# Patient Record
Sex: MALE | Race: WHITE | Employment: OTHER | URBAN - NONMETROPOLITAN AREA
[De-identification: names, ages, dates, MRNs, and addresses within clinical notes are randomized per-mention and may not be internally consistent; named-entity substitution may affect disease eponyms.]

---

## 2017-01-10 ENCOUNTER — DOCTOR'S OFFICE (OUTPATIENT)
Dept: URBAN - NONMETROPOLITAN AREA CLINIC 1 | Facility: CLINIC | Age: 67
Setting detail: OPHTHALMOLOGY
End: 2017-01-10
Payer: COMMERCIAL

## 2017-01-10 DIAGNOSIS — H40.013: ICD-10-CM

## 2017-01-10 DIAGNOSIS — E11.9: ICD-10-CM

## 2017-01-10 DIAGNOSIS — H25.13: ICD-10-CM

## 2017-01-10 PROCEDURE — 92014 COMPRE OPH EXAM EST PT 1/>: CPT | Performed by: OPHTHALMOLOGY

## 2017-01-10 ASSESSMENT — REFRACTION_MANIFEST
OD_VA3: 20/
OU_VA: 20/
OS_VA3: 20/
OS_SPHERE: -6.50
OS_VA1: 20/25-2
OD_VA2: 20/
OS_CYLINDER: -1.00
OD_CYLINDER: -1.25
OS_VA2: 20/
OS_CYLINDER: -0.75
OS_VA2: 20/25-2
OS_AXIS: 010
OS_SPHERE: -6.50
OD_VA2: 20/25-2
OS_VA2: 20/25-2
OD_AXIS: 180
OD_ADD: +1.75
OS_VA3: 20/
OD_VA1: 20/
OU_VA: 20/
OD_VA1: 20/25-2
OD_VA1: 20/25-2
OS_AXIS: 010
OU_VA: 20/
OD_ADD: +2.25
OS_VA3: 20/
OS_ADD: +2.25
OS_VA1: 20/
OD_SPHERE: -5.00
OD_SPHERE: -4.75
OD_AXIS: 180
OD_CYLINDER: -1.25
OD_VA2: 20/25-2
OD_VA3: 20/
OD_VA3: 20/
OS_ADD: +1.75
OS_VA1: 20/25-2

## 2017-01-10 ASSESSMENT — REFRACTION_CURRENTRX
OS_SPHERE: -6.75
OS_VPRISM_DIRECTION: PROGS
OS_SPHERE: -7.75
OD_CYLINDER: -1.50
OD_VPRISM_DIRECTION: PROGS
OD_AXIS: 180
OS_AXIS: 6
OD_SPHERE: -5.75
OS_CYLINDER: -1.00
OD_OVR_VA: 20/
OD_OVR_VA: 20/
OS_OVR_VA: 20/
OS_CYLINDER: -0.75
OD_ADD: +2.00
OS_VPRISM_DIRECTION: PROGS
OS_OVR_VA: 20/
OD_AXIS: 180
OD_CYLINDER: -1.50
OS_ADD: +2.25
OD_SPHERE: -5.25
OS_OVR_VA: 20/
OD_VPRISM_DIRECTION: PROGS
OS_AXIS: 010
OS_ADD: +2.00
OD_OVR_VA: 20/
OD_ADD: +2.25

## 2017-01-10 ASSESSMENT — REFRACTION_AUTOREFRACTION
OD_CYLINDER: -1.00
OS_SPHERE: -6.00
OD_SPHERE: -4.75
OD_AXIS: 174
OS_AXIS: 19
OS_CYLINDER: -1.00

## 2017-01-10 ASSESSMENT — LID POSITION - DERMATOCHALASIS
OD_DERMATOCHALASIS: RUL
OS_DERMATOCHALASIS: LUL

## 2017-01-10 ASSESSMENT — SPHEQUIV_DERIVED
OD_SPHEQUIV: -5.625
OD_SPHEQUIV: -5.375
OS_SPHEQUIV: -6.875
OS_SPHEQUIV: -6.5
OS_SPHEQUIV: -7
OD_SPHEQUIV: -5.25

## 2017-01-10 ASSESSMENT — LID POSITION - PTOSIS
OS_PTOSIS: LUL
OD_PTOSIS: RUL

## 2017-01-10 ASSESSMENT — VISUAL ACUITY
OS_BCVA: 20/25
OD_BCVA: 20/25-2

## 2017-01-10 ASSESSMENT — CONFRONTATIONAL VISUAL FIELD TEST (CVF)
OS_FINDINGS: FULL
OD_FINDINGS: FULL

## 2017-07-18 ENCOUNTER — DOCTOR'S OFFICE (OUTPATIENT)
Dept: URBAN - NONMETROPOLITAN AREA CLINIC 1 | Facility: CLINIC | Age: 67
Setting detail: OPHTHALMOLOGY
End: 2017-07-18
Payer: COMMERCIAL

## 2017-07-18 DIAGNOSIS — H40.013: ICD-10-CM

## 2017-07-18 DIAGNOSIS — E11.3293: ICD-10-CM

## 2017-07-18 DIAGNOSIS — E11.9: ICD-10-CM

## 2017-07-18 DIAGNOSIS — Z79.84: ICD-10-CM

## 2017-07-18 DIAGNOSIS — H25.13: ICD-10-CM

## 2017-07-18 PROCEDURE — 92083 EXTENDED VISUAL FIELD XM: CPT | Performed by: OPHTHALMOLOGY

## 2017-07-18 PROCEDURE — 92250 FUNDUS PHOTOGRAPHY W/I&R: CPT | Performed by: OPHTHALMOLOGY

## 2017-07-18 PROCEDURE — 92014 COMPRE OPH EXAM EST PT 1/>: CPT | Performed by: OPHTHALMOLOGY

## 2017-07-18 PROCEDURE — 76514 ECHO EXAM OF EYE THICKNESS: CPT | Performed by: OPHTHALMOLOGY

## 2017-07-18 PROCEDURE — 92134 CPTRZ OPH DX IMG PST SGM RTA: CPT | Performed by: OPHTHALMOLOGY

## 2017-07-18 ASSESSMENT — REFRACTION_CURRENTRX
OD_SPHERE: -5.25
OS_VPRISM_DIRECTION: PROGS
OD_AXIS: 180
OS_CYLINDER: -0.75
OD_OVR_VA: 20/
OS_AXIS: 010
OD_CYLINDER: -1.50
OD_OVR_VA: 20/
OS_OVR_VA: 20/
OD_AXIS: 180
OD_ADD: +2.25
OS_ADD: +2.00
OD_SPHERE: -5.75
OD_OVR_VA: 20/
OS_OVR_VA: 20/
OS_OVR_VA: 20/
OS_VPRISM_DIRECTION: PROGS
OD_VPRISM_DIRECTION: PROGS
OS_SPHERE: -7.75
OS_CYLINDER: -1.00
OD_VPRISM_DIRECTION: PROGS
OS_ADD: +2.25
OS_AXIS: 6
OS_SPHERE: -6.75
OD_ADD: +2.00
OD_CYLINDER: -1.50

## 2017-07-18 ASSESSMENT — SPHEQUIV_DERIVED
OD_SPHEQUIV: -5.625
OD_SPHEQUIV: -5.375
OS_SPHEQUIV: -6.5
OD_SPHEQUIV: -5.25
OS_SPHEQUIV: -7
OS_SPHEQUIV: -6.875

## 2017-07-18 ASSESSMENT — LID POSITION - PTOSIS
OD_PTOSIS: RUL
OS_PTOSIS: LUL

## 2017-07-18 ASSESSMENT — REFRACTION_MANIFEST
OD_VA2: 20/25-2
OS_VA2: 20/
OS_CYLINDER: -1.00
OD_CYLINDER: -1.25
OD_VA2: 20/
OS_AXIS: 010
OS_VA3: 20/
OD_VA3: 20/
OD_CYLINDER: -1.25
OS_ADD: +1.75
OD_AXIS: 180
OS_VA2: 20/25-2
OS_SPHERE: -6.50
OS_VA1: 20/25-2
OS_ADD: +2.25
OD_VA2: 20/25-2
OU_VA: 20/
OD_SPHERE: -5.00
OD_VA1: 20/
OD_VA1: 20/25-2
OU_VA: 20/
OS_AXIS: 010
OS_CYLINDER: -0.75
OS_SPHERE: -6.50
OD_AXIS: 180
OS_VA1: 20/25-2
OD_SPHERE: -4.75
OS_VA2: 20/25-2
OD_VA3: 20/
OD_ADD: +1.75
OD_VA3: 20/
OS_VA3: 20/
OS_VA1: 20/
OS_VA3: 20/
OD_ADD: +2.25
OU_VA: 20/
OD_VA1: 20/25-2

## 2017-07-18 ASSESSMENT — CONFRONTATIONAL VISUAL FIELD TEST (CVF)
OS_FINDINGS: FULL
OD_FINDINGS: FULL

## 2017-07-18 ASSESSMENT — REFRACTION_AUTOREFRACTION
OS_CYLINDER: -1.00
OS_AXIS: 19
OD_AXIS: 174
OS_SPHERE: -6.00
OD_SPHERE: -4.75
OD_CYLINDER: -1.00

## 2017-07-18 ASSESSMENT — PACHYMETRY
OS_CT_UM: 612
OD_CT_UM: 622
OS_CT_CORRECTION: -5
OD_CT_CORRECTION: -6

## 2017-07-18 ASSESSMENT — VISUAL ACUITY
OD_BCVA: 20/25
OS_BCVA: 20/25+2

## 2017-07-18 ASSESSMENT — LID POSITION - DERMATOCHALASIS
OD_DERMATOCHALASIS: RUL
OS_DERMATOCHALASIS: LUL

## 2017-07-24 ENCOUNTER — ALLSCRIPTS OFFICE VISIT (OUTPATIENT)
Dept: OTHER | Facility: OTHER | Age: 67
End: 2017-07-24

## 2017-07-25 ENCOUNTER — GENERIC CONVERSION - ENCOUNTER (OUTPATIENT)
Dept: OTHER | Facility: OTHER | Age: 67
End: 2017-07-25

## 2017-08-10 ENCOUNTER — GENERIC CONVERSION - ENCOUNTER (OUTPATIENT)
Dept: OTHER | Facility: OTHER | Age: 67
End: 2017-08-10

## 2017-10-10 ENCOUNTER — ALLSCRIPTS OFFICE VISIT (OUTPATIENT)
Dept: OTHER | Facility: OTHER | Age: 67
End: 2017-10-10

## 2017-10-10 DIAGNOSIS — E11.9 TYPE 2 DIABETES MELLITUS WITHOUT COMPLICATIONS (HCC): ICD-10-CM

## 2017-10-11 NOTE — PROGRESS NOTES
Assessment  1  DM type 2, not at goal (250 00) (E11 9)   2  BPH with obstruction/lower urinary tract symptoms (600 01,599 69) (N40 1,N13 8)   3  Hypertension (401 9) (I10)    Plan  BPH with obstruction/lower urinary tract symptoms    · From  Avodart 0 5 MG Oral Capsule  To Dutasteride 0 5 MG Oral Capsule take  1 capsule daily  Diabetes    · Levemir FlexTouch 100 UNIT/ML Subcutaneous Solution Pen-injector; INJECT  60 UNIT Twice daily  DM type 2, not at goal    · HumaLOG KwikPen 100 UNIT/ML Subcutaneous Solution Pen-injector; For BS  200-250, give 2 units  251-3, give 4 units; 301-350 give 6 units; 351-400, 8 units;  >400, give 10  units and call office   · NovoFine 32G X 6 MM Miscellaneous; test 4 times daily   · OneTouch Ultra Blue In Vitro Strip; TEST 4 TIMES DAILY   · (1) HEMOGLOBIN A1C; Status:Complete;   Done: 80KUS8764 11:17AM    Discussion/Summary  Possible side effects of new medications were reviewed with the patient/guardian today  The treatment plan was reviewed with the patient/guardian  The patient/guardian understands and agrees with the treatment plan      Chief Complaint  NEW MED WAS ADDED HCTZ BY ENDO   Patient is here today for follow up of chronic conditions described in HPI  History of Present Illness  His A1c is not at goal  It is 9 2% in the office today  He has no hypoglycemia  He has no side effects from his medications  BP is at goal on his current regimen  He has no CP or SOB  He has no HA or vision changes  LDL is at goal on his current regimen  He has no myalgia or muscle weakness  Review of Systems    Constitutional: No fever or chills, feels well, no tiredness, no recent weight gain or weight loss  Eyes: No complaints of eye pain, no red eyes, no discharge from eyes, no itchy eyes  ENT: no complaints of earache, no hearing loss, no nosebleeds, no nasal discharge, no sore throat, no hoarseness     Cardiovascular: No complaints of slow heart rate, no fast heart rate, no chest pain, no palpitations, no leg claudication, no lower extremity  Respiratory: No complaints of shortness of breath, no wheezing, no cough, no SOB on exertion, no orthopnea or PND  Gastrointestinal: No complaints of abdominal pain, no constipation, no nausea or vomiting, no diarrhea or bloody stools  Genitourinary: No complaints of dysuria, no incontinence, no hesitancy, no nocturia, no genital lesion, no testicular pain  Musculoskeletal: No complaints of arthralgia, no myalgias, no joint swelling or stiffness, no limb pain or swelling  Integumentary: No complaints of skin rash or skin lesions, no itching, no skin wound, no dry skin  Neurological: No compliants of headache, no confusion, no convulsions, no numbness or tingling, no dizziness or fainting, no limb weakness, no difficulty walking  Psychiatric: Is not suicidal, no sleep disturbances, no anxiety or depression, no change in personality, no emotional problems  Endocrine: No complaints of proptosis, no hot flashes, no muscle weakness, no erectile dysfunction, no deepening of the voice, no feelings of weakness  Hematologic/Lymphatic: No complaints of swollen glands, no swollen glands in the neck, does not bleed easily, no easy bruising  Active Problems  1  Arthritis of knee (716 96) (M17 10)   2  Diabetes (250 00) (E11 9)   3  DM type 2, not at goal (250 00) (E11 9)   4  Hypercalciuria (275 40) (E83 50)   5  Hyperparathyroidism (252 00) (E21 3)   6  Hypertension (401 9) (I10)   7  Knee osteoarthritis (715 36) (M17 10)   8  Long-term insulin use (V58 67) (Z79 4)   9  Prepatellar bursitis (726 65) (M70 40)   10  Screen for colon cancer (V76 51) (Z12 11)    Surgical History  1  History of Hernia Repair    Family History  Mother    1  Family history of arthritis (V17 7) (Z82 61)   2  Family history of coronary artery disease (V17 3) (Z82 49)   3  Family history of type 2 diabetes mellitus (V18 0) (Z83 3)  Father    4   Family history of coronary artery disease (V17 3) (Z82 49)  Maternal Uncle    5  Family history of Diabetes (250 00) (E11 9)  Paternal Uncle    10  Family history of Diabetes (250 00) (E11 9)    Social History   · Never a smoker    Current Meds   1  Avodart 0 5 MG Oral Capsule; Therapy: 86IWN5363 to (Evaluate:08Sep2014) Recorded   2  Gabapentin 600 MG Oral Tablet; TAKE 1 TABLET 3 TIMES DAILY; Therapy: 33TUY0505 to (Evaluate:21Nov2017) Recorded   3  HydroCHLOROthiazide 25 MG Oral Tablet; Therapy: 91NTV1945 to Recorded   4  Januvia 100 MG Oral Tablet; Therapy: 41Fgz4327 to (Evaluate:08Sep2014) Recorded   5  Levemir FlexTouch 100 UNIT/ML Subcutaneous Solution Pen-injector; INJECT 60 UNIT   Twice daily; Therapy: 93MMQ5374 to (Last Rx:01Aug2017)  Requested for: 01Aug2017 Ordered   6  Lipitor 40 MG Oral Tablet; Therapy: 31HGJ9597 to Recorded   7  Lisinopril 40 MG Oral Tablet; Therapy: 18BFV6563 to (Evaluate:08Sep2014) Recorded   8  Metanx 3-90 314-2-35 MG Oral Capsule; Therapy: 27UKX9246 to (Evaluate:08Sep2014) Recorded   9  MetFORMIN HCl - 1000 MG Oral Tablet; Therapy: 45Npx3922 to (Evaluate:08Sep2014) Recorded   10  OneTouch Ultra Blue In [employer]; Therapy: 47LEI9354 to (Evaluate:08Sep2014) Recorded   11  Pioglitazone HCl - 45 MG Oral Tablet; Therapy: 83NDS5264 to (Evaluate:08Sep2014) Recorded   12  Tamsulosin HCl - 0 4 MG Oral Capsule; Therapy: 55HWJ6065 to (Evaluate:08Sep2014) Recorded    Allergies  1  No Known Drug Allergies    Vitals  Vital Signs    Recorded: 58IUN3141 70:13CK   Systolic 376   Diastolic 74   Height 5 ft 9 in   Weight 218 lb    BMI Calculated 32 19   BSA Calculated 2 14     Physical Exam    Constitutional   General appearance: No acute distress, well appearing and well nourished  Pulmonary   Respiratory effort: No increased work of breathing or signs of respiratory distress      Auscultation of lungs: Clear to auscultation, equal breath sounds bilaterally, no wheezes, no rales, no rhonci  Cardiovascular   Auscultation of heart: Normal rate and rhythm, normal S1 and S2, without murmurs  Examination of extremities for edema and/or varicosities: Normal     Abdomen   Abdomen: Non-tender, no masses  Liver and spleen: No hepatomegaly or splenomegaly  Musculoskeletal   Gait and station: Normal     Digits and nails: Normal without clubbing or cyanosis  Inspection/palpation of joints, bones, and muscles: Normal          Results/Data  (1) HEMOGLOBIN A1C 10Oct2017 11:17AM Lynn Meneses     Test Name Result Flag Reference   HEMOGLOBIN A1C 9 2                     Health Management  Screen for colon cancer   COLONOSCOPY; every 10 years; Last 28Jun2007; Next Due: 21LGJ6199;  Overdue    Signatures   Electronically signed by : Nina De La Paz MD; Oct 10 2017 11:39AM EST                       (Author)

## 2018-01-09 ENCOUNTER — ALLSCRIPTS OFFICE VISIT (OUTPATIENT)
Dept: OTHER | Facility: OTHER | Age: 68
End: 2018-01-09

## 2018-01-09 DIAGNOSIS — Z11.59 ENCOUNTER FOR SCREENING FOR OTHER VIRAL DISEASES (CODE): ICD-10-CM

## 2018-01-09 DIAGNOSIS — I10 ESSENTIAL (PRIMARY) HYPERTENSION: ICD-10-CM

## 2018-01-09 DIAGNOSIS — E11.9 TYPE 2 DIABETES MELLITUS WITHOUT COMPLICATIONS (HCC): ICD-10-CM

## 2018-01-10 NOTE — PROGRESS NOTES
Assessment   1  DM type 2, not at goal (250 00) (E11 9)   2  Hyperparathyroidism (252 00) (E21 3)   3  Long-term insulin use (V58 67) (Z79 4)   4  Hypertension (401 9) (I10)   5  Encounter for hepatitis C screening test for low risk patient (V73 89) (Z11 59)   6  Screening for neurological condition (V80 09) (Z13 89)   7  Screening for genitourinary condition (V81 6) (Z13 89)   8  Need for pneumococcal vaccination (V03 82) (Z23)    Plan   DM type 2, not at goal    · (1) COMPREHENSIVE METABOLIC PANEL; Status:Active; Requested GZS:06NHA2769;    · (1) HEMOGLOBIN A1C; Status:Active; Requested RND:70IFQ3196;    · (1) LIPID PANEL, FASTING; Status:Active; Requested CWQ:32LKP9491;    · (1) TSH WITH FT4 REFLEX; Status:Active; Requested FCR:73YFY5187;   Encounter for hepatitis C screening test for low risk patient    · (1) HEP C ANTIBODY; Status:Active; Requested KVD:03VKO6208;   Hyperparathyroidism    · (Q) PTH, INTACT AND CALCIUM; Status:Active; Requested KFU:24BUB8308;   Hypertension    · (1) MICROALBUMIN CREATININE RATIO, RANDOM URINE; Status:Active; Requested    TVN:84UTF3780;   Need for pneumococcal vaccination    · Pneumo (Pneumovax); INJECT 0 5  ML Intramuscular; To Be Done:    39MJV2371  Screening for genitourinary condition    · *VB - Urinary Incontinence Screen (Dx Z13 89 Screen for UI); Status:Complete;   Done:    45DUJ8123 09:19AM  Screening for neurological condition    · *VB - Fall Risk Assessment  (Dx Z13 89 Screen for Neurologic Disorder);    Status:Complete;   Done: 22QLQ6156 09:18AM    Discussion/Summary   Possible side effects of new medications were reviewed with the patient/guardian today  The treatment plan was reviewed with the patient/guardian  The patient/guardian understands and agrees with the treatment plan      Chief Complaint   Patient is here today for follow up of chronic conditions described in HPI  History of Present Illness   His fasting blood sugars are much better since the New Year  He has not changed anything, however  He has no hypoglycemia  His last A1c was 9 0%  He is trying to watch his diet and exercise  He has no hypoglycemia  lipids are at goal on his current regimen  He has no myalgia or muscle weakness  He has no RUQ pain or nausea  BP is a little high today, but has been under better control previously  He has no CP or SOB  He has no HA or vision changes  BPH is well controlled  He has no incontinence  Review of Systems        Preventive Quality 65 and Older: Falls Risk: The patient fell 0 times in the past 12 months  The patient currently has no urinary incontinence symptoms  Active Problems   1  Arthritis of knee (716 96) (M17 10)   2  BPH with obstruction/lower urinary tract symptoms (600 01,599 69) (N40 1,N13 8)   3  Diabetes (250 00) (E11 9)   4  DM type 2, not at goal (250 00) (E11 9)   5  Hypercalciuria (275 40) (E83 50)   6  Hyperparathyroidism (252 00) (E21 3)   7  Hypertension (401 9) (I10)   8  Knee osteoarthritis (715 36) (M17 10)   9  Long-term insulin use (V58 67) (Z79 4)   10  Need for influenza vaccination (V04 81) (Z23)   11  Prepatellar bursitis (726 65) (M70 40)   12  Screen for colon cancer (V76 51) (Z12 11)    Past Medical History      The active problems and past medical history were reviewed and updated today  Surgical History   1  History of Hernia Repair     The surgical history was reviewed and updated today  Family History   Mother    1  Family history of arthritis (V17 7) (Z82 61)   2  Family history of coronary artery disease (V17 3) (Z82 49)   3  Family history of type 2 diabetes mellitus (V18 0) (Z83 3)  Father    4  Family history of coronary artery disease (V17 3) (Z82 49)  Maternal Uncle    5  Family history of Diabetes (250 00) (E11 9)  Paternal Uncle    10  Family history of Diabetes (250 00) (E11 9)     The family history was reviewed and updated today         Social History    · Never a smoker  The social history was reviewed and updated today  The social history was reviewed and is unchanged  Current Meds    1  Dutasteride 0 5 MG Oral Capsule; take 1 capsule daily; Therapy: 11IRF3116 to (Last Rx:10Oct2017)  Requested for: 10Oct2017 Ordered   2  Gabapentin 600 MG Oral Tablet; TAKE 1 TABLET 3 TIMES DAILY; Therapy: 49XMF8269 to (Evaluate:21Nov2017) Recorded   3  HumaLOG KwikPen 100 UNIT/ML Subcutaneous Solution Pen-injector; For -250,     give 2 units  251-3, give 4 units; 301-350 give 6 units; 351-400, 8 units;  >400, give 10     units and call office; Therapy: 77YEN7527 to (Last Rx:10Oct2017)  Requested for: 53SKQ9503 Ordered   4  HydroCHLOROthiazide 25 MG Oral Tablet; TAKE 1 TABLET DAILY; Therapy: 57EPC1790 to ); Last Rx:31Oct2017 Ordered   5  Januvia 100 MG Oral Tablet; Therapy: 30Apr2013 to (Evaluate:08Sep2014) Recorded   6  Levemir FlexTouch 100 UNIT/ML Subcutaneous Solution Pen-injector; INJECT 60 UNIT     Twice daily; Therapy: 97XHL6435 to (Last Rx:10Oct2017)  Requested for: 99VUN1501 Ordered   7  Lipitor 40 MG Oral Tablet; Therapy: 91XRO6886 to Recorded   8  Lisinopril 40 MG Oral Tablet; Therapy: 68EEU6295 to (Evaluate:08Sep2014) Recorded   9  Metanx 3-90 314-2-35 MG Oral Capsule; Therapy: 18BCX9463 to (Evaluate:08Sep2014) Recorded   10  MetFORMIN HCl - 1000 MG Oral Tablet; Therapy: 90Ytj0304 to (Evaluate:08Sep2014) Recorded   11  NovoFine 32G X 6 MM Miscellaneous; test 4 times daily; Therapy: 57WLM8739 to (Last Rx:10Oct2017)  Requested for: 72UBB4123 Ordered   12  OneTouch Ultra Blue In Vitro Strip; TEST 4 TIMES DAILY; Therapy: 05OPH0704 to (28 873 135)  Requested for: 36YGI7356; Last      Rx:10Oct2017 Ordered   13  OneTouch Ultra Blue In [employer]; Therapy: 10CQA4408 to (Evaluate:04Omj4460) Recorded   14  Pioglitazone HCl - 45 MG Oral Tablet; Therapy: 91ZJM2030 to (Evaluate:08Sep2014) Recorded   15   Tamsulosin HCl - 0 4 MG Oral Capsule; take 1 capsule daily; Therapy: 39VXS6813 to (Evaluate:74Iwc1722)  Requested for: 59Quh9788; Last      Rx:31Bga0694 Ordered     The medication list was reviewed and updated today  Allergies   1  No Known Drug Allergies    Vitals   Vital Signs    Recorded: 42WNR1351 08:49AM   Heart Rate 81   Respiration 14   Systolic 449   Diastolic 76   Height 5 ft 9 in   Weight 212 lb    BMI Calculated 31 31   BSA Calculated 2 12   O2 Saturation 98     Physical Exam        Constitutional      General appearance: No acute distress, well appearing and well nourished  Pulmonary      Respiratory effort: No increased work of breathing or signs of respiratory distress  Auscultation of lungs: Clear to auscultation, equal breath sounds bilaterally, no wheezes, no rales, no rhonci  Cardiovascular      Auscultation of heart: Normal rate and rhythm, normal S1 and S2, without murmurs  Examination of extremities for edema and/or varicosities: Normal        Abdomen      Abdomen: Non-tender, no masses  Liver and spleen: No hepatomegaly or splenomegaly  Health Management   Screen for colon cancer   COLONOSCOPY; every 10 years; Last 28Jun2007; Next Due: 16LSV7375;  Overdue    Signatures    Electronically signed by : Naomi Otoole MD; Jan 9 2018  9:28AM EST                       (Author)

## 2018-01-12 ENCOUNTER — TRANSCRIBE ORDERS (OUTPATIENT)
Dept: LAB | Facility: MEDICAL CENTER | Age: 68
End: 2018-01-12

## 2018-01-12 ENCOUNTER — APPOINTMENT (OUTPATIENT)
Dept: LAB | Facility: MEDICAL CENTER | Age: 68
End: 2018-01-12
Payer: COMMERCIAL

## 2018-01-12 DIAGNOSIS — I10 ESSENTIAL (PRIMARY) HYPERTENSION: ICD-10-CM

## 2018-01-12 DIAGNOSIS — Z11.59 ENCOUNTER FOR SCREENING FOR OTHER VIRAL DISEASES (CODE): ICD-10-CM

## 2018-01-12 DIAGNOSIS — E83.50 UNSPECIFIED DISORDER OF CALCIUM METABOLISM: ICD-10-CM

## 2018-01-12 DIAGNOSIS — E34.9 ENDOCRINE DISORDER: ICD-10-CM

## 2018-01-12 DIAGNOSIS — E83.50 UNSPECIFIED DISORDER OF CALCIUM METABOLISM: Primary | ICD-10-CM

## 2018-01-12 DIAGNOSIS — E11.9 TYPE 2 DIABETES MELLITUS WITHOUT COMPLICATIONS (HCC): ICD-10-CM

## 2018-01-12 LAB
ALBUMIN SERPL BCP-MCNC: 3.6 G/DL (ref 3.5–5)
ALP SERPL-CCNC: 90 U/L (ref 46–116)
ALT SERPL W P-5'-P-CCNC: 39 U/L (ref 12–78)
ANION GAP SERPL CALCULATED.3IONS-SCNC: 5 MMOL/L (ref 4–13)
AST SERPL W P-5'-P-CCNC: 19 U/L (ref 5–45)
BILIRUB SERPL-MCNC: 1.48 MG/DL (ref 0.2–1)
BUN SERPL-MCNC: 10 MG/DL (ref 5–25)
CALCIUM SERPL-MCNC: 9.8 MG/DL (ref 8.3–10.1)
CHLORIDE SERPL-SCNC: 106 MMOL/L (ref 100–108)
CHOLEST SERPL-MCNC: 107 MG/DL (ref 50–200)
CO2 SERPL-SCNC: 27 MMOL/L (ref 21–32)
CREAT SERPL-MCNC: 0.53 MG/DL (ref 0.6–1.3)
CREAT UR-MCNC: 87.9 MG/DL
EST. AVERAGE GLUCOSE BLD GHB EST-MCNC: 197 MG/DL
GFR SERPL CREATININE-BSD FRML MDRD: 111 ML/MIN/1.73SQ M
GLUCOSE P FAST SERPL-MCNC: 129 MG/DL (ref 65–99)
HBA1C MFR BLD: 8.5 % (ref 4.2–6.3)
HDLC SERPL-MCNC: 31 MG/DL (ref 40–60)
LDLC SERPL CALC-MCNC: 49 MG/DL (ref 0–100)
MICROALBUMIN UR-MCNC: 9.8 MG/L (ref 0–20)
MICROALBUMIN/CREAT 24H UR: 11 MG/G CREATININE (ref 0–30)
POTASSIUM SERPL-SCNC: 4.1 MMOL/L (ref 3.5–5.3)
PROT SERPL-MCNC: 7 G/DL (ref 6.4–8.2)
PTH-INTACT SERPL-MCNC: 82.3 PG/ML (ref 14–72)
SODIUM SERPL-SCNC: 138 MMOL/L (ref 136–145)
TRIGL SERPL-MCNC: 136 MG/DL
TSH SERPL DL<=0.05 MIU/L-ACNC: 2.38 UIU/ML (ref 0.36–3.74)

## 2018-01-12 PROCEDURE — 80053 COMPREHEN METABOLIC PANEL: CPT

## 2018-01-12 PROCEDURE — 83970 ASSAY OF PARATHORMONE: CPT

## 2018-01-12 PROCEDURE — 83036 HEMOGLOBIN GLYCOSYLATED A1C: CPT

## 2018-01-12 PROCEDURE — 82570 ASSAY OF URINE CREATININE: CPT

## 2018-01-12 PROCEDURE — 86803 HEPATITIS C AB TEST: CPT

## 2018-01-12 PROCEDURE — 80061 LIPID PANEL: CPT

## 2018-01-12 PROCEDURE — 36415 COLL VENOUS BLD VENIPUNCTURE: CPT

## 2018-01-12 PROCEDURE — 82043 UR ALBUMIN QUANTITATIVE: CPT

## 2018-01-12 PROCEDURE — 84443 ASSAY THYROID STIM HORMONE: CPT

## 2018-01-13 VITALS
WEIGHT: 218 LBS | SYSTOLIC BLOOD PRESSURE: 136 MMHG | BODY MASS INDEX: 32.29 KG/M2 | DIASTOLIC BLOOD PRESSURE: 74 MMHG | HEIGHT: 69 IN

## 2018-01-13 LAB — HCV AB SER QL: NORMAL

## 2018-01-14 VITALS
SYSTOLIC BLOOD PRESSURE: 132 MMHG | HEIGHT: 69 IN | WEIGHT: 220.13 LBS | HEART RATE: 88 BPM | RESPIRATION RATE: 15 BRPM | DIASTOLIC BLOOD PRESSURE: 74 MMHG | BODY MASS INDEX: 32.6 KG/M2 | OXYGEN SATURATION: 97 %

## 2018-01-23 ENCOUNTER — DOCTOR'S OFFICE (OUTPATIENT)
Dept: URBAN - NONMETROPOLITAN AREA CLINIC 1 | Facility: CLINIC | Age: 68
Setting detail: OPHTHALMOLOGY
End: 2018-01-23
Payer: COMMERCIAL

## 2018-01-23 VITALS
DIASTOLIC BLOOD PRESSURE: 76 MMHG | WEIGHT: 212 LBS | BODY MASS INDEX: 31.4 KG/M2 | SYSTOLIC BLOOD PRESSURE: 142 MMHG | HEIGHT: 69 IN | RESPIRATION RATE: 14 BRPM | HEART RATE: 81 BPM | OXYGEN SATURATION: 98 %

## 2018-01-23 DIAGNOSIS — E11.9: ICD-10-CM

## 2018-01-23 DIAGNOSIS — Z79.84: ICD-10-CM

## 2018-01-23 DIAGNOSIS — H40.013: ICD-10-CM

## 2018-01-23 DIAGNOSIS — H25.13: ICD-10-CM

## 2018-01-23 DIAGNOSIS — E11.3293: ICD-10-CM

## 2018-01-23 PROCEDURE — 92133 CPTRZD OPH DX IMG PST SGM ON: CPT | Performed by: OPHTHALMOLOGY

## 2018-01-23 PROCEDURE — 92014 COMPRE OPH EXAM EST PT 1/>: CPT | Performed by: OPHTHALMOLOGY

## 2018-01-23 ASSESSMENT — SPHEQUIV_DERIVED
OD_SPHEQUIV: -5.375
OS_SPHEQUIV: -6.5
OD_SPHEQUIV: -5.25
OS_SPHEQUIV: -7
OD_SPHEQUIV: -5.625
OS_SPHEQUIV: -6.875

## 2018-01-23 ASSESSMENT — REFRACTION_CURRENTRX
OD_OVR_VA: 20/
OS_OVR_VA: 20/
OS_SPHERE: -7.75
OS_ADD: +2.00
OS_VPRISM_DIRECTION: PROGS
OS_ADD: +2.25
OS_CYLINDER: -0.75
OS_OVR_VA: 20/
OD_CYLINDER: -1.50
OD_CYLINDER: -1.50
OS_OVR_VA: 20/
OD_OVR_VA: 20/
OS_SPHERE: -6.75
OS_AXIS: 6
OD_SPHERE: -5.75
OS_CYLINDER: -1.00
OS_AXIS: 010
OD_ADD: +2.00
OD_OVR_VA: 20/
OD_VPRISM_DIRECTION: PROGS
OD_SPHERE: -5.25
OD_AXIS: 180
OD_ADD: +2.25
OD_AXIS: 180
OS_VPRISM_DIRECTION: PROGS
OD_VPRISM_DIRECTION: PROGS

## 2018-01-23 ASSESSMENT — REFRACTION_MANIFEST
OS_SPHERE: -6.50
OD_ADD: +1.75
OS_VA2: 20/
OS_VA3: 20/
OS_ADD: +2.25
OD_CYLINDER: -1.25
OS_AXIS: 010
OD_VA2: 20/25-2
OU_VA: 20/
OD_ADD: +2.25
OS_VA3: 20/
OD_VA3: 20/
OS_CYLINDER: -0.75
OS_VA3: 20/
OS_VA2: 20/25-2
OS_ADD: +1.75
OD_VA2: 20/
OS_VA1: 20/25-2
OD_VA1: 20/25-2
OS_CYLINDER: -1.00
OD_SPHERE: -4.75
OS_VA2: 20/25-2
OD_SPHERE: -5.00
OS_AXIS: 010
OU_VA: 20/
OD_CYLINDER: -1.25
OD_AXIS: 180
OD_VA1: 20/
OD_VA2: 20/25-2
OD_VA1: 20/25-2
OS_SPHERE: -6.50
OU_VA: 20/
OS_VA1: 20/25-2
OD_AXIS: 180
OD_VA3: 20/
OS_VA1: 20/
OD_VA3: 20/

## 2018-01-23 ASSESSMENT — LID POSITION - DERMATOCHALASIS
OS_DERMATOCHALASIS: LUL
OD_DERMATOCHALASIS: RUL

## 2018-01-23 ASSESSMENT — REFRACTION_AUTOREFRACTION
OD_CYLINDER: -1.00
OD_AXIS: 174
OD_SPHERE: -4.75
OS_CYLINDER: -1.00
OS_SPHERE: -6.00
OS_AXIS: 19

## 2018-01-23 ASSESSMENT — CONFRONTATIONAL VISUAL FIELD TEST (CVF)
OS_FINDINGS: FULL
OD_FINDINGS: FULL

## 2018-01-23 ASSESSMENT — LID POSITION - PTOSIS
OS_PTOSIS: LUL
OD_PTOSIS: RUL

## 2018-01-23 ASSESSMENT — VISUAL ACUITY
OD_BCVA: 20/20-2
OS_BCVA: 20/20-1

## 2018-02-13 ENCOUNTER — HOSPITAL ENCOUNTER (EMERGENCY)
Facility: HOSPITAL | Age: 68
Discharge: HOME | End: 2018-02-13
Payer: COMMERCIAL

## 2018-02-13 ENCOUNTER — APPOINTMENT (EMERGENCY)
Dept: ULTRASOUND IMAGING | Facility: HOSPITAL | Age: 68
End: 2018-02-13
Payer: COMMERCIAL

## 2018-02-13 ENCOUNTER — OFFICE VISIT (OUTPATIENT)
Dept: FAMILY MEDICINE CLINIC | Facility: CLINIC | Age: 68
End: 2018-02-13
Payer: COMMERCIAL

## 2018-02-13 VITALS
WEIGHT: 209.4 LBS | HEIGHT: 71 IN | DIASTOLIC BLOOD PRESSURE: 58 MMHG | RESPIRATION RATE: 14 BRPM | OXYGEN SATURATION: 96 % | HEART RATE: 100 BPM | SYSTOLIC BLOOD PRESSURE: 94 MMHG | BODY MASS INDEX: 29.31 KG/M2

## 2018-02-13 VITALS
BODY MASS INDEX: 29.46 KG/M2 | TEMPERATURE: 97.8 F | OXYGEN SATURATION: 99 % | RESPIRATION RATE: 16 BRPM | SYSTOLIC BLOOD PRESSURE: 127 MMHG | DIASTOLIC BLOOD PRESSURE: 68 MMHG | HEART RATE: 100 BPM | WEIGHT: 211.2 LBS

## 2018-02-13 DIAGNOSIS — N45.3 EPIDIDYMOORCHITIS: Primary | ICD-10-CM

## 2018-02-13 DIAGNOSIS — N50.812 TESTICULAR PAIN, LEFT: Primary | ICD-10-CM

## 2018-02-13 LAB
BACTERIA UR QL AUTO: ABNORMAL /HPF
BILIRUB UR QL STRIP: NEGATIVE
CLARITY UR: ABNORMAL
COLOR UR: ABNORMAL
GLUCOSE UR STRIP-MCNC: ABNORMAL MG/DL
HGB UR QL STRIP.AUTO: ABNORMAL
HYALINE CASTS #/AREA URNS LPF: ABNORMAL /LPF
KETONES UR STRIP-MCNC: ABNORMAL MG/DL
LEUKOCYTE ESTERASE UR QL STRIP: ABNORMAL
NITRITE UR QL STRIP: NEGATIVE
NON-SQ EPI CELLS URNS QL MICRO: ABNORMAL /HPF
PH UR STRIP.AUTO: 5.5 [PH] (ref 4.5–8)
PROT UR STRIP-MCNC: ABNORMAL MG/DL
RBC #/AREA URNS AUTO: ABNORMAL /HPF
SP GR UR STRIP.AUTO: >=1.03 (ref 1–1.03)
UROBILINOGEN UR QL STRIP.AUTO: 1 E.U./DL
WBC #/AREA URNS AUTO: ABNORMAL /HPF

## 2018-02-13 PROCEDURE — 87077 CULTURE AEROBIC IDENTIFY: CPT | Performed by: PHYSICIAN ASSISTANT

## 2018-02-13 PROCEDURE — 87086 URINE CULTURE/COLONY COUNT: CPT | Performed by: PHYSICIAN ASSISTANT

## 2018-02-13 PROCEDURE — 99284 EMERGENCY DEPT VISIT MOD MDM: CPT

## 2018-02-13 PROCEDURE — 81001 URINALYSIS AUTO W/SCOPE: CPT | Performed by: PHYSICIAN ASSISTANT

## 2018-02-13 PROCEDURE — 99214 OFFICE O/P EST MOD 30 MIN: CPT | Performed by: FAMILY MEDICINE

## 2018-02-13 PROCEDURE — 87186 SC STD MICRODIL/AGAR DIL: CPT | Performed by: PHYSICIAN ASSISTANT

## 2018-02-13 PROCEDURE — 76870 US EXAM SCROTUM: CPT

## 2018-02-13 RX ORDER — IBUPROFEN 600 MG/1
600 TABLET ORAL EVERY 8 HOURS PRN
Qty: 15 TABLET | Refills: 0 | Status: SHIPPED | OUTPATIENT
Start: 2018-02-13 | End: 2018-10-22

## 2018-02-13 RX ORDER — LISINOPRIL 40 MG/1
40 TABLET ORAL DAILY
COMMUNITY
Start: 2017-12-22 | End: 2018-11-12 | Stop reason: SDUPTHER

## 2018-02-13 RX ORDER — ATORVASTATIN CALCIUM 40 MG/1
TABLET, FILM COATED ORAL
COMMUNITY
Start: 2017-12-22 | End: 2018-06-27 | Stop reason: SDUPTHER

## 2018-02-13 RX ORDER — GABAPENTIN 600 MG/1
1 TABLET ORAL 3 TIMES DAILY
COMMUNITY
Start: 2017-07-24 | End: 2019-11-05 | Stop reason: SDUPTHER

## 2018-02-13 RX ORDER — HYDROCHLOROTHIAZIDE 25 MG/1
TABLET ORAL
COMMUNITY
Start: 2017-11-30 | End: 2018-02-22 | Stop reason: SDUPTHER

## 2018-02-13 RX ORDER — CIPROFLOXACIN 500 MG/1
500 TABLET, FILM COATED ORAL ONCE
Status: COMPLETED | OUTPATIENT
Start: 2018-02-13 | End: 2018-02-13

## 2018-02-13 RX ORDER — IBUPROFEN 600 MG/1
600 TABLET ORAL ONCE
Status: COMPLETED | OUTPATIENT
Start: 2018-02-13 | End: 2018-02-13

## 2018-02-13 RX ORDER — CIPROFLOXACIN 500 MG/1
500 TABLET, FILM COATED ORAL EVERY 12 HOURS SCHEDULED
Qty: 14 TABLET | Refills: 0 | Status: SHIPPED | OUTPATIENT
Start: 2018-02-13 | End: 2018-02-27

## 2018-02-13 RX ORDER — TAMSULOSIN HYDROCHLORIDE 0.4 MG/1
1 CAPSULE ORAL DAILY
COMMUNITY
Start: 2013-08-15 | End: 2018-11-12 | Stop reason: SDUPTHER

## 2018-02-13 RX ORDER — DUTASTERIDE 0.5 MG/1
0.5 CAPSULE, LIQUID FILLED ORAL DAILY
COMMUNITY
Start: 2018-01-25 | End: 2018-11-12 | Stop reason: SDUPTHER

## 2018-02-13 RX ADMIN — CIPROFLOXACIN 500 MG: 500 TABLET, FILM COATED ORAL at 18:28

## 2018-02-13 RX ADMIN — IBUPROFEN 600 MG: 600 TABLET, FILM COATED ORAL at 18:28

## 2018-02-13 NOTE — DISCHARGE INSTRUCTIONS
Epididymo-orchitis   WHAT YOU SHOULD KNOW:   Epididymo-orchitis is a condition where there is inflammation of your epididymis and testicle  The epididymis is a bundle of very small tubes found next to each testicle  The epididymis is where sperm from each testicle passes before it goes out of the penis  Epididymo-orchitis usually affects the epididymis and testicle on one side of the scrotum, but it may affect both sides  AFTER YOU LEAVE:   Medicines:   · Antibiotics: This medicine is given if epididymo-orchitis was caused by a bacterial infection  Take them as directed  · NSAIDs:  These medicines decrease swelling, pain, and fever  NSAIDs are available without a doctor's order  Ask which medicine is right for you and how much to take  Take as directed  NSAIDs can cause stomach bleeding or kidney problems if not taken correctly  · Pain medicine: You may be given a prescription medicine to decrease pain  Do not wait until the pain is severe before you take this medicine  · Take your medicine as directed  Call your healthcare provider if you think your medicine is not helping or if you have side effects  Tell him if you are allergic to any medicine  Keep a list of the medicines, vitamins, and herbs you take  Include the amounts, and when and why you take them  Bring the list or the pill bottles to follow-up visits  Carry your medicine list with you in case of an emergency  Follow up with your primary healthcare provider or urologist as directed: You may need to return for blood tests or other testing after you are done with treatment  Write down your questions so you remember to ask them during your visits  Self-care:   · Ice:  Ice helps decrease swelling and pain  Ice may also help prevent tissue damage  Use an ice pack or put crushed ice in a plastic bag  Cover it with a towel and place it on your swollen testicle or scrotum for 15 to 20 minutes every hour as directed      · Rest:  Rest or decreased activity may help decrease your pain  It may also help you heal faster  Return to normal activities as directed  · Safe sex:  Use a latex condom during oral, vaginal, or anal sex  Do not have sex with someone who has a sexually transmitted infection (STI)  If you have an infection, let your sexual partner know so they can be checked for an STI and treated if needed  Do not have sex while you or your partner is being treated for an STI, or until your primary healthcare provider says that it is okay  · Scrotal support: You may be told to put a pillow or rolled up towel under your scrotum to elevate your scrotum when you lie down or sit  This may help reduce your pain  An athletic supporter may make you more comfortable when you stand  Contact your primary healthcare provider or urologist if:   · You have a fever  · You have chills or feel weak and achy  · Your pain is not relieved by bed rest, applying cold, or scrotal support  · You have questions or concerns about your condition or care  Seek care immediately or call 911 if:   · You have an area of redness, swelling, and increased pain in your scrotum  · You develop severe pain in your testicle  © 2014 [employer] is for End User's use only and may not be sold, redistributed or otherwise used for commercial purposes  All illustrations and images included in CareNotes® are the copyrighted property of [employer]  or [address]  The above information is an  only  It is not intended as medical advice for individual conditions or treatments  Talk to your doctor, nurse or pharmacist before following any medical regimen to see if it is safe and effective for you

## 2018-02-13 NOTE — PROGRESS NOTES
Assessment/Plan:    No problem-specific Assessment & Plan notes found for this encounter  There are no diagnoses linked to this encounter  Subjective:      Patient ID: Holden Hernandez is a 74 y o  male  His left testicle has been swollen and tender for the past few few days  He does not recall any trauma  It hurts more to sit when there is pressure on it  He has no urinary issues  He has no fevers but he does have chills  He has no nausea or vomiting  His appetite is poor  He has not lsot weight  Dizziness     Pelvic Pain         The following portions of the patient's history were reviewed and updated as appropriate:   He  has no past medical history on file  He  does not have a problem list on file  He  has no past surgical history on file  His family history is not on file  He  reports that he has never smoked  He has never used smokeless tobacco  He reports that he drinks alcohol  He reports that he does not use drugs  Current Outpatient Prescriptions   Medication Sig Dispense Refill    atorvastatin (LIPITOR) 40 mg tablet       dutasteride (AVODART) 0 5 mg capsule       gabapentin (NEURONTIN) 600 MG tablet Take 1 tablet by mouth 3 (three) times a day      hydrochlorothiazide (HYDRODIURIL) 25 mg tablet       insulin detemir (LEVEMIR) 100 units/mL subcutaneous injection Inject under the skin Twice daily      insulin lispro (HUMALOG KWIKPEN) 100 Units/mL SOPN Inject under the skin      lisinopril (ZESTRIL) 40 mg tablet       metFORMIN (GLUCOPHAGE) 1000 MG tablet Take by mouth      sitaGLIPtin (JANUVIA) 100 mg tablet Take by mouth      tamsulosin (FLOMAX) 0 4 mg Take 1 capsule by mouth daily       No current facility-administered medications for this visit  No current outpatient prescriptions on file prior to visit  No current facility-administered medications on file prior to visit  He has No Known Allergies       Review of Systems   Genitourinary: Positive for pelvic pain, scrotal swelling and testicular pain  Neurological: Positive for dizziness  All other systems reviewed and are negative  Objective:    Vitals:    02/13/18 1336   BP: 94/58   Pulse:    Resp:    SpO2:         Physical Exam   Constitutional: He is oriented to person, place, and time  He appears well-developed and well-nourished  Neck: Normal range of motion  Neck supple  Cardiovascular: Normal rate, regular rhythm, normal heart sounds and intact distal pulses  Pulmonary/Chest: Effort normal and breath sounds normal    Abdominal: Soft  Bowel sounds are normal    Genitourinary:   Genitourinary Comments: Left testicle red, warm, tender  It is high-riding and lying transversely  Musculoskeletal: Normal range of motion  Neurological: He is alert and oriented to person, place, and time  He has normal reflexes  Skin: Skin is warm and dry  Psychiatric: He has a normal mood and affect  His behavior is normal  Judgment and thought content normal    Nursing note and vitals reviewed

## 2018-02-13 NOTE — PATIENT INSTRUCTIONS
I referred you to the emergency room for further evaluation of your testicular pain  I am concerned for testicular torsion  I spoke with the ER and gave them report

## 2018-02-16 LAB
BACTERIA UR CULT: ABNORMAL
BACTERIA UR CULT: ABNORMAL

## 2018-02-17 NOTE — ED PROVIDER NOTES
History  Chief Complaint   Patient presents with    Testicle Pain     Patiant noticed left sided testical pain on Saturday  Patiant was seen at his PCP today and was told he had a twisted testical       74 yr male expected from PCP office for left testicle pain and swelling for r/o testicular torsion  Onset of sx 3 days ago, gradual onset, constant, unchanged since, pain at present is 3/10  Not tried any meds for pain  Left testicle swollen tender and elevated compared to right  Feels he is emptying his bladder well, no dysuria  Thinks he noted either dark urine or bloody urine yesterday and today  Sexually active with his wife  No prior/recent GI/ surgery/catheterization  No h/o UTIs  No fever/chills no uri sx no cough no cp no sob no belly pain no diarrhea  History provided by:  Patient and medical records      Prior to Admission Medications   Prescriptions Last Dose Informant Patient Reported? Taking?   atorvastatin (LIPITOR) 40 mg tablet   Yes Yes   dutasteride (AVODART) 0 5 mg capsule   Yes Yes   gabapentin (NEURONTIN) 600 MG tablet   Yes Yes   Sig: Take 1 tablet by mouth 3 (three) times a day   hydrochlorothiazide (HYDRODIURIL) 25 mg tablet   Yes Yes   insulin detemir (LEVEMIR) 100 units/mL subcutaneous injection   Yes Yes   Sig: Inject under the skin Twice daily   insulin lispro (HUMALOG KWIKPEN) 100 Units/mL SOPN   Yes Yes   Sig: Inject under the skin   lisinopril (ZESTRIL) 40 mg tablet   Yes Yes   metFORMIN (GLUCOPHAGE) 1000 MG tablet   Yes Yes   Sig: Take by mouth   sitaGLIPtin (JANUVIA) 100 mg tablet   Yes Yes   Sig: Take by mouth   tamsulosin (FLOMAX) 0 4 mg   Yes Yes   Sig: Take 1 capsule by mouth daily      Facility-Administered Medications: None       Past Medical History:   Diagnosis Date    Back pain     Diabetes mellitus ([facility] [address] )     Hypertension        History reviewed  No pertinent surgical history  History reviewed  No pertinent family history    I have reviewed and agree with the history as documented  Social History   Substance Use Topics    Smoking status: Never Smoker    Smokeless tobacco: Never Used    Alcohol use Yes      Comment: rarely        Review of Systems   Constitutional: Negative for activity change, appetite change, chills, diaphoresis, fatigue, fever and unexpected weight change  HENT: Negative for congestion, ear pain, rhinorrhea, sinus pressure, sore throat and tinnitus  Eyes: Negative for visual disturbance  Respiratory: Negative for cough, chest tightness, shortness of breath and wheezing  Cardiovascular: Negative for chest pain, palpitations and leg swelling  Gastrointestinal: Negative for abdominal pain, constipation, diarrhea, nausea and vomiting  Genitourinary: Positive for scrotal swelling and testicular pain  Negative for decreased urine volume, difficulty urinating, discharge, dysuria, flank pain, frequency, hematuria, penile pain, penile swelling and urgency  Musculoskeletal: Negative for arthralgias, back pain and myalgias  Skin: Negative for rash and wound  Neurological: Negative for dizziness, tremors, syncope and headaches  Physical Exam  ED Triage Vitals [02/13/18 1624]   Temperature Pulse Respirations Blood Pressure SpO2   97 8 °F (36 6 °C) 100 16 127/68 99 %      Temp Source Heart Rate Source Patient Position - Orthostatic VS BP Location FiO2 (%)   Temporal Monitor Lying Right arm --      Pain Score       7           Orthostatic Vital Signs  Vitals:    02/13/18 1624   BP: 127/68   Pulse: 100   Patient Position - Orthostatic VS: Lying       Physical Exam   Constitutional: He is oriented to person, place, and time  He appears well-developed and well-nourished  No distress  HENT:   Head: Normocephalic and atraumatic  Mouth/Throat: Oropharynx is clear and moist    Eyes: Conjunctivae are normal  Pupils are equal, round, and reactive to light     Cardiovascular: Normal rate, regular rhythm, normal heart sounds and intact distal pulses  No murmur heard  Pulmonary/Chest: Effort normal and breath sounds normal  No respiratory distress  He exhibits no tenderness  Abdominal: Soft  Bowel sounds are normal  He exhibits no distension  There is no tenderness  There is no guarding  No hernia  Hernia confirmed negative in the right inguinal area and confirmed negative in the left inguinal area  Genitourinary: Penis normal  Right testis shows no swelling and no tenderness  Left testis shows swelling and tenderness  No penile erythema or penile tenderness  Musculoskeletal: Normal range of motion  He exhibits no edema or tenderness  Neurological: He is alert and oriented to person, place, and time  Skin: Skin is warm and dry  Capillary refill takes less than 2 seconds  No rash noted  He is not diaphoretic  No erythema  No pallor  Psychiatric: He has a normal mood and affect  Nursing note and vitals reviewed        ED Medications  Medications   ciprofloxacin (CIPRO) tablet 500 mg (500 mg Oral Given 2/13/18 1828)   ibuprofen (MOTRIN) tablet 600 mg (600 mg Oral Given 2/13/18 1828)       Diagnostic Studies  Results Reviewed     Procedure Component Value Units Date/Time    Urine culture [[id number]]  (Abnormal)  (Susceptibility) Collected:  02/13/18 1801    Lab Status:  Final result Specimen:  Urine from Urine, Clean Catch Updated:  02/16/18 1756     Urine Culture --      40,000-49,000 cfu/ml Coagulase negative Staphylococcus species (A)      20,000-29,000 cfu/ml Pseudomonas aeruginosa (A)    Susceptibility      Coagulase negative Staphylococcus species     MONCHO    Amoxicillin + Clavulanate <=4/2 ug/ml Susceptible    Ampicillin ($$) <=2 00 ug/ml Resistant    Ampicillin + Sulbactam ($) <=8/4 ug/ml Susceptible    Cefazolin ($) <=8 00 ug/ml Susceptible    Gentamicin ($$) <=4 ug/ml Susceptible    Nitrofurantoin <=32 ug/ml Susceptible    Oxacillin <=0 25 ug/ml Susceptible    Tetracycline <=4 ug/ml Susceptible    Trimethoprim + Sulfamethoxazole ($$$) <=0 5/9 5 ug/ml Susceptible    Vancomycin ($) 1 00 ug/ml Susceptible               Susceptibility      Pseudomonas aeruginosa     MONCHO    Aztreonam ($$$)  <=8 ug/ml Susceptible    Cefepime ($) <=8 00 ug/ml Susceptible    Ceftazidime ($$) 4 ug/ml Susceptible    Ciprofloxacin ($)  <=1 00 ug/ml Susceptible    Gentamicin ($$) <=4 ug/ml Susceptible    Imipenem <=4 ug/ml Susceptible    Levofloxacin ($) <=2 00 ug/ml Susceptible    Meropenem ($$) <=4 00 ug/ml Susceptible    Piperacillin + Tazobactam ($$$) <=16 ug/ml Susceptible    Ticarcillin/K Clavulanate <=16 ug/ml Susceptible    Tobramycin ($) <=4 ug/ml Susceptible                   Urine Microscopic [[id number]]  (Abnormal) Collected:  02/13/18 1801    Lab Status:  Final result Specimen:  Urine from Urine, Clean Catch Updated:  02/13/18 1826     RBC, UA 4-10 (A) /hpf      WBC, UA Innumerable (A) /hpf      Epithelial Cells Occasional /hpf      Bacteria, UA Innumerable (A) /hpf      Hyaline Casts, UA 2-4 (A) /lpf     UA w Reflex to Microscopic w Reflex to Culture [[id number]]  (Abnormal) Collected:  02/13/18 1801    Lab Status:  Final result Specimen:  Urine from Urine, Clean Catch Updated:  02/13/18 1812     Color, UA Magalys     Clarity, UA Cloudy     Specific Gravity, UA >=1 030     pH, UA 5 5     Leukocytes, UA Small (A)     Nitrite, UA Negative     Protein, UA Trace (A) mg/dl      Glucose, UA >=1000 (1%) (A) mg/dl      Ketones, UA Trace (A) mg/dl      Urobilinogen, UA 1 0 E U /dl      Bilirubin, UA Negative     Blood, UA Large (A)                 US scrotum and testicles   Final Result by Corrinne Kohler, MD (02/13 1747)   Addendum 1 of 1 by Corrinne Kohler, MD (02/13 1814)   Findings should read as follows under EPIDIDYMIDES section:   The LEFT epididymis is thickened measuring 1 3 cm versus the contralateral    right which measures 1 1 cm  Doppler ultrasound demonstrates hyperemia in the left epididymis     There are small epididymal cyst(s) bilaterally  Final      1  Left epididymoorchitis  Moderate size complex fluid collection within septations in the left scrotal sac which may represent reactive hydrocele or possibly pyocele given the septations  6-8 weeks after appropriate treatment, I would recommend follow-up ultrasound to assure resolution of the heterogeneous appearance of the left testicle  Workstation performed: [id number]                    Procedures  Procedures       Phone Contacts  ED Phone Contact    ED Course  ED Course as of Feb 17 1616   Tue Feb 13, 2018   1809 I spoke with marek dangelo with urology reviewed ultrasound report and physical exam findings  Awaiting urinalysis however treatment will be same  PO cipro ok      1813 Color, UA: Magalys   1813 Clarity, UA: Cloudy   1814 Specific Gravity, UA: >=1 030   1814 pH, UA: 5 5   1814 Leukocytes, UA: (!) Small   1814 Nitrite, UA: Negative   1814 Protein, UA: (!) Trace   1814 Glucose, UA: (!) >=1000 (1%)   1814 Ketones, UA: (!) Trace   1814 Urobilinogen, UA: 1 0   1814 Bilirubin, UA: Negative   1814 Blood, UA: (!) Large       MDM  Number of Diagnoses or Management Options  Epididymoorchitis: new and requires workup     Amount and/or Complexity of Data Reviewed  Clinical lab tests: ordered and reviewed  Tests in the radiology section of CPT®: ordered and reviewed    Patient Progress  Patient progress: stable    CritCare Time    Disposition  Final diagnoses:   Epididymoorchitis - left     Time reflects when diagnosis was documented in both MDM as applicable and the Disposition within this note     Time User Action Codes Description Comment    2/13/2018  6:14 PM Lisa Maldonado [N45 3] Epididymoorchitis     2/13/2018  6:14 PM Steve Fay [N45 3] Epididymoorchitis left      ED Disposition     ED Disposition Condition Comment    Discharge  Kaylee Holiday discharge to home/self care      Condition at discharge: Good        Follow-up Information     Follow up With Specialties Details Why Dawn [facility] [address]  For Urology [facility] Schedule an appointment as soon as possible for a visit in 3 days ER followup with urology for testicle infection [address] [facility]  [phone]        Discharge Medication List as of 2/13/2018  6:25 PM      START taking these medications    Details   ciprofloxacin (CIPRO) 500 mg tablet Take 1 tablet (500 mg total) by mouth every 12 (twelve) hours for 14 days, Starting Tue 2/13/2018, Until Tue 2/27/2018, Normal      ibuprofen (MOTRIN) 600 mg tablet Take 1 tablet (600 mg total) by mouth every 8 (eight) hours as needed for mild pain or moderate pain for up to 5 days, Starting Tue 2/13/2018, Until Sun 2/18/2018, Normal         CONTINUE these medications which have NOT CHANGED    Details   atorvastatin (LIPITOR) 40 mg tablet Starting Fri 12/22/2017, Historical Med      dutasteride (AVODART) 0 5 mg capsule Starting Thu 1/25/2018, Historical Med      gabapentin (NEURONTIN) 600 MG tablet Take 1 tablet by mouth 3 (three) times a day, Starting Mon 7/24/2017, Historical Med      hydrochlorothiazide (HYDRODIURIL) 25 mg tablet Starting Thu 11/30/2017, Historical Med      insulin detemir (LEVEMIR) 100 units/mL subcutaneous injection Inject under the skin Twice daily, Starting Tue 7/25/2017, Historical Med      insulin lispro (HUMALOG KWIKPEN) 100 Units/mL SOPN Inject under the skin, Starting Tue 10/10/2017, Historical Med      lisinopril (ZESTRIL) 40 mg tablet Starting Fri 12/22/2017, Historical Med      metFORMIN (GLUCOPHAGE) 1000 MG tablet Take by mouth, Starting Tue 4/30/2013, Historical Med      sitaGLIPtin (JANUVIA) 100 mg tablet Take by mouth, Starting Tue 4/30/2013, Historical Med      tamsulosin (FLOMAX) 0 4 mg Take 1 capsule by mouth daily, Starting Thu 8/15/2013, Historical Med           No discharge procedures on file      ED Provider  Electronically Signed by           Chhaya Brady CRUZ  02/17/18 1617

## 2018-02-22 DIAGNOSIS — I10 HYPERTENSION, UNSPECIFIED TYPE: Primary | ICD-10-CM

## 2018-02-22 RX ORDER — HYDROCHLOROTHIAZIDE 25 MG/1
25 TABLET ORAL DAILY
Qty: 90 TABLET | Refills: 3 | Status: SHIPPED | OUTPATIENT
Start: 2018-02-22 | End: 2019-08-12

## 2018-03-02 ENCOUNTER — CONSULT (OUTPATIENT)
Dept: UROLOGY | Facility: CLINIC | Age: 68
End: 2018-03-02
Payer: COMMERCIAL

## 2018-03-02 VITALS
HEART RATE: 88 BPM | HEIGHT: 69 IN | WEIGHT: 219.4 LBS | DIASTOLIC BLOOD PRESSURE: 64 MMHG | SYSTOLIC BLOOD PRESSURE: 118 MMHG | BODY MASS INDEX: 32.5 KG/M2

## 2018-03-02 DIAGNOSIS — N45.1 EPIDIDYMITIS: Primary | ICD-10-CM

## 2018-03-02 PROCEDURE — 99244 OFF/OP CNSLTJ NEW/EST MOD 40: CPT | Performed by: UROLOGY

## 2018-03-02 NOTE — LETTER
March 2, 2018     Anusha Mcdonnell, DO  [address]  [address]    Patient: Ramsey Mahmood   YOB: 1950   Date of Visit: 3/2/2018       Dear Dr Suleiman Hancock:    Thank you for referring Ramsey Mahmood to me for evaluation  Below are my notes for this consultation  If you have questions, please do not hesitate to call me  I look forward to following your patient along with you  Sincerely,        Fifi De Leon MD        CC: No Recipients  Fifi De Leon MD  3/2/2018  2:42 PM  Sign at close encounter    UROLOGY NEW CONSULT NOTE     History of Present Illness:   Ramsey Mahmood is a 74 y o  male with a history of enlarged prostate with incomplete emptying  He is managed on a 5 alpha reductase inhibitor and alpha blocker  He has a primary urologist who has been following his voiding symptoms and prostate exams  He recently developed some significant left-sided swelling and tenderness  He was diagnosed with urinary tract infection and epididymal orchitis  He just recently completed his antibiotic treatment and has dramatically improved his discomfort  He presents today for discussion  Past Medical History:     Past Medical History:   Diagnosis Date    Back pain     Diabetes mellitus ([facility] [address] )     Hypertension        PAST SURGICAL HISTORY:   History reviewed  No pertinent surgical history      CURRENT MEDICATIONS:     Current Outpatient Prescriptions   Medication Sig Dispense Refill    atorvastatin (LIPITOR) 40 mg tablet       dutasteride (AVODART) 0 5 mg capsule       gabapentin (NEURONTIN) 600 MG tablet Take 1 tablet by mouth 3 (three) times a day      hydrochlorothiazide (HYDRODIURIL) 25 mg tablet Take 1 tablet (25 mg total) by mouth daily 90 tablet 3    insulin detemir (LEVEMIR) 100 units/mL subcutaneous injection Inject under the skin Twice daily      insulin lispro (HUMALOG KWIKPEN) 100 Units/mL SOPN Inject under the skin      lisinopril (ZESTRIL) 40 mg tablet       metFORMIN (GLUCOPHAGE) 1000 MG tablet Take by mouth      sitaGLIPtin (JANUVIA) 100 mg tablet Take by mouth      tamsulosin (FLOMAX) 0 4 mg Take 1 capsule by mouth daily      ibuprofen (MOTRIN) 600 mg tablet Take 1 tablet (600 mg total) by mouth every 8 (eight) hours as needed for mild pain or moderate pain for up to 5 days 15 tablet 0     No current facility-administered medications for this visit          ALLERGIES:   No Known Allergies    SOCIAL HISTORY:     Social History     Social History    Marital status: /Civil Union     Spouse name: N/A    Number of children: N/A    Years of education: N/A     Social History Main Topics    Smoking status: Never Smoker    Smokeless tobacco: Never Used    Alcohol use Yes      Comment: rarely    Drug use: No    Sexual activity: Not Asked     Other Topics Concern    None     Social History Narrative    None       SOCIAL HISTORY:     Family History   Problem Relation Age of Onset    Hypertension Father     Hypertension Mother        REVIEW OF SYSTEMS:     General: negative for chills, fatigue, fever, significant unplanned weight changed  Psychological: negative for anxiety, depression, concentration or memory difficulties, irritability, mood swings, sleep disturbances  Ophthalmic: negative for blurry vision or double  ENT: negative for hearing difficulties, tinnitus, vertigo  Hematological and Lymphatic: negative for bleeding problems, blood clots, bruising, swollen lymph nodes  Respiratory: negative for shortness of breath, cough, hemoptysis, orthopnea, tachypnea or wheezing  Cardiovascular: negative for chest pain, dyspnea on exertion, edema, irregular or rapid heartbeat, paroxysmal nocturnal dyspnea  Gastrointestinal: negative for abdominal pain, bright red blood in stools, change in stools, constipation, diarrhea, nausea/vomiting, stool incontinence    GENITOURINARY: see HPI    Musculoskeletal: negative for gait disturbance, joint pain, joint stiffness/sweeling/pain, muscular weakness  Dermatological: negative for rash or skin lesion changes  Neurological: negative for confusion, dizziness, headaches, memory loss, numbness/tingling, seizures, speech problems, tremors or weakness    PHYSICAL EXAM:     /64   Pulse 88   Ht 5' 8 5" (1 74 m)   Wt 99 5 kg (219 lb 6 4 oz)   BMI 32 87 kg/m²    General:  Healthy appearing individual in no acute distress  They have a normal affect  There is not appear to be any gross neurologic defects or abnormalities  HEENT:  Normocephalic, atraumatic  Neck is supple without any palpable lymphadenopathy  Cardiovascular:  Patient has normal palpable distal radial pulses  There is no significant peripheral edema  No JVD is noted  Respiratory:  Patient has unlabored respirations  There is no audible wheeze or rhonchi  Abdomen:  Abdomen is without surgical scars  Abdomen is soft and nontender  There is no tympany  Inguinal and umbilical hernia are not appreciated  Genitourinary: no penile lesions or discharge, patient has a significantly indurated left testicle but this is nontender  There is no overlying erythema  There is no signs of active infection  no hernias  Musculoskeletal:  Patient does not have significant CVA tenderness with palpation or percussion  They full range of motion in all 4 extremities  Strength in all 4 extremities appears congruent  Patient is able to ambulate without assistance or difficulty  Dermatologic:  Patient has no skin abnormalities or rashes       LABS:     CBC: No results found for: WBC, HGB, HCT, MCV, PLT    BMP:   Lab Results   Component Value Date    CALCIUM 9 8 01/12/2018     01/12/2018    K 4 1 01/12/2018    CO2 27 01/12/2018     01/12/2018    BUN 10 01/12/2018    CREATININE 0 53 (L) 01/12/2018     Urine culture   Order: [id number] - Reflex for Order [id number]   Status:  Final result   Visible to patient:  No (Inaccessible in [address]) Next appt:  05/08/2018 at 08:00 AM in Family Medicine Olivia Burton MD)   Specimen Information: Urine, Clean Catch; Urine        Culture           40,000-49,000 cfu/ml Coagulase negative Staphylococcus species        20,000-29,000 cfu/ml Pseudomonas aeruginosa        Susceptibility      Coagulase negative Staphylococcus species     MONCHO     Amoxicillin + Clavulanate <=4/2 ug/ml"><=4/2 ug/ml Susceptible     Ampicillin ($$) <=2 00 ug/ml"><=2 00 ug/ml Resistant     Ampicillin + Sulbactam ($) <=8/4 ug/ml"><=8/4 ug/ml Susceptible     Cefazolin ($) <=8 00 ug/ml"><=8 00 ug/ml Susceptible     Gentamicin ($$) <=4 ug/ml"><=4 ug/ml Susceptible     Nitrofurantoin <=32 ug/ml"><=32 ug/ml Susceptible     Oxacillin <=0 25 ug/ml"><=0 25 ug/ml Susceptible     Tetracycline <=4 ug/ml"><=4 ug/ml Susceptible     Trimethoprim + Sulfamethoxazole ($$$) <=0 5/9 5 ug/ml"><=0 5/9 5 u  Susceptible     Vancomycin ($) 1 00 ug/ml Susceptible           Susceptibility      Pseudomonas aeruginosa     MONCHO     Aztreonam ($$$)  <=8 ug/ml"><=8 ug/ml Susceptible     Cefepime ($) <=8 00 ug/ml"><=8 00 ug/ml Susceptible     Ceftazidime ($$) 4 ug/ml Susceptible     Ciprofloxacin ($)  <=1 00 ug/ml"><=1 00 ug/ml Susceptible     Gentamicin ($$) <=4 ug/ml"><=4 ug/ml Susceptible     Imipenem <=4 ug/ml"><=4 ug/ml Susceptible     Levofloxacin ($) <=2 00 ug/ml"><=2 00 ug/ml Susceptible     Meropenem ($$) <=4 00 ug/ml"><=4 00 ug/ml Susceptible     Piperacillin + Tazobactam ($$$) <=16 ug/ml"><=16 ug/ml Susceptible     Ticarcillin/K Clavulanate <=16 ug/ml"><=16 ug/ml Susceptible     Tobramycin ($) <=4 ug/ml"><=4 ug/ml Susceptible              Specimen Collected: 02/13/18  6:01 PM Last Resulted: 02/16/18  5:56 PM                ASSESSMENT:     74 y o  male with epididymoorchitis    PLAN:     Patient is improving nicely after his antibiotic treatment  There is still some residual testicular induration    I discussed with the patient that this is typically the last symptoms to resolve  I have recommended tight-fitting scrotal support and warm soaks  He is not having tenderness but if this returns, I would use anti-inflammatories  I have recommended that he repeat a scrotal ultrasound in approximately 1 month  The patient has a primary urologist that he follows with  I have recommended that he take the repeat ultrasound to his primary urologist     We discussed the etiology of retrograde infections in the testicle  This is likely due to incomplete emptying and urinary stasis  The patient is currently on dual therapy for his prostate  We discussed that he may need eventual surgical options if he develops recurrent infections  Should the patient be interested in transferring care, I would always be happy to see him back but he is very comfortable with his current physician

## 2018-03-02 NOTE — PROGRESS NOTES
UROLOGY NEW CONSULT NOTE     History of Present Illness:   Silvana Hannon is a 74 y o  male with a history of enlarged prostate with incomplete emptying  He is managed on a 5 alpha reductase inhibitor and alpha blocker  He has a primary urologist who has been following his voiding symptoms and prostate exams  He recently developed some significant left-sided swelling and tenderness  He was diagnosed with urinary tract infection and epididymal orchitis  He just recently completed his antibiotic treatment and has dramatically improved his discomfort  He presents today for discussion  Past Medical History:     Past Medical History:   Diagnosis Date    Back pain     Diabetes mellitus ([facility] [address] )     Hypertension        PAST SURGICAL HISTORY:   History reviewed  No pertinent surgical history  CURRENT MEDICATIONS:     Current Outpatient Prescriptions   Medication Sig Dispense Refill    atorvastatin (LIPITOR) 40 mg tablet       dutasteride (AVODART) 0 5 mg capsule       gabapentin (NEURONTIN) 600 MG tablet Take 1 tablet by mouth 3 (three) times a day      hydrochlorothiazide (HYDRODIURIL) 25 mg tablet Take 1 tablet (25 mg total) by mouth daily 90 tablet 3    insulin detemir (LEVEMIR) 100 units/mL subcutaneous injection Inject under the skin Twice daily      insulin lispro (HUMALOG KWIKPEN) 100 Units/mL SOPN Inject under the skin      lisinopril (ZESTRIL) 40 mg tablet       metFORMIN (GLUCOPHAGE) 1000 MG tablet Take by mouth      sitaGLIPtin (JANUVIA) 100 mg tablet Take by mouth      tamsulosin (FLOMAX) 0 4 mg Take 1 capsule by mouth daily      ibuprofen (MOTRIN) 600 mg tablet Take 1 tablet (600 mg total) by mouth every 8 (eight) hours as needed for mild pain or moderate pain for up to 5 days 15 tablet 0     No current facility-administered medications for this visit          ALLERGIES:   No Known Allergies    SOCIAL HISTORY:     Social History     Social History    Marital status: /Civil Union Spouse name: N/A    Number of children: N/A    Years of education: N/A     Social History Main Topics    Smoking status: Never Smoker    Smokeless tobacco: Never Used    Alcohol use Yes      Comment: rarely    Drug use: No    Sexual activity: Not Asked     Other Topics Concern    None     Social History Narrative    None       SOCIAL HISTORY:     Family History   Problem Relation Age of Onset    Hypertension Father     Hypertension Mother        REVIEW OF SYSTEMS:     General: negative for chills, fatigue, fever, significant unplanned weight changed  Psychological: negative for anxiety, depression, concentration or memory difficulties, irritability, mood swings, sleep disturbances  Ophthalmic: negative for blurry vision or double  ENT: negative for hearing difficulties, tinnitus, vertigo  Hematological and Lymphatic: negative for bleeding problems, blood clots, bruising, swollen lymph nodes  Respiratory: negative for shortness of breath, cough, hemoptysis, orthopnea, tachypnea or wheezing  Cardiovascular: negative for chest pain, dyspnea on exertion, edema, irregular or rapid heartbeat, paroxysmal nocturnal dyspnea  Gastrointestinal: negative for abdominal pain, bright red blood in stools, change in stools, constipation, diarrhea, nausea/vomiting, stool incontinence    GENITOURINARY: see HPI    Musculoskeletal: negative for gait disturbance, joint pain, joint stiffness/sweeling/pain, muscular weakness  Dermatological: negative for rash or skin lesion changes  Neurological: negative for confusion, dizziness, headaches, memory loss, numbness/tingling, seizures, speech problems, tremors or weakness    PHYSICAL EXAM:     /64   Pulse 88   Ht 5' 8 5" (1 74 m)   Wt 99 5 kg (219 lb 6 4 oz)   BMI 32 87 kg/m²   General:  Healthy appearing individual in no acute distress  They have a normal affect  There is not appear to be any gross neurologic defects or abnormalities    HEENT:  Normocephalic, atraumatic  Neck is supple without any palpable lymphadenopathy  Cardiovascular:  Patient has normal palpable distal radial pulses  There is no significant peripheral edema  No JVD is noted  Respiratory:  Patient has unlabored respirations  There is no audible wheeze or rhonchi  Abdomen:  Abdomen is without surgical scars  Abdomen is soft and nontender  There is no tympany  Inguinal and umbilical hernia are not appreciated  Genitourinary: no penile lesions or discharge, patient has a significantly indurated left testicle but this is nontender  There is no overlying erythema  There is no signs of active infection  no hernias  Musculoskeletal:  Patient does not have significant CVA tenderness with palpation or percussion  They full range of motion in all 4 extremities  Strength in all 4 extremities appears congruent  Patient is able to ambulate without assistance or difficulty  Dermatologic:  Patient has no skin abnormalities or rashes       LABS:     CBC: No results found for: WBC, HGB, HCT, MCV, PLT    BMP:   Lab Results   Component Value Date    CALCIUM 9 8 01/12/2018     01/12/2018    K 4 1 01/12/2018    CO2 27 01/12/2018     01/12/2018    BUN 10 01/12/2018    CREATININE 0 53 (L) 01/12/2018     Urine culture   Order: [id number] - Reflex for Order [id number]   Status:  Final result   Visible to patient:  No (Inaccessible in [address]) Next appt:  05/08/2018 at 08:00 AM in Family Medicine Violeta Huerta MD)   Specimen Information: Urine, Clean Catch; Urine        Culture           40,000-49,000 cfu/ml Coagulase negative Staphylococcus species        20,000-29,000 cfu/ml Pseudomonas aeruginosa        Susceptibility      Coagulase negative Staphylococcus species     MONCHO     Amoxicillin + Clavulanate <=4/2 ug/ml"><=4/2 ug/ml Susceptible     Ampicillin ($$) <=2 00 ug/ml"><=2 00 ug/ml Resistant     Ampicillin + Sulbactam ($) <=8/4 ug/ml"><=8/4 ug/ml Susceptible     Cefazolin ($) <=8 00 ug/ml"><=8 00 ug/ml Susceptible     Gentamicin ($$) <=4 ug/ml"><=4 ug/ml Susceptible     Nitrofurantoin <=32 ug/ml"><=32 ug/ml Susceptible     Oxacillin <=0 25 ug/ml"><=0 25 ug/ml Susceptible     Tetracycline <=4 ug/ml"><=4 ug/ml Susceptible     Trimethoprim + Sulfamethoxazole ($$$) <=0 5/9 5 ug/ml"><=0 5/9 5 u  Susceptible     Vancomycin ($) 1 00 ug/ml Susceptible           Susceptibility      Pseudomonas aeruginosa     MONCHO     Aztreonam ($$$)  <=8 ug/ml"><=8 ug/ml Susceptible     Cefepime ($) <=8 00 ug/ml"><=8 00 ug/ml Susceptible     Ceftazidime ($$) 4 ug/ml Susceptible     Ciprofloxacin ($)  <=1 00 ug/ml"><=1 00 ug/ml Susceptible     Gentamicin ($$) <=4 ug/ml"><=4 ug/ml Susceptible     Imipenem <=4 ug/ml"><=4 ug/ml Susceptible     Levofloxacin ($) <=2 00 ug/ml"><=2 00 ug/ml Susceptible     Meropenem ($$) <=4 00 ug/ml"><=4 00 ug/ml Susceptible     Piperacillin + Tazobactam ($$$) <=16 ug/ml"><=16 ug/ml Susceptible     Ticarcillin/K Clavulanate <=16 ug/ml"><=16 ug/ml Susceptible     Tobramycin ($) <=4 ug/ml"><=4 ug/ml Susceptible              Specimen Collected: 02/13/18  6:01 PM Last Resulted: 02/16/18  5:56 PM                ASSESSMENT:     74 y o  male with epididymoorchitis    PLAN:     Patient is improving nicely after his antibiotic treatment  There is still some residual testicular induration  I discussed with the patient that this is typically the last symptoms to resolve  I have recommended tight-fitting scrotal support and warm soaks  He is not having tenderness but if this returns, I would use anti-inflammatories  I have recommended that he repeat a scrotal ultrasound in approximately 1 month  The patient has a primary urologist that he follows with  I have recommended that he take the repeat ultrasound to his primary urologist     We discussed the etiology of retrograde infections in the testicle  This is likely due to incomplete emptying and urinary stasis    The patient is currently on dual therapy for his prostate  We discussed that he may need eventual surgical options if he develops recurrent infections  Should the patient be interested in transferring care, I would always be happy to see him back but he is very comfortable with his current physician

## 2018-03-02 NOTE — LETTER
March 2, 2018     Akin Still DO  [address]  [address]    Patient: Kendra Hoover   YOB: 1950   Date of Visit: 3/2/2018       Dear Dr Kiarra Bobo:    Thank you for referring Kendra Hoover to me for evaluation  Below are my notes for this consultation  If you have questions, please do not hesitate to call me  I look forward to following your patient along with you  Sincerely,        Leigh Allen MD        CC: No Recipients  Leigh Allen MD  3/2/2018  2:40 PM  Sign at close encounter    UROLOGY NEW CONSULT NOTE     History of Present Illness:   Kendra Hoover is a 74 y o  male with a history of enlarged prostate with incomplete emptying  He is managed on a 5 alpha reductase inhibitor and alpha blocker  He has a primary urologist who has been following his voiding symptoms and prostate exams  He recently developed some significant left-sided swelling and tenderness  He was diagnosed with urinary tract infection and epididymal orchitis  He just recently completed his antibiotic treatment and has dramatically improved his discomfort  He presents today for discussion  Past Medical History:     Past Medical History:   Diagnosis Date    Back pain     Diabetes mellitus ([facility] [address] )     Hypertension        PAST SURGICAL HISTORY:   History reviewed  No pertinent surgical history      CURRENT MEDICATIONS:     Current Outpatient Prescriptions   Medication Sig Dispense Refill    atorvastatin (LIPITOR) 40 mg tablet       dutasteride (AVODART) 0 5 mg capsule       gabapentin (NEURONTIN) 600 MG tablet Take 1 tablet by mouth 3 (three) times a day      hydrochlorothiazide (HYDRODIURIL) 25 mg tablet Take 1 tablet (25 mg total) by mouth daily 90 tablet 3    insulin detemir (LEVEMIR) 100 units/mL subcutaneous injection Inject under the skin Twice daily      insulin lispro (HUMALOG KWIKPEN) 100 Units/mL SOPN Inject under the skin      lisinopril (ZESTRIL) 40 mg tablet       metFORMIN (GLUCOPHAGE) 1000 MG tablet Take by mouth      sitaGLIPtin (JANUVIA) 100 mg tablet Take by mouth      tamsulosin (FLOMAX) 0 4 mg Take 1 capsule by mouth daily      ibuprofen (MOTRIN) 600 mg tablet Take 1 tablet (600 mg total) by mouth every 8 (eight) hours as needed for mild pain or moderate pain for up to 5 days 15 tablet 0     No current facility-administered medications for this visit          ALLERGIES:   No Known Allergies    SOCIAL HISTORY:     Social History     Social History    Marital status: /Civil Union     Spouse name: N/A    Number of children: N/A    Years of education: N/A     Social History Main Topics    Smoking status: Never Smoker    Smokeless tobacco: Never Used    Alcohol use Yes      Comment: rarely    Drug use: No    Sexual activity: Not Asked     Other Topics Concern    None     Social History Narrative    None       SOCIAL HISTORY:     Family History   Problem Relation Age of Onset    Hypertension Father     Hypertension Mother        REVIEW OF SYSTEMS:     General: negative for chills, fatigue, fever, significant unplanned weight changed  Psychological: negative for anxiety, depression, concentration or memory difficulties, irritability, mood swings, sleep disturbances  Ophthalmic: negative for blurry vision or double  ENT: negative for hearing difficulties, tinnitus, vertigo  Hematological and Lymphatic: negative for bleeding problems, blood clots, bruising, swollen lymph nodes  Respiratory: negative for shortness of breath, cough, hemoptysis, orthopnea, tachypnea or wheezing  Cardiovascular: negative for chest pain, dyspnea on exertion, edema, irregular or rapid heartbeat, paroxysmal nocturnal dyspnea  Gastrointestinal: negative for abdominal pain, bright red blood in stools, change in stools, constipation, diarrhea, nausea/vomiting, stool incontinence    GENITOURINARY: see HPI    Musculoskeletal: negative for gait disturbance, joint pain, joint stiffness/sweeling/pain, muscular weakness  Dermatological: negative for rash or skin lesion changes  Neurological: negative for confusion, dizziness, headaches, memory loss, numbness/tingling, seizures, speech problems, tremors or weakness    PHYSICAL EXAM:     /64   Pulse 88   Ht 5' 8 5" (1 74 m)   Wt 99 5 kg (219 lb 6 4 oz)   BMI 32 87 kg/m²    General:  Healthy appearing individual in no acute distress  They have a normal affect  There is not appear to be any gross neurologic defects or abnormalities  HEENT:  Normocephalic, atraumatic  Neck is supple without any palpable lymphadenopathy  Cardiovascular:  Patient has normal palpable distal radial pulses  There is no significant peripheral edema  No JVD is noted  Respiratory:  Patient has unlabored respirations  There is no audible wheeze or rhonchi  Abdomen:  Abdomen is without surgical scars  Abdomen is soft and nontender  There is no tympany  Inguinal and umbilical hernia are not appreciated  Genitourinary: no penile lesions or discharge, patient has a significantly indurated left testicle but this is nontender  There is no overlying erythema  There is no signs of active infection  no hernias  Musculoskeletal:  Patient does not have significant CVA tenderness with palpation or percussion  They full range of motion in all 4 extremities  Strength in all 4 extremities appears congruent  Patient is able to ambulate without assistance or difficulty  Dermatologic:  Patient has no skin abnormalities or rashes       LABS:     CBC: No results found for: WBC, HGB, HCT, MCV, PLT    BMP:   Lab Results   Component Value Date    CALCIUM 9 8 01/12/2018     01/12/2018    K 4 1 01/12/2018    CO2 27 01/12/2018     01/12/2018    BUN 10 01/12/2018    CREATININE 0 53 (L) 01/12/2018     Urine culture   Order: [id number] - Reflex for Order [id number]   Status:  Final result   Visible to patient:  No (Inaccessible in [address]) Next appt:  05/08/2018 at 08:00 AM in Family Medicine Cinthia Zimmer MD)   Specimen Information: Urine, Clean Catch; Urine        Culture           40,000-49,000 cfu/ml Coagulase negative Staphylococcus species        20,000-29,000 cfu/ml Pseudomonas aeruginosa        Susceptibility      Coagulase negative Staphylococcus species     MONCHO     Amoxicillin + Clavulanate <=4/2 ug/ml"><=4/2 ug/ml Susceptible     Ampicillin ($$) <=2 00 ug/ml"><=2 00 ug/ml Resistant     Ampicillin + Sulbactam ($) <=8/4 ug/ml"><=8/4 ug/ml Susceptible     Cefazolin ($) <=8 00 ug/ml"><=8 00 ug/ml Susceptible     Gentamicin ($$) <=4 ug/ml"><=4 ug/ml Susceptible     Nitrofurantoin <=32 ug/ml"><=32 ug/ml Susceptible     Oxacillin <=0 25 ug/ml"><=0 25 ug/ml Susceptible     Tetracycline <=4 ug/ml"><=4 ug/ml Susceptible     Trimethoprim + Sulfamethoxazole ($$$) <=0 5/9 5 ug/ml"><=0 5/9 5 u  Susceptible     Vancomycin ($) 1 00 ug/ml Susceptible           Susceptibility      Pseudomonas aeruginosa     MONCHO     Aztreonam ($$$)  <=8 ug/ml"><=8 ug/ml Susceptible     Cefepime ($) <=8 00 ug/ml"><=8 00 ug/ml Susceptible     Ceftazidime ($$) 4 ug/ml Susceptible     Ciprofloxacin ($)  <=1 00 ug/ml"><=1 00 ug/ml Susceptible     Gentamicin ($$) <=4 ug/ml"><=4 ug/ml Susceptible     Imipenem <=4 ug/ml"><=4 ug/ml Susceptible     Levofloxacin ($) <=2 00 ug/ml"><=2 00 ug/ml Susceptible     Meropenem ($$) <=4 00 ug/ml"><=4 00 ug/ml Susceptible     Piperacillin + Tazobactam ($$$) <=16 ug/ml"><=16 ug/ml Susceptible     Ticarcillin/K Clavulanate <=16 ug/ml"><=16 ug/ml Susceptible     Tobramycin ($) <=4 ug/ml"><=4 ug/ml Susceptible              Specimen Collected: 02/13/18  6:01 PM Last Resulted: 02/16/18  5:56 PM                ASSESSMENT:     74 y o  {Desc; male/female:1::"male"} with ***    PLAN:     Patient is improving nicely after his antibiotic treatment  There is still some residual testicular induration    I discussed with the patient that this is typically the last symptoms to resolve  I have recommended tight-fitting scrotal support and warm soaks  He is not having tenderness but if this returns, I would use anti-inflammatories  I have recommended that he repeat a scrotal ultrasound in approximately 1 month  The patient has a primary urologist that he follows with  I have recommended that he take the repeat ultrasound to his primary urologist     We discussed the etiology of retrograde infections in the testicle  This is likely due to incomplete emptying and urinary stasis  The patient is currently on dual therapy for his prostate  We discussed that he may need eventual surgical options if he develops recurrent infections  Should the patient be interested in transferring care, I would always be happy to see him back but he is very comfortable with his current physician

## 2018-03-02 NOTE — LETTER
March 2, 2018     No Recipients    Patient: Kaylee Martinez   YOB: 1950   Date of Visit: 3/2/2018       Dear Dr Chilango Cloud Recipients: Thank you for referring Kaylee Martinez to me for evaluation  Below are my notes for this consultation  If you have questions, please do not hesitate to call me  I look forward to following your patient along with you           Sincerely,        Guanaco Lovett MD        CC: No Recipients

## 2018-03-02 NOTE — PATIENT INSTRUCTIONS
Epididymitis   WHAT YOU NEED TO KNOW:   Epididymitis is inflammation of your epididymis  The epididymis is a coiled tube inside your scrotum  It stores and carries sperm from your testicles to your penis  Acute epididymitis lasts for 6 weeks or less  Chronic epididymitis lasts longer than 6 weeks  DISCHARGE INSTRUCTIONS:   Return to the emergency department if:   · You have severe pain in your testicles  · Your symptoms become worse even after you start treatment with medicine  Contact your healthcare provider if:   · Your symptoms do not get better within 3 days of treatment or come back after treatment  · You have a hot, red, tender area on your testicles  · You have questions or concerns about your condition or care  Medicines:   · Antibiotics  may be given if epididymitis is caused by a bacterial infection  · NSAIDs , such as ibuprofen, help decrease swelling, pain, and fever  This medicine is available with or without a doctor's order  NSAIDs can cause stomach bleeding or kidney problems in certain people  If you take blood thinner medicine, always ask if NSAIDs are safe for you  Always read the medicine label and follow directions  Do not give these medicines to children under 10months of age without direction from your child's healthcare provider  · Acetaminophen  decreases pain and fever  It is available without a doctor's order  Ask how much to take and how often to take it  Follow directions  Acetaminophen can cause liver damage if not taken correctly  · Prescription pain medicine  may be given  Ask how to take this medicine safely  · Take your medicine as directed  Contact your healthcare provider if you think your medicine is not helping or if you have side effects  Tell him of her if you are allergic to any medicine  Keep a list of the medicines, vitamins, and herbs you take  Include the amounts, and when and why you take them   Bring the list or the pill bottles to follow-up visits  Carry your medicine list with you in case of an emergency  Self-care:   · Apply ice  on your testicles for 15 to 20 minutes every hour or as directed  Use an ice pack, or put crushed ice in a plastic bag  Cover it with a towel  Ice helps prevent tissue damage and decreases swelling and pain  · Rest in bed  as directed  Elevate your scrotum when you sit or lie down to help reduce swelling and pain  You may be asked to do this by placing a rolled-up towel under your scrotum  · Scrotal support  may be recommended  An athletic supporter provides scrotal support and may make you more comfortable when you stand  Ask your healthcare provider how to use an athletic supporter  · Do not lift heavy objects  You can make swelling worse if you lift heavy objects or strain  Follow up with your healthcare provider as directed:  Write down your questions so you remember to ask them during your visits  © 2017 [employer] Information is for End User's use only and may not be sold, redistributed or otherwise used for commercial purposes  All illustrations and images included in CareNotes® are the copyrighted property of [employer]  or [employer]  The above information is an  only  It is not intended as medical advice for individual conditions or treatments  Talk to your doctor, nurse or pharmacist before following any medical regimen to see if it is safe and effective for you

## 2018-03-15 ENCOUNTER — TELEPHONE (OUTPATIENT)
Dept: FAMILY MEDICINE CLINIC | Facility: CLINIC | Age: 68
End: 2018-03-15

## 2018-03-15 DIAGNOSIS — R11.2 NON-INTRACTABLE VOMITING WITH NAUSEA, UNSPECIFIED VOMITING TYPE: Primary | ICD-10-CM

## 2018-03-15 RX ORDER — MECLIZINE HYDROCHLORIDE 25 MG/1
25 TABLET ORAL 3 TIMES DAILY PRN
Qty: 30 TABLET | Refills: 0 | Status: SHIPPED | OUTPATIENT
Start: 2018-03-15

## 2018-03-15 RX ORDER — ONDANSETRON 4 MG/1
4 TABLET, FILM COATED ORAL EVERY 8 HOURS PRN
Qty: 20 TABLET | Refills: 0 | Status: SHIPPED | OUTPATIENT
Start: 2018-03-15 | End: 2018-05-08

## 2018-03-23 ENCOUNTER — HOSPITAL ENCOUNTER (OUTPATIENT)
Dept: ULTRASOUND IMAGING | Facility: HOSPITAL | Age: 68
Discharge: HOME | End: 2018-03-23
Attending: UROLOGY
Payer: COMMERCIAL

## 2018-03-23 DIAGNOSIS — N45.1 EPIDIDYMITIS: ICD-10-CM

## 2018-03-23 PROCEDURE — 76870 US EXAM SCROTUM: CPT

## 2018-03-29 ENCOUNTER — OFFICE VISIT (OUTPATIENT)
Dept: FAMILY MEDICINE CLINIC | Facility: CLINIC | Age: 68
End: 2018-03-29
Payer: COMMERCIAL

## 2018-03-29 VITALS
OXYGEN SATURATION: 95 % | SYSTOLIC BLOOD PRESSURE: 118 MMHG | WEIGHT: 218.4 LBS | BODY MASS INDEX: 32.35 KG/M2 | DIASTOLIC BLOOD PRESSURE: 70 MMHG | HEART RATE: 97 BPM | RESPIRATION RATE: 15 BRPM | HEIGHT: 69 IN

## 2018-03-29 DIAGNOSIS — R42 VERTIGO: Primary | ICD-10-CM

## 2018-03-29 PROCEDURE — 99214 OFFICE O/P EST MOD 30 MIN: CPT | Performed by: FAMILY MEDICINE

## 2018-03-29 RX ORDER — METHYLPREDNISOLONE 4 MG/1
TABLET ORAL
Qty: 21 TABLET | Refills: 0 | Status: SHIPPED | OUTPATIENT
Start: 2018-03-29 | End: 2018-03-29 | Stop reason: SDUPTHER

## 2018-03-29 RX ORDER — METHYLPREDNISOLONE 4 MG/1
TABLET ORAL
Qty: 21 TABLET | Refills: 0 | Status: SHIPPED | OUTPATIENT
Start: 2018-03-29 | End: 2018-05-08

## 2018-03-29 NOTE — PROGRESS NOTES
Assessment/Plan:    No problem-specific Assessment & Plan notes found for this encounter  Diagnoses and all orders for this visit:    Vertigo          Subjective:      Patient ID: Clari Kendrick is a 74 y o  male  Dizziness         The following portions of the patient's history were reviewed and updated as appropriate:   He  has a past medical history of Back pain; Diabetes mellitus ([address] ); and Hypertension  He There are no active problems to display for this patient  He  has no past surgical history on file  His family history includes Hypertension in his father and mother  He  reports that he has never smoked  He has never used smokeless tobacco  He reports that he drinks alcohol  He reports that he does not use drugs    Current Outpatient Prescriptions   Medication Sig Dispense Refill    atorvastatin (LIPITOR) 40 mg tablet       dutasteride (AVODART) 0 5 mg capsule Take 0 5 mg by mouth daily        gabapentin (NEURONTIN) 600 MG tablet Take 1 tablet by mouth 3 (three) times a day      hydrochlorothiazide (HYDRODIURIL) 25 mg tablet Take 1 tablet (25 mg total) by mouth daily 90 tablet 3    insulin detemir (LEVEMIR) 100 units/mL subcutaneous injection Inject under the skin Twice daily      insulin lispro (HUMALOG KWIKPEN) 100 Units/mL SOPN Inject under the skin      lisinopril (ZESTRIL) 40 mg tablet Take 40 mg by mouth daily        meclizine (ANTIVERT) 25 mg tablet Take 1 tablet (25 mg total) by mouth 3 (three) times a day as needed for dizziness 30 tablet 0    metFORMIN (GLUCOPHAGE) 1000 MG tablet Take 1,000 mg by mouth daily with breakfast        sitaGLIPtin (JANUVIA) 100 mg tablet Take 100 mg by mouth daily        tamsulosin (FLOMAX) 0 4 mg Take 1 capsule by mouth daily      ibuprofen (MOTRIN) 600 mg tablet Take 1 tablet (600 mg total) by mouth every 8 (eight) hours as needed for mild pain or moderate pain for up to 5 days 15 tablet 0    ondansetron (ZOFRAN) 4 mg tablet Take 1 tablet (4 mg total) by mouth every 8 (eight) hours as needed for nausea or vomiting 20 tablet 0     No current facility-administered medications for this visit  Current Outpatient Prescriptions on File Prior to Visit   Medication Sig    atorvastatin (LIPITOR) 40 mg tablet     dutasteride (AVODART) 0 5 mg capsule Take 0 5 mg by mouth daily      gabapentin (NEURONTIN) 600 MG tablet Take 1 tablet by mouth 3 (three) times a day    hydrochlorothiazide (HYDRODIURIL) 25 mg tablet Take 1 tablet (25 mg total) by mouth daily    insulin detemir (LEVEMIR) 100 units/mL subcutaneous injection Inject under the skin Twice daily    insulin lispro (HUMALOG KWIKPEN) 100 Units/mL SOPN Inject under the skin    lisinopril (ZESTRIL) 40 mg tablet Take 40 mg by mouth daily      meclizine (ANTIVERT) 25 mg tablet Take 1 tablet (25 mg total) by mouth 3 (three) times a day as needed for dizziness    metFORMIN (GLUCOPHAGE) 1000 MG tablet Take 1,000 mg by mouth daily with breakfast      sitaGLIPtin (JANUVIA) 100 mg tablet Take 100 mg by mouth daily      tamsulosin (FLOMAX) 0 4 mg Take 1 capsule by mouth daily    ibuprofen (MOTRIN) 600 mg tablet Take 1 tablet (600 mg total) by mouth every 8 (eight) hours as needed for mild pain or moderate pain for up to 5 days    ondansetron (ZOFRAN) 4 mg tablet Take 1 tablet (4 mg total) by mouth every 8 (eight) hours as needed for nausea or vomiting     No current facility-administered medications on file prior to visit  He has No Known Allergies       Review of Systems   Genitourinary: Positive for scrotal swelling  Neurological: Positive for dizziness  All other systems reviewed and are negative  Objective:      /70 (BP Location: Right arm, Patient Position: Standing, Cuff Size: Large)   Pulse 97   Resp 15   Ht 5' 8 5" (1 74 m)   Wt 99 1 kg (218 lb 6 4 oz)   SpO2 95%   BMI 32 72 kg/m²          Physical Exam   Constitutional: He is oriented to person, place, and time   He appears well-developed and well-nourished  Neck: Normal range of motion  Neck supple  Cardiovascular: Normal rate, regular rhythm, normal heart sounds and intact distal pulses  Pulmonary/Chest: Effort normal and breath sounds normal    Abdominal: Bowel sounds are normal    Musculoskeletal: Normal range of motion  Neurological: He is alert and oriented to person, place, and time  He has normal reflexes  Skin: Skin is warm and dry  Psychiatric: He has a normal mood and affect  His behavior is normal  Judgment and thought content normal    Nursing note and vitals reviewed

## 2018-05-08 ENCOUNTER — TRANSCRIBE ORDERS (OUTPATIENT)
Dept: ADMINISTRATIVE | Facility: HOSPITAL | Age: 68
End: 2018-05-08

## 2018-05-08 ENCOUNTER — APPOINTMENT (OUTPATIENT)
Dept: LAB | Facility: MEDICAL CENTER | Age: 68
End: 2018-05-08
Payer: COMMERCIAL

## 2018-05-08 ENCOUNTER — OFFICE VISIT (OUTPATIENT)
Dept: FAMILY MEDICINE CLINIC | Facility: CLINIC | Age: 68
End: 2018-05-08
Payer: COMMERCIAL

## 2018-05-08 VITALS
HEIGHT: 69 IN | SYSTOLIC BLOOD PRESSURE: 134 MMHG | WEIGHT: 214.4 LBS | RESPIRATION RATE: 14 BRPM | DIASTOLIC BLOOD PRESSURE: 68 MMHG | BODY MASS INDEX: 31.76 KG/M2 | OXYGEN SATURATION: 97 % | HEART RATE: 96 BPM

## 2018-05-08 DIAGNOSIS — E11.9 TYPE 2 DIABETES MELLITUS WITHOUT COMPLICATION, WITHOUT LONG-TERM CURRENT USE OF INSULIN (HCC): ICD-10-CM

## 2018-05-08 DIAGNOSIS — E11.9 TYPE 2 DIABETES MELLITUS WITHOUT COMPLICATION, WITHOUT LONG-TERM CURRENT USE OF INSULIN (HCC): Primary | ICD-10-CM

## 2018-05-08 DIAGNOSIS — E21.3 HYPERPARATHYROIDISM (HCC): ICD-10-CM

## 2018-05-08 DIAGNOSIS — I10 ESSENTIAL HYPERTENSION: ICD-10-CM

## 2018-05-08 DIAGNOSIS — E78.49 OTHER HYPERLIPIDEMIA: ICD-10-CM

## 2018-05-08 DIAGNOSIS — E21.3 HYPERPARATHYROIDISM (HCC): Primary | ICD-10-CM

## 2018-05-08 LAB
ALBUMIN SERPL BCP-MCNC: 3.7 G/DL (ref 3.5–5)
ALP SERPL-CCNC: 95 U/L (ref 46–116)
ALT SERPL W P-5'-P-CCNC: 36 U/L (ref 12–78)
ANION GAP SERPL CALCULATED.3IONS-SCNC: 9 MMOL/L (ref 4–13)
AST SERPL W P-5'-P-CCNC: 15 U/L (ref 5–45)
BILIRUB SERPL-MCNC: 1.69 MG/DL (ref 0.2–1)
BUN SERPL-MCNC: 9 MG/DL (ref 5–25)
CA-I BLD-SCNC: 1.37 MMOL/L (ref 1.12–1.32)
CALCIUM ALBUM COR SERPL-MCNC: 11 MG/DL (ref 8.3–10.1)
CALCIUM SERPL-MCNC: 10.8 MG/DL (ref 8.3–10.1)
CHLORIDE SERPL-SCNC: 102 MMOL/L (ref 100–108)
CHOLEST SERPL-MCNC: 143 MG/DL (ref 50–200)
CO2 SERPL-SCNC: 24 MMOL/L (ref 21–32)
CREAT SERPL-MCNC: 0.75 MG/DL (ref 0.6–1.3)
EST. AVERAGE GLUCOSE BLD GHB EST-MCNC: 206 MG/DL
GFR SERPL CREATININE-BSD FRML MDRD: 96 ML/MIN/1.73SQ M
GLUCOSE SERPL-MCNC: 249 MG/DL (ref 65–140)
HBA1C MFR BLD: 8.8 % (ref 4.2–6.3)
HDLC SERPL-MCNC: 34 MG/DL (ref 40–60)
LDLC SERPL CALC-MCNC: 64 MG/DL (ref 0–100)
NONHDLC SERPL-MCNC: 109 MG/DL
POTASSIUM SERPL-SCNC: 4.1 MMOL/L (ref 3.5–5.3)
PROT SERPL-MCNC: 7.4 G/DL (ref 6.4–8.2)
PTH-INTACT SERPL-MCNC: 75 PG/ML (ref 18.4–80.1)
SODIUM SERPL-SCNC: 135 MMOL/L (ref 136–145)
TRIGL SERPL-MCNC: 225 MG/DL
TSH SERPL DL<=0.05 MIU/L-ACNC: 1.86 UIU/ML (ref 0.36–3.74)

## 2018-05-08 PROCEDURE — 99214 OFFICE O/P EST MOD 30 MIN: CPT | Performed by: FAMILY MEDICINE

## 2018-05-08 PROCEDURE — 83970 ASSAY OF PARATHORMONE: CPT

## 2018-05-08 PROCEDURE — 80053 COMPREHEN METABOLIC PANEL: CPT

## 2018-05-08 PROCEDURE — 36415 COLL VENOUS BLD VENIPUNCTURE: CPT

## 2018-05-08 PROCEDURE — 84443 ASSAY THYROID STIM HORMONE: CPT

## 2018-05-08 PROCEDURE — 82330 ASSAY OF CALCIUM: CPT

## 2018-05-08 PROCEDURE — 80061 LIPID PANEL: CPT

## 2018-05-08 PROCEDURE — 83036 HEMOGLOBIN GLYCOSYLATED A1C: CPT

## 2018-05-08 NOTE — PROGRESS NOTES
Assessment/Plan:    No problem-specific Assessment & Plan notes found for this encounter  Diagnoses and all orders for this visit:    Hyperparathyroidism ([facility] )  -     PTH, intact; Future  -     Calcium, ionized; Future  -     NM parathyroid scan w spect; Future    Type 2 diabetes mellitus without complication, without long-term current use of insulin ([facility])  -     Hemoglobin A1c; Future  -     Lipid panel; Future  -     Comprehensive metabolic panel; Future  -     TSH, 3rd generation; Future    Essential hypertension    Other hyperlipidemia          Subjective:      Patient ID: Thea Stringer is a 79 y o  male  His BP is at goal today  He has no chest pain or SOB  He has no HA or vision changes  His lipids are at goal on his current regimen  He has no   Myalgias or muscle weakness  He has no right upper quadrant pain or nausea  His liver function testing is within normal limits  His diabetes is well controlled at this point in time  He has no hypoglycemic events and has no side effects from his current regimen  He has a mildly elevated calcium and an elevated PTH  He has a history of kidney stones  He states that he has had a number of imaging studies which were inconclusive for hyperparathyroidism /parathyroid adenoma  The following portions of the patient's history were reviewed and updated as appropriate:   He  has a past medical history of Back pain; Diabetes mellitus ([facility] ); and Hypertension  He   Patient Active Problem List    Diagnosis Date Noted    [facility] ) 05/08/2018    Type 2 diabetes mellitus without complication, without long-term current use of insulin ([facility] ) 05/08/2018    Other hyperlipidemia 05/08/2018    Essential hypertension 05/08/2018     He  has no past surgical history on file  His family history includes Hypertension in his father and mother  He  reports that he has never smoked   He has never used smokeless tobacco  He reports that he drinks alcohol  He reports that he does not use drugs  Current Outpatient Prescriptions   Medication Sig Dispense Refill    atorvastatin (LIPITOR) 40 mg tablet       dutasteride (AVODART) 0 5 mg capsule Take 0 5 mg by mouth daily        gabapentin (NEURONTIN) 600 MG tablet Take 1 tablet by mouth 3 (three) times a day      hydrochlorothiazide (HYDRODIURIL) 25 mg tablet Take 1 tablet (25 mg total) by mouth daily (Patient taking differently: Take 12 5 mg by mouth daily  ) 90 tablet 3    insulin detemir (LEVEMIR) 100 units/mL subcutaneous injection Inject under the skin Twice daily      insulin lispro (HUMALOG KWIKPEN) 100 Units/mL SOPN Inject under the skin      lisinopril (ZESTRIL) 40 mg tablet Take 40 mg by mouth daily        meclizine (ANTIVERT) 25 mg tablet Take 1 tablet (25 mg total) by mouth 3 (three) times a day as needed for dizziness 30 tablet 0    metFORMIN (GLUCOPHAGE) 1000 MG tablet Take 1,000 mg by mouth daily with breakfast        sitaGLIPtin (JANUVIA) 100 mg tablet Take 100 mg by mouth daily        tamsulosin (FLOMAX) 0 4 mg Take 1 capsule by mouth daily      ibuprofen (MOTRIN) 600 mg tablet Take 1 tablet (600 mg total) by mouth every 8 (eight) hours as needed for mild pain or moderate pain for up to 5 days 15 tablet 0     No current facility-administered medications for this visit        Current Outpatient Prescriptions on File Prior to Visit   Medication Sig    atorvastatin (LIPITOR) 40 mg tablet     dutasteride (AVODART) 0 5 mg capsule Take 0 5 mg by mouth daily      gabapentin (NEURONTIN) 600 MG tablet Take 1 tablet by mouth 3 (three) times a day    hydrochlorothiazide (HYDRODIURIL) 25 mg tablet Take 1 tablet (25 mg total) by mouth daily (Patient taking differently: Take 12 5 mg by mouth daily  )    insulin detemir (LEVEMIR) 100 units/mL subcutaneous injection Inject under the skin Twice daily    insulin lispro (HUMALOG KWIKPEN) 100 Units/mL SOPN Inject under the skin    lisinopril (ZESTRIL) 40 mg tablet Take 40 mg by mouth daily      meclizine (ANTIVERT) 25 mg tablet Take 1 tablet (25 mg total) by mouth 3 (three) times a day as needed for dizziness    metFORMIN (GLUCOPHAGE) 1000 MG tablet Take 1,000 mg by mouth daily with breakfast      sitaGLIPtin (JANUVIA) 100 mg tablet Take 100 mg by mouth daily      tamsulosin (FLOMAX) 0 4 mg Take 1 capsule by mouth daily    ibuprofen (MOTRIN) 600 mg tablet Take 1 tablet (600 mg total) by mouth every 8 (eight) hours as needed for mild pain or moderate pain for up to 5 days    [DISCONTINUED] Methylprednisolone 4 MG TBPK Use as directed on package    [DISCONTINUED] ondansetron (ZOFRAN) 4 mg tablet Take 1 tablet (4 mg total) by mouth every 8 (eight) hours as needed for nausea or vomiting     No current facility-administered medications on file prior to visit  He has No Known Allergies       Review of Systems   All other systems reviewed and are negative  Objective:      /68 (BP Location: Right arm, Patient Position: Sitting, Cuff Size: Standard)   Pulse 96   Resp 14   Ht 5' 8 5" (1 74 m)   Wt 97 3 kg (214 lb 6 4 oz)   SpO2 97%   BMI 32 13 kg/m²          Physical Exam   Constitutional: He is oriented to person, place, and time  He appears well-developed and well-nourished  Neck: Normal range of motion  Neck supple  Cardiovascular: Normal rate, regular rhythm, normal heart sounds and intact distal pulses  Pulmonary/Chest: Effort normal and breath sounds normal    Abdominal: Soft  Bowel sounds are normal    Musculoskeletal: Normal range of motion  Neurological: He is alert and oriented to person, place, and time  He has normal reflexes  Skin: Skin is warm and dry  Psychiatric: He has a normal mood and affect  His behavior is normal  Judgment and thought content normal    Nursing note and vitals reviewed

## 2018-06-06 ENCOUNTER — HOSPITAL ENCOUNTER (OUTPATIENT)
Dept: NUCLEAR MEDICINE | Facility: HOSPITAL | Age: 68
Discharge: HOME | End: 2018-06-06
Attending: FAMILY MEDICINE
Payer: COMMERCIAL

## 2018-06-06 DIAGNOSIS — E21.3 HYPERPARATHYROIDISM (HCC): ICD-10-CM

## 2018-06-06 PROCEDURE — 78071 PARATHYRD PLANAR W/WO SUBTRJ: CPT

## 2018-06-06 PROCEDURE — A9500 TC99M SESTAMIBI: HCPCS

## 2018-06-08 DIAGNOSIS — E21.3 HYPERPARATHYROIDISM (HCC): Primary | ICD-10-CM

## 2018-06-27 DIAGNOSIS — E11.9 TYPE 2 DIABETES MELLITUS WITHOUT COMPLICATION, WITHOUT LONG-TERM CURRENT USE OF INSULIN (HCC): Primary | ICD-10-CM

## 2018-06-27 RX ORDER — ATORVASTATIN CALCIUM 40 MG/1
40 TABLET, FILM COATED ORAL DAILY
Qty: 90 TABLET | Refills: 3 | Status: SHIPPED | OUTPATIENT
Start: 2018-06-27 | End: 2019-05-13 | Stop reason: SDUPTHER

## 2018-07-24 ENCOUNTER — DOCTOR'S OFFICE (OUTPATIENT)
Dept: URBAN - NONMETROPOLITAN AREA CLINIC 1 | Facility: CLINIC | Age: 68
Setting detail: OPHTHALMOLOGY
End: 2018-07-24
Payer: COMMERCIAL

## 2018-07-24 DIAGNOSIS — H40.013: ICD-10-CM

## 2018-07-24 DIAGNOSIS — Z79.84: ICD-10-CM

## 2018-07-24 DIAGNOSIS — E11.9: ICD-10-CM

## 2018-07-24 DIAGNOSIS — E11.3293: ICD-10-CM

## 2018-07-24 DIAGNOSIS — H25.13: ICD-10-CM

## 2018-07-24 PROCEDURE — 92014 COMPRE OPH EXAM EST PT 1/>: CPT | Performed by: OPHTHALMOLOGY

## 2018-07-24 PROCEDURE — 92250 FUNDUS PHOTOGRAPHY W/I&R: CPT | Performed by: OPHTHALMOLOGY

## 2018-07-24 PROCEDURE — 92083 EXTENDED VISUAL FIELD XM: CPT | Performed by: OPHTHALMOLOGY

## 2018-07-24 PROCEDURE — 76514 ECHO EXAM OF EYE THICKNESS: CPT | Performed by: OPHTHALMOLOGY

## 2018-07-24 ASSESSMENT — REFRACTION_MANIFEST
OS_ADD: +2.25
OS_VA2: 20/25-2
OS_VA2: 20/25-2
OS_VA3: 20/
OD_VA2: 20/25-2
OS_SPHERE: -6.50
OS_VA1: 20/25-2
OU_VA: 20/
OD_VA1: 20/
OD_AXIS: 180
OS_VA1: 20/25-2
OD_CYLINDER: -1.25
OS_AXIS: 010
OD_VA3: 20/
OD_AXIS: 180
OD_VA3: 20/
OD_VA3: 20/
OS_SPHERE: -6.50
OS_CYLINDER: -1.00
OD_ADD: +2.25
OS_ADD: +1.75
OD_ADD: +1.75
OD_VA1: 20/25-2
OS_AXIS: 010
OS_VA3: 20/
OD_SPHERE: -5.00
OD_SPHERE: -4.75
OS_VA3: 20/
OS_CYLINDER: -0.75
OD_VA1: 20/25-2
OU_VA: 20/
OD_CYLINDER: -1.25
OU_VA: 20/
OD_VA2: 20/25-2
OS_VA2: 20/
OD_VA2: 20/
OS_VA1: 20/

## 2018-07-24 ASSESSMENT — CONFRONTATIONAL VISUAL FIELD TEST (CVF)
OS_FINDINGS: FULL
OD_FINDINGS: FULL

## 2018-07-24 ASSESSMENT — REFRACTION_CURRENTRX
OS_SPHERE: -7.75
OD_SPHERE: -5.75
OD_SPHERE: -5.25
OD_ADD: +2.25
OS_VPRISM_DIRECTION: PROGS
OD_AXIS: 180
OD_OVR_VA: 20/
OS_AXIS: 010
OD_AXIS: 180
OD_OVR_VA: 20/
OD_VPRISM_DIRECTION: PROGS
OS_VPRISM_DIRECTION: PROGS
OS_OVR_VA: 20/
OS_AXIS: 6
OD_OVR_VA: 20/
OS_SPHERE: -6.75
OD_CYLINDER: -1.50
OD_VPRISM_DIRECTION: PROGS
OS_CYLINDER: -0.75
OS_ADD: +2.25
OS_ADD: +2.00
OS_OVR_VA: 20/
OS_OVR_VA: 20/
OS_CYLINDER: -1.00
OD_CYLINDER: -1.50
OD_ADD: +2.00

## 2018-07-24 ASSESSMENT — REFRACTION_AUTOREFRACTION
OD_AXIS: 174
OS_CYLINDER: -1.00
OS_SPHERE: -6.00
OD_SPHERE: -4.75
OD_CYLINDER: -1.00
OS_AXIS: 19

## 2018-07-24 ASSESSMENT — SPHEQUIV_DERIVED
OD_SPHEQUIV: -5.25
OS_SPHEQUIV: -7
OD_SPHEQUIV: -5.375
OD_SPHEQUIV: -5.625
OS_SPHEQUIV: -6.5
OS_SPHEQUIV: -6.875

## 2018-07-24 ASSESSMENT — LID POSITION - DERMATOCHALASIS
OD_DERMATOCHALASIS: RUL
OS_DERMATOCHALASIS: LUL

## 2018-07-24 ASSESSMENT — PACHYMETRY
OD_CT_CORRECTION: -6
OS_CT_CORRECTION: -6
OD_CT_UM: 629
OS_CT_UM: 639

## 2018-07-24 ASSESSMENT — LID POSITION - PTOSIS
OS_PTOSIS: LUL
OD_PTOSIS: RUL

## 2018-07-24 ASSESSMENT — VISUAL ACUITY
OS_BCVA: 20/20-1
OD_BCVA: 20/25-2

## 2018-07-31 LAB
LEFT EYE DIABETIC RETINOPATHY: NORMAL
RIGHT EYE DIABETIC RETINOPATHY: NORMAL

## 2018-07-31 PROCEDURE — 3072F LOW RISK FOR RETINOPATHY: CPT | Performed by: FAMILY MEDICINE

## 2018-08-03 RX ORDER — INSULIN DETEMIR 100 [IU]/ML
INJECTION, SOLUTION SUBCUTANEOUS
COMMUNITY
Start: 2018-06-03 | End: 2018-09-06 | Stop reason: SDUPTHER

## 2018-08-07 ENCOUNTER — OFFICE VISIT (OUTPATIENT)
Dept: FAMILY MEDICINE CLINIC | Facility: CLINIC | Age: 68
End: 2018-08-07
Payer: COMMERCIAL

## 2018-08-07 VITALS
WEIGHT: 213.2 LBS | SYSTOLIC BLOOD PRESSURE: 126 MMHG | HEIGHT: 69 IN | BODY MASS INDEX: 31.58 KG/M2 | HEART RATE: 82 BPM | OXYGEN SATURATION: 96 % | DIASTOLIC BLOOD PRESSURE: 80 MMHG | RESPIRATION RATE: 15 BRPM

## 2018-08-07 DIAGNOSIS — E11.9 TYPE 2 DIABETES MELLITUS WITHOUT COMPLICATION, WITHOUT LONG-TERM CURRENT USE OF INSULIN (HCC): ICD-10-CM

## 2018-08-07 DIAGNOSIS — I10 ESSENTIAL HYPERTENSION: ICD-10-CM

## 2018-08-07 DIAGNOSIS — E21.3 HYPERPARATHYROIDISM (HCC): Primary | ICD-10-CM

## 2018-08-07 DIAGNOSIS — E78.49 OTHER HYPERLIPIDEMIA: ICD-10-CM

## 2018-08-07 LAB — SL AMB POCT HEMOGLOBIN AIC: 10

## 2018-08-07 PROCEDURE — 83036 HEMOGLOBIN GLYCOSYLATED A1C: CPT | Performed by: FAMILY MEDICINE

## 2018-08-07 PROCEDURE — 3079F DIAST BP 80-89 MM HG: CPT | Performed by: FAMILY MEDICINE

## 2018-08-07 PROCEDURE — 3074F SYST BP LT 130 MM HG: CPT | Performed by: FAMILY MEDICINE

## 2018-08-07 PROCEDURE — 99214 OFFICE O/P EST MOD 30 MIN: CPT | Performed by: FAMILY MEDICINE

## 2018-08-07 RX ORDER — GLIMEPIRIDE 4 MG/1
4 TABLET ORAL
Qty: 90 TABLET | Refills: 3 | Status: SHIPPED | OUTPATIENT
Start: 2018-08-07 | End: 2019-03-22 | Stop reason: SDUPTHER

## 2018-08-07 NOTE — PROGRESS NOTES
Assessment/Plan:    No problem-specific Assessment & Plan notes found for this encounter  Diagnoses and all orders for this visit:    Hyperparathyroidism ([facility] )    Type 2 diabetes mellitus without complication, without long-term current use of insulin ([facility] )  -     metFORMIN (GLUCOPHAGE) 1000 MG tablet; Take 1 tablet (1,000 mg total) by mouth 2 (two) times a day with meals  -     POCT hemoglobin A1c  -     glimepiride (AMARYL) 4 mg tablet; Take 1 tablet (4 mg total) by mouth daily with breakfast    Essential hypertension    Other hyperlipidemia    Other orders  -     LEVEMIR FLEXTOUCH 100 units/mL injection pen;   -     ONE TOUCH ULTRA TEST test strip;         HTN;  BP at goal   Cont current  Hyperlipidemia:  Stable  Continue current  Subjective:      Patient ID: Katie Bowman is a 79 y o  male  His A1c is not at goal   He has no hypoglycemia  He has no side effects from his medications  His BP is at goal on his current regimen, which includes an ACE-I  His LDL is at goal on his current regimen  He is on "high-intensity" statin therapy  He has hyperparthyroidism  He is seeing Dr Harry Lange next week  The following portions of the patient's history were reviewed and updated as appropriate:   He  has a past medical history of Back pain; Diabetes mellitus ([facility] ); and Hypertension  He   Patient Active Problem List    Diagnosis Date Noted    [facility] ) 05/08/2018    Type 2 diabetes mellitus without complication, without long-term current use of insulin ([facility] ) 05/08/2018    Other hyperlipidemia 05/08/2018    Essential hypertension 05/08/2018     He  has a past surgical history that includes Hernia repair (1998)  His family history includes Arthritis in his mother; Coronary artery disease in his father and mother; Diabetes in his maternal uncle and paternal uncle; Diabetes type II in his mother; Hypertension in his father and mother    He  reports that he has never smoked  He has never used smokeless tobacco  He reports that he drinks alcohol  He reports that he does not use drugs  Current Outpatient Prescriptions   Medication Sig Dispense Refill    atorvastatin (LIPITOR) 40 mg tablet Take 1 tablet (40 mg total) by mouth daily 90 tablet 3    dutasteride (AVODART) 0 5 mg capsule Take 0 5 mg by mouth daily        gabapentin (NEURONTIN) 600 MG tablet Take 1 tablet by mouth 3 (three) times a day      hydrochlorothiazide (HYDRODIURIL) 25 mg tablet Take 1 tablet (25 mg total) by mouth daily (Patient taking differently: Take 12 5 mg by mouth daily  ) 90 tablet 3    insulin detemir (LEVEMIR) 100 units/mL subcutaneous injection Inject under the skin Twice daily      insulin lispro (HUMALOG KWIKPEN) 100 Units/mL SOPN Inject under the skin      LEVEMIR FLEXTOUCH 100 units/mL injection pen       lisinopril (ZESTRIL) 40 mg tablet Take 40 mg by mouth daily        meclizine (ANTIVERT) 25 mg tablet Take 1 tablet (25 mg total) by mouth 3 (three) times a day as needed for dizziness 30 tablet 0    metFORMIN (GLUCOPHAGE) 1000 MG tablet Take 1 tablet (1,000 mg total) by mouth 2 (two) times a day with meals 180 tablet 3    ONE TOUCH ULTRA TEST test strip       sitaGLIPtin (JANUVIA) 100 mg tablet Take 1 tablet (100 mg total) by mouth daily 90 tablet 3    tamsulosin (FLOMAX) 0 4 mg Take 1 capsule by mouth daily      glimepiride (AMARYL) 4 mg tablet Take 1 tablet (4 mg total) by mouth daily with breakfast 90 tablet 3    ibuprofen (MOTRIN) 600 mg tablet Take 1 tablet (600 mg total) by mouth every 8 (eight) hours as needed for mild pain or moderate pain for up to 5 days 15 tablet 0     No current facility-administered medications for this visit        Current Outpatient Prescriptions on File Prior to Visit   Medication Sig    atorvastatin (LIPITOR) 40 mg tablet Take 1 tablet (40 mg total) by mouth daily    dutasteride (AVODART) 0 5 mg capsule Take 0 5 mg by mouth daily      gabapentin (NEURONTIN) 600 MG tablet Take 1 tablet by mouth 3 (three) times a day    hydrochlorothiazide (HYDRODIURIL) 25 mg tablet Take 1 tablet (25 mg total) by mouth daily (Patient taking differently: Take 12 5 mg by mouth daily  )    insulin detemir (LEVEMIR) 100 units/mL subcutaneous injection Inject under the skin Twice daily    insulin lispro (HUMALOG KWIKPEN) 100 Units/mL SOPN Inject under the skin    lisinopril (ZESTRIL) 40 mg tablet Take 40 mg by mouth daily      meclizine (ANTIVERT) 25 mg tablet Take 1 tablet (25 mg total) by mouth 3 (three) times a day as needed for dizziness    sitaGLIPtin (JANUVIA) 100 mg tablet Take 1 tablet (100 mg total) by mouth daily    tamsulosin (FLOMAX) 0 4 mg Take 1 capsule by mouth daily    [DISCONTINUED] metFORMIN (GLUCOPHAGE) 1000 MG tablet Take 1 tablet (1,000 mg total) by mouth daily with breakfast (Patient taking differently: Take 1,000 mg by mouth 2 (two) times a day with meals  )    ibuprofen (MOTRIN) 600 mg tablet Take 1 tablet (600 mg total) by mouth every 8 (eight) hours as needed for mild pain or moderate pain for up to 5 days     No current facility-administered medications on file prior to visit  He has No Known Allergies       Review of Systems   All other systems reviewed and are negative  Objective:      /80 (BP Location: Right arm, Patient Position: Sitting, Cuff Size: Standard)   Pulse 82   Resp 15   Ht 5' 8 5" (1 74 m)   Wt 96 7 kg (213 lb 3 2 oz)   SpO2 96%   BMI 31 95 kg/m²          Physical Exam   Constitutional: He is oriented to person, place, and time  He appears well-developed and well-nourished  Neck: Normal range of motion  Neck supple  Cardiovascular: Normal rate, regular rhythm, normal heart sounds and intact distal pulses  Pulmonary/Chest: Effort normal and breath sounds normal    Abdominal: Soft  Bowel sounds are normal    Neurological: He is alert and oriented to person, place, and time  He has normal reflexes  Skin: Skin is warm and dry  Psychiatric: He has a normal mood and affect  His behavior is normal  Judgment and thought content normal    Nursing note and vitals reviewed

## 2018-08-14 PROBLEM — N40.1 BPH WITH OBSTRUCTION/LOWER URINARY TRACT SYMPTOMS: Status: ACTIVE | Noted: 2017-10-10

## 2018-08-14 PROBLEM — R82.994 HYPERCALCIURIA: Status: ACTIVE | Noted: 2017-07-24

## 2018-08-14 PROBLEM — E11.9 DM TYPE 2, NOT AT GOAL (HCC): Status: ACTIVE | Noted: 2017-07-24

## 2018-08-14 PROBLEM — N13.8 BPH WITH OBSTRUCTION/LOWER URINARY TRACT SYMPTOMS: Status: ACTIVE | Noted: 2017-10-10

## 2018-08-15 ENCOUNTER — APPOINTMENT (OUTPATIENT)
Dept: LAB | Facility: HOSPITAL | Age: 68
End: 2018-08-15
Attending: SURGERY
Payer: COMMERCIAL

## 2018-08-15 ENCOUNTER — OFFICE VISIT (OUTPATIENT)
Dept: SURGICAL ONCOLOGY | Facility: HOSPITAL | Age: 68
End: 2018-08-15
Payer: COMMERCIAL

## 2018-08-15 VITALS
TEMPERATURE: 98.8 F | HEART RATE: 94 BPM | HEIGHT: 69 IN | DIASTOLIC BLOOD PRESSURE: 80 MMHG | SYSTOLIC BLOOD PRESSURE: 122 MMHG | WEIGHT: 218 LBS | BODY MASS INDEX: 32.29 KG/M2 | RESPIRATION RATE: 16 BRPM

## 2018-08-15 DIAGNOSIS — E21.3 HYPERPARATHYROIDISM (HCC): ICD-10-CM

## 2018-08-15 DIAGNOSIS — E21.3 HYPERPARATHYROIDISM (HCC): Primary | ICD-10-CM

## 2018-08-15 LAB
ANION GAP SERPL CALCULATED.3IONS-SCNC: 9 MMOL/L (ref 4–13)
BUN SERPL-MCNC: 14 MG/DL (ref 5–25)
CALCIUM SERPL-MCNC: 9.8 MG/DL (ref 8.3–10.1)
CHLORIDE SERPL-SCNC: 101 MMOL/L (ref 100–108)
CO2 SERPL-SCNC: 24 MMOL/L (ref 21–32)
CREAT SERPL-MCNC: 0.78 MG/DL (ref 0.6–1.3)
GFR SERPL CREATININE-BSD FRML MDRD: 94 ML/MIN/1.73SQ M
GLUCOSE SERPL-MCNC: 293 MG/DL (ref 65–140)
POTASSIUM SERPL-SCNC: 4.2 MMOL/L (ref 3.5–5.3)
PTH-INTACT SERPL-MCNC: 79.8 PG/ML (ref 18.4–80.1)
SODIUM SERPL-SCNC: 134 MMOL/L (ref 136–145)

## 2018-08-15 PROCEDURE — 99244 OFF/OP CNSLTJ NEW/EST MOD 40: CPT | Performed by: SURGERY

## 2018-08-15 PROCEDURE — 83970 ASSAY OF PARATHORMONE: CPT

## 2018-08-15 PROCEDURE — 36415 COLL VENOUS BLD VENIPUNCTURE: CPT

## 2018-08-15 PROCEDURE — 80048 BASIC METABOLIC PNL TOTAL CA: CPT

## 2018-08-15 RX ORDER — ASPIRIN 81 MG/1
81 TABLET ORAL DAILY
COMMUNITY

## 2018-08-15 NOTE — LETTER
August 15, 2018     Ester Bates MD  [address] [place] [id number]    Patient: Bernice Vinson   YOB: 1950   Date of Visit: 8/15/2018       Dear Dr Addison Corners: Thank you for referring Claud Hodgkin to me for evaluation  Below are my notes for this consultation  If you have questions, please do not hesitate to call me  I look forward to following your patient along with you  Sincerely,        Benita Corrales MD        CC: MD Benita Freed MD  8/15/2018  3:15 PM  Sign at close encounter               Surgical Oncology Follow Up       [address] [address]    Bernice Vinson  1950  [id number]  [address] [address]    Chief Complaint   Patient presents with   Brandy Krishnan Consult     Patient is here for a consult regarding hyperparathyroidism       Assessment/Plan:    No problem-specific Assessment & Plan notes found for this encounter  Diagnoses and all orders for this visit:    Hyperparathyroidism ([address] )  -     CT soft tissue neck w wo contrast; Future    Other orders  -     aspirin (ECOTRIN LOW STRENGTH) 81 mg EC tablet; Take 81 mg by mouth daily  -     SUPER B COMPLEX/C PO; Take by mouth        Advance Care Planning/Advance Directives:  Discussed disease status, cancer treatment plans and/or cancer treatment goals with the patient  No history exists  History of Present Illness:   Patient is 74-year-old man with a history of kidney stones over last several years  He was diagnosed with hypercalcemia  Subsequent workup was for primary hyperparathyroidism  He underwent neck biopsy at some point but states the results were inconclusive    He has fatigue, acheyness, but 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     Review of Systems   Constitutional: Negative  HENT: Negative  Eyes: Negative  Respiratory: Negative  Cardiovascular: Negative  Gastrointestinal: Negative  Endocrine: Negative  Genitourinary: Negative  Musculoskeletal: Negative  Skin: Negative  Allergic/Immunologic: Negative  Neurological: Negative  Hematological: Negative  Psychiatric/Behavioral: Negative  Patient Active Problem List   Diagnosis    Hyperparathyroidism ([place] [address] )    Type 2 diabetes mellitus without complication, without long-term current use of insulin ([facility])    Other hyperlipidemia    Hypertension    Arthritis of knee    BPH with obstruction/lower urinary tract symptoms    Diabetes ([place] [address] )    DM type 2, not at goal [facility])    Hypercalciuria    Knee osteoarthritis    Prepatellar bursitis     Past Medical History:   Diagnosis Date    Back pain     Diabetes mellitus ([place] [address] )     Hypertension      Past Surgical History:   Procedure Laterality Date    HERNIA REPAIR  1998     Family History   Problem Relation Age of Onset    Hypertension Father     Coronary artery disease Father     Hypertension Mother     Arthritis Mother     Coronary artery disease Mother     Diabetes type II Mother     Diabetes Maternal Uncle     Diabetes Paternal Uncle      Social History     Social History    Marital status: /Civil Union     Spouse name: N/A    Number of children: N/A    Years of education: N/A     Occupational History    Not on file       Social History Main Topics    Smoking status: Never Smoker    Smokeless tobacco: Never Used    Alcohol use Yes      Comment: rarely    Drug use: No    Sexual activity: Not on file     Other Topics Concern    Not on file     Social History Narrative    No narrative on file       Current Outpatient Prescriptions:     aspirin (ECOTRIN LOW STRENGTH) 81 mg EC tablet, Take 81 mg by mouth daily, Disp: , Rfl:     atorvastatin (LIPITOR) 40 mg tablet, Take 1 tablet (40 mg total) by mouth daily, Disp: 90 tablet, Rfl: 3    dutasteride (AVODART) 0 5 mg capsule, Take 0 5 mg by mouth daily  , Disp: , Rfl:     gabapentin (NEURONTIN) 600 MG tablet, Take 1 tablet by mouth 3 (three) times a day, Disp: , Rfl:     glimepiride (AMARYL) 4 mg tablet, Take 1 tablet (4 mg total) by mouth daily with breakfast, Disp: 90 tablet, Rfl: 3    hydrochlorothiazide (HYDRODIURIL) 25 mg tablet, Take 1 tablet (25 mg total) by mouth daily (Patient taking differently: Take 12 5 mg by mouth daily  ), Disp: 90 tablet, Rfl: 3    insulin detemir (LEVEMIR) 100 units/mL subcutaneous injection, Inject under the skin Twice daily, Disp: , Rfl:     insulin lispro (HUMALOG KWIKPEN) 100 Units/mL SOPN, Inject under the skin, Disp: , Rfl:     LEVEMIR FLEXTOUCH 100 units/mL injection pen, , Disp: , Rfl:     lisinopril (ZESTRIL) 40 mg tablet, Take 40 mg by mouth daily  , Disp: , Rfl:     meclizine (ANTIVERT) 25 mg tablet, Take 1 tablet (25 mg total) by mouth 3 (three) times a day as needed for dizziness, Disp: 30 tablet, Rfl: 0    metFORMIN (GLUCOPHAGE) 1000 MG tablet, Take 1 tablet (1,000 mg total) by mouth 2 (two) times a day with meals, Disp: 180 tablet, Rfl: 3    ONE TOUCH ULTRA TEST test strip, , Disp: , Rfl:     sitaGLIPtin (JANUVIA) 100 mg tablet, Take 1 tablet (100 mg total) by mouth daily, Disp: 90 tablet, Rfl: 3    SUPER B COMPLEX/C PO, Take by mouth, Disp: , Rfl:     tamsulosin (FLOMAX) 0 4 mg, Take 1 capsule by mouth daily, Disp: , Rfl:     ibuprofen (MOTRIN) 600 mg tablet, Take 1 tablet (600 mg total) by mouth every 8 (eight) hours as needed for mild pain or moderate pain for up to 5 days, Disp: 15 tablet, Rfl: 0  No Known Allergies  Vitals:    08/15/18 1424   BP: 122/80   Pulse: 94   Resp: 16   Temp: 98 8 °F (37 1 °C)       Physical Exam   Constitutional: He is oriented to person, place, and time  He appears well-developed and well-nourished     HENT:   Head: Normocephalic and atraumatic  Right Ear: External ear normal    Left Ear: External ear normal    Eyes: Conjunctivae are normal  Pupils are equal, round, and reactive to light  Neck: Normal range of motion  Neck supple  Cardiovascular: Normal rate, regular rhythm and normal heart sounds  Pulmonary/Chest: Effort normal and breath sounds normal    Abdominal: Soft  Bowel sounds are normal    Musculoskeletal: Normal range of motion  Neurological: He is alert and oriented to person, place, and time  Skin: Skin is warm and dry  Psychiatric: He has a normal mood and affect  His behavior is normal  Judgment and thought content normal        Pathology:  none    Labs:  CBC, Coags, BMP, Mg, Phos   Lab Results   Component Value Date    PTH 75 0 05/08/2018    CALCIUM 10 8 (H) 05/08/2018    CAION 1 37 (H) 05/08/2018       Imaging  No results found  I reviewed the above laboratory and imaging data  Discussion/Summary:  Hypercalcemia, suspected hyperparathyroidism, though his PTH level is high normal   Sestamibi scan done suggestive of right-sided process but not definitive  We will therefore order CT angiogram to help clarify  Follow-up here once results available for review  Further management will be based on study  If it lesion was localized, he would be a candidate for minimally invasive parathyroidectomy

## 2018-08-15 NOTE — PROGRESS NOTES
Surgical Oncology Follow Up       [address] [place] [address] SURGICAL ONCOLOGY [place]  [address]  [facility] [address]    Katie Bowman  1950  [id number]  [address] [place] [address] SURGICAL ONCOLOGY [place]  [address] [facility] [address]    Chief Complaint   Patient presents with   [employer] Consult     Patient is here for a consult regarding hyperparathyroidism       Assessment/Plan:    No problem-specific Assessment & Plan notes found for this encounter  Diagnoses and all orders for this visit:    Hyperparathyroidism ([address] )  -     CT soft tissue neck w wo contrast; Future    Other orders  -     aspirin (ECOTRIN LOW STRENGTH) 81 mg EC tablet; Take 81 mg by mouth daily  -     SUPER B COMPLEX/C PO; Take by mouth        Advance Care Planning/Advance Directives:  Discussed disease status, cancer treatment plans and/or cancer treatment goals with the patient  No history exists  History of Present Illness:   Patient is 62-year-old man with a history of kidney stones over last several years  He was diagnosed with hypercalcemia  Subsequent workup was for primary hyperparathyroidism  He underwent neck biopsy at some point but states the results were inconclusive  He has fatigue, acheyness, but 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     Review of Systems   Constitutional: Negative  HENT: Negative  Eyes: Negative  Respiratory: Negative  Cardiovascular: Negative  Gastrointestinal: Negative  Endocrine: Negative  Genitourinary: Negative  Musculoskeletal: Negative  Skin: Negative  Allergic/Immunologic: Negative  Neurological: Negative  Hematological: Negative  Psychiatric/Behavioral: Negative            Patient Active Problem List   Diagnosis    Hyperparathyroidism ([address] )    Type 2 diabetes mellitus without complication, without long-term current use of insulin ([facility] )    Other hyperlipidemia    Hypertension    Arthritis of knee    BPH with obstruction/lower urinary tract symptoms    Diabetes ([place] [address] )    DM type 2, not at goal [facility])    Hypercalciuria    Knee osteoarthritis    Prepatellar bursitis     Past Medical History:   Diagnosis Date    Back pain     Diabetes mellitus ([facility] )     Hypertension      Past Surgical History:   Procedure Laterality Date    HERNIA REPAIR  1998     Family History   Problem Relation Age of Onset    Hypertension Father     Coronary artery disease Father     Hypertension Mother     Arthritis Mother     Coronary artery disease Mother     Diabetes type II Mother     Diabetes Maternal Uncle     Diabetes Paternal Uncle      Social History     Social History    Marital status: /Civil Union     Spouse name: N/A    Number of children: N/A    Years of education: N/A     Occupational History    Not on file       Social History Main Topics    Smoking status: Never Smoker    Smokeless tobacco: Never Used    Alcohol use Yes      Comment: rarely    Drug use: No    Sexual activity: Not on file     Other Topics Concern    Not on file     Social History Narrative    No narrative on file       Current Outpatient Prescriptions:     aspirin (ECOTRIN LOW STRENGTH) 81 mg EC tablet, Take 81 mg by mouth daily, Disp: , Rfl:     atorvastatin (LIPITOR) 40 mg tablet, Take 1 tablet (40 mg total) by mouth daily, Disp: 90 tablet, Rfl: 3    dutasteride (AVODART) 0 5 mg capsule, Take 0 5 mg by mouth daily  , Disp: , Rfl:     gabapentin (NEURONTIN) 600 MG tablet, Take 1 tablet by mouth 3 (three) times a day, Disp: , Rfl:     glimepiride (AMARYL) 4 mg tablet, Take 1 tablet (4 mg total) by mouth daily with breakfast, Disp: 90 tablet, Rfl: 3    hydrochlorothiazide (HYDRODIURIL) 25 mg tablet, Take 1 tablet (25 mg total) by mouth daily (Patient taking differently: Take 12 5 mg by mouth daily  ), Disp: 90 tablet, Rfl: 3   insulin detemir (LEVEMIR) 100 units/mL subcutaneous injection, Inject under the skin Twice daily, Disp: , Rfl:     insulin lispro (HUMALOG KWIKPEN) 100 Units/mL SOPN, Inject under the skin, Disp: , Rfl:     LEVEMIR FLEXTOUCH 100 units/mL injection pen, , Disp: , Rfl:     lisinopril (ZESTRIL) 40 mg tablet, Take 40 mg by mouth daily  , Disp: , Rfl:     meclizine (ANTIVERT) 25 mg tablet, Take 1 tablet (25 mg total) by mouth 3 (three) times a day as needed for dizziness, Disp: 30 tablet, Rfl: 0    metFORMIN (GLUCOPHAGE) 1000 MG tablet, Take 1 tablet (1,000 mg total) by mouth 2 (two) times a day with meals, Disp: 180 tablet, Rfl: 3    ONE TOUCH ULTRA TEST test strip, , Disp: , Rfl:     sitaGLIPtin (JANUVIA) 100 mg tablet, Take 1 tablet (100 mg total) by mouth daily, Disp: 90 tablet, Rfl: 3    SUPER B COMPLEX/C PO, Take by mouth, Disp: , Rfl:     tamsulosin (FLOMAX) 0 4 mg, Take 1 capsule by mouth daily, Disp: , Rfl:     ibuprofen (MOTRIN) 600 mg tablet, Take 1 tablet (600 mg total) by mouth every 8 (eight) hours as needed for mild pain or moderate pain for up to 5 days, Disp: 15 tablet, Rfl: 0  No Known Allergies  Vitals:    08/15/18 1424   BP: 122/80   Pulse: 94   Resp: 16   Temp: 98 8 °F (37 1 °C)       Physical Exam   Constitutional: He is oriented to person, place, and time  He appears well-developed and well-nourished  HENT:   Head: Normocephalic and atraumatic  Right Ear: External ear normal    Left Ear: External ear normal    Eyes: Conjunctivae are normal  Pupils are equal, round, and reactive to light  Neck: Normal range of motion  Neck supple  Cardiovascular: Normal rate, regular rhythm and normal heart sounds  Pulmonary/Chest: Effort normal and breath sounds normal    Abdominal: Soft  Bowel sounds are normal    Musculoskeletal: Normal range of motion  Neurological: He is alert and oriented to person, place, and time  Skin: Skin is warm and dry     Psychiatric: He has a normal mood and affect  His behavior is normal  Judgment and thought content normal        Pathology:  none    Labs:  CBC, Coags, BMP, Mg, Phos   Lab Results   Component Value Date    PTH 75 0 05/08/2018    CALCIUM 10 8 (H) 05/08/2018    CAION 1 37 (H) 05/08/2018       Imaging  No results found  I reviewed the above laboratory and imaging data  Discussion/Summary:  Hypercalcemia, suspected hyperparathyroidism, though his PTH level is high normal   Sestamibi scan done suggestive of right-sided process but not definitive  We will therefore order CT angiogram to help clarify  Follow-up here once results available for review  Further management will be based on study  If it lesion was localized, he would be a candidate for minimally invasive parathyroidectomy

## 2018-08-21 DIAGNOSIS — E21.3 HYPERPARATHYROIDISM (HCC): Primary | ICD-10-CM

## 2018-08-23 ENCOUNTER — HOSPITAL ENCOUNTER (OUTPATIENT)
Dept: CT IMAGING | Facility: HOSPITAL | Age: 68
Discharge: HOME | End: 2018-08-23
Attending: SURGERY
Payer: COMMERCIAL

## 2018-08-23 DIAGNOSIS — E21.3 HYPERPARATHYROIDISM (HCC): ICD-10-CM

## 2018-08-23 PROCEDURE — 70492 CT SFT TSUE NCK W/O & W/DYE: CPT

## 2018-08-23 RX ADMIN — IOHEXOL 85 ML: 350 INJECTION, SOLUTION INTRAVENOUS at 10:19

## 2018-08-24 DIAGNOSIS — E04.2 MULTIPLE THYROID NODULES: Primary | ICD-10-CM

## 2018-08-30 ENCOUNTER — HOSPITAL ENCOUNTER (OUTPATIENT)
Dept: ULTRASOUND IMAGING | Facility: HOSPITAL | Age: 68
Discharge: HOME | End: 2018-08-30
Attending: SURGERY
Payer: COMMERCIAL

## 2018-08-30 DIAGNOSIS — E04.2 MULTIPLE THYROID NODULES: ICD-10-CM

## 2018-08-30 PROCEDURE — 76536 US EXAM OF HEAD AND NECK: CPT

## 2018-09-05 ENCOUNTER — OFFICE VISIT (OUTPATIENT)
Dept: SURGICAL ONCOLOGY | Facility: HOSPITAL | Age: 68
End: 2018-09-05
Payer: COMMERCIAL

## 2018-09-05 VITALS
RESPIRATION RATE: 16 BRPM | HEIGHT: 69 IN | BODY MASS INDEX: 32.29 KG/M2 | HEART RATE: 84 BPM | TEMPERATURE: 99.3 F | DIASTOLIC BLOOD PRESSURE: 80 MMHG | WEIGHT: 218 LBS | SYSTOLIC BLOOD PRESSURE: 132 MMHG

## 2018-09-05 DIAGNOSIS — E21.3 HYPERPARATHYROIDISM (HCC): ICD-10-CM

## 2018-09-05 DIAGNOSIS — E04.2 MULTIPLE THYROID NODULES: Primary | ICD-10-CM

## 2018-09-05 PROCEDURE — 99213 OFFICE O/P EST LOW 20 MIN: CPT | Performed by: SURGERY

## 2018-09-05 NOTE — LETTER
September 5, 2018     MD [address]    Patient: Buddy Plata   YOB: 1950   Date of Visit: 9/5/2018       Dear Dr Ye Canales: Thank you for referring Jessica Abdi to me for evaluation  Below are my notes for this consultation  If you have questions, please do not hesitate to call me  I look forward to following your patient along with you  Sincerely,        Sergio Gallegos MD        CC: MD Leanne Barrientos MD  9/5/2018  2:25 PM  Sign at close encounter     Surgical Oncology Follow Up       [address] [address]    Buddy Plata  1950  [id number]  [address] [address]    Chief Complaint   Patient presents with    Follow-up     Patient is here for a 2 week follow up with CT angiogram        Assessment/Plan:      No problem-specific Assessment & Plan notes found for this encounter  Diagnoses and all orders for this visit:    Multiple thyroid nodules  -     US guided thyroid biopsy; Future    Hyperparathyroidism [facility])        Advance Care Planning/Advance Directives:  Discussed disease status, cancer treatment plans and/or cancer treatment goals with the patient  No history exists  History of Present Illness:   79year old man with primary hyperparathyroidism,  -Interval History:  He is here status post CT angiogram to look for potential adenoma  He also had a thyroid ultrasound done which revealed nodules on the left side which merit further workup  Denies a history of radiation exposure to the head or neck area  Denies personal or family history of thyroid cancer  Review of Systems:  Review of Systems   Constitutional: Negative  HENT: Negative  Eyes: Negative  Respiratory: Negative  Cardiovascular: Negative  Gastrointestinal: Negative  Endocrine: Negative  Genitourinary: Negative  Musculoskeletal: Negative  Skin: Negative  Allergic/Immunologic: Negative  Neurological: Negative  Hematological: Negative  Psychiatric/Behavioral: Negative  Patient Active Problem List   Diagnosis    Hyperparathyroidism ([place] [address] )    Type 2 diabetes mellitus without complication, without long-term current use of insulin (HCC)    Other hyperlipidemia    Hypertension    Arthritis of knee    BPH with obstruction/lower urinary tract symptoms    Diabetes ([place] [address] )    DM type 2, not at goal [facility])    Hypercalciuria    Knee osteoarthritis    Prepatellar bursitis    Multiple thyroid nodules     Past Medical History:   Diagnosis Date    Back pain     Diabetes mellitus ([place] [address] )     Hypertension      Past Surgical History:   Procedure Laterality Date    HERNIA REPAIR  1998     Family History   Problem Relation Age of Onset    Hypertension Father     Coronary artery disease Father     Hypertension Mother     Arthritis Mother     Coronary artery disease Mother     Diabetes type II Mother     Diabetes Maternal Uncle     Diabetes Paternal Uncle      Social History     Social History    Marital status: /Civil Union     Spouse name: N/A    Number of children: N/A    Years of education: N/A     Occupational History    Not on file       Social History Main Topics    Smoking status: Never Smoker    Smokeless tobacco: Never Used    Alcohol use Yes      Comment: rarely    Drug use: No    Sexual activity: Not on file     Other Topics Concern    Not on file     Social History Narrative    No narrative on file       Current Outpatient Prescriptions:     aspirin (ECOTRIN LOW STRENGTH) 81 mg EC tablet, Take 81 mg by mouth daily, Disp: , Rfl:     atorvastatin (LIPITOR) 40 mg tablet, Take 1 tablet (40 mg total) by mouth daily, Disp: 90 tablet, Rfl: 3   dutasteride (AVODART) 0 5 mg capsule, Take 0 5 mg by mouth daily  , Disp: , Rfl:     gabapentin (NEURONTIN) 600 MG tablet, Take 1 tablet by mouth 3 (three) times a day, Disp: , Rfl:     glimepiride (AMARYL) 4 mg tablet, Take 1 tablet (4 mg total) by mouth daily with breakfast, Disp: 90 tablet, Rfl: 3    hydrochlorothiazide (HYDRODIURIL) 25 mg tablet, Take 1 tablet (25 mg total) by mouth daily (Patient taking differently: Take 12 5 mg by mouth daily  ), Disp: 90 tablet, Rfl: 3    insulin detemir (LEVEMIR) 100 units/mL subcutaneous injection, Inject under the skin Twice daily, Disp: , Rfl:     insulin lispro (HUMALOG KWIKPEN) 100 Units/mL SOPN, Inject under the skin, Disp: , Rfl:     LEVEMIR FLEXTOUCH 100 units/mL injection pen, , Disp: , Rfl:     lisinopril (ZESTRIL) 40 mg tablet, Take 40 mg by mouth daily  , Disp: , Rfl:     meclizine (ANTIVERT) 25 mg tablet, Take 1 tablet (25 mg total) by mouth 3 (three) times a day as needed for dizziness, Disp: 30 tablet, Rfl: 0    metFORMIN (GLUCOPHAGE) 1000 MG tablet, Take 1 tablet (1,000 mg total) by mouth 2 (two) times a day with meals, Disp: 180 tablet, Rfl: 3    ONE TOUCH ULTRA TEST test strip, , Disp: , Rfl:     sitaGLIPtin (JANUVIA) 100 mg tablet, Take 1 tablet (100 mg total) by mouth daily, Disp: 90 tablet, Rfl: 3    SUPER B COMPLEX/C PO, Take by mouth, Disp: , Rfl:     tamsulosin (FLOMAX) 0 4 mg, Take 1 capsule by mouth daily, Disp: , Rfl:     ibuprofen (MOTRIN) 600 mg tablet, Take 1 tablet (600 mg total) by mouth every 8 (eight) hours as needed for mild pain or moderate pain for up to 5 days, Disp: 15 tablet, Rfl: 0  No Known Allergies  Vitals:    09/05/18 1348   BP: 132/80   Pulse: 84   Resp: 16   Temp: 99 3 °F (37 4 °C)       Physical Exam   Constitutional: He appears well-developed and well-nourished  Cardiovascular: Normal rate and regular rhythm  Pulmonary/Chest: Effort normal and breath sounds normal    Abdominal: Soft   Bowel sounds are normal  Results:  Labs:  Lab Results   Component Value Date    CALCIUM 9 8 08/15/2018     (L) 08/15/2018    K 4 2 08/15/2018    CO2 24 08/15/2018     08/15/2018    BUN 14 08/15/2018    CREATININE 0 78 08/15/2018     Lab Results   Component Value Date    PTH 79 8 08/15/2018    CALCIUM 9 8 08/15/2018    CAION 1 37 (H) 05/08/2018       Imaging  Us Thyroid    Result Date: 9/1/2018  Narrative: THYROID ULTRASOUND INDICATION:    E04 2: Nontoxic multinodular goiter  Thyroid nodule seen on CT scan  History of parathyroid adenoma  COMPARISON:  CT neck August 23, 2018 TECHNIQUE:   Ultrasound of the thyroid was performed with a high frequency linear transducer in transverse and sagittal planes including volumetric imaging sweeps as well as traditional still imaging technique  FINDINGS:  Thyroid parenchyma is diffusely heterogeneous in echotexture with focal nodule(s) as described below  Right lobe:  4 1 x 1 4 x 1 5 cm  Left lobe:  5 2 x 2 9 x 2 2 cm  Isthmus:  0 7 cm  #1 Left upper pole measuring 1 6 x 1 3 x 0 9 cm  COMPOSITION:  2 points, solid or almost completely solid   ECHOGENICITY:  1 point, hyperechoic or isoechoic  SHAPE:  0 points, wider-than-tall  MARGIN: 0 points, ill-defined  ECHOGENIC FOCI:  0 points, none or large comet-tail artifacts  TI-RADS Score: TR 3 (3 points), Mildly suspicious  FNA if >2 5 cm  Follow if >1 5 cm  #2 LEFT midgland nodule measuring 2 7 x 2 6 x 2 0 cm  COMPOSITION:  2 points, solid or almost completely solid   ECHOGENICITY:  1 point, hyperechoic or isoechoic  SHAPE:  3 points, taller-than-wide  MARGIN: 0 points, smooth  ECHOGENIC FOCI:  0 points, none or large comet-tail artifacts  TI-RADS Classification: TR 4 (4-6 points), Moderately suspicious  FNA if > 1 5 cm  Follow if > 1cm        Impression: 1  Heterogeneous thyroid gland with left side dominant nodules as described above  Largest nodule, corresponds to the CT abnormality   2   The following nodule meets the current ACR criteria for recommending ultrasound guided biopsy: 2 7 x 2 6 x 2 0 cm left midpole nodule   (Image number [id number]) (CRITERIA: TR 4, Moderately suspicious  FNA if > 1 5 cm  3   The 1 6 x 1 3 x 0 9 cm left upper pole nodule requires follow-up sonography at 1, 3 and 5 year intervals  Reference: ACR Thyroid Imaging, Reporting and Data System (TI-RADS): White Paper of the [employer]  J AM Lei Radiol 0266;33:841-422  (additional recommendations based on American Thyroid Association 2015 guidelines ) Workstation performed: [id number]     Ct Parathyroid Study W Wo Contrast    Result Date: 8/23/2018  Narrative: CT  NECK  WITHOUT AND WITH CONTRAST FROM CURT TO SKULL BASE INDICATION: Hyperparathyroidism  Sestamibi scan demonstrated slight uptake in the region of the right lower pole of the thyroid  COMPARISON:  None  TECHNIQUE:This examination, like all CT scans performed in the [facility], was performed utilizing techniques to minimize radiation dose exposure, including the use of iterative reconstruction and automated exposure control  Both noncontrast, contrast, and post contrast delayed images were performed according to standard parathyroid protocol (4D technique) Coronal and sagittal reconstructions were performed  3D reconstructions were performed on an independent workstation, and are supplied for review  85 ml of Omnipaque 350 was injected intravenously without immediate consequence  FINDINGS: SERIAL NONCONTRAST, POST CONTRAST AND DELAYS: There is a 10 mm x 8 mm x 12 mm lesion sitting posterior to the inferior aspect of the right thyroid lobe which meets the CT enhancement criteria suspicious for parathyroid adenoma  The lesion is best seen on series 5 images 58 through 68  VASCULAR STRUCTURES: This study was not performed for the evaluation of the carotid arteries, and therefore Nascet criteria do not apply  No carotid stenosis is noted however    A moderate stenosis is noted at the origin of the right vertebral artery  VISUALIZED PARANASAL SINUSES: Normal  NASAL CAVITY AND NASOPHARYNX: Normal  SUPRAHYOID NECK: Normal oropharynx, oral cavity, parapharyngeal and retropharyngeal spaces  INFRAHYOID NECK: Normal oropharynx, oral cavity, parapharyngeal and retropharyngeal spaces  THYROID GLAND: There is asymmetric enlargement of the left lobe of the thyroid which is markedly heterogeneous with multiple nodules  The largest nodule is 2 1 mm in widest dimension  Incidental discovery of one or more thyroid nodule(s) measuring more than 1 5 cm and without suspicious features is noted in this patient who is above 28years old; according to guidelines published in the February 2015 white paper on incidental thyroid nodules in the Journal of the [employer] of Radiology [place] Bah), further characterization with thyroid ultrasound is recommended  LYMPH NODES: No pathologic or enlarged adenopathy  MEDIASTINUM: Unremarkable  BONY STRUCTURES Mild to moderate degenerative changes are noted throughout the cervical spine  LUNG APICES: Unremarkable  Impression: 1   10 x 8 x 12 mm lesion sitting posterior to the inferior aspect of the right thyroid lobe which meets the CT enhancement criteria suspicious for parathyroid adenoma  2   Asymmetric enlargement of the left lobe of the thyroid with multiple nodules, with the largest greater than 2 cm, in this patient who is greater than 28years old  There are no suspicious features present  Further characterization of these incidental thyroid nodules with thyroid ultrasound is recommended  Workstation performed: [id number]     I reviewed the above laboratory and imaging data  Discussion/Summary:  Primary hyperparathyroidism, attributable to right-sided parathyroid adenoma seen on CT angiogram   He also has 2 left-sided thyroid nodules which merit further workup    We will therefore set up to have these biopsied with afirma testing in the event that they need to be addressed surgically at the same time as the suspected adenoma  He will follow-up here once results are available for review

## 2018-09-05 NOTE — PROGRESS NOTES
Surgical Oncology Follow Up       [address] [place] [address] SURGICAL ONCOLOGY [place]  [address]  Ashlee [place] [address]    [place]   1950  [id number]  [address] [place] [address] SURGICAL ONCOLOGY [place]  [address] [address]    Chief Complaint   Patient presents with    Follow-up     Patient is here for a 2 week follow up with CT angiogram        Assessment/Plan:      No problem-specific Assessment & Plan notes found for this encounter  Diagnoses and all orders for this visit:    Multiple thyroid nodules  -     US guided thyroid biopsy; Future    Hyperparathyroidism [facility])        Advance Care Planning/Advance Directives:  Discussed disease status, cancer treatment plans and/or cancer treatment goals with the patient  No history exists  History of Present Illness:   79year old man with primary hyperparathyroidism,  -Interval History:  He is here status post CT angiogram to look for potential adenoma  He also had a thyroid ultrasound done which revealed nodules on the left side which merit further workup  Denies a history of radiation exposure to the head or neck area  Denies personal or family history of thyroid cancer  Review of Systems:  Review of Systems   Constitutional: Negative  HENT: Negative  Eyes: Negative  Respiratory: Negative  Cardiovascular: Negative  Gastrointestinal: Negative  Endocrine: Negative  Genitourinary: Negative  Musculoskeletal: Negative  Skin: Negative  Allergic/Immunologic: Negative  Neurological: Negative  Hematological: Negative  Psychiatric/Behavioral: Negative          Patient Active Problem List   Diagnosis    Hyperparathyroidism ([address] )    Type 2 diabetes mellitus without complication, without long-term current use of insulin (HCC)    Other hyperlipidemia    Hypertension    Arthritis of knee    BPH with obstruction/lower urinary tract symptoms    [facility] )    DM type 2, not at goal [facility])    Hypercalciuria    Knee osteoarthritis    Prepatellar bursitis    Multiple thyroid nodules     Past Medical History:   Diagnosis Date    Back pain     [facility]     Hypertension      Past Surgical History:   Procedure Laterality Date    HERNIA REPAIR  1998     Family History   Problem Relation Age of Onset    Hypertension Father     Coronary artery disease Father     Hypertension Mother     Arthritis Mother     Coronary artery disease Mother     Diabetes type II Mother     Diabetes Maternal Uncle     Diabetes Paternal Uncle      Social History     Social History    Marital status: /Civil Union     Spouse name: N/A    Number of children: N/A    Years of education: N/A     Occupational History    Not on file       Social History Main Topics    Smoking status: Never Smoker    Smokeless tobacco: Never Used    Alcohol use Yes      Comment: rarely    Drug use: No    Sexual activity: Not on file     Other Topics Concern    Not on file     Social History Narrative    No narrative on file       Current Outpatient Prescriptions:     aspirin (ECOTRIN LOW STRENGTH) 81 mg EC tablet, Take 81 mg by mouth daily, Disp: , Rfl:     atorvastatin (LIPITOR) 40 mg tablet, Take 1 tablet (40 mg total) by mouth daily, Disp: 90 tablet, Rfl: 3    dutasteride (AVODART) 0 5 mg capsule, Take 0 5 mg by mouth daily  , Disp: , Rfl:     gabapentin (NEURONTIN) 600 MG tablet, Take 1 tablet by mouth 3 (three) times a day, Disp: , Rfl:     glimepiride (AMARYL) 4 mg tablet, Take 1 tablet (4 mg total) by mouth daily with breakfast, Disp: 90 tablet, Rfl: 3    hydrochlorothiazide (HYDRODIURIL) 25 mg tablet, Take 1 tablet (25 mg total) by mouth daily (Patient taking differently: Take 12 5 mg by mouth daily  ), Disp: 90 tablet, Rfl: 3    insulin detemir (LEVEMIR) 100 units/mL subcutaneous injection, Inject under the skin Twice daily, Disp: , Rfl:     insulin lispro (HUMALOG KWIKPEN) 100 Units/mL SOPN, Inject under the skin, Disp: , Rfl:     LEVEMIR FLEXTOUCH 100 units/mL injection pen, , Disp: , Rfl:     lisinopril (ZESTRIL) 40 mg tablet, Take 40 mg by mouth daily  , Disp: , Rfl:     meclizine (ANTIVERT) 25 mg tablet, Take 1 tablet (25 mg total) by mouth 3 (three) times a day as needed for dizziness, Disp: 30 tablet, Rfl: 0    metFORMIN (GLUCOPHAGE) 1000 MG tablet, Take 1 tablet (1,000 mg total) by mouth 2 (two) times a day with meals, Disp: 180 tablet, Rfl: 3    ONE TOUCH ULTRA TEST test strip, , Disp: , Rfl:     sitaGLIPtin (JANUVIA) 100 mg tablet, Take 1 tablet (100 mg total) by mouth daily, Disp: 90 tablet, Rfl: 3    SUPER B COMPLEX/C PO, Take by mouth, Disp: , Rfl:     tamsulosin (FLOMAX) 0 4 mg, Take 1 capsule by mouth daily, Disp: , Rfl:     ibuprofen (MOTRIN) 600 mg tablet, Take 1 tablet (600 mg total) by mouth every 8 (eight) hours as needed for mild pain or moderate pain for up to 5 days, Disp: 15 tablet, Rfl: 0  No Known Allergies  Vitals:    09/05/18 1348   BP: 132/80   Pulse: 84   Resp: 16   Temp: 99 3 °F (37 4 °C)       Physical Exam   Constitutional: He appears well-developed and well-nourished  Cardiovascular: Normal rate and regular rhythm  Pulmonary/Chest: Effort normal and breath sounds normal    Abdominal: Soft  Bowel sounds are normal          Results:  Labs:  Lab Results   Component Value Date    CALCIUM 9 8 08/15/2018     (L) 08/15/2018    K 4 2 08/15/2018    CO2 24 08/15/2018     08/15/2018    BUN 14 08/15/2018    CREATININE 0 78 08/15/2018     Lab Results   Component Value Date    PTH 79 8 08/15/2018    CALCIUM 9 8 08/15/2018    CAION 1 37 (H) 05/08/2018       Imaging  Us Thyroid    Result Date: 9/1/2018  Narrative: THYROID ULTRASOUND INDICATION:    E04 2: Nontoxic multinodular goiter  Thyroid nodule seen on CT scan  History of parathyroid adenoma    COMPARISON: CT neck August 23, 2018 TECHNIQUE:   Ultrasound of the thyroid was performed with a high frequency linear transducer in transverse and sagittal planes including volumetric imaging sweeps as well as traditional still imaging technique  FINDINGS:  Thyroid parenchyma is diffusely heterogeneous in echotexture with focal nodule(s) as described below  Right lobe:  4 1 x 1 4 x 1 5 cm  Left lobe:  5 2 x 2 9 x 2 2 cm  Isthmus:  0 7 cm  #1 Left upper pole measuring 1 6 x 1 3 x 0 9 cm  COMPOSITION:  2 points, solid or almost completely solid   ECHOGENICITY:  1 point, hyperechoic or isoechoic  SHAPE:  0 points, wider-than-tall  MARGIN: 0 points, ill-defined  ECHOGENIC FOCI:  0 points, none or large comet-tail artifacts  TI-RADS Score: TR 3 (3 points), Mildly suspicious  FNA if >2 5 cm  Follow if >1 5 cm  #2 LEFT midgland nodule measuring 2 7 x 2 6 x 2 0 cm  COMPOSITION:  2 points, solid or almost completely solid   ECHOGENICITY:  1 point, hyperechoic or isoechoic  SHAPE:  3 points, taller-than-wide  MARGIN: 0 points, smooth  ECHOGENIC FOCI:  0 points, none or large comet-tail artifacts  TI-RADS Classification: TR 4 (4-6 points), Moderately suspicious  FNA if > 1 5 cm  Follow if > 1cm        Impression: 1  Heterogeneous thyroid gland with left side dominant nodules as described above  Largest nodule, corresponds to the CT abnormality  2   The following nodule meets the current ACR criteria for recommending ultrasound guided biopsy: 2 7 x 2 6 x 2 0 cm left midpole nodule   (Image number [id number]) (CRITERIA: TR 4, Moderately suspicious  FNA if > 1 5 cm  3   The 1 6 x 1 3 x 0 9 cm left upper pole nodule requires follow-up sonography at 1, 3 and 5 year intervals  Reference: ACR Thyroid Imaging, Reporting and Data System (TI-RADS): White Paper of the [employer]   J AM Lei Radiol 1530;73:[id number]  (additional recommendations based on American Thyroid Association 2015 guidelines ) Workstation performed: [id number] Ct Parathyroid Study W Wo Contrast    Result Date: 8/23/2018  Narrative: CT  NECK  WITHOUT AND WITH CONTRAST FROM CURT TO SKULL BASE INDICATION: Hyperparathyroidism  Sestamibi scan demonstrated slight uptake in the region of the right lower pole of the thyroid  COMPARISON:  None  TECHNIQUE:This examination, like all CT scans performed in the [facility], was performed utilizing techniques to minimize radiation dose exposure, including the use of iterative reconstruction and automated exposure control  Both noncontrast, contrast, and post contrast delayed images were performed according to standard parathyroid protocol (4D technique) Coronal and sagittal reconstructions were performed  3D reconstructions were performed on an independent workstation, and are supplied for review  85 ml of Omnipaque 350 was injected intravenously without immediate consequence  FINDINGS: SERIAL NONCONTRAST, POST CONTRAST AND DELAYS: There is a 10 mm x 8 mm x 12 mm lesion sitting posterior to the inferior aspect of the right thyroid lobe which meets the CT enhancement criteria suspicious for parathyroid adenoma  The lesion is best seen on series 5 images 58 through 68  VASCULAR STRUCTURES: This study was not performed for the evaluation of the carotid arteries, and therefore Nascet criteria do not apply  No carotid stenosis is noted however  A moderate stenosis is noted at the origin of the right vertebral artery  VISUALIZED PARANASAL SINUSES: Normal  NASAL CAVITY AND NASOPHARYNX: Normal  SUPRAHYOID NECK: Normal oropharynx, oral cavity, parapharyngeal and retropharyngeal spaces  INFRAHYOID NECK: Normal oropharynx, oral cavity, parapharyngeal and retropharyngeal spaces  THYROID GLAND: There is asymmetric enlargement of the left lobe of the thyroid which is markedly heterogeneous with multiple nodules  The largest nodule is 2 1 mm in widest dimension    Incidental discovery of one or more thyroid nodule(s) measuring more than 1 5 cm and without suspicious features is noted in this patient who is above 28years old; according to guidelines published in the February 2015 white paper on incidental thyroid nodules in the Journal of the [employer] of Radiology Krystyna Marquez), further characterization with thyroid ultrasound is recommended  LYMPH NODES: No pathologic or enlarged adenopathy  MEDIASTINUM: Unremarkable  BONY STRUCTURES Mild to moderate degenerative changes are noted throughout the cervical spine  LUNG APICES: Unremarkable  Impression: 1   10 x 8 x 12 mm lesion sitting posterior to the inferior aspect of the right thyroid lobe which meets the CT enhancement criteria suspicious for parathyroid adenoma  2   Asymmetric enlargement of the left lobe of the thyroid with multiple nodules, with the largest greater than 2 cm, in this patient who is greater than 28years old  There are no suspicious features present  Further characterization of these incidental thyroid nodules with thyroid ultrasound is recommended  Workstation performed: [id number]     I reviewed the above laboratory and imaging data  Discussion/Summary:  Primary hyperparathyroidism, attributable to right-sided parathyroid adenoma seen on CT angiogram   He also has 2 left-sided thyroid nodules which merit further workup  We will therefore set up to have these biopsied with afirma testing in the event that they need to be addressed surgically at the same time as the suspected adenoma  He will follow-up here once results are available for review

## 2018-09-06 DIAGNOSIS — E11.9 TYPE 2 DIABETES MELLITUS WITHOUT COMPLICATION, WITH LONG-TERM CURRENT USE OF INSULIN (HCC): Primary | ICD-10-CM

## 2018-09-06 DIAGNOSIS — Z79.4 TYPE 2 DIABETES MELLITUS WITHOUT COMPLICATION, WITH LONG-TERM CURRENT USE OF INSULIN (HCC): Primary | ICD-10-CM

## 2018-09-06 RX ORDER — INSULIN DETEMIR 100 [IU]/ML
INJECTION, SOLUTION SUBCUTANEOUS
Qty: 120 ML | Refills: 1 | Status: ON HOLD | OUTPATIENT
Start: 2018-09-06 | End: 2018-11-01

## 2018-09-07 ENCOUNTER — HOSPITAL ENCOUNTER (OUTPATIENT)
Dept: ULTRASOUND IMAGING | Facility: HOSPITAL | Age: 68
Discharge: HOME | End: 2018-09-07
Attending: SURGERY | Admitting: RADIOLOGY
Payer: COMMERCIAL

## 2018-09-07 DIAGNOSIS — E04.2 MULTIPLE THYROID NODULES: ICD-10-CM

## 2018-09-07 PROCEDURE — 88173 CYTOPATH EVAL FNA REPORT: CPT | Performed by: PATHOLOGY

## 2018-09-07 PROCEDURE — 76942 ECHO GUIDE FOR BIOPSY: CPT

## 2018-09-07 PROCEDURE — 10022 HB FNA W/IMAGE: CPT

## 2018-09-07 RX ORDER — LIDOCAINE HYDROCHLORIDE 10 MG/ML
5 INJECTION, SOLUTION EPIDURAL; INFILTRATION; INTRACAUDAL; PERINEURAL ONCE
Status: COMPLETED | OUTPATIENT
Start: 2018-09-07 | End: 2018-09-07

## 2018-09-07 RX ADMIN — LIDOCAINE HYDROCHLORIDE 5 ML: 10 INJECTION, SOLUTION EPIDURAL; INFILTRATION; INTRACAUDAL; PERINEURAL at 14:35

## 2018-10-03 ENCOUNTER — OFFICE VISIT (OUTPATIENT)
Dept: SURGICAL ONCOLOGY | Facility: HOSPITAL | Age: 68
End: 2018-10-03
Payer: COMMERCIAL

## 2018-10-03 VITALS
TEMPERATURE: 98.5 F | HEIGHT: 69 IN | DIASTOLIC BLOOD PRESSURE: 72 MMHG | HEART RATE: 94 BPM | BODY MASS INDEX: 32.29 KG/M2 | WEIGHT: 218 LBS | SYSTOLIC BLOOD PRESSURE: 130 MMHG | RESPIRATION RATE: 16 BRPM

## 2018-10-03 DIAGNOSIS — E21.3 HYPERPARATHYROIDISM (HCC): Primary | ICD-10-CM

## 2018-10-03 PROCEDURE — 99214 OFFICE O/P EST MOD 30 MIN: CPT | Performed by: SURGERY

## 2018-10-03 RX ORDER — TRAMADOL HYDROCHLORIDE 50 MG/1
50 TABLET ORAL EVERY 6 HOURS PRN
Qty: 10 TABLET | Refills: 0 | Status: SHIPPED | OUTPATIENT
Start: 2018-10-03 | End: 2018-10-22

## 2018-10-03 NOTE — PROGRESS NOTES
Surgical Oncology Follow Up       [address] [place] [address] SURGICAL ONCOLOGY [place]  [address]  [facility] [address]    Karmen Arshad  1950  [id number]  [address] [place] [address] SURGICAL ONCOLOGY [place]  [address] [facility] [address]    Chief Complaint   Patient presents with    Follow-up     Patient is here for a follow up after thyroid biopsy  Assessment/Plan:    No problem-specific Assessment & Plan notes found for this encounter  Diagnoses and all orders for this visit:    Hyperparathyroidism ([address] )  -     Case request operating room: PARATHYROIDECTOMY, MINIMALLY INVASIVE WITH INTRAOP PTH MONITORIN; Standing  -     Basic metabolic panel; Future  -     APTT; Future  -     CBC and Platelet; Future  -     Protime-INR; Future  -     EKG 12 lead; Future  -     XR chest pa & lateral; Future  -     Case request operating room: PARATHYROIDECTOMY, MINIMALLY INVASIVE WITH INTRAOP PTH MONITORIN  -     traMADol (ULTRAM) 50 mg tablet; Take 1 tablet (50 mg total) by mouth every 6 (six) hours as needed for moderate pain    Other orders  -     Incentive spirometry; Standing  -     Insert and maintain IV line; Standing  -     Void On-Call to O R ; Standing  -     Place sequential compression device; Standing  -     PTH, intact; Standing        Advance Care Planning/Advance Directives:  Discussed disease status, cancer treatment plans and/or cancer treatment goals with the patient  No history exists  History of Present Illness:   Patient is a 82-year-old man with primary hyperparathyroidism  He also has a thyroid nodule which we are presently working up   -Interval History: We had set up for ultrasound-guided FNA of the left thyroid nodule  He had this done uneventfully  Review of Systems:  Review of Systems   Constitutional: Positive for fatigue  HENT: Negative  Eyes: Negative  Respiratory: Negative  Gastrointestinal: Negative  Endocrine: Negative  Genitourinary: Negative  Musculoskeletal: Positive for arthralgias, back pain and myalgias  Allergic/Immunologic: Negative  Neurological: Negative  Hematological: Negative  Psychiatric/Behavioral: Negative  Patient Active Problem List   Diagnosis    Hyperparathyroidism ([place] [address] )    Type 2 diabetes mellitus without complication, without long-term current use of insulin (HCC)    Other hyperlipidemia    Hypertension    Arthritis of knee    BPH with obstruction/lower urinary tract symptoms    Diabetes ([place] [address] )    DM type 2, not at goal [facility])    Hypercalciuria    Knee osteoarthritis    Prepatellar bursitis    Multiple thyroid nodules     Past Medical History:   Diagnosis Date    Back pain     Diabetes mellitus ([facility] )     Hypertension      Past Surgical History:   Procedure Laterality Date    HERNIA REPAIR  1998    US GUIDED THYROID BIOPSY  9/7/2018     Family History   Problem Relation Age of Onset    Hypertension Father     Coronary artery disease Father     Hypertension Mother     Arthritis Mother     Coronary artery disease Mother     Diabetes type II Mother     Diabetes Maternal Uncle     Diabetes Paternal Uncle      Social History     Social History    Marital status: /Civil Union     Spouse name: N/A    Number of children: N/A    Years of education: N/A     Occupational History    Not on file       Social History Main Topics    Smoking status: Never Smoker    Smokeless tobacco: Never Used    Alcohol use Yes      Comment: rarely    Drug use: No    Sexual activity: Not on file     Other Topics Concern    Not on file     Social History Narrative    No narrative on file       Current Outpatient Prescriptions:     aspirin (ECOTRIN LOW STRENGTH) 81 mg EC tablet, Take 81 mg by mouth daily, Disp: , Rfl:     atorvastatin (LIPITOR) 40 mg tablet, Take 1 tablet (40 mg total) by mouth daily, Disp: 90 tablet, Rfl: 3    dutasteride (AVODART) 0 5 mg capsule, Take 0 5 mg by mouth daily  , Disp: , Rfl:     gabapentin (NEURONTIN) 600 MG tablet, Take 1 tablet by mouth 3 (three) times a day, Disp: , Rfl:     glimepiride (AMARYL) 4 mg tablet, Take 1 tablet (4 mg total) by mouth daily with breakfast, Disp: 90 tablet, Rfl: 3    hydrochlorothiazide (HYDRODIURIL) 25 mg tablet, Take 1 tablet (25 mg total) by mouth daily (Patient taking differently: Take 12 5 mg by mouth daily  ), Disp: 90 tablet, Rfl: 3    insulin detemir (LEVEMIR) 100 units/mL subcutaneous injection, Inject under the skin Twice daily, Disp: , Rfl:     LEVEMIR FLEXTOUCH 100 units/mL injection pen, INJECT 60 UNITS TWICE A DAY, Disp: 120 mL, Rfl: 1    lisinopril (ZESTRIL) 40 mg tablet, Take 40 mg by mouth daily  , Disp: , Rfl:     metFORMIN (GLUCOPHAGE) 1000 MG tablet, Take 1 tablet (1,000 mg total) by mouth 2 (two) times a day with meals, Disp: 180 tablet, Rfl: 3    ONE TOUCH ULTRA TEST test strip, , Disp: , Rfl:     sitaGLIPtin (JANUVIA) 100 mg tablet, Take 1 tablet (100 mg total) by mouth daily, Disp: 90 tablet, Rfl: 3    SUPER B COMPLEX/C PO, Take by mouth, Disp: , Rfl:     tamsulosin (FLOMAX) 0 4 mg, Take 1 capsule by mouth daily, Disp: , Rfl:     ibuprofen (MOTRIN) 600 mg tablet, Take 1 tablet (600 mg total) by mouth every 8 (eight) hours as needed for mild pain or moderate pain for up to 5 days (Patient not taking: Reported on 10/3/2018 ), Disp: 15 tablet, Rfl: 0    insulin lispro (HUMALOG KWIKPEN) 100 Units/mL SOPN, Inject under the skin, Disp: , Rfl:     meclizine (ANTIVERT) 25 mg tablet, Take 1 tablet (25 mg total) by mouth 3 (three) times a day as needed for dizziness (Patient not taking: Reported on 10/3/2018 ), Disp: 30 tablet, Rfl: 0    traMADol (ULTRAM) 50 mg tablet, Take 1 tablet (50 mg total) by mouth every 6 (six) hours as needed for moderate pain, Disp: 10 tablet, Rfl: 0  No Known Allergies  Vitals:    10/03/18 1024   BP: 130/72   Pulse: 94   Resp: 16   Temp: 98 5 °F (36 9 °C)       Physical Exam   Constitutional: He appears well-developed and well-nourished  HENT:   Head: Normocephalic and atraumatic  Eyes: Pupils are equal, round, and reactive to light  Conjunctivae and EOM are normal    Neck: Normal range of motion  Cardiovascular: Normal rate, regular rhythm, normal heart sounds and intact distal pulses  Results:  Labs:  Lab Results   Component Value Date    PTH 79 8 08/15/2018    CALCIUM 9 8 08/15/2018    CAION 1 37 (H) 05/08/2018         Imaging  Us Guided Thyroid Biopsy    Result Date: 9/7/2018  Narrative: ULTRASOUND-GUIDED THYROID BIOPSY HISTORY: 79year-old with history of recent ultrasound thyroid demonstrating thyroid nodules  Patient presents with prescription for fine-needle aspiration of left-sided thyroid nodules with Afrima testing  COMPARISON: Ultrasound thyroid 8/30/2018 FINDINGS: Prior ultrasound images were reviewed  After further review with repeat imaging this day the previous described 1 6 x 1 3 x 0 9 left upper pole nodule was felt to be nonnodular but adjacent heterogeneous thyroid tissue  The left midpole thyroid nodule was targeted for today's biopsy  The procedure was explained to the patient, including risks of hemorrhage, infection and local injury  Informed consent was freely obtained  The patient verbalized expressed understanding of the above risks and wished to proceed with the procedure  Final standard "time-out" procedure performed  PROCEDURE: The neck was prepped and draped in normal sterile fashion  Under real-time ultrasound guidance and local anesthesia 5 passes with a 25 gauge needle were made through the left midpole nodule measuring today 2 9 x 2 2 x 1 8 cm  Cytopathology was present and deemed the specimens adequate for evaluation  The patient tolerated the procedure well  Postprocedure instructions were provided for the patient   I asked the patient to call us with any questions, concerns, or acute problems  The patient expressed understanding of the above  Impression: Status post successful ultrasound-guided thyroid biopsy  Procedure was performed by LEONOR Campbell PA-C under the direct supervision of Dr Manisha Givens  Workstation performed: [id number]     I reviewed the above laboratory and imaging data  Discussion/Summary:  Primary hyperparathyroidism, suspected right-sided parathyroid adenoma based on sestamibi and CT scan  He had a thyroid nodule on the left which merited workup including biopsy  This was benign  We will therefore continue ultrasound surveillance of thyroid nodules, and plan on minimally invasive right parathyroidectomy with intraoperative PTH monitoring, possible 4 gland exploration  Rationale for this along with risks and benefits of surgery discussed with patient  He understands the plan wishes to proceed as outlined

## 2018-10-03 NOTE — PATIENT INSTRUCTIONS
Parathyroid Adenoma   WHAT YOU NEED TO KNOW:   What is a parathyroid adenoma? A parathyroid adenoma is a benign (not cancerous) tumor on one or more of your parathyroid glands  The parathyroid glands are 4 small glands located near the thyroid gland in your neck  The parathyroid gland produces parathyroid hormone (PTH)  PTH keeps the level of calcium balanced in your blood  A parathyroid adenoma can cause extra PTH to be produced  This condition is called hyperparathyroidism  Hyperparathyroidism can cause blood calcium levels to increase, and lead to health problems such as osteoporosis (weak, brittle bones)  The cause of parathyroid adenomas is usually not known  What are the signs and symptoms of a parathyroid adenoma? You may not have any signs or symptoms  You may have symptoms of increased blood calcium if your parathyroid adenoma has caused hyperparathyroidism  You may have any of the following:  · Muscle weakness    · Fatigue    · Nausea, vomiting, or abdominal pain    · Constipation    · Depression or anxiety    · General body aches and pains    · Bone and joint pain    · Loss of appetite    · Confusion or forgetfulness     · Increased thirst and urination  How is a parathyroid adenoma diagnosed and treated? Blood tests may show increased levels of parathyroid hormone and calcium  An ultrasound, CT scan, or MRI may be done  You may be given contrast liquid to help the adenoma show up better in the pictures  Tell the healthcare provider if you have ever had an allergic reaction to contrast liquid  Do not enter the MRI room with anything metal  Metal can cause serious injury  Tell the healthcare provider if you have any metal in or on your body  Surgery may be done to remove the adenoma or parathyroid gland  When should I seek immediate care? · You heart beats faster or slower than normal, or it feels like fluttering in your chest     · You have nausea, vomiting, and abdominal pain      · You cannot think clearly  When should I contact my healthcare provider? · You have bone and joint pain  · You have pain in your lower back, side, or stomach  · You have pain or burning when you urinate or your urine is pink or red  · You have increased thirst or you are urinating more often than usual     · You have a loss of appetite  · You have questions or concerns about your condition or care  CARE AGREEMENT:   You have the right to help plan your care  Learn about your health condition and how it may be treated  Discuss treatment options with your caregivers to decide what care you want to receive  You always have the right to refuse treatment  The above information is an  only  It is not intended as medical advice for individual conditions or treatments  Talk to your doctor, nurse or pharmacist before following any medical regimen to see if it is safe and effective for you  © 2017 [address] [employer] Information is for End User's use only and may not be sold, redistributed or otherwise used for commercial purposes  All illustrations and images included in CareNotes® are the copyrighted property of [employer]  or Ilir Garza  Parathyroidectomy   WHAT YOU NEED TO KNOW:   A parathyroidectomy is surgery to remove part or all of your parathyroid glands  The parathyroid is made of 4 small glands that usually sit near the thyroid gland  The parathyroid glands make a hormone that controls the amount of calcium in your blood  DISCHARGE INSTRUCTIONS:   Medicines: You may need any of the following:  · NSAIDs  may decrease swelling and pain  This medicine can be bought with or without a doctor's order  This medicine can cause stomach bleeding or kidney problems in certain people  If you take blood thinner medicine, always ask your healthcare provider if NSAIDs are safe for you   Always read the medicine label and follow the directions on it before using this medicine  · Acetaminophen  decreases pain  It is available without a doctor's order  Ask how much to take and how often to take it  Follow directions  Acetaminophen can cause liver damage if not taken correctly  · Take your medicine as directed  Contact your healthcare provider if you think your medicine is not helping or if you have side effects  Tell him or her if you are allergic to any medicine  Keep a list of the medicines, vitamins, and herbs you take  Include the amounts, and when and why you take them  Bring the list or the pill bottles to follow-up visits  Carry your medicine list with you in case of an emergency  Follow up with your healthcare provider or surgeon as directed: You will need to return to have tests, your incision checked, and your drain or stitches removed  You may be referred to an endocrinologist  Write down your questions so you remember to ask them during your visits  Wound care:  Care for your wound as directed  You may need to carefully wash the wound with soap and water  Dry the area and put on new, clean bandages as directed  Change your bandages when they get wet or dirty  Check your drain when you change the bandages  Do not pull the drain out  Ask for more information about how to care for your drain  Rest as needed:  Slowly start to do more each day  Return to your daily activities as directed  Take supplements as directed: You may need to take calcium medicine to keep your blood calcium level normal  It may also help prevent and treat bone loss  Your healthcare provider may also tell you to take vitamin D to help your body absorb the calcium  Contact your healthcare provider or surgeon if:   · You have a fever  · Your wound is red, swollen, or draining pus  · You have pain in your neck area that does not go away, or gets worse even after you take your pain medicine  · You have chills, a cough, or feel weak and achy      · You have nausea or are vomiting  · You have questions or concerns about your condition or care  Seek care immediately or call 911 if:   · You cough up blood  · You feel lightheaded, short of breath, and have chest pain  · Your arm or leg feels warm, tender, and painful  It may look swollen and red  · You have trouble swallowing or talking, or you lose your voice  · You feel anxious, frightened, and uneasy  · You have the following symptoms of low blood calcium:     ¨ Confusion    ¨ Fatigue    ¨ Muscle spasms or muscle tightening    ¨ Numbness or tingling around your face, hands, or feet    ¨ A seizure  © 2017 [address]  Information is for End User's use only and may not be sold, redistributed or otherwise used for commercial purposes  All illustrations and images included in CareNotes® are the copyrighted property of [employer]  or Ilir Garza  The above information is an  only  It is not intended as medical advice for individual conditions or treatments  Talk to your doctor, nurse or pharmacist before following any medical regimen to see if it is safe and effective for you  Parathyroidectomy   WHAT YOU NEED TO KNOW:   A parathyroidectomy is surgery to remove part or all of your parathyroid glands  The parathyroid is made of 4 small glands that usually sit near the thyroid gland  The parathyroid glands make a hormone that controls the amount of calcium in your blood  WHILE YOU ARE HERE:   Before your surgery:   · Informed consent  is a legal document that explains the tests, treatments, or procedures that you may need  Informed consent means you understand what will be done and can make decisions about what you want  You give your permission when you sign the consent form  You can have someone sign this form for you if you are not able to sign it  You have the right to understand your medical care in words you know   Before you sign the consent form, understand the risks and benefits of what will be done  Make sure all your questions are answered  · An IV  is a small tube placed in your vein that is used to give you medicine or liquids  · Anesthesia  is medicine to make you comfortable during the surgery  Healthcare providers will work with you to decide which anesthesia is best for you  ¨ General anesthesia  will keep you asleep and free from pain during surgery  Anesthesia may be given through your IV  You may instead breathe it in through a mask or a tube placed down your throat  The tube may cause you to have a sore throat when you wake up  ¨ Local anesthesia  is a shot of medicine put into your neck  It is used to numb the area and dull the pain  You may still feel pressure or pushing during surgery  · Tests:      ¨ Blood tests  may be done to check your parathyroid hormone level  ¨ A scan  may be done right before your surgery to help healthcare providers know which parathyroid glands are overactive  Radioactive dye is put into your IV to help healthcare providers see your parathyroid glands and know which glands need to be removed  Healthcare providers will use a camera above your neck and chest to look for the parathyroid glands that absorbed the dye  ¨ An ultrasound  uses sound waves to show pictures on a monitor  An ultrasound may be done to show the size and location of each parathyroid gland in your neck  During your surgery:   · An incision will be made in your neck, and the muscles of your neck will be pulled to each side  Your surgeon will gently move your thyroid gland aside to see the 4 parathyroid glands  A handheld probe (wand) and video may be used during the surgery to find your parathyroid glands  Your surgeon will remove part or all of your parathyroid glands  If all 4 glands need to be removed, your surgeon may leave a small part of 1 gland in place  He may also put a small piece of gland into a muscle in your arm       · A drain may be placed in your wound to remove blood or extra fluid from the surgery area  The wound will be closed with stitches and covered with bandages  After your surgery: You will be taken to a room to rest until you are fully awake  Healthcare providers will monitor you closely for any problems  Do not get out of bed until your healthcare provider says it is okay  A healthcare provider will check your incision soon after your surgery to make sure everything is okay  If you have a drain, your healthcare provider will check it when he checks your incision  Try to avoid jerking movements and coughing  When your healthcare provider sees that you are okay, you will be taken to your hospital room  · Blood tests  may be done to check your parathyroid hormone level to see if you need more surgery  · You may need to walk around the same day of surgery , or the day after  Movement will help prevent blood clots  You may also be given exercises to do in bed  Do not get out of bed on your own until your healthcare provider says you can  Talk to healthcare providers before you get up the first time  They may need to help you stand up safely  When you are able to get up on your own, sit or lie down right away if you feel weak or dizzy  Then press the call light button to let healthcare providers know you need help  · You will be able to drink liquids and eat certain foods  once your stomach function returns after surgery  You may be given ice chips at first  Then you will get liquids such as water, broth, juice, and clear soft drinks  If your stomach does not become upset, you may then be given soft foods, such as ice cream and applesauce  Once you can eat soft foods easily, you may slowly begin to eat solid foods  · A drain  may be placed during surgery to remove extra fluid from your wound  This helps prevent infection  The drain is taken out when the wound stops draining fluid      · Medicines:      ¨ Antibiotics are given to help treat or prevent a bacterial infection  ¨ Pain medicine  may be given to decrease pain  Do not wait until the pain is severe before you ask for more medicine  ¨ Antinausea medicine  may calm your stomach and help prevent vomiting  ¨ Calcium  may be given as a pill or through your IV  It will help keep your blood calcium at a normal level  It may also help treat or prevent bone loss  RISKS:   · During or after surgery, you may bleed more than expected, or get an infection  Your thyroid gland, blood vessels, or other tissues may be damaged during surgery  You may have nerve or vocal cord damage  You may have a hoarse or weak voice that lasts for a few days or longer  You may get a blood clot in your leg or arm  This can cause pain and swelling, and can become life-threatening  Your blood calcium level may be lower than normal after surgery  Your condition could return and you may need to have surgery again  · If you do not have surgery, an overactive parathyroid gland could cause elevated blood calcium levels  This could cause fatigue, and bone, joint, or muscle pain  You may also have headaches, muscle spasms, weak bones, a broken bone, hallucinations, and confusion  Cancer of your parathyroid gland that is left untreated may prevent your kidneys from working correctly  You may also develop kidney stones or congestive heart failure  These conditions can become life-threatening  CARE AGREEMENT:   You have the right to help plan your care  Learn about your health condition and how it may be treated  Discuss treatment options with your caregivers to decide what care you want to receive  You always have the right to refuse treatment  © 2017 [address] [facility] Information is for End User's use only and may not be sold, redistributed or otherwise used for commercial purposes   All illustrations and images included in CareNotes® are the copyrighted property of [employer]  or Ilir Garza  The above information is an  only  It is not intended as medical advice for individual conditions or treatments  Talk to your doctor, nurse or pharmacist before following any medical regimen to see if it is safe and effective for you  Parathyroidectomy   WHAT YOU NEED TO KNOW:   A parathyroidectomy is surgery to remove part or all of your parathyroid glands  The parathyroid is made of 4 small glands that usually sit near the thyroid gland  The parathyroid glands make a hormone that controls the amount of calcium in your blood  HOW TO PREPARE:   The week before your surgery:   · Write down the correct date, time, and location of your surgery  · Arrange a ride home  Ask a family member or friend to drive you home after your surgery or procedure  Do not drive yourself home  · Ask your caregiver if you need to stop using aspirin or any other prescribed or over-the-counter medicine before your procedure or surgery  · Bring your medicine bottles or a list of your medicines when you see your caregiver  Tell your caregiver if you are allergic to any medicine  Tell your caregiver if you use any herbs, food supplements, or over-the-counter medicine  · Tell your healthcare provider if you think or know that you may be having a kidney transplant  · You may need blood tests, an electrocardiogram (EKG), chest x-ray, or other tests before your surgery  Talk to your healthcare provider about these and other tests you may need  Write down the date, time, and location of each test   The night before your surgery:   · You may be given medicine to help you sleep  · Ask caregivers about directions for eating and drinking  The day of your surgery:   · Ask your caregiver before you take any medicine on the day of your surgery   Bring a list of all the medicines you take, or your pill bottles, with you to the hospital  Caregivers will check that your medicines will not interact poorly with the medicine you need for surgery  · You or a close family member will be asked to sign a legal document called a consent form  It gives caregivers permission to do the procedure or surgery  It also explains the problems that may happen, and your choices  Make sure all your questions are answered before you sign this form  · Caregivers may insert an intravenous tube (IV) into your vein  A vein in the arm is usually chosen  Through the IV tube, you may be given liquids and medicine  · An anesthesiologist will talk to you before your surgery  You may need medicine to keep you asleep or numb an area of your body during surgery  Tell caregivers if you or anyone in your family has had a problem with anesthesia in the past     · Your healthcare provider may give you an IV injection of a radioactive dye 1 to 2 hours before your surgery  This will help him clearly see your parathyroid glands during surgery  WHAT WILL HAPPEN:   What will happen:   · An incision will be made in your neck, and the muscles of your neck will be pulled to each side  Your surgeon will gently move your thyroid gland aside to see the 4 parathyroid glands  A handheld probe (wand) and camera may be used during the surgery to find your parathyroid glands  Your surgeon will remove part or all of your parathyroid glands  If all 4 glands need to be removed, your surgeon may leave a small part of 1 gland in place  He may also put a small piece of gland into a muscle in your arm  · You may need blood tests during surgery  A drain may be placed in your wound to remove blood or extra fluid from the surgery area  The wound will be closed with stitches and covered with bandages  After your surgery: You will be taken to a room to rest until you are fully awake  Healthcare providers will monitor you closely for any problems  Do not get out of bed until your healthcare provider says it is okay   A healthcare provider will check your incision soon after your surgery to make sure everything is okay  If you have a drain, your healthcare provider will check it when he checks your incision  He will remove it when your incision stops draining fluid  Try to avoid jerking movements and coughing  When your healthcare provider sees that you are okay, you will be taken to your hospital room  CONTACT YOUR HEALTHCARE PROVIDER IF:   · You cannot make it to your surgery  · You have a fever  · You get a cold or the flu  · You have a skin or wound infection near the area where surgery will be done  · You have questions or concerns about your surgery  SEEK CARE IMMEDIATELY IF:   · You have new symptoms since the last time you saw your healthcare provider, or your symptoms are getting worse  · You feel too depressed or sad, or think that you cannot cope with life or your problems  RISKS:   · During or after surgery, you may bleed more than expected, or get an infection  Your thyroid gland, blood vessels, or other tissues may be damaged during surgery  You may have nerve or vocal cord damage  You may have a hoarse or weak voice that lasts for a few days or longer  You may get a blood clot in your leg or arm  This can cause pain and swelling, and can become life-threatening  Your blood calcium level may be lower than normal after surgery  Your condition could return and you may need to have surgery again  · If you do not have surgery, an overactive parathyroid gland could cause elevated blood calcium levels  This could cause fatigue, and bone, joint, or muscle pain  You may also have headaches, muscle spasms, weak bones, a broken bone, hallucinations, and confusion  Cancer of your parathyroid gland that is left untreated may prevent your kidneys from working correctly  You may also develop kidney stones or congestive heart failure  These conditions can become life-threatening  CARE AGREEMENT:   You have the right to help plan your care  Learn about your health condition and how it may be treated  Discuss treatment options with your caregivers to decide what care you want to receive  You always have the right to refuse treatment  © 2017 [address] [employer] Information is for End User's use only and may not be sold, redistributed or otherwise used for commercial purposes  All illustrations and images included in CareNotes® are the copyrighted property of [employer]  or Ilir Garza  The above information is an  only  It is not intended as medical advice for individual conditions or treatments  Talk to your doctor, nurse or pharmacist before following any medical regimen to see if it is safe and effective for you

## 2018-10-03 NOTE — LETTER
October 3, 2018     MD Avila Childs 42    Patient: Guevara Shoulders   YOB: 1950   Date of Visit: 10/3/2018       Dear Dr Guido Guadalupe: Thank you for referring Ramsey Mahmood to me for evaluation  Below are my notes for this consultation  If you have questions, please do not hesitate to call me  I look forward to following your patient along with you  Sincerely,        Tam Wheeler MD        CC: MD Tam Reddy MD  10/3/2018 11:16 AM  Sign at close encounter     Surgical Oncology Follow Up       [facility]  [address] [place] [address]    Guevara Shoulders  1950  [id number]  [facility]  [address] [facility] [address]    Chief Complaint   Patient presents with    Follow-up     Patient is here for a follow up after thyroid biopsy  Assessment/Plan:    No problem-specific Assessment & Plan notes found for this encounter  Diagnoses and all orders for this visit:    Hyperparathyroidism ([facility] [address] )  -     Case request operating room: PARATHYROIDECTOMY, MINIMALLY INVASIVE WITH INTRAOP PTH MONITORIN; Standing  -     Basic metabolic panel; Future  -     APTT; Future  -     CBC and Platelet; Future  -     Protime-INR; Future  -     EKG 12 lead; Future  -     XR chest pa & lateral; Future  -     Case request operating room: PARATHYROIDECTOMY, MINIMALLY INVASIVE WITH INTRAOP PTH MONITORIN  -     traMADol (ULTRAM) 50 mg tablet;  Take 1 tablet (50 mg total) by mouth every 6 (six) hours as needed for moderate pain    Other orders  -     Incentive spirometry; Standing  -     Insert and maintain IV line; Standing  -     Void On-Call to O R ; Standing  -     Place sequential compression device; Standing  -     PTH, intact; Standing        Advance Care Planning/Advance Directives:  Discussed disease status, cancer treatment plans and/or cancer treatment goals with the patient  No history exists  History of Present Illness:   Patient is a 7-year-old man with primary hyperparathyroidism  He also has a thyroid nodule which we are presently working up   -Interval History: We had set up for ultrasound-guided FNA of the left thyroid nodule  He had this done uneventfully  Review of Systems:  Review of Systems   Constitutional: Positive for fatigue  HENT: Negative  Eyes: Negative  Respiratory: Negative  Gastrointestinal: Negative  Endocrine: Negative  Genitourinary: Negative  Musculoskeletal: Positive for arthralgias, back pain and myalgias  Allergic/Immunologic: Negative  Neurological: Negative  Hematological: Negative  Psychiatric/Behavioral: Negative          Patient Active Problem List   Diagnosis    [facility]parathyroidism ([facility] )    Type 2 diabetes mellitus without complication, without long-term current use of insulin ([facility])    Other hyperlipidemia    Hypertension    Arthritis of knee    BPH with obstruction/lower urinary tract symptoms    [facility] ([facility]    DM type 2, not at goal [facility])    Hypercalciuria    Knee osteoarthritis    Prepatellar bursitis    Multiple thyroid nodules     Past Medical History:   Diagnosis Date    Back pain     Diabetes mellitus ([facility] )     Hypertension      Past Surgical History:   Procedure Laterality Date    HERNIA REPAIR  1998    US GUIDED THYROID BIOPSY  9/7/2018     Family History   Problem Relation Age of Onset    Hypertension Father     Coronary artery disease Father     Hypertension Mother     Arthritis Mother     Coronary artery disease Mother     Diabetes type II Mother     Diabetes Maternal Uncle     Diabetes Paternal Uncle      Social History     Social History    Marital status: /Civil Union     Spouse name: N/A    Number of children: N/A    Years of education: N/A Occupational History    Not on file       Social History Main Topics    Smoking status: Never Smoker    Smokeless tobacco: Never Used    Alcohol use Yes      Comment: rarely    Drug use: No    Sexual activity: Not on file     Other Topics Concern    Not on file     Social History Narrative    No narrative on file       Current Outpatient Prescriptions:     aspirin (ECOTRIN LOW STRENGTH) 81 mg EC tablet, Take 81 mg by mouth daily, Disp: , Rfl:     atorvastatin (LIPITOR) 40 mg tablet, Take 1 tablet (40 mg total) by mouth daily, Disp: 90 tablet, Rfl: 3    dutasteride (AVODART) 0 5 mg capsule, Take 0 5 mg by mouth daily  , Disp: , Rfl:     gabapentin (NEURONTIN) 600 MG tablet, Take 1 tablet by mouth 3 (three) times a day, Disp: , Rfl:     glimepiride (AMARYL) 4 mg tablet, Take 1 tablet (4 mg total) by mouth daily with breakfast, Disp: 90 tablet, Rfl: 3    hydrochlorothiazide (HYDRODIURIL) 25 mg tablet, Take 1 tablet (25 mg total) by mouth daily (Patient taking differently: Take 12 5 mg by mouth daily  ), Disp: 90 tablet, Rfl: 3    insulin detemir (LEVEMIR) 100 units/mL subcutaneous injection, Inject under the skin Twice daily, Disp: , Rfl:     LEVEMIR FLEXTOUCH 100 units/mL injection pen, INJECT 60 UNITS TWICE A DAY, Disp: 120 mL, Rfl: 1    lisinopril (ZESTRIL) 40 mg tablet, Take 40 mg by mouth daily  , Disp: , Rfl:     metFORMIN (GLUCOPHAGE) 1000 MG tablet, Take 1 tablet (1,000 mg total) by mouth 2 (two) times a day with meals, Disp: 180 tablet, Rfl: 3    ONE TOUCH ULTRA TEST test strip, , Disp: , Rfl:     sitaGLIPtin (JANUVIA) 100 mg tablet, Take 1 tablet (100 mg total) by mouth daily, Disp: 90 tablet, Rfl: 3    SUPER B COMPLEX/C PO, Take by mouth, Disp: , Rfl:     tamsulosin (FLOMAX) 0 4 mg, Take 1 capsule by mouth daily, Disp: , Rfl:     ibuprofen (MOTRIN) 600 mg tablet, Take 1 tablet (600 mg total) by mouth every 8 (eight) hours as needed for mild pain or moderate pain for up to 5 days (Patient not taking: Reported on 10/3/2018 ), Disp: 15 tablet, Rfl: 0    insulin lispro (HUMALOG KWIKPEN) 100 Units/mL SOPN, Inject under the skin, Disp: , Rfl:     meclizine (ANTIVERT) 25 mg tablet, Take 1 tablet (25 mg total) by mouth 3 (three) times a day as needed for dizziness (Patient not taking: Reported on 10/3/2018 ), Disp: 30 tablet, Rfl: 0    traMADol (ULTRAM) 50 mg tablet, Take 1 tablet (50 mg total) by mouth every 6 (six) hours as needed for moderate pain, Disp: 10 tablet, Rfl: 0  No Known Allergies  Vitals:    10/03/18 1024   BP: 130/72   Pulse: 94   Resp: 16   Temp: 98 5 °F (36 9 °C)       Physical Exam   Constitutional: He appears well-developed and well-nourished  HENT:   Head: Normocephalic and atraumatic  Eyes: Pupils are equal, round, and reactive to light  Conjunctivae and EOM are normal    Neck: Normal range of motion  Cardiovascular: Normal rate, regular rhythm, normal heart sounds and intact distal pulses  Results:  Labs:  Lab Results   Component Value Date    PTH 79 8 08/15/2018    CALCIUM 9 8 08/15/2018    CAION 1 37 (H) 05/08/2018         Imaging  Us Guided Thyroid Biopsy    Result Date: 9/7/2018  Narrative: ULTRASOUND-GUIDED THYROID BIOPSY HISTORY: 79year-old with history of recent ultrasound thyroid demonstrating thyroid nodules  Patient presents with prescription for fine-needle aspiration of left-sided thyroid nodules with Afrima testing  COMPARISON: Ultrasound thyroid 8/30/2018 FINDINGS: Prior ultrasound images were reviewed  After further review with repeat imaging this day the previous described 1 6 x 1 3 x 0 9 left upper pole nodule was felt to be nonnodular but adjacent heterogeneous thyroid tissue  The left midpole thyroid nodule was targeted for today's biopsy  The procedure was explained to the patient, including risks of hemorrhage, infection and local injury  Informed consent was freely obtained   The patient verbalized expressed understanding of the above risks and wished to proceed with the procedure  Final standard "time-out" procedure performed  PROCEDURE: The neck was prepped and draped in normal sterile fashion  Under real-time ultrasound guidance and local anesthesia 5 passes with a 25 gauge needle were made through the left midpole nodule measuring today 2 9 x 2 2 x 1 8 cm  Cytopathology was present and deemed the specimens adequate for evaluation  The patient tolerated the procedure well  Postprocedure instructions were provided for the patient  I asked the patient to call us with any questions, concerns, or acute problems  The patient expressed understanding of the above  Impression: Status post successful ultrasound-guided thyroid biopsy  Procedure was performed by LEONOR Raygoza PA-C under the direct supervision of Dr Chente Monzon  Workstation performed: [id number]     I reviewed the above laboratory and imaging data  Discussion/Summary:  Primary hyperparathyroidism, suspected right-sided parathyroid adenoma based on sestamibi and CT scan  He had a thyroid nodule on the left which merited workup including biopsy  This was benign  We will therefore continue ultrasound surveillance of thyroid nodules, and plan on minimally invasive right parathyroidectomy with intraoperative PTH monitoring, possible 4 gland exploration  Rationale for this along with risks and benefits of surgery discussed with patient  He understands the plan wishes to proceed as outlined

## 2018-10-15 ENCOUNTER — APPOINTMENT (OUTPATIENT)
Dept: LAB | Facility: HOSPITAL | Age: 68
End: 2018-10-15
Attending: SURGERY
Payer: COMMERCIAL

## 2018-10-15 ENCOUNTER — HOSPITAL ENCOUNTER (OUTPATIENT)
Dept: RADIOLOGY | Facility: HOSPITAL | Age: 68
Discharge: HOME | End: 2018-10-15
Attending: SURGERY
Payer: COMMERCIAL

## 2018-10-15 ENCOUNTER — HOSPITAL ENCOUNTER (OUTPATIENT)
Dept: NON INVASIVE DIAGNOSTICS | Facility: HOSPITAL | Age: 68
Discharge: HOME | End: 2018-10-15
Attending: SURGERY
Payer: COMMERCIAL

## 2018-10-15 DIAGNOSIS — E21.3 HYPERPARATHYROIDISM (HCC): ICD-10-CM

## 2018-10-15 LAB
ANION GAP SERPL CALCULATED.3IONS-SCNC: 8 MMOL/L (ref 4–13)
APTT PPP: 29 SECONDS (ref 24–36)
ATRIAL RATE: 85 BPM
BUN SERPL-MCNC: 11 MG/DL (ref 5–25)
CALCIUM SERPL-MCNC: 9.9 MG/DL (ref 8.3–10.1)
CHLORIDE SERPL-SCNC: 103 MMOL/L (ref 100–108)
CO2 SERPL-SCNC: 24 MMOL/L (ref 21–32)
CREAT SERPL-MCNC: 0.65 MG/DL (ref 0.6–1.3)
ERYTHROCYTE [DISTWIDTH] IN BLOOD BY AUTOMATED COUNT: 13.2 % (ref 11.6–15.1)
GFR SERPL CREATININE-BSD FRML MDRD: 101 ML/MIN/1.73SQ M
GLUCOSE P FAST SERPL-MCNC: 171 MG/DL (ref 65–99)
HCT VFR BLD AUTO: 46.9 % (ref 36.5–49.3)
HGB BLD-MCNC: 15.4 G/DL (ref 12–17)
INR PPP: 1.08 (ref 0.86–1.17)
MCH RBC QN AUTO: 27.9 PG (ref 26.8–34.3)
MCHC RBC AUTO-ENTMCNC: 32.8 G/DL (ref 31.4–37.4)
MCV RBC AUTO: 85 FL (ref 82–98)
P AXIS: 65 DEGREES
PLATELET # BLD AUTO: 170 THOUSANDS/UL (ref 149–390)
PMV BLD AUTO: 10.1 FL (ref 8.9–12.7)
POTASSIUM SERPL-SCNC: 4 MMOL/L (ref 3.5–5.3)
PR INTERVAL: 186 MS
PROTHROMBIN TIME: 13.5 SECONDS (ref 11.8–14.2)
QRS AXIS: -66 DEGREES
QRSD INTERVAL: 152 MS
QT INTERVAL: 388 MS
QTC INTERVAL: 461 MS
RBC # BLD AUTO: 5.52 MILLION/UL (ref 3.88–5.62)
SODIUM SERPL-SCNC: 135 MMOL/L (ref 136–145)
T WAVE AXIS: 48 DEGREES
VENTRICULAR RATE: 85 BPM
WBC # BLD AUTO: 7.07 THOUSAND/UL (ref 4.31–10.16)

## 2018-10-15 PROCEDURE — 36415 COLL VENOUS BLD VENIPUNCTURE: CPT

## 2018-10-15 PROCEDURE — 93010 ELECTROCARDIOGRAM REPORT: CPT | Performed by: INTERNAL MEDICINE

## 2018-10-15 PROCEDURE — 85730 THROMBOPLASTIN TIME PARTIAL: CPT

## 2018-10-15 PROCEDURE — 71046 X-RAY EXAM CHEST 2 VIEWS: CPT

## 2018-10-15 PROCEDURE — 85610 PROTHROMBIN TIME: CPT

## 2018-10-15 PROCEDURE — 80048 BASIC METABOLIC PNL TOTAL CA: CPT

## 2018-10-15 PROCEDURE — 85027 COMPLETE CBC AUTOMATED: CPT

## 2018-10-15 PROCEDURE — 93005 ELECTROCARDIOGRAM TRACING: CPT

## 2018-10-22 ENCOUNTER — ANESTHESIA EVENT (OUTPATIENT)
Dept: PERIOP | Facility: HOSPITAL | Age: 68
End: 2018-10-22
Payer: COMMERCIAL

## 2018-10-22 NOTE — PRE-PROCEDURE INSTRUCTIONS
Pre-Surgery Instructions:   Medication Instructions    aspirin (ECOTRIN LOW STRENGTH) 81 mg EC tablet Instructed patient per Anesthesia Guidelines   atorvastatin (LIPITOR) 40 mg tablet Instructed patient per Anesthesia Guidelines   dutasteride (AVODART) 0 5 mg capsule Instructed patient per Anesthesia Guidelines   gabapentin (NEURONTIN) 600 MG tablet Instructed patient per Anesthesia Guidelines   glimepiride (AMARYL) 4 mg tablet Instructed patient per Anesthesia Guidelines   hydrochlorothiazide (HYDRODIURIL) 25 mg tablet Instructed patient per Anesthesia Guidelines   insulin detemir (LEVEMIR) 100 units/mL subcutaneous injection Instructed patient per Anesthesia Guidelines   insulin lispro (HUMALOG KWIKPEN) 100 Units/mL SOPN Instructed patient per Anesthesia Guidelines   LEVEMIR FLEXTOUCH 100 units/mL injection pen Instructed patient per Anesthesia Guidelines   lisinopril (ZESTRIL) 40 mg tablet Instructed patient per Anesthesia Guidelines   metFORMIN (GLUCOPHAGE) 1000 MG tablet Instructed patient per Anesthesia Guidelines   sitaGLIPtin (JANUVIA) 100 mg tablet Instructed patient per Anesthesia Guidelines   SUPER B COMPLEX/C PO Instructed patient per Anesthesia Guidelines   tamsulosin (FLOMAX) 0 4 mg Instructed patient per Anesthesia Guidelines  ACE/ARB Med Class     Do not take this medication the day before and the morning of the day of surgery/procedure  Diuretic Med Class     Continue this medication up to the evening before surgery/procedure, but do not take the morning of the day of surgery  Alpha-1 adrenergic blocker Med Class     Continue to take this medication on your normal schedule  If this is an oral medication and you take it in the morning, then you may take this medicine with a sip of water  Antiepileptic Med Class     Continue to take this medication on your normal schedule    If this is an oral medication and you take it in the morning, then you may take this medicine with a sip of water  ASA Med Class: Aspirin     Should be discontinued at least one week prior to planned operation, unless specifically stated otherwise by surgical service  Your Surgeon may have patient stop taking aspirin up to a week before surgery if having intracranial, middle ear, posterior eye, spine surgery or prostate surgery  [Patients taking aspirin for coronary stents should be reviewed by an anesthesiologist in the optimization clinic  Please do not discontinue aspirin in patients with coronary stents unless given specific permission to do so by the cardiologist who prescribed medication ]   If your surgeon approves please continue to take this medication on your normal schedule  You may take this medication on the morning of your surgery with a sip of water  Insulin Med Class     Pre-Surgery/Procedure Instructions for Adult Patients who Take Medicine for Diabetes or to Control their Blood Sugar     Day Before Surgery/Procedure  Use the directions based on the type of medicine you take for your diabetes  1  If you are having a procedure that does not require a bowel prep:  ? Pre-Mixed Insulin (Intermediate Acting: Humalog 75/25, Humulin 70/30  Novolog 70/30, Regular Insulin)  § Take ½ your regular dose the evening before your procedure  ? Rapid/Fast Acting Insulin/Long Acting Insulin (Humalog U200, NovoLog, Apidra, Lantus, Levemir, Bruno Wally, [place])  § Take your FULL regular dose the day before procedure  ? Oral Diabetic Medicines including Glipizide/Glimepiride/Glucotrol (sulfonylurea)  § Take your regular dose with dinner the evening before your procedure  2  If you are having a procedure (e g  Colonoscopy) that requires a bowel prep and you are allowed to have at least a clear liquid diet:  ? Pre-Mixed Insulin (Intermediate Acting: Humalog 75/25, Humulin 70/30, Novolog 70/30, Regular Insulin)  § Take ½ your regular dose the evening before your procedure    ? Rapid/Fast Acting Insulin (Humalog U200, NovoLog, Apidra, Fiasp)  § Take ½ your regular dose the evening before your procedure  ? Long Acting Insulin (Lantus, Levemir, [place])  § Take your FULL regular dose the day before procedure  ? Oral Glipizide/Glimepiride/Glucotrol (sulfonylurea)  § Take ½ your regular dose the evening before your procedure  ? Oral Diabetic Medicines that are NOT Glipizide/Glimepiride/Glucotrol  § Take your regular dose with dinner in the evening before your procedure      Day of Surgery/Procedure  · Long Acting Insulin (Lantus, Levemir, [place])  ? If you usually take your Long-Acting Insulin in the morning, take the full dose as scheduled  · With the exception of the morning Long-Acting Insulin noted above, DO NOT take ANY diabetic medicine on the day of your procedure unless you were instructed by the doctor who manages your diabetic medicines  · Continue to check your blood sugars  · If you have an insulin pump then consult with your Endocrinologist for instructions  · If you cannot see your Endocrinologist, on the day of the procedure set your insulin pump to your basal rate only  Please bring your insulin pump supplies to the hospital      This Educational material has been approved by the Patient Education Advisory Committee  Date prepared: 1/17/2018          Expiration date: 1/17/2019        Approval Number:            Pre op instructions reviewed with patient on 10/22    Meds,bathing and hospital instructions reviewed with understanding at this time

## 2018-10-31 NOTE — ANESTHESIA PREPROCEDURE EVALUATION
Review of Systems/Medical History  Patient summary reviewed  Chart reviewed  No history of anesthetic complications     Cardiovascular  Hyperlipidemia, Hypertension ,   Comment: 10/15/2018-EKG -SR 85, RBBB, IMI and AMI-age undetermined    ECHO 11/1/2018 Preop-- EF 38%, Nml systolic fxn, Mild hypertrophy, Gr 1 diastolic dysfunction, Mild Pulm HTN- PA pressure 42, Pericardial effusion posterior to heart with no visible hemodynamic compromise,  Pulmonary  Negative pulmonary ROS        GI/Hepatic  Negative GI/hepatic ROS          Prostatic disorder, benign prostatic hyperplasia  Comment: Cr  65     Endo/Other  Diabetes (3:30am pt took Levimir 60units ,  He normally takes  60 units bid  Last A1C 8 8  ///Preop ) , History of thyroid disease (Nodules) , Parathyroid disease (PTH 79 (nml), Ca 9 9) hyperparathyroidism,   Obesity    GYN  Negative gynecology ROS          Hematology  Negative hematology ROS      Musculoskeletal  Back pain (has occasional paresthesia LE/ R>L) , lumbar pain, Osteoarthritis,        Neurology  Negative neurology ROS      Psychology   Negative psychology ROS              Physical Exam    Airway  Comment: Small mouth opening  Excess tissue neck, Full neck  Mallampati score: III  TM Distance: <3 FB       Dental   Comment: Caps,     Cardiovascular  Rhythm: regular,     Pulmonary  Breath sounds clear to auscultation,     Other Findings        Anesthesia Plan  ASA Score- 3     Anesthesia Type- general with ASA Monitors  Additional Monitors: arterial line  Airway Plan: ETT  Comment: Abnormal EKG in poorly controlled diabetic, HTN--discussed with Dr Pradip Tripp  Pt to have ECHO before proceeding with surgery  Ordered STAT ECHO preop  TRAVIS Beal  Plan Factors-    Induction- intravenous  Postoperative Plan- Plan for postoperative opioid use  Planned trial extubation    Informed Consent- Anesthetic plan and risks discussed with patient    I personally reviewed this patient with the CRNA  Discussed and agreed on the Anesthesia Plan with the CRNA  Juan Luis Elliott

## 2018-11-01 ENCOUNTER — ANESTHESIA (OUTPATIENT)
Dept: PERIOP | Facility: HOSPITAL | Age: 68
End: 2018-11-01
Payer: COMMERCIAL

## 2018-11-01 ENCOUNTER — APPOINTMENT (OUTPATIENT)
Dept: NON INVASIVE DIAGNOSTICS | Facility: HOSPITAL | Age: 68
End: 2018-11-01
Payer: COMMERCIAL

## 2018-11-01 ENCOUNTER — HOSPITAL ENCOUNTER (OUTPATIENT)
Facility: HOSPITAL | Age: 68
Setting detail: OUTPATIENT SURGERY
Discharge: HOME | End: 2018-11-01
Attending: SURGERY | Admitting: SURGERY
Payer: COMMERCIAL

## 2018-11-01 VITALS
DIASTOLIC BLOOD PRESSURE: 70 MMHG | HEIGHT: 69 IN | TEMPERATURE: 100 F | HEART RATE: 70 BPM | RESPIRATION RATE: 16 BRPM | SYSTOLIC BLOOD PRESSURE: 150 MMHG | BODY MASS INDEX: 32.29 KG/M2 | OXYGEN SATURATION: 96 % | WEIGHT: 218 LBS

## 2018-11-01 DIAGNOSIS — E21.3 HYPERPARATHYROIDISM (HCC): Primary | ICD-10-CM

## 2018-11-01 LAB
CALCIUM SERPL-MCNC: 8.7 MG/DL (ref 8.3–10.1)
CALCIUM SERPL-MCNC: 8.8 MG/DL (ref 8.3–10.1)
GLUCOSE SERPL-MCNC: 143 MG/DL (ref 65–140)
GLUCOSE SERPL-MCNC: 154 MG/DL (ref 65–140)
PTH-INTACT SERPL-MCNC: 106.9 PG/ML (ref 18.4–80.1)
PTH-INTACT SERPL-MCNC: 26 PG/ML (ref 18.4–80.1)
PTH-INTACT SERPL-MCNC: 35.1 PG/ML (ref 18.4–80.1)
PTH-INTACT SERPL-MCNC: 59.9 PG/ML (ref 18.4–80.1)

## 2018-11-01 PROCEDURE — 93306 TTE W/DOPPLER COMPLETE: CPT | Performed by: INTERNAL MEDICINE

## 2018-11-01 PROCEDURE — 83970 ASSAY OF PARATHORMONE: CPT | Performed by: SURGERY

## 2018-11-01 PROCEDURE — 82310 ASSAY OF CALCIUM: CPT | Performed by: SURGERY

## 2018-11-01 PROCEDURE — 93306 TTE W/DOPPLER COMPLETE: CPT

## 2018-11-01 PROCEDURE — 88331 PATH CONSLTJ SURG 1 BLK 1SPC: CPT | Performed by: PATHOLOGY

## 2018-11-01 PROCEDURE — 88305 TISSUE EXAM BY PATHOLOGIST: CPT | Performed by: PATHOLOGY

## 2018-11-01 PROCEDURE — 82948 REAGENT STRIP/BLOOD GLUCOSE: CPT

## 2018-11-01 PROCEDURE — 60500 EXPLORE PARATHYROID GLANDS: CPT | Performed by: SURGERY

## 2018-11-01 RX ORDER — ONDANSETRON 2 MG/ML
4 INJECTION INTRAMUSCULAR; INTRAVENOUS ONCE AS NEEDED
Status: DISCONTINUED | OUTPATIENT
Start: 2018-11-01 | End: 2018-11-01 | Stop reason: HOSPADM

## 2018-11-01 RX ORDER — ROCURONIUM BROMIDE 10 MG/ML
INJECTION, SOLUTION INTRAVENOUS AS NEEDED
Status: DISCONTINUED | OUTPATIENT
Start: 2018-11-01 | End: 2018-11-01 | Stop reason: SURG

## 2018-11-01 RX ORDER — LIDOCAINE HYDROCHLORIDE 10 MG/ML
0.5 INJECTION, SOLUTION INFILTRATION; PERINEURAL ONCE AS NEEDED
Status: DISCONTINUED | OUTPATIENT
Start: 2018-11-01 | End: 2018-11-01 | Stop reason: HOSPADM

## 2018-11-01 RX ORDER — SODIUM CHLORIDE 9 MG/ML
INJECTION, SOLUTION INTRAVENOUS CONTINUOUS PRN
Status: DISCONTINUED | OUTPATIENT
Start: 2018-11-01 | End: 2018-11-01 | Stop reason: SURG

## 2018-11-01 RX ORDER — PROPOFOL 10 MG/ML
INJECTION, EMULSION INTRAVENOUS AS NEEDED
Status: DISCONTINUED | OUTPATIENT
Start: 2018-11-01 | End: 2018-11-01 | Stop reason: SURG

## 2018-11-01 RX ORDER — GLYCOPYRROLATE 0.2 MG/ML
INJECTION INTRAMUSCULAR; INTRAVENOUS AS NEEDED
Status: DISCONTINUED | OUTPATIENT
Start: 2018-11-01 | End: 2018-11-01 | Stop reason: SURG

## 2018-11-01 RX ORDER — OXYCODONE HYDROCHLORIDE 5 MG/1
5 TABLET ORAL EVERY 4 HOURS PRN
Status: DISCONTINUED | OUTPATIENT
Start: 2018-11-01 | End: 2018-11-01 | Stop reason: HOSPADM

## 2018-11-01 RX ORDER — FENTANYL CITRATE/PF 50 MCG/ML
50 SYRINGE (ML) INJECTION
Status: DISCONTINUED | OUTPATIENT
Start: 2018-11-01 | End: 2018-11-01 | Stop reason: HOSPADM

## 2018-11-01 RX ORDER — METOCLOPRAMIDE HYDROCHLORIDE 5 MG/ML
10 INJECTION INTRAMUSCULAR; INTRAVENOUS ONCE AS NEEDED
Status: DISCONTINUED | OUTPATIENT
Start: 2018-11-01 | End: 2018-11-01 | Stop reason: HOSPADM

## 2018-11-01 RX ORDER — FENTANYL CITRATE 50 UG/ML
INJECTION, SOLUTION INTRAMUSCULAR; INTRAVENOUS AS NEEDED
Status: DISCONTINUED | OUTPATIENT
Start: 2018-11-01 | End: 2018-11-01 | Stop reason: SURG

## 2018-11-01 RX ORDER — BUPIVACAINE HYDROCHLORIDE 2.5 MG/ML
INJECTION, SOLUTION EPIDURAL; INFILTRATION; INTRACAUDAL AS NEEDED
Status: DISCONTINUED | OUTPATIENT
Start: 2018-11-01 | End: 2018-11-01 | Stop reason: HOSPADM

## 2018-11-01 RX ORDER — SODIUM CHLORIDE, SODIUM LACTATE, POTASSIUM CHLORIDE, CALCIUM CHLORIDE 600; 310; 30; 20 MG/100ML; MG/100ML; MG/100ML; MG/100ML
125 INJECTION, SOLUTION INTRAVENOUS CONTINUOUS
Status: DISCONTINUED | OUTPATIENT
Start: 2018-11-01 | End: 2018-11-01 | Stop reason: HOSPADM

## 2018-11-01 RX ORDER — ONDANSETRON 2 MG/ML
INJECTION INTRAMUSCULAR; INTRAVENOUS AS NEEDED
Status: DISCONTINUED | OUTPATIENT
Start: 2018-11-01 | End: 2018-11-01 | Stop reason: SURG

## 2018-11-01 RX ORDER — MEPERIDINE HYDROCHLORIDE 25 MG/ML
12.5 INJECTION INTRAMUSCULAR; INTRAVENOUS; SUBCUTANEOUS ONCE AS NEEDED
Status: DISCONTINUED | OUTPATIENT
Start: 2018-11-01 | End: 2018-11-01 | Stop reason: HOSPADM

## 2018-11-01 RX ORDER — LIDOCAINE HYDROCHLORIDE 10 MG/ML
INJECTION, SOLUTION INFILTRATION; PERINEURAL AS NEEDED
Status: DISCONTINUED | OUTPATIENT
Start: 2018-11-01 | End: 2018-11-01 | Stop reason: SURG

## 2018-11-01 RX ORDER — ONDANSETRON 2 MG/ML
4 INJECTION INTRAMUSCULAR; INTRAVENOUS EVERY 6 HOURS PRN
Status: DISCONTINUED | OUTPATIENT
Start: 2018-11-01 | End: 2018-11-01 | Stop reason: HOSPADM

## 2018-11-01 RX ORDER — HYDROMORPHONE HCL/PF 1 MG/ML
0.2 SYRINGE (ML) INJECTION
Status: DISCONTINUED | OUTPATIENT
Start: 2018-11-01 | End: 2018-11-01 | Stop reason: HOSPADM

## 2018-11-01 RX ORDER — SODIUM CHLORIDE, SODIUM LACTATE, POTASSIUM CHLORIDE, CALCIUM CHLORIDE 600; 310; 30; 20 MG/100ML; MG/100ML; MG/100ML; MG/100ML
50 INJECTION, SOLUTION INTRAVENOUS CONTINUOUS
Status: DISCONTINUED | OUTPATIENT
Start: 2018-11-01 | End: 2018-11-01 | Stop reason: HOSPADM

## 2018-11-01 RX ADMIN — SODIUM CHLORIDE, SODIUM LACTATE, POTASSIUM CHLORIDE, AND CALCIUM CHLORIDE 125 ML/HR: .6; .31; .03; .02 INJECTION, SOLUTION INTRAVENOUS at 07:00

## 2018-11-01 RX ADMIN — FENTANYL CITRATE 50 MCG: 50 INJECTION, SOLUTION INTRAMUSCULAR; INTRAVENOUS at 11:53

## 2018-11-01 RX ADMIN — ONDANSETRON 4 MG: 2 INJECTION INTRAMUSCULAR; INTRAVENOUS at 11:28

## 2018-11-01 RX ADMIN — PROPOFOL 40 MG: 10 INJECTION, EMULSION INTRAVENOUS at 12:10

## 2018-11-01 RX ADMIN — PROPOFOL 200 MG: 10 INJECTION, EMULSION INTRAVENOUS at 11:06

## 2018-11-01 RX ADMIN — SODIUM CHLORIDE: 0.9 INJECTION, SOLUTION INTRAVENOUS at 11:10

## 2018-11-01 RX ADMIN — GLYCOPYRROLATE 0.3 MG: 0.2 INJECTION, SOLUTION INTRAMUSCULAR; INTRAVENOUS at 12:04

## 2018-11-01 RX ADMIN — SODIUM CHLORIDE, SODIUM LACTATE, POTASSIUM CHLORIDE, AND CALCIUM CHLORIDE: .6; .31; .03; .02 INJECTION, SOLUTION INTRAVENOUS at 11:15

## 2018-11-01 RX ADMIN — NEOSTIGMINE METHYLSULFATE 3 MG: 1 INJECTION, SOLUTION INTRAMUSCULAR; INTRAVENOUS; SUBCUTANEOUS at 12:04

## 2018-11-01 RX ADMIN — FENTANYL CITRATE 50 MCG: 50 INJECTION, SOLUTION INTRAMUSCULAR; INTRAVENOUS at 11:06

## 2018-11-01 RX ADMIN — LIDOCAINE HYDROCHLORIDE 50 MG: 10 INJECTION, SOLUTION INFILTRATION; PERINEURAL at 11:06

## 2018-11-01 RX ADMIN — ROCURONIUM BROMIDE 50 MG: 10 INJECTION INTRAVENOUS at 11:06

## 2018-11-01 NOTE — PROGRESS NOTES
Pt's Calcium level = 8 8 mg/dl  Dr Leonidas Roche notified in [address]  Verbal Order to D/C home home now

## 2018-11-01 NOTE — INTERVAL H&P NOTE
H&P reviewed  After examining the patient I find no changes in the patients condition since the H&P had been written      Physical Exam     HEART: RRR  CHEST: CTA B  ABD: S/NT/ND    Plan: right parathyroidectomy, possible 4 gland exploration

## 2018-11-01 NOTE — ANESTHESIA POSTPROCEDURE EVALUATION
Post-Op Assessment Note      CV Status:  Stable    Mental Status:  Alert and awake    Hydration Status:  Euvolemic    PONV Controlled:  Controlled    Airway Patency:  Patent    Post Op Vitals Reviewed: Yes          Staff: CRNA, Anesthesiologist           /64 (11/01/18 1320)    Temp 99 4 °F (37 4 °C) (11/01/18 1320)    Pulse 74 (11/01/18 1320)   Resp 16 (11/01/18 1320)    SpO2 96 % (11/01/18 1320)

## 2018-11-01 NOTE — H&P (VIEW-ONLY)
Surgical Oncology Follow Up       [address] [place] [address] SURGICAL ONCOLOGY [place]  [address]  [place] [address]    Estrella Rojas  1950  [id number]  [address] [place] [address] SURGICAL ONCOLOGY [place]  [address] [facility] [id number]    Chief Complaint   Patient presents with    Follow-up     Patient is here for a follow up after thyroid biopsy  Assessment/Plan:    No problem-specific Assessment & Plan notes found for this encounter  Diagnoses and all orders for this visit:    Hyperparathyroidism ([address] )  -     Case request operating room: PARATHYROIDECTOMY, MINIMALLY INVASIVE WITH INTRAOP PTH MONITORIN; Standing  -     Basic metabolic panel; Future  -     APTT; Future  -     CBC and Platelet; Future  -     Protime-INR; Future  -     EKG 12 lead; Future  -     XR chest pa & lateral; Future  -     Case request operating room: PARATHYROIDECTOMY, MINIMALLY INVASIVE WITH INTRAOP PTH MONITORIN  -     traMADol (ULTRAM) 50 mg tablet; Take 1 tablet (50 mg total) by mouth every 6 (six) hours as needed for moderate pain    Other orders  -     Incentive spirometry; Standing  -     Insert and maintain IV line; Standing  -     Void On-Call to O R ; Standing  -     Place sequential compression device; Standing  -     PTH, intact; Standing        Advance Care Planning/Advance Directives:  Discussed disease status, cancer treatment plans and/or cancer treatment goals with the patient  No history exists  History of Present Illness:   Patient is a 80-year-old man with primary hyperparathyroidism  He also has a thyroid nodule which we are presently working up   -Interval History: We had set up for ultrasound-guided FNA of the left thyroid nodule  He had this done uneventfully  Review of Systems:  Review of Systems   Constitutional: Positive for fatigue  HENT: Negative  Eyes: Negative  Respiratory: Negative  Gastrointestinal: Negative  Endocrine: Negative  Genitourinary: Negative  Musculoskeletal: Positive for arthralgias, back pain and myalgias  Allergic/Immunologic: Negative  Neurological: Negative  Hematological: Negative  Psychiatric/Behavioral: Negative  Patient Active Problem List   Diagnosis    Hyperparathyroidism ([place] [address] )    Type 2 diabetes mellitus without complication, without long-term current use of insulin (HCC)    Other hyperlipidemia    Hypertension    Arthritis of knee    BPH with obstruction/lower urinary tract symptoms    Diabetes ([place] [address] )    DM type 2, not at goal [facility])    Hypercalciuria    Knee osteoarthritis    Prepatellar bursitis    Multiple thyroid nodules     Past Medical History:   Diagnosis Date    Back pain     Diabetes mellitus ([facility] )     Hypertension      Past Surgical History:   Procedure Laterality Date    HERNIA REPAIR  1998    US GUIDED THYROID BIOPSY  9/7/2018     Family History   Problem Relation Age of Onset    Hypertension Father     Coronary artery disease Father     Hypertension Mother     Arthritis Mother     Coronary artery disease Mother     Diabetes type II Mother     Diabetes Maternal Uncle     Diabetes Paternal Uncle      Social History     Social History    Marital status: /Civil Union     Spouse name: N/A    Number of children: N/A    Years of education: N/A     Occupational History    Not on file       Social History Main Topics    Smoking status: Never Smoker    Smokeless tobacco: Never Used    Alcohol use Yes      Comment: rarely    Drug use: No    Sexual activity: Not on file     Other Topics Concern    Not on file     Social History Narrative    No narrative on file       Current Outpatient Prescriptions:     aspirin (ECOTRIN LOW STRENGTH) 81 mg EC tablet, Take 81 mg by mouth daily, Disp: , Rfl:     atorvastatin (LIPITOR) 40 mg tablet, Take 1 tablet (40 mg total) by mouth daily, Disp: 90 tablet, Rfl: 3    dutasteride (AVODART) 0 5 mg capsule, Take 0 5 mg by mouth daily  , Disp: , Rfl:     gabapentin (NEURONTIN) 600 MG tablet, Take 1 tablet by mouth 3 (three) times a day, Disp: , Rfl:     glimepiride (AMARYL) 4 mg tablet, Take 1 tablet (4 mg total) by mouth daily with breakfast, Disp: 90 tablet, Rfl: 3    hydrochlorothiazide (HYDRODIURIL) 25 mg tablet, Take 1 tablet (25 mg total) by mouth daily (Patient taking differently: Take 12 5 mg by mouth daily  ), Disp: 90 tablet, Rfl: 3    insulin detemir (LEVEMIR) 100 units/mL subcutaneous injection, Inject under the skin Twice daily, Disp: , Rfl:     LEVEMIR FLEXTOUCH 100 units/mL injection pen, INJECT 60 UNITS TWICE A DAY, Disp: 120 mL, Rfl: 1    lisinopril (ZESTRIL) 40 mg tablet, Take 40 mg by mouth daily  , Disp: , Rfl:     metFORMIN (GLUCOPHAGE) 1000 MG tablet, Take 1 tablet (1,000 mg total) by mouth 2 (two) times a day with meals, Disp: 180 tablet, Rfl: 3    ONE TOUCH ULTRA TEST test strip, , Disp: , Rfl:     sitaGLIPtin (JANUVIA) 100 mg tablet, Take 1 tablet (100 mg total) by mouth daily, Disp: 90 tablet, Rfl: 3    SUPER B COMPLEX/C PO, Take by mouth, Disp: , Rfl:     tamsulosin (FLOMAX) 0 4 mg, Take 1 capsule by mouth daily, Disp: , Rfl:     ibuprofen (MOTRIN) 600 mg tablet, Take 1 tablet (600 mg total) by mouth every 8 (eight) hours as needed for mild pain or moderate pain for up to 5 days (Patient not taking: Reported on 10/3/2018 ), Disp: 15 tablet, Rfl: 0    insulin lispro (HUMALOG KWIKPEN) 100 Units/mL SOPN, Inject under the skin, Disp: , Rfl:     meclizine (ANTIVERT) 25 mg tablet, Take 1 tablet (25 mg total) by mouth 3 (three) times a day as needed for dizziness (Patient not taking: Reported on 10/3/2018 ), Disp: 30 tablet, Rfl: 0    traMADol (ULTRAM) 50 mg tablet, Take 1 tablet (50 mg total) by mouth every 6 (six) hours as needed for moderate pain, Disp: 10 tablet, Rfl: 0  No Known Allergies  Vitals:    10/03/18 1024   BP: 130/72   Pulse: 94   Resp: 16   Temp: 98 5 °F (36 9 °C)       Physical Exam   Constitutional: He appears well-developed and well-nourished  HENT:   Head: Normocephalic and atraumatic  Eyes: Pupils are equal, round, and reactive to light  Conjunctivae and EOM are normal    Neck: Normal range of motion  Cardiovascular: Normal rate, regular rhythm, normal heart sounds and intact distal pulses  Results:  Labs:  Lab Results   Component Value Date    PTH 79 8 08/15/2018    CALCIUM 9 8 08/15/2018    CAION 1 37 (H) 05/08/2018         Imaging  Us Guided Thyroid Biopsy    Result Date: 9/7/2018  Narrative: ULTRASOUND-GUIDED THYROID BIOPSY HISTORY: 79year-old with history of recent ultrasound thyroid demonstrating thyroid nodules  Patient presents with prescription for fine-needle aspiration of left-sided thyroid nodules with Afrima testing  COMPARISON: Ultrasound thyroid 8/30/2018 FINDINGS: Prior ultrasound images were reviewed  After further review with repeat imaging this day the previous described 1 6 x 1 3 x 0 9 left upper pole nodule was felt to be nonnodular but adjacent heterogeneous thyroid tissue  The left midpole thyroid nodule was targeted for today's biopsy  The procedure was explained to the patient, including risks of hemorrhage, infection and local injury  Informed consent was freely obtained  The patient verbalized expressed understanding of the above risks and wished to proceed with the procedure  Final standard "time-out" procedure performed  PROCEDURE: The neck was prepped and draped in normal sterile fashion  Under real-time ultrasound guidance and local anesthesia 5 passes with a 25 gauge needle were made through the left midpole nodule measuring today 2 9 x 2 2 x 1 8 cm  Cytopathology was present and deemed the specimens adequate for evaluation  The patient tolerated the procedure well  Postprocedure instructions were provided for the patient   I asked the patient to call us with any questions, concerns, or acute problems  The patient expressed understanding of the above  Impression: Status post successful ultrasound-guided thyroid biopsy  Procedure was performed by LEONOR Barnett PA-C under the direct supervision of Dr Lele Chapman  Workstation performed: [id number]     I reviewed the above laboratory and imaging data  Discussion/Summary:  Primary hyperparathyroidism, suspected right-sided parathyroid adenoma based on sestamibi and CT scan  He had a thyroid nodule on the left which merited workup including biopsy  This was benign  We will therefore continue ultrasound surveillance of thyroid nodules, and plan on minimally invasive right parathyroidectomy with intraoperative PTH monitoring, possible 4 gland exploration  Rationale for this along with risks and benefits of surgery discussed with patient  He understands the plan wishes to proceed as outlined

## 2018-11-01 NOTE — OP NOTE
OPERATIVE REPORT  PATIENT NAME: Guevara Montalvo    :  1950  MRN: [id number]  Pt Location: BE OR ROOM 07    SURGERY DATE: 2018    Surgeon(s) and Role:     * Cristopher Bolden MD - Primary     * 114 Jayro Hernandez MD - Assisting    Preop Diagnosis:  Hyperparathyroidism ([address] ) [E21 3]    Post-Op Diagnosis Codes:     * Hyperparathyroidism ([address] ) [E21 3]    Procedure(s) (LRB):  MINIMALLY INVASIVE RIGHT PARATHYROIDECTOMY WITH PTH MONITORING (Right)    Specimen(s):  ID Type Source Tests Collected by Time Destination   1 : right inferior parathyroid Tissue Parathyroid TISSUE EXAM Cristopher Bolden MD 2018 1136        Estimated Blood Loss:   Minimal    Drains:       Anesthesia Type:   General    Operative Indications:  Hyperparathyroidism ([address] ) [E21 3]  Right adenoma    Operative Findings:  740 mg right inferior parathyroid adenoma  Component      Latest Ref Rng & Units 2018           6:54 AM 11:47 AM 11:51 AM 11:56 AM   PTH      18 4 - 80 1 pg/mL 106 9 (H) 59 9 88 7 03 6     Complications:   None    Procedure and Technique:  The patient was brought to the OR and identified by proper time-out  Following this he was intubated by the anesthesia team, then was  prepped and draped with the neck exposed in the extended position  Local anesthesia was given, then a 3 cm incision was made sharply 2 finger breaths above the sternal notch  Cautery was used to dissect through the dermis subcutaneous tissue  Wheatlanner  retractor was placed  The platysma was then transected with cautery  Strap muscles were then divided the midline  This exposed the surface of the thyroid  We dissected the strap muscles off the anterolateral aspect of the right thyroid lobe  This enabled us to dissect between the thyroid and the strap muscles    The thyroid gland was then retracted medially and anteriorly which exposed what appeared to be a large parathyroid gland along the inferior pole of the thyroid gland   This was dissected out from surrounding tissue in hemostatic fashion with cautery  Vascular pedicle was visualized  The pedicle was clipped  PTH levels were drawn at the start of the case, 0 min, 5 min, and 10 min after the case  Levels decreased appropriately to normal range upon 10 min post resection of the gland  We then irrigated the field and closed using 3-0 Vicryl to close the strap muscles the midline followed by 3-0 Vicryl to close the platysma, followed by 4-0 Vicryl close the dermis, followed by 5-0 Monocryl to close skin in subcuticular fashion  Benzoin and Steri-Strips were then applied in the usual fashion  The patient was awakened transferred to the recovery room in stable condition     I was present for the entire procedure    Patient Disposition:  PACU     SIGNATURE: Angelito Flaherty MD  DATE: November 1, 2018  TIME: 12:37 PM

## 2018-11-12 ENCOUNTER — OFFICE VISIT (OUTPATIENT)
Dept: FAMILY MEDICINE CLINIC | Facility: CLINIC | Age: 68
End: 2018-11-12
Payer: COMMERCIAL

## 2018-11-12 VITALS
RESPIRATION RATE: 15 BRPM | DIASTOLIC BLOOD PRESSURE: 72 MMHG | HEIGHT: 69 IN | OXYGEN SATURATION: 97 % | BODY MASS INDEX: 33.44 KG/M2 | WEIGHT: 225.8 LBS | HEART RATE: 92 BPM | SYSTOLIC BLOOD PRESSURE: 144 MMHG

## 2018-11-12 DIAGNOSIS — E21.3 HYPERPARATHYROIDISM (HCC): ICD-10-CM

## 2018-11-12 DIAGNOSIS — E11.8 TYPE 2 DIABETES MELLITUS WITH COMPLICATION, WITH LONG-TERM CURRENT USE OF INSULIN (HCC): Primary | ICD-10-CM

## 2018-11-12 DIAGNOSIS — Z79.4 TYPE 2 DIABETES MELLITUS WITH COMPLICATION, WITH LONG-TERM CURRENT USE OF INSULIN (HCC): Primary | ICD-10-CM

## 2018-11-12 DIAGNOSIS — G47.33 OSA (OBSTRUCTIVE SLEEP APNEA): Primary | ICD-10-CM

## 2018-11-12 DIAGNOSIS — E78.49 OTHER HYPERLIPIDEMIA: ICD-10-CM

## 2018-11-12 DIAGNOSIS — I10 ESSENTIAL HYPERTENSION: ICD-10-CM

## 2018-11-12 DIAGNOSIS — E11.9 TYPE 2 DIABETES MELLITUS WITHOUT COMPLICATION, WITHOUT LONG-TERM CURRENT USE OF INSULIN (HCC): ICD-10-CM

## 2018-11-12 DIAGNOSIS — N40.0 BENIGN PROSTATIC HYPERPLASIA, UNSPECIFIED WHETHER LOWER URINARY TRACT SYMPTOMS PRESENT: ICD-10-CM

## 2018-11-12 LAB — SL AMB POCT HEMOGLOBIN AIC: 8.1

## 2018-11-12 PROCEDURE — 99214 OFFICE O/P EST MOD 30 MIN: CPT | Performed by: FAMILY MEDICINE

## 2018-11-12 PROCEDURE — 83036 HEMOGLOBIN GLYCOSYLATED A1C: CPT

## 2018-11-12 PROCEDURE — 4010F ACE/ARB THERAPY RXD/TAKEN: CPT | Performed by: FAMILY MEDICINE

## 2018-11-12 RX ORDER — DUTASTERIDE 0.5 MG/1
0.5 CAPSULE, LIQUID FILLED ORAL DAILY
Qty: 90 CAPSULE | Refills: 3 | Status: SHIPPED | OUTPATIENT
Start: 2018-11-12 | End: 2019-12-06 | Stop reason: SDUPTHER

## 2018-11-12 RX ORDER — LISINOPRIL 40 MG/1
40 TABLET ORAL DAILY
Qty: 90 TABLET | Refills: 3 | Status: SHIPPED | OUTPATIENT
Start: 2018-11-12 | End: 2020-06-29 | Stop reason: SDUPTHER

## 2018-11-12 RX ORDER — TAMSULOSIN HYDROCHLORIDE 0.4 MG/1
0.4 CAPSULE ORAL DAILY
Qty: 90 CAPSULE | Refills: 3 | Status: SHIPPED | OUTPATIENT
Start: 2018-11-12 | End: 2019-11-05 | Stop reason: SDUPTHER

## 2018-11-12 NOTE — PATIENT INSTRUCTIONS
Chronic Hypertension   AMBULATORY CARE:   Hypertension  is high blood pressure (BP)  Your BP is the force of your blood moving against the walls of your arteries  Normal BP is less than 120/80  Prehypertension is between 120/80 and 139/89  Hypertension is 140/90 or higher  Hypertension causes your BP to get so high that your heart has to work much harder than normal  This can damage your heart  Chronic hypertension is a long-term condition that you can control with a healthy lifestyle or medicines  A controlled blood pressure helps protect your organs, such as your heart, lungs, brain, and kidneys  Common symptoms include the following:   · Headache     · Blurred vision    · Chest pain     · Dizziness or weakness     · Trouble breathing     · Nosebleeds  Call 911 for any of the following:   · You have discomfort in your chest that feels like squeezing, pressure, fullness, or pain  · You become confused or have difficulty speaking  · You suddenly feel lightheaded or have trouble breathing  · You have pain or discomfort in your back, neck, jaw, stomach, or arm  Seek care immediately if:   · You have a severe headache or vision loss  · You have weakness in an arm or leg  Contact your healthcare provider if:   · You feel faint, dizzy, confused, or drowsy  · You have been taking your BP medicine and your BP is still higher than your healthcare provider says it should be  · You have questions or concerns about your condition or care  Treatment for chronic hypertension  may include medicine to lower your BP and lower your cholesterol level  A low cholesterol level helps prevent heart disease and makes it easier to control your blood pressure  Heart disease can make your blood pressure harder to control  You may also need to make lifestyle changes  Take your medicine exactly as directed    Manage chronic hypertension:  Talk with your healthcare provider about these and other ways to manage hypertension:  · Take your BP at home  Sit and rest for 5 minutes before you take your BP  Extend your arm and support it on a flat surface  Your arm should be at the same level as your heart  Follow the directions that came with your BP monitor  If possible, take at least 2 BP readings each time  Take your BP at least twice a day at the same times each day, such as morning and evening  Keep a record of your BP readings and bring it to your follow-up visits  Ask your healthcare provider what your blood pressure should be  · Limit sodium (salt) as directed  Too much sodium can affect your fluid balance  Check labels to find low-sodium or no-salt-added foods  Some low-sodium foods use potassium salts for flavor  Too much potassium can also cause health problems  Your healthcare provider will tell you how much sodium and potassium are safe for you to have in a day  He or she may recommend that you limit sodium to 2,300 mg a day  · Follow the meal plan recommended by your healthcare provider  A dietitian or your provider can give you more information on low-sodium plans or the DASH (Dietary Approaches to Stop Hypertension) eating plan  The DASH plan is low in sodium, unhealthy fats, and total fat  It is high in potassium, calcium, and fiber  · Exercise to maintain a healthy weight  Exercise at least 30 minutes per day, on most days of the week  This will help decrease your blood pressure  Ask about the best exercise plan for you  · Decrease stress  This may help lower your BP  Learn ways to relax, such as deep breathing or listening to music  · Limit alcohol  Women should limit alcohol to 1 drink a day  Men should limit alcohol to 2 drinks a day  A drink of alcohol is 12 ounces of beer, 5 ounces of wine, or 1½ ounces of liquor  · Do not smoke  Nicotine and other chemicals in cigarettes and cigars can increase your BP and also cause lung damage   Ask your healthcare provider for information if you currently smoke and need help to quit  E-cigarettes or smokeless tobacco still contain nicotine  Talk to your healthcare provider before you use these products  Follow up with your healthcare provider as directed: You will need to return to have your BP checked and to have other lab tests done  Write down your questions so you remember to ask them during your visits  © 2017 [address] [employer] Information is for End User's use only and may not be sold, redistributed or otherwise used for commercial purposes  All illustrations and images included in CareNotes® are the copyrighted property of A [employer] M [employer]  or [employer]  The above information is an  only  It is not intended as medical advice for individual conditions or treatments  Talk to your doctor, nurse or pharmacist before following any medical regimen to see if it is safe and effective for you  Diabetes in the Older Adult   WHAT YOU NEED TO KNOW:   Older adults with diabetes are at risk for heart disease, stroke, kidney disease, blindness, and nerve damage   You may also be at risk for any of the following:  · Poor nutrition or low blood sugar levels    · Confusion or problems with memory, attention, or learning new things    · Trouble controlling urination or frequent urinary tract infections    · Trouble with coordination or balance    · Falls and injuries    · Pain    · Depression    · Open sores on your legs or feet  DISCHARGE INSTRUCTIONS:   Call 911 for any of the following:   · You have any of the following signs of a stroke:      ¨ Numbness or drooping on one side of your face     ¨ Weakness in an arm or leg    ¨ Confusion or difficulty speaking    ¨ Dizziness, a severe headache, or vision loss    · You have any of the following signs of a heart attack:      ¨ Squeezing, pressure, or pain in your chest that lasts longer than 5 minutes or returns    ¨ Discomfort or pain in your back, neck, jaw, stomach, or arm     ¨ Trouble breathing    ¨ Nausea or vomiting    ¨ Lightheadedness or a sudden cold sweat, especially with chest pain or trouble breathing  Return to the emergency department if:   · You have severe abdominal pain, or the pain spreads to your back  You may also be vomiting  · You have trouble staying awake or focusing  · You are shaking or sweating  · You have blurred or double vision  · Your breath has a fruity, sweet smell  · Your breathing is deep and labored, or rapid and shallow  · Your heartbeat is fast and weak  · You fall and get hurt  Contact your healthcare provider if:   · You are vomiting or have diarrhea  · You have an upset stomach and cannot eat the foods on your meal plan  · You feel weak or more tired than usual      · You feel dizzy, have headaches, or are easily irritated  · Your skin is red, warm, dry, or swollen  · You have a wound that does not heal      · You have numbness in your arms or legs  · You have trouble coping with your illness, or you feel anxious or depressed  · You have problems with your memory  · You have changes in your vision  · You have questions or concerns about your condition or care  Medicines  may be given to decrease the amount of sugar in your blood  You may also need medicine to lower your blood pressure or cholesterol, or medicine to prevent blood clots  Manage the ABCs and prevent problems caused by diabetes:   · Check your blood sugar levels as directed  Your healthcare provider will tell you when and how often to check during the day  Your healthcare provider will also tell you what your blood sugar levels should be before and after a meal  You may need to check for ketones in your urine or blood if your level is higher than directed  Write down your results and show them to your healthcare provider   Your provider may use the results to make changes to your medicine, food, or exercise schedules  Ask your healthcare provider for more information about how to treat a high or low blood sugar level  · Follow your meal plan as directed  A dietitian will help you make a meal plan to keep your blood sugar level steady and make sure you get enough nutrition  Do not skip meals  Your blood sugar level may drop too low if you have taken diabetes medicine and do not eat  Ask your healthcare provider about programs in your community that can deliver the meals to your home  · Try to be active for 30 to 60 minutes most days of the week  Exercise can help keep your blood sugar level steady, decrease your risk of heart disease, and help you lose weight  It can also help improve your balance and decrease your risk for falls  Work with your healthcare provider to create an exercise plan  Ask a family member or friend to exercise with you  Start slow and exercise for 5 to 10 minutes at a time  Examples of activities include walking or swimming  Include muscle strengthening activities 2 to 3 days each week  Include balance training 2 to 3 times each week  Activities that help increase balance include yoga and xuan chi      · Maintain a healthy weight  Ask your healthcare provider how much you should weigh  A healthy weight can help you control your diabetes and prevent heart disease  Ask your provider to help you create a weight loss plan if you are overweight  Together you can set manageable weight loss goals  · Do not smoke  Ask your healthcare provider for information if you currently smoke and need help to quit  Do not use e-cigarettes or smokeless tobacco in place of cigarettes or to help you quit  They still contain nicotine  · Manage stress  Stress may increase your blood sugar level  Deep breathing, muscle relaxation, and music may help you relax  Ask your healthcare provider for more information about these practices    Other ways to manage your diabetes:   · Check your feet every day for sores  Look at your whole foot, including the bottom, and between and under your toes  Check for wounds, corns, and calluses  Use a mirror to see the bottom of your feet  The skin on your feet may be shiny, tight, dry, or darker than normal  Your feet may also be cold and pale  Feel your feet by running your hands along the tops, bottoms, sides, and between your toes  Redness, swelling, and warmth are signs of blood flow problems that can lead to a foot ulcer  Do not try to remove corns or calluses yourself  · Wear medical alert identification  Wear medical alert jewelry or carry a card that says you have diabetes  Ask your healthcare provider where to get these items  · Ask about vaccines  You have a higher risk for serious illness if you get the flu, pneumonia, or hepatitis  Ask your healthcare provider if you should get a flu, pneumonia, shingles, or hepatitis B vaccine, and when to get the vaccine  · Keep all appointments  You may need to return to have your A1c checked every 3 months  You will need to return at least once each year to have your feet checked  You will need an eye exam once a year to check for retinopathy  You will also need urine tests every year to check for kidney problems  You may need tests to monitor for heart disease  Write down your questions so you remember to ask them during your visits  · Get help from family and friends  You may need help checking your blood sugar level, giving insulin injections, or preparing your meals  Ask your family and friends to help you with these tasks  Talk to your healthcare provider if you do not have someone at home to help you  A healthcare provider can come to your home to help you with these tasks  Follow up with your healthcare provider as directed: You may need to return to have your A1c checked every 3 months  You will need to return at least once each year to have your feet checked   You will need an eye exam once a year to check for retinopathy  You will also need urine tests every year to check for kidney problems  You may need tests to monitor for heart disease  Write down your questions so you remember to ask them during your visits  © 2017 [employer] [employer] Information is for End User's use only and may not be sold, redistributed or otherwise used for commercial purposes  All illustrations and images included in CareNotes® are the copyrighted property of [employer] Inc  or [employer]  The above information is an  only  It is not intended as medical advice for individual conditions or treatments  Talk to your doctor, nurse or pharmacist before following any medical regimen to see if it is safe and effective for you

## 2018-11-12 NOTE — PROGRESS NOTES
Assessment/Plan:    No problem-specific Assessment & Plan notes found for this encounter  Diagnoses and all orders for this visit:    LILIA (obstructive sleep apnea)    Type 2 diabetes mellitus without complication, without long-term current use of insulin ([facility])  -     metFORMIN (GLUCOPHAGE) 1000 MG tablet; Take 1 tablet (1,000 mg total) by mouth 2 (two) times a day with meals    Other orders  -     lisinopril (ZESTRIL) 40 mg tablet; Take 1 tablet (40 mg total) by mouth daily  -     dutasteride (AVODART) 0 5 mg capsule; Take 1 capsule (0 5 mg total) by mouth daily  -     tamsulosin (FLOMAX) 0 4 mg; Take 1 capsule (0 4 mg total) by mouth daily  -     Home Study; Future          Subjective:      Patient ID: Charleen Renner is a 79 y o  male  His DM is not under good control  He has no hypoglycemia or side effects from his medications  His A1c is 8 1% (down from 10%)    He is on high-intensity statin therapy  He has no myalgia or muscle weakness  He has no RUQ pain or nausea  Hi BP is fairly well controlled on his current regimen  He has no CP or cough  He has no HA or vision changes  He recently hand parathyroidectomy  He has no bleeding, drainage or discharge, or F/C  He has a small loculated pericardial effusion on TTE  He has pulmonary HTN and scores 6/8 on STOP-BAND questionnaire  The following portions of the patient's history were reviewed and updated as appropriate:   He  has a past medical history of Arthritis; Back pain; Diabetes mellitus ([facility] ); and Hypertension    He   Patient Active Problem List    Diagnosis Date Noted    LILIA (obstructive sleep apnea) 11/12/2018    Multiple thyroid nodules 09/05/2018    Hyperparathyroidism ([address] ) 05/08/2018    Type 2 diabetes mellitus without complication, without long-term current use of insulin ([facility] ) 05/08/2018    Other hyperlipidemia 05/08/2018    Hypertension 05/08/2018    BPH with obstruction/lower urinary tract symptoms 10/10/2017    DM type 2, not at goal [facility]) 07/24/2017    Hypercalciuria 07/24/2017    Arthritis of knee 06/10/2014    Diabetes ([address] ) 06/10/2014    Knee osteoarthritis 06/10/2014    Prepatellar bursitis 06/10/2014     He  has a past surgical history that includes US guided thyroid biopsy (9/7/2018); Tonsillectomy; Hernia repair (Left, 1998); and pr explore parathyroid glands (Right, 11/1/2018)  His family history includes Arthritis in his mother; Coronary artery disease in his father and mother; Diabetes in his maternal uncle and paternal uncle; Hypertension in his father and mother  He  reports that he has never smoked  He has never used smokeless tobacco  He reports that he drinks alcohol  He reports that he does not use drugs    Current Outpatient Prescriptions   Medication Sig Dispense Refill    aspirin (ECOTRIN LOW STRENGTH) 81 mg EC tablet Take 81 mg by mouth daily      atorvastatin (LIPITOR) 40 mg tablet Take 1 tablet (40 mg total) by mouth daily 90 tablet 3    dutasteride (AVODART) 0 5 mg capsule Take 0 5 mg by mouth daily        gabapentin (NEURONTIN) 600 MG tablet Take 1 tablet by mouth 3 (three) times a day      glimepiride (AMARYL) 4 mg tablet Take 1 tablet (4 mg total) by mouth daily with breakfast 90 tablet 3    hydrochlorothiazide (HYDRODIURIL) 25 mg tablet Take 1 tablet (25 mg total) by mouth daily (Patient taking differently: Take 12 5 mg by mouth daily  ) 90 tablet 3    insulin detemir (LEVEMIR) 100 units/mL subcutaneous injection Inject 60 Units under the skin Twice daily        insulin lispro (HUMALOG KWIKPEN) 100 Units/mL SOPN Inject under the skin      lisinopril (ZESTRIL) 40 mg tablet Take 40 mg by mouth daily        metFORMIN (GLUCOPHAGE) 1000 MG tablet Take 1 tablet (1,000 mg total) by mouth 2 (two) times a day with meals 180 tablet 3    ONE TOUCH ULTRA TEST test strip       sitaGLIPtin (JANUVIA) 100 mg tablet Take 1 tablet (100 mg total) by mouth daily 90 tablet 3    SUPER B COMPLEX/C PO Take by mouth      tamsulosin (FLOMAX) 0 4 mg Take 1 capsule by mouth daily      meclizine (ANTIVERT) 25 mg tablet Take 1 tablet (25 mg total) by mouth 3 (three) times a day as needed for dizziness (Patient not taking: Reported on 10/3/2018 ) 30 tablet 0     No current facility-administered medications for this visit  Current Outpatient Prescriptions on File Prior to Visit   Medication Sig    aspirin (ECOTRIN LOW STRENGTH) 81 mg EC tablet Take 81 mg by mouth daily    atorvastatin (LIPITOR) 40 mg tablet Take 1 tablet (40 mg total) by mouth daily    dutasteride (AVODART) 0 5 mg capsule Take 0 5 mg by mouth daily      gabapentin (NEURONTIN) 600 MG tablet Take 1 tablet by mouth 3 (three) times a day    glimepiride (AMARYL) 4 mg tablet Take 1 tablet (4 mg total) by mouth daily with breakfast    hydrochlorothiazide (HYDRODIURIL) 25 mg tablet Take 1 tablet (25 mg total) by mouth daily (Patient taking differently: Take 12 5 mg by mouth daily  )    insulin detemir (LEVEMIR) 100 units/mL subcutaneous injection Inject 60 Units under the skin Twice daily      insulin lispro (HUMALOG KWIKPEN) 100 Units/mL SOPN Inject under the skin    lisinopril (ZESTRIL) 40 mg tablet Take 40 mg by mouth daily      metFORMIN (GLUCOPHAGE) 1000 MG tablet Take 1 tablet (1,000 mg total) by mouth 2 (two) times a day with meals    ONE TOUCH ULTRA TEST test strip     sitaGLIPtin (JANUVIA) 100 mg tablet Take 1 tablet (100 mg total) by mouth daily    SUPER B COMPLEX/C PO Take by mouth    tamsulosin (FLOMAX) 0 4 mg Take 1 capsule by mouth daily    meclizine (ANTIVERT) 25 mg tablet Take 1 tablet (25 mg total) by mouth 3 (three) times a day as needed for dizziness (Patient not taking: Reported on 10/3/2018 )     No current facility-administered medications on file prior to visit  He has No Known Allergies       Review of Systems   All other systems reviewed and are negative          Objective:      /72 (BP Location: Right arm, Patient Position: Sitting, Cuff Size: Large)   Pulse 92   Resp 15   Ht 5' 8 5" (1 74 m)   Wt 102 kg (225 lb 12 8 oz)   SpO2 97%   BMI 33 83 kg/m²          Physical Exam   Constitutional: He is oriented to person, place, and time  He appears well-developed and well-nourished  Neck: Normal range of motion  Neck supple  Cardiovascular: Normal rate, regular rhythm, normal heart sounds and intact distal pulses  Pulmonary/Chest: Effort normal and breath sounds normal    Abdominal: Soft  Bowel sounds are normal    Musculoskeletal: Normal range of motion  Neurological: He is alert and oriented to person, place, and time  He has normal reflexes  Skin: Skin is warm and dry  Psychiatric: He has a normal mood and affect  His behavior is normal  Judgment and thought content normal    Nursing note and vitals reviewed

## 2018-11-19 ENCOUNTER — OFFICE VISIT (OUTPATIENT)
Dept: SURGICAL ONCOLOGY | Facility: CLINIC | Age: 68
End: 2018-11-19

## 2018-11-19 VITALS
HEIGHT: 69 IN | TEMPERATURE: 98.1 F | SYSTOLIC BLOOD PRESSURE: 128 MMHG | WEIGHT: 220 LBS | BODY MASS INDEX: 32.58 KG/M2 | HEART RATE: 80 BPM | DIASTOLIC BLOOD PRESSURE: 90 MMHG | RESPIRATION RATE: 16 BRPM

## 2018-11-19 DIAGNOSIS — E21.0 HYPERPARATHYROIDISM, PRIMARY (HCC): Primary | ICD-10-CM

## 2018-11-19 PROCEDURE — 99024 POSTOP FOLLOW-UP VISIT: CPT | Performed by: SURGERY

## 2018-11-19 NOTE — PROGRESS NOTES
Surgical Oncology Follow Up       [address] [facility]  [address] [address]  [address] Yesica Murphy  1950  [phone]  [address] [facility]  [address] [address] [phone]    Chief Complaint   Patient presents with    Post-op     Patient is here post-op right parathyroidectomy  Assessment/Plan:    No problem-specific Assessment & Plan notes found for this encounter  Diagnoses and all orders for this visit:    Hyperparathyroidism, primary ([facility] )  -     Calcium; Future  -     PTH, intact; Future        Advance Care Planning/Advance Directives:  Discussed disease status, cancer treatment plans and/or cancer treatment goals with the patient  No history exists  History of Present Illness: Patient is here for a postop check s/p R MIP  -Interval History:  Since surgery, Mr Yesica Murphy reports marked improvement in his energy levels  He no longer feels fatigued  He is able to get up in the morning without any issues  Review of Systems:  Review of Systems   Constitutional: Negative  HENT: Negative  Eyes: Negative  Respiratory: Negative  Cardiovascular: Negative  Gastrointestinal: Negative  Endocrine: Negative  Genitourinary: Negative  Musculoskeletal: Negative  Skin: Negative  Allergic/Immunologic: Negative  Neurological: Negative  Hematological: Negative  Psychiatric/Behavioral: Negative          Patient Active Problem List   Diagnosis    [facility] )    Type 2 diabetes mellitus without complication, without long-term current use of insulin ([facility])    Other hyperlipidemia    Hypertension    Arthritis of knee    BPH with obstruction/lower urinary tract symptoms    Diabetes ([facility] )    DM type 2, not at goal [facility])    Hypercalciuria    Knee osteoarthritis    Prepatellar bursitis    Multiple thyroid nodules    LILIA (obstructive sleep apnea)     Past Medical History:   Diagnosis Date    Arthritis     Back pain     Diabetes mellitus ([address] )     Hypertension      Past Surgical History:   Procedure Laterality Date    HERNIA REPAIR Left 1998    with mesh to groin    IL EXPLORE PARATHYROID GLANDS Right 11/1/2018    Procedure: MINIMALLY INVASIVE RIGHT PARATHYROIDECTOMY WITH PTH MONITORING;  Surgeon: Baylee Caceres MD;  Location: [facility];  Service: Surgical Oncology    TONSILLECTOMY      US GUIDED THYROID BIOPSY  9/7/2018     Family History   Problem Relation Age of Onset    Hypertension Father     Coronary artery disease Father     Hypertension Mother     Arthritis Mother     Coronary artery disease Mother     Diabetes Maternal Uncle     Diabetes Paternal Uncle      Social History     Social History    Marital status: /Civil Union     Spouse name: N/A    Number of children: N/A    Years of education: N/A     Occupational History    Not on file       Social History Main Topics    Smoking status: Never Smoker    Smokeless tobacco: Never Used    Alcohol use Yes      Comment: rarely    Drug use: No    Sexual activity: Yes     Other Topics Concern    Not on file     Social History Narrative    No narrative on file       Current Outpatient Prescriptions:     aspirin (ECOTRIN LOW STRENGTH) 81 mg EC tablet, Take 81 mg by mouth daily, Disp: , Rfl:     atorvastatin (LIPITOR) 40 mg tablet, Take 1 tablet (40 mg total) by mouth daily, Disp: 90 tablet, Rfl: 3    dutasteride (AVODART) 0 5 mg capsule, Take 1 capsule (0 5 mg total) by mouth daily, Disp: 90 capsule, Rfl: 3    gabapentin (NEURONTIN) 600 MG tablet, Take 1 tablet by mouth 3 (three) times a day, Disp: , Rfl:     glimepiride (AMARYL) 4 mg tablet, Take 1 tablet (4 mg total) by mouth daily with breakfast, Disp: 90 tablet, Rfl: 3    hydrochlorothiazide (HYDRODIURIL) 25 mg tablet, Take 1 tablet (25 mg total) by mouth daily (Patient taking differently: Take 12 5 mg by mouth daily  ), Disp: 90 tablet, Rfl: 3    insulin detemir (LEVEMIR) 100 units/mL subcutaneous injection, Inject 60 Units under the skin Twice daily  , Disp: , Rfl:     insulin lispro (HUMALOG KWIKPEN) 100 Units/mL SOPN, Inject under the skin, Disp: , Rfl:     lisinopril (ZESTRIL) 40 mg tablet, Take 1 tablet (40 mg total) by mouth daily, Disp: 90 tablet, Rfl: 3    meclizine (ANTIVERT) 25 mg tablet, Take 1 tablet (25 mg total) by mouth 3 (three) times a day as needed for dizziness, Disp: 30 tablet, Rfl: 0    metFORMIN (GLUCOPHAGE) 1000 MG tablet, Take 1 tablet (1,000 mg total) by mouth 2 (two) times a day with meals, Disp: 180 tablet, Rfl: 3    ONE TOUCH ULTRA TEST test strip, , Disp: , Rfl:     sitaGLIPtin (JANUVIA) 100 mg tablet, Take 1 tablet (100 mg total) by mouth daily, Disp: 90 tablet, Rfl: 3    SUPER B COMPLEX/C PO, Take by mouth, Disp: , Rfl:     tamsulosin (FLOMAX) 0 4 mg, Take 1 capsule (0 4 mg total) by mouth daily, Disp: 90 capsule, Rfl: 3  No Known Allergies  Vitals:    11/19/18 0945   BP: 128/90   Pulse: 80   Resp: 16   Temp: 98 1 °F (36 7 °C)       Physical Exam   Constitutional: He appears well-developed and well-nourished  Neck:   Incision clean dry and intact  Lymphadenopathy:     He has no cervical adenopathy           Results:  Labs:  Case Report   Surgical Pathology Report                         Case: [id number]                                    Authorizing Provider: Maximus Urbina MD        Collected:           11/01/2018 1136               Ordering Location:     [address]      Received:            11/01/2018 [address] [place] [address] Operating Room                                                       Pathologist:           Negrito Urbina MD                                                                Intraop:               Negrito Urbina MD                                                                Specimen:    Parathyroid, right inferior parathyroid                                                    Final Diagnosis   A  Right inferior parathyroid (excision):  - Cellular parathyroid tissue, 0 74g     Case signed out at [facility], [address] [place] [address]      Electronically signed by Juan Francisco Cabello MD on 11/2/2018 at 12:43 PM   Comments: This is an appended report  These results have been appended to a previously preliminary verified report  Comments: This is an appended report  These results have been appended to a previously preliminary verified report  Additional Information   All controls performed with the immunohistochemical stains reported above reacted appropriately  These tests were developed and their performance characteristics determined by [facility] or [facility]  They may not be cleared or approved by the [place]  Food and Drug Administration  The FDA has determined that such clearance or approval is not necessary  These tests are used for clinical purposes  They should not be regarded as investigational or for research  This laboratory has been approved by [facility], designated as a high-complexity laboratory and is qualified to perform these tests  Intraoperative Consultation   FSAdx: Cellular parathyroid tissue, 0 74g     Dr Karri Dougherty to Dr Lissette Lagos on 11/1/18 at 12:06 pm            Gross Description   A  Received fresh for frozen section labeled with the patient's name and medical record designated "right inferior parathyroid" is one red and tan well-circumscribed portion of tissue measuring 1 6 x 1 1 x 0 9 cm and weighing 0 74g  The specimen is bivalved and 50% is submitted for frozen section and the residue is placed in cassette A1    The remainder of the specimen found in the container is entirely submitted in cassette A2      Note: The estimated total formalin fixation time based upon information provided by the submitting clinician and the standard processing schedule is under 72 hours  RRpatrice      Comments: This is an appended report  These results have been appended to a previously preliminary verified report  Imaging  No results found  I reviewed the above laboratory and imaging data  Discussion/Summary:  Primary hyperparathyroidism status post minimally invasive right parathyroidectomy  Patient doing well  His energy levels have improved significantly  Will plan on 6 month follow-up with repeat calcium and PTH levels to assess for stability at that time

## 2018-11-19 NOTE — LETTER
November 19, 2018     MD [address]    Patient: Fely Curtis   YOB: 1950   Date of Visit: 11/19/2018       Dear Dr Loulou Forte: Thank you for referring Allen Kevin to me for evaluation  Below are my notes for this consultation  If you have questions, please do not hesitate to call me  I look forward to following your patient along with you  Sincerely,        Aleksandra Pink MD        CC: MD Aleksandra Cabrales MD  11/19/2018 10:11 AM  Sign at close encounter     Surgical Oncology Follow Up       [address]  [facility] [address] [address]  [address]  1950  [id number]  [address] [facility]  [address]  [facility] [address]  [phone]    Chief Complaint   Patient presents with    Post-op     Patient is here post-op right parathyroidectomy  Assessment/Plan:    No problem-specific Assessment & Plan notes found for this encounter  Diagnoses and all orders for this visit:    Hyperparathyroidism, primary ([address] )  -     Calcium; Future  -     PTH, intact; Future        Advance Care Planning/Advance Directives:  Discussed disease status, cancer treatment plans and/or cancer treatment goals with the patient  No history exists  History of Present Illness: Patient is here for a postop check s/p R MIP  -Interval History:  Since surgery, Mr Kelly Marrero reports marked improvement in his energy levels  He no longer feels fatigued  He is able to get up in the morning without any issues  Review of Systems:  Review of Systems   Constitutional: Negative  HENT: Negative  Eyes: Negative  Respiratory: Negative  Cardiovascular: Negative  Gastrointestinal: Negative  Endocrine: Negative  Genitourinary: Negative  Musculoskeletal: Negative  Skin: Negative  Allergic/Immunologic: Negative  Neurological: Negative  Hematological: Negative  Psychiatric/Behavioral: Negative  Patient Active Problem List   Diagnosis    Hyperparathyroidism ([place] [address] )    Type 2 diabetes mellitus without complication, without long-term current use of insulin (HCC)    Other hyperlipidemia    Hypertension    Arthritis of knee    BPH with obstruction/lower urinary tract symptoms    Diabetes ([facility] )    DM type 2, not at goal [facility])    Hypercalciuria    Knee osteoarthritis    Prepatellar bursitis    Multiple thyroid nodules    LILIA (obstructive sleep apnea)     Past Medical History:   Diagnosis Date    Arthritis     Back pain     Diabetes mellitus ([facility] )     Hypertension      Past Surgical History:   Procedure Laterality Date    HERNIA REPAIR Left 1998    with mesh to groin    NH EXPLORE PARATHYROID GLANDS Right 11/1/2018    Procedure: MINIMALLY INVASIVE RIGHT PARATHYROIDECTOMY WITH PTH MONITORING;  Surgeon: Darci Madrigal MD;  [facility];  Service: Surgical Oncology    TONSILLECTOMY      US GUIDED THYROID BIOPSY  9/7/2018     Family History   Problem Relation Age of Onset    Hypertension Father     Coronary artery disease Father     Hypertension Mother     Arthritis Mother     Coronary artery disease Mother     Diabetes Maternal Uncle     Diabetes Paternal Uncle      Social History     Social History    Marital status: /Civil Union     Spouse name: N/A    Number of children: N/A    Years of education: N/A     Occupational History    Not on file       Social History Main Topics    Smoking status: Never Smoker    Smokeless tobacco: Never Used    Alcohol use Yes      Comment: rarely    Drug use: No    Sexual activity: Yes     Other Topics Concern    Not on file     Social History Narrative    No narrative on file       Current Outpatient Prescriptions:     aspirin (ECOTRIN LOW STRENGTH) 81 mg EC tablet, Take 81 mg by mouth daily, Disp: , Rfl:     atorvastatin (LIPITOR) 40 mg tablet, Take 1 tablet (40 mg total) by mouth daily, Disp: 90 tablet, Rfl: 3    dutasteride (AVODART) 0 5 mg capsule, Take 1 capsule (0 5 mg total) by mouth daily, Disp: 90 capsule, Rfl: 3    gabapentin (NEURONTIN) 600 MG tablet, Take 1 tablet by mouth 3 (three) times a day, Disp: , Rfl:     glimepiride (AMARYL) 4 mg tablet, Take 1 tablet (4 mg total) by mouth daily with breakfast, Disp: 90 tablet, Rfl: 3    hydrochlorothiazide (HYDRODIURIL) 25 mg tablet, Take 1 tablet (25 mg total) by mouth daily (Patient taking differently: Take 12 5 mg by mouth daily  ), Disp: 90 tablet, Rfl: 3    insulin detemir (LEVEMIR) 100 units/mL subcutaneous injection, Inject 60 Units under the skin Twice daily  , Disp: , Rfl:     insulin lispro (HUMALOG KWIKPEN) 100 Units/mL SOPN, Inject under the skin, Disp: , Rfl:     lisinopril (ZESTRIL) 40 mg tablet, Take 1 tablet (40 mg total) by mouth daily, Disp: 90 tablet, Rfl: 3    meclizine (ANTIVERT) 25 mg tablet, Take 1 tablet (25 mg total) by mouth 3 (three) times a day as needed for dizziness, Disp: 30 tablet, Rfl: 0    metFORMIN (GLUCOPHAGE) 1000 MG tablet, Take 1 tablet (1,000 mg total) by mouth 2 (two) times a day with meals, Disp: 180 tablet, Rfl: 3    ONE TOUCH ULTRA TEST test strip, , Disp: , Rfl:     sitaGLIPtin (JANUVIA) 100 mg tablet, Take 1 tablet (100 mg total) by mouth daily, Disp: 90 tablet, Rfl: 3    SUPER B COMPLEX/C PO, Take by mouth, Disp: , Rfl:     tamsulosin (FLOMAX) 0 4 mg, Take 1 capsule (0 4 mg total) by mouth daily, Disp: 90 capsule, Rfl: 3  No Known Allergies  Vitals:    11/19/18 0945   BP: 128/90   Pulse: 80   Resp: 16   Temp: 98 1 °F (36 7 °C)       Physical Exam   Constitutional: He appears well-developed and well-nourished  Neck:   Incision clean dry and intact  Lymphadenopathy:     He has no cervical adenopathy           Results:  Labs:  Case Report   Surgical Pathology Report                         Case: [id number]                                    Authorizing Provider: Arelis Davis MD        Collected:           11/01/2018 1136               Ordering Location:     [address]      Received:            11/01/2018 [facility]                                      Hospital Operating Room                                                       Pathologist:           Michelet Wolfe MD                                                                Intraop:               Michelet Wolfe MD                                                                Specimen:    Parathyroid, right inferior parathyroid                                                    Final Diagnosis   A  Right inferior parathyroid (excision):  - Cellular parathyroid tissue, 0 74g     Case signed out at [address] [place] [address]      Electronically signed by Michelet Wolfe MD on 11/2/2018 at 12:43 PM   Comments: This is an appended report  These results have been appended to a previously preliminary verified report  Comments: This is an appended report  These results have been appended to a previously preliminary verified report  Additional Information   All controls performed with the immunohistochemical stains reported above reacted appropriately  These tests were developed and their performance characteristics determined by [address] [place] [address] or [facility]  They may not be cleared or approved by the [place]  Food and Drug Administration  The FDA has determined that such clearance or approval is not necessary  These tests are used for clinical purposes  They should not be regarded as investigational or for research  This laboratory has been approved by CLIA 88, designated as a high-complexity laboratory and is qualified to perform these tests        Intraoperative Consultation   FSAdx: Cellular parathyroid tissue, 0 74g     Dr Dillon Pod to Dr Skyler Sexton on 11/1/18 at 12:06 pm            Gross Description   A  Received fresh for frozen section labeled with the patient's name and medical record designated "right inferior parathyroid" is one red and tan well-circumscribed portion of tissue measuring 1 6 x 1 1 x 0 9 cm and weighing 0 74g  The specimen is bivalved and 50% is submitted for frozen section and the residue is placed in cassette A1  The remainder of the specimen found in the container is entirely submitted in cassette A2      Note: The estimated total formalin fixation time based upon information provided by the submitting clinician and the standard processing schedule is under 72 hours  RRavotti      Comments: This is an appended report  These results have been appended to a previously preliminary verified report  Imaging  No results found  I reviewed the above laboratory and imaging data  Discussion/Summary:  Primary hyperparathyroidism status post minimally invasive right parathyroidectomy  Patient doing well  His energy levels have improved significantly  Will plan on 6 month follow-up with repeat calcium and PTH levels to assess for stability at that time

## 2018-11-29 ENCOUNTER — TELEPHONE (OUTPATIENT)
Dept: FAMILY MEDICINE CLINIC | Facility: CLINIC | Age: 68
End: 2018-11-29

## 2018-12-10 ENCOUNTER — TELEPHONE (OUTPATIENT)
Dept: FAMILY MEDICINE CLINIC | Facility: CLINIC | Age: 68
End: 2018-12-10

## 2018-12-11 NOTE — TELEPHONE ENCOUNTER
I won't have time to discuss with him today  I will call him tomorrow  He has severe sleep apnea  He will need a CPAP machine  I will order a CPAP machine  The machine will be delivered to him and set up with instructions

## 2018-12-20 ENCOUNTER — OFFICE VISIT (OUTPATIENT)
Dept: FAMILY MEDICINE CLINIC | Facility: CLINIC | Age: 68
End: 2018-12-20
Payer: COMMERCIAL

## 2018-12-20 VITALS
HEIGHT: 69 IN | OXYGEN SATURATION: 95 % | RESPIRATION RATE: 16 BRPM | SYSTOLIC BLOOD PRESSURE: 138 MMHG | WEIGHT: 225.6 LBS | BODY MASS INDEX: 33.41 KG/M2 | DIASTOLIC BLOOD PRESSURE: 80 MMHG | HEART RATE: 91 BPM

## 2018-12-20 DIAGNOSIS — G47.33 OSA (OBSTRUCTIVE SLEEP APNEA): Primary | ICD-10-CM

## 2018-12-20 PROCEDURE — 99213 OFFICE O/P EST LOW 20 MIN: CPT | Performed by: FAMILY MEDICINE

## 2018-12-24 NOTE — PROGRESS NOTES
Assessment/Plan:         Diagnoses and all orders for this visit:    LILIA (obstructive sleep apnea)      I hand wrote a prescription for auto Pap with tubing and facemask  Minimum pressure 4 cm water maximum pressure her 20 cm of water  Subjective:      Patient ID: Juan Manning is a 79 y o  male  Please see previous note  The patient was complaining of excessive fatigue  He had an at home sleep study which showed severe obstructive sleep apnea  He has not yet gotten his CPAP machine  He will be leaving for [facility] on [place]  He hopes to get his machine when he returns in the new year  The following portions of the patient's history were reviewed and updated as appropriate: He  has a past medical history of Arthritis; Back pain; Diabetes mellitus ([address] ); and Hypertension       Review of Systems   Constitutional: Positive for fatigue  All other systems reviewed and are negative  Objective:      /80 (BP Location: Right arm, Patient Position: Sitting, Cuff Size: Large)   Pulse 91   Resp 16   Ht 5' 8 5" (1 74 m)   Wt 102 kg (225 lb 9 6 oz)   SpO2 95%   BMI 33 80 kg/m²          Physical Exam   Constitutional: He appears well-developed and well-nourished  Neck: Normal range of motion  Neck supple  Cardiovascular: Normal rate, regular rhythm, normal heart sounds and intact distal pulses  Pulmonary/Chest: Effort normal and breath sounds normal    Nursing note and vitals reviewed

## 2019-01-15 ENCOUNTER — TELEPHONE (OUTPATIENT)
Dept: FAMILY MEDICINE CLINIC | Facility: CLINIC | Age: 69
End: 2019-01-15

## 2019-01-15 DIAGNOSIS — M17.0 OSTEOARTHRITIS OF BOTH KNEES, UNSPECIFIED OSTEOARTHRITIS TYPE: Primary | ICD-10-CM

## 2019-01-25 ENCOUNTER — DOCTOR'S OFFICE (OUTPATIENT)
Dept: URBAN - NONMETROPOLITAN AREA CLINIC 1 | Facility: CLINIC | Age: 69
Setting detail: OPHTHALMOLOGY
End: 2019-01-25
Payer: COMMERCIAL

## 2019-01-25 DIAGNOSIS — E11.9: ICD-10-CM

## 2019-01-25 DIAGNOSIS — H35.363: ICD-10-CM

## 2019-01-25 DIAGNOSIS — H40.023: ICD-10-CM

## 2019-01-25 DIAGNOSIS — H25.13: ICD-10-CM

## 2019-01-25 DIAGNOSIS — Z79.84: ICD-10-CM

## 2019-01-25 PROCEDURE — 92133 CPTRZD OPH DX IMG PST SGM ON: CPT | Performed by: OPHTHALMOLOGY

## 2019-01-25 PROCEDURE — 92014 COMPRE OPH EXAM EST PT 1/>: CPT | Performed by: OPHTHALMOLOGY

## 2019-01-25 ASSESSMENT — LID POSITION - PTOSIS
OS_PTOSIS: LUL
OD_PTOSIS: RUL

## 2019-01-25 ASSESSMENT — SPHEQUIV_DERIVED
OD_SPHEQUIV: -5.375
OD_SPHEQUIV: -5.625
OS_SPHEQUIV: -6.875
OS_SPHEQUIV: -7
OD_SPHEQUIV: -5.25
OS_SPHEQUIV: -6

## 2019-01-25 ASSESSMENT — REFRACTION_MANIFEST
OD_CYLINDER: -1.25
OD_ADD: +2.25
OS_CYLINDER: -0.75
OS_ADD: +2.25
OD_AXIS: 180
OS_AXIS: 010
OD_VA2: 20/25-2
OS_VA1: 20/25-2
OS_AXIS: 010
OU_VA: 20/
OD_CYLINDER: -1.25
OD_VA3: 20/
OS_VA2: 20/25-2
OD_VA1: 20/25-2
OD_VA3: 20/
OS_SPHERE: -6.50
OD_ADD: +1.75
OS_VA3: 20/
OS_VA2: 20/25-2
OS_VA1: 20/25-2
OD_SPHERE: -4.75
OS_SPHERE: -6.50
OS_VA3: 20/
OD_VA2: 20/25-2
OS_CYLINDER: -1.00
OD_SPHERE: -5.00
OD_AXIS: 180
OU_VA: 20/
OD_VA1: 20/25-2
OS_ADD: +1.75

## 2019-01-25 ASSESSMENT — REFRACTION_CURRENTRX
OD_VPRISM_DIRECTION: PROGS
OD_OVR_VA: 20/
OS_OVR_VA: 20/
OD_OVR_VA: 20/
OD_CYLINDER: -1.50
OD_OVR_VA: 20/
OD_AXIS: 180
OS_AXIS: 010
OS_SPHERE: -6.75
OS_AXIS: 6
OD_AXIS: 180
OD_SPHERE: -5.75
OS_ADD: +2.25
OS_VPRISM_DIRECTION: PROGS
OS_CYLINDER: -1.00
OS_OVR_VA: 20/
OS_CYLINDER: -0.75
OD_ADD: +2.25
OD_ADD: +2.00
OS_VPRISM_DIRECTION: PROGS
OD_CYLINDER: -1.50
OS_ADD: +2.00
OD_SPHERE: -5.25
OD_VPRISM_DIRECTION: PROGS
OS_SPHERE: -7.75
OS_OVR_VA: 20/

## 2019-01-25 ASSESSMENT — REFRACTION_AUTOREFRACTION
OS_CYLINDER: -0.50
OD_AXIS: 175
OS_SPHERE: -5.75
OD_SPHERE: -4.50
OD_CYLINDER: -1.50
OS_AXIS: 052

## 2019-01-25 ASSESSMENT — VISUAL ACUITY
OD_BCVA: 20/30+2
OS_BCVA: 20/20-2

## 2019-01-25 ASSESSMENT — CONFRONTATIONAL VISUAL FIELD TEST (CVF)
OS_FINDINGS: FULL
OD_FINDINGS: FULL

## 2019-01-25 ASSESSMENT — LID POSITION - DERMATOCHALASIS
OD_DERMATOCHALASIS: RUL
OS_DERMATOCHALASIS: LUL

## 2019-01-30 LAB
LEFT EYE DIABETIC RETINOPATHY: NORMAL
RIGHT EYE DIABETIC RETINOPATHY: NORMAL

## 2019-02-11 ENCOUNTER — OFFICE VISIT (OUTPATIENT)
Dept: FAMILY MEDICINE CLINIC | Facility: CLINIC | Age: 69
End: 2019-02-11
Payer: COMMERCIAL

## 2019-02-11 VITALS
OXYGEN SATURATION: 97 % | RESPIRATION RATE: 14 BRPM | WEIGHT: 227.2 LBS | HEIGHT: 69 IN | DIASTOLIC BLOOD PRESSURE: 68 MMHG | HEART RATE: 104 BPM | BODY MASS INDEX: 33.65 KG/M2 | SYSTOLIC BLOOD PRESSURE: 122 MMHG

## 2019-02-11 DIAGNOSIS — E11.9 TYPE 2 DIABETES MELLITUS WITHOUT COMPLICATION, WITH LONG-TERM CURRENT USE OF INSULIN (HCC): ICD-10-CM

## 2019-02-11 DIAGNOSIS — Z12.11 SCREENING FOR COLON CANCER: Primary | ICD-10-CM

## 2019-02-11 DIAGNOSIS — Z79.4 TYPE 2 DIABETES MELLITUS WITHOUT COMPLICATION, WITH LONG-TERM CURRENT USE OF INSULIN (HCC): Primary | ICD-10-CM

## 2019-02-11 DIAGNOSIS — E21.0 HYPERPARATHYROIDISM, PRIMARY (HCC): ICD-10-CM

## 2019-02-11 DIAGNOSIS — Z79.4 TYPE 2 DIABETES MELLITUS WITH COMPLICATION, WITH LONG-TERM CURRENT USE OF INSULIN (HCC): ICD-10-CM

## 2019-02-11 DIAGNOSIS — E11.9 TYPE 2 DIABETES MELLITUS WITHOUT COMPLICATION, WITH LONG-TERM CURRENT USE OF INSULIN (HCC): Primary | ICD-10-CM

## 2019-02-11 DIAGNOSIS — E11.8 TYPE 2 DIABETES MELLITUS WITH COMPLICATION, WITH LONG-TERM CURRENT USE OF INSULIN (HCC): ICD-10-CM

## 2019-02-11 DIAGNOSIS — Z79.4 TYPE 2 DIABETES MELLITUS WITHOUT COMPLICATION, WITH LONG-TERM CURRENT USE OF INSULIN (HCC): ICD-10-CM

## 2019-02-11 DIAGNOSIS — Z00.00 MEDICARE ANNUAL WELLNESS VISIT, SUBSEQUENT: ICD-10-CM

## 2019-02-11 DIAGNOSIS — Z23 ENCOUNTER FOR IMMUNIZATION: ICD-10-CM

## 2019-02-11 PROBLEM — E66.01 MORBID OBESITY (HCC): Status: ACTIVE | Noted: 2019-02-11

## 2019-02-11 LAB — SL AMB POCT HEMOGLOBIN AIC: 8.7 (ref ?–6.5)

## 2019-02-11 PROCEDURE — 99397 PER PM REEVAL EST PAT 65+ YR: CPT | Performed by: FAMILY MEDICINE

## 2019-02-11 PROCEDURE — 1160F RVW MEDS BY RX/DR IN RCRD: CPT | Performed by: FAMILY MEDICINE

## 2019-02-11 PROCEDURE — 90471 IMMUNIZATION ADMIN: CPT | Performed by: FAMILY MEDICINE

## 2019-02-11 PROCEDURE — 90670 PCV13 VACCINE IM: CPT | Performed by: FAMILY MEDICINE

## 2019-02-11 PROCEDURE — 83036 HEMOGLOBIN GLYCOSYLATED A1C: CPT

## 2019-02-11 NOTE — PROGRESS NOTES
Assessment/Plan: Morbid obesity ([facility] )  Encourage diet and exercise  Hyperparathyroidism ([facility] )  Follow up per surg-onc  Type 2 diabetes mellitus without complication, without long-term current use of insulin ([facility] )  Lab Results   Component Value Date    HGBA1C 8 1 11/12/2018       Check A1c today  Hypertension  At goal   Continue current  Diagnoses and all orders for this visit:    Screening for colon cancer  -     Ambulatory referral to Gastroenterology; Future    Type 2 diabetes mellitus without complication, with long-term current use of insulin ([facility])  -     Insulin Pen Needle (NOVOFINE) 32G X 6 MM MISC; by Does not apply route 3 (three) times a day  -     Comprehensive metabolic panel; Future    Hyperparathyroidism, primary ([facility] )    Type 2 diabetes mellitus with complication, with long-term current use of insulin ([facility])  -     Microalbumin / creatinine urine ratio  -     Lipid panel; Future  -     Comprehensive metabolic panel; Future          Subjective:      Patient ID: Manferd Henry is a 79 y o  male  His lipids are at goal on his current regimen  He has no myalgia or muscle weakness  His BP is at goal   He has no HA or vision changes  He has no CP or SOB  His DM is improving  He has no side effects and no hypoglycemia  The following portions of the patient's history were reviewed and updated as appropriate:   He  has a past medical history of Arthritis, Back pain, Diabetes mellitus ([facility] ), and Hypertension    He   Patient Active Problem List    Diagnosis Date Noted    Type 2 diabetes mellitus with complication, with long-term current use of insulin ([facility] ) 02/11/2019    Morbid obesity ([facility] ) 02/11/2019    LILIA (obstructive sleep apnea) 11/12/2018    Multiple thyroid nodules 09/05/2018    Hyperparathyroidism ([facility] ) 05/08/2018    Type 2 diabetes mellitus without complication, without long-term current use of insulin ([facility] ) 05/08/2018    Other hyperlipidemia 05/08/2018    Hypertension 05/08/2018    BPH with obstruction/lower urinary tract symptoms 10/10/2017    DM type 2, not at goal [facility]) 07/24/2017    Hypercalciuria 07/24/2017    Arthritis of knee 06/10/2014    Diabetes ([address] ) 06/10/2014    Knee osteoarthritis 06/10/2014    Prepatellar bursitis 06/10/2014     He  has a past surgical history that includes US guided thyroid biopsy (9/7/2018); Tonsillectomy; Hernia repair (Left, 1998); and pr explore parathyroid glands (Right, 11/1/2018)  His family history includes Arthritis in his mother; Coronary artery disease in his father and mother; Diabetes in his maternal uncle and paternal uncle; Hypertension in his father and mother  He  reports that he has never smoked  He has never used smokeless tobacco  He reports that he drinks alcohol  He reports that he does not use drugs    Current Outpatient Medications   Medication Sig Dispense Refill    aspirin (ECOTRIN LOW STRENGTH) 81 mg EC tablet Take 81 mg by mouth daily      atorvastatin (LIPITOR) 40 mg tablet Take 1 tablet (40 mg total) by mouth daily 90 tablet 3    dutasteride (AVODART) 0 5 mg capsule Take 1 capsule (0 5 mg total) by mouth daily 90 capsule 3    gabapentin (NEURONTIN) 600 MG tablet Take 1 tablet by mouth 3 (three) times a day      glimepiride (AMARYL) 4 mg tablet Take 1 tablet (4 mg total) by mouth daily with breakfast 90 tablet 3    hydrochlorothiazide (HYDRODIURIL) 25 mg tablet Take 1 tablet (25 mg total) by mouth daily (Patient taking differently: Take 12 5 mg by mouth daily  ) 90 tablet 3    insulin detemir (LEVEMIR) 100 units/mL subcutaneous injection Inject 60 Units under the skin Twice daily        insulin lispro (HUMALOG KWIKPEN) 100 Units/mL SOPN Inject under the skin      lisinopril (ZESTRIL) 40 mg tablet Take 1 tablet (40 mg total) by mouth daily 90 tablet 3    meclizine (ANTIVERT) 25 mg tablet Take 1 tablet (25 mg total) by mouth 3 (three) times a day as needed for dizziness 30 tablet 0    metFORMIN (GLUCOPHAGE) 1000 MG tablet Take 1 tablet (1,000 mg total) by mouth 2 (two) times a day with meals 180 tablet 3    ONE TOUCH ULTRA TEST test strip       sitaGLIPtin (JANUVIA) 100 mg tablet Take 1 tablet (100 mg total) by mouth daily 90 tablet 3    SUPER B COMPLEX/C PO Take by mouth      tamsulosin (FLOMAX) 0 4 mg Take 1 capsule (0 4 mg total) by mouth daily 90 capsule 3    Insulin Pen Needle (NOVOFINE) 32G X 6 MM MISC by Does not apply route 3 (three) times a day 300 each 3     No current facility-administered medications for this visit        Current Outpatient Medications on File Prior to Visit   Medication Sig    aspirin (ECOTRIN LOW STRENGTH) 81 mg EC tablet Take 81 mg by mouth daily    atorvastatin (LIPITOR) 40 mg tablet Take 1 tablet (40 mg total) by mouth daily    dutasteride (AVODART) 0 5 mg capsule Take 1 capsule (0 5 mg total) by mouth daily    gabapentin (NEURONTIN) 600 MG tablet Take 1 tablet by mouth 3 (three) times a day    glimepiride (AMARYL) 4 mg tablet Take 1 tablet (4 mg total) by mouth daily with breakfast    hydrochlorothiazide (HYDRODIURIL) 25 mg tablet Take 1 tablet (25 mg total) by mouth daily (Patient taking differently: Take 12 5 mg by mouth daily  )    insulin detemir (LEVEMIR) 100 units/mL subcutaneous injection Inject 60 Units under the skin Twice daily      insulin lispro (HUMALOG KWIKPEN) 100 Units/mL SOPN Inject under the skin    lisinopril (ZESTRIL) 40 mg tablet Take 1 tablet (40 mg total) by mouth daily    meclizine (ANTIVERT) 25 mg tablet Take 1 tablet (25 mg total) by mouth 3 (three) times a day as needed for dizziness    metFORMIN (GLUCOPHAGE) 1000 MG tablet Take 1 tablet (1,000 mg total) by mouth 2 (two) times a day with meals    ONE TOUCH ULTRA TEST test strip     sitaGLIPtin (JANUVIA) 100 mg tablet Take 1 tablet (100 mg total) by mouth daily    SUPER B COMPLEX/C PO Take by mouth    tamsulosin (FLOMAX) 0 4 mg Take 1 capsule (0 4 mg total) by mouth daily     No current facility-administered medications on file prior to visit  He has No Known Allergies       Review of Systems   All other systems reviewed and are negative  Objective:      /68   Pulse 104   Resp 14   Ht 5' 8 5" (1 74 m)   Wt 103 kg (227 lb 3 2 oz)   SpO2 97%   BMI 34 04 kg/m²          Physical Exam   Constitutional: He is oriented to person, place, and time  He appears well-developed and well-nourished  Neck: Normal range of motion  Neck supple  Cardiovascular: Normal rate, regular rhythm, normal heart sounds and intact distal pulses  Pulmonary/Chest: Effort normal and breath sounds normal    Abdominal: Soft  Bowel sounds are normal    Musculoskeletal: Normal range of motion  Neurological: He is alert and oriented to person, place, and time  Skin: Skin is warm and dry  Nursing note and vitals reviewed

## 2019-02-11 NOTE — PROGRESS NOTES
Assessment and Plan:  Problem List Items Addressed This Visit     None        Health Maintenance Due   Topic Date Due    BMI: Followup Plan  04/07/1970    HEPATITIS B VACCINES (1 of 3 - Risk 3-dose series) 04/07/1971    DTaP,Tdap,and Td Vaccines (1 - Tdap) 04/07/1973    Fall Risk  04/07/2017    CRC Screening: Colonoscopy  06/28/2017    INFLUENZA VACCINE  07/01/2018    Pneumococcal PPSV23/PCV13 65+ Years / Low and Medium Risk (2 of 2 - PCV13) 01/09/2019    URINE MICROALBUMIN  01/12/2019         HPI:  Patient Active Problem List   Diagnosis    Hyperparathyroidism ([facility] )    Type 2 diabetes mellitus without complication, without long-term current use of insulin (HCC)    Other hyperlipidemia    Hypertension    Arthritis of knee    BPH with obstruction/lower urinary tract symptoms    Diabetes ([facility] )    DM type 2, not at goal [facility])    Hypercalciuria    Knee osteoarthritis    Prepatellar bursitis    Multiple thyroid nodules    LILIA (obstructive sleep apnea)     Past Medical History:   Diagnosis Date    Arthritis     Back pain     Diabetes mellitus ([facility] [address] )     Hypertension      Past Surgical History:   Procedure Laterality Date    HERNIA REPAIR Left 1998    with mesh to groin    AR EXPLORE PARATHYROID GLANDS Right 11/1/2018    Procedure: MINIMALLY INVASIVE RIGHT PARATHYROIDECTOMY WITH PTH MONITORING;  Surgeon: Angelito Flaherty MD;  Location: [facility];  Service: Surgical Oncology    TONSILLECTOMY      US GUIDED THYROID BIOPSY  9/7/2018     Family History   Problem Relation Age of Onset    Hypertension Father     Coronary artery disease Father     Hypertension Mother     Arthritis Mother     Coronary artery disease Mother     Diabetes Maternal Uncle     Diabetes Paternal Uncle      Social History     Tobacco Use   Smoking Status Never Smoker   Smokeless Tobacco Never Used     Social History     Substance and Sexual Activity   Alcohol Use Yes    Comment: rarely      Social History     Substance and Sexual Activity   Drug Use No         Current Outpatient Medications   Medication Sig Dispense Refill    aspirin (ECOTRIN LOW STRENGTH) 81 mg EC tablet Take 81 mg by mouth daily      atorvastatin (LIPITOR) 40 mg tablet Take 1 tablet (40 mg total) by mouth daily 90 tablet 3    dutasteride (AVODART) 0 5 mg capsule Take 1 capsule (0 5 mg total) by mouth daily 90 capsule 3    gabapentin (NEURONTIN) 600 MG tablet Take 1 tablet by mouth 3 (three) times a day      glimepiride (AMARYL) 4 mg tablet Take 1 tablet (4 mg total) by mouth daily with breakfast 90 tablet 3    hydrochlorothiazide (HYDRODIURIL) 25 mg tablet Take 1 tablet (25 mg total) by mouth daily (Patient taking differently: Take 12 5 mg by mouth daily  ) 90 tablet 3    insulin detemir (LEVEMIR) 100 units/mL subcutaneous injection Inject 60 Units under the skin Twice daily        insulin lispro (HUMALOG KWIKPEN) 100 Units/mL SOPN Inject under the skin      lisinopril (ZESTRIL) 40 mg tablet Take 1 tablet (40 mg total) by mouth daily 90 tablet 3    meclizine (ANTIVERT) 25 mg tablet Take 1 tablet (25 mg total) by mouth 3 (three) times a day as needed for dizziness 30 tablet 0    metFORMIN (GLUCOPHAGE) 1000 MG tablet Take 1 tablet (1,000 mg total) by mouth 2 (two) times a day with meals 180 tablet 3    ONE TOUCH ULTRA TEST test strip       sitaGLIPtin (JANUVIA) 100 mg tablet Take 1 tablet (100 mg total) by mouth daily 90 tablet 3    SUPER B COMPLEX/C PO Take by mouth      tamsulosin (FLOMAX) 0 4 mg Take 1 capsule (0 4 mg total) by mouth daily 90 capsule 3     No current facility-administered medications for this visit  No Known Allergies  Immunization History   Administered Date(s) Administered    Pneumococcal Polysaccharide PPV23 01/09/2018       Patient Care Team:  Perry Huitron MD as PCP - General  Jamison Krabbe, MD    Medicare Screening Tests and Risk Assessments:  Sharon Sheth is here for his Subsequent Wellness visit    Last Medicare Wellness visit information reviewed, patient interviewed and updates made to the record today  Health Risk Assessment:  Patient rates overall health as good  Patient feels that their physical health rating is Much better  Eyesight was rated as Same  Hearing was rated as Same  Patient feels that their emotional and mental health rating is Much better  Pain experienced by patient in the last 7 days has been Some  Patient's pain rating has been 5/10  Patient states that he has experienced no weight loss or gain in last 6 months  Emotional/Mental Health:  Patient has been feeling nervous/anxious  PHQ-9 Depression Screening:    Frequency of the following problems over the past two weeks:      1  Little interest or pleasure in doing things: 0 - not at all      2  Feeling down, depressed, or hopeless: 0 - not at all  PHQ-2 Score: 0          Broken Bones/Falls: Fall Risk Assessment:    In the past year, patient has experienced: No history of falling in past year          Bladder/Bowel:  Patient has leaked urine accidently in the last six months  Patient reports no loss of bowel control  Immunizations:  Patient has not had a flu vaccination within the last year  Patient has not received a pneumonia shot  Patient has not received a shingles shot  Home Safety:  Patient does not have trouble with stairs inside or outside of their home  Patient currently reports that there are no safety hazards present in home, working smoke alarms, working carbon monoxide detectors  Preventative Screenings:   prostate cancer screen performed, colon cancer screen completed, cholesterol screen completed, glaucoma eye exam completed, 1/25/2019      Nutrition:  Current diet: Low Cholesterol and No Added Salt with servings of the following:    Medications:  Patient is not currently taking any over-the-counter supplements  Patient is able to manage medications  Lifestyle Choices:  Patient reports no tobacco use    Patient has not smoked or used tobacco in the past   Patient reports no alcohol use  Patient drives a vehicle  Patient wears seat belt  Activities of Daily Living:  Can get out of bed by his or her self, able to dress self, able to make own meals, able to do own shopping, able to bathe self, can do own laundry/housekeeping, can manage own money, pay bills and track expenses    Previous Hospitalizations:  Hospitalization or ED visit in past 12 months  Number of hospitalizations within the last year: 1-2        Advanced Directives:  Patient has decided on a power of   Patient has spoken to designated power of   Patient has not completed advanced directive  Preventative Screening/Counseling:      Cardiovascular:      General: Screening Current and Risks and Benefits Discussed          Diabetes:      General: Screening Not Indicated          Colorectal Cancer:      General: Risks and Benefits Discussed      Due for studies: Colonoscopy - Low Risk          Prostate Cancer:      General: Screening Not Indicated          Osteoporosis:      General: Screening Not Indicated          AAA:      General: Screening Not Indicated          Glaucoma:      General: Screening Current          HIV:      General: Screening Not Indicated          Hepatitis C:      General: Screening Current        Advanced Directives:   Patient has living will for healthcare, patient has an advanced directive  Information on ACP and/or AD provided  5 wishes given  End of life assessment reviewed with patient  Provider agrees with end of life decisions        Immunizations:      Influenza: Influenza UTD This Year      Pneumococcal: Pneumococcal Due Today      Shingrix: Vaccine Status Unknown      Hepatitis B (Medium to high risk patients): Vaccine Status Unknown      Zostavax: Vaccine Status Unknown      TD: Vaccine Status Unknown      TDAP: Vaccine Status Unknown            No exam data present    Physical Exam:  Review of Systems Gastrointestinal: Negative for bowel incontinence  Psychiatric/Behavioral: The patient is not nervous/anxious  There were no vitals filed for this visit  There is no height or weight on file to calculate BMI      Physical Exam

## 2019-03-22 DIAGNOSIS — E11.9 TYPE 2 DIABETES MELLITUS WITHOUT COMPLICATION, WITHOUT LONG-TERM CURRENT USE OF INSULIN (HCC): ICD-10-CM

## 2019-03-22 RX ORDER — GLIMEPIRIDE 4 MG/1
8 TABLET ORAL
Qty: 180 TABLET | Refills: 3 | Status: SHIPPED | OUTPATIENT
Start: 2019-03-22 | End: 2020-02-17 | Stop reason: SDUPTHER

## 2019-04-25 ENCOUNTER — TELEPHONE (OUTPATIENT)
Dept: FAMILY MEDICINE CLINIC | Facility: CLINIC | Age: 69
End: 2019-04-25

## 2019-04-25 DIAGNOSIS — J01.90 ACUTE NON-RECURRENT SINUSITIS, UNSPECIFIED LOCATION: Primary | ICD-10-CM

## 2019-04-25 RX ORDER — AMOXICILLIN 500 MG/1
500 CAPSULE ORAL EVERY 8 HOURS SCHEDULED
Qty: 21 CAPSULE | Refills: 0 | Status: SHIPPED | OUTPATIENT
Start: 2019-04-25 | End: 2019-05-02

## 2019-04-26 ENCOUNTER — APPOINTMENT (OUTPATIENT)
Dept: LAB | Facility: HOSPITAL | Age: 69
End: 2019-04-26
Attending: SURGERY
Payer: COMMERCIAL

## 2019-04-26 DIAGNOSIS — Z79.4 TYPE 2 DIABETES MELLITUS WITHOUT COMPLICATION, WITH LONG-TERM CURRENT USE OF INSULIN (HCC): ICD-10-CM

## 2019-04-26 DIAGNOSIS — E21.0 HYPERPARATHYROIDISM, PRIMARY (HCC): ICD-10-CM

## 2019-04-26 DIAGNOSIS — Z79.4 TYPE 2 DIABETES MELLITUS WITH COMPLICATION, WITH LONG-TERM CURRENT USE OF INSULIN (HCC): ICD-10-CM

## 2019-04-26 DIAGNOSIS — E11.8 TYPE 2 DIABETES MELLITUS WITH COMPLICATION, WITH LONG-TERM CURRENT USE OF INSULIN (HCC): ICD-10-CM

## 2019-04-26 DIAGNOSIS — E11.9 TYPE 2 DIABETES MELLITUS WITHOUT COMPLICATION, WITH LONG-TERM CURRENT USE OF INSULIN (HCC): ICD-10-CM

## 2019-04-26 LAB
ALBUMIN SERPL BCP-MCNC: 3.4 G/DL (ref 3.5–5)
ALP SERPL-CCNC: 96 U/L (ref 46–116)
ALT SERPL W P-5'-P-CCNC: 37 U/L (ref 12–78)
ANION GAP SERPL CALCULATED.3IONS-SCNC: 9 MMOL/L (ref 4–13)
AST SERPL W P-5'-P-CCNC: 18 U/L (ref 5–45)
BILIRUB SERPL-MCNC: 1.4 MG/DL (ref 0.2–1)
BUN SERPL-MCNC: 9 MG/DL (ref 5–25)
CALCIUM SERPL-MCNC: 8.8 MG/DL (ref 8.3–10.1)
CHLORIDE SERPL-SCNC: 100 MMOL/L (ref 100–108)
CHOLEST SERPL-MCNC: 142 MG/DL (ref 50–200)
CO2 SERPL-SCNC: 26 MMOL/L (ref 21–32)
CREAT SERPL-MCNC: 0.76 MG/DL (ref 0.6–1.3)
CREAT UR-MCNC: 110 MG/DL
GFR SERPL CREATININE-BSD FRML MDRD: 94 ML/MIN/1.73SQ M
GLUCOSE P FAST SERPL-MCNC: 203 MG/DL (ref 65–99)
HDLC SERPL-MCNC: 35 MG/DL (ref 40–60)
LDLC SERPL CALC-MCNC: 81 MG/DL (ref 0–100)
MICROALBUMIN UR-MCNC: 31.7 MG/L (ref 0–20)
MICROALBUMIN/CREAT 24H UR: 29 MG/G CREATININE (ref 0–30)
NONHDLC SERPL-MCNC: 107 MG/DL
POTASSIUM SERPL-SCNC: 3.8 MMOL/L (ref 3.5–5.3)
PROT SERPL-MCNC: 7.5 G/DL (ref 6.4–8.2)
PTH-INTACT SERPL-MCNC: 31.1 PG/ML (ref 18.4–80.1)
SODIUM SERPL-SCNC: 135 MMOL/L (ref 136–145)
TRIGL SERPL-MCNC: 129 MG/DL

## 2019-04-26 PROCEDURE — 83970 ASSAY OF PARATHORMONE: CPT

## 2019-04-26 PROCEDURE — 82570 ASSAY OF URINE CREATININE: CPT | Performed by: FAMILY MEDICINE

## 2019-04-26 PROCEDURE — 82043 UR ALBUMIN QUANTITATIVE: CPT | Performed by: FAMILY MEDICINE

## 2019-04-26 PROCEDURE — 36415 COLL VENOUS BLD VENIPUNCTURE: CPT

## 2019-04-26 PROCEDURE — 3061F NEG MICROALBUMINURIA REV: CPT | Performed by: FAMILY MEDICINE

## 2019-04-26 PROCEDURE — 80053 COMPREHEN METABOLIC PANEL: CPT

## 2019-04-26 PROCEDURE — 80061 LIPID PANEL: CPT

## 2019-05-09 PROBLEM — E89.2 HISTORY OF PARATHYROIDECTOMY (HCC): Status: ACTIVE | Noted: 2019-05-09

## 2019-05-09 PROBLEM — E11.9 DM TYPE 2, NOT AT GOAL (HCC): Status: RESOLVED | Noted: 2017-07-24 | Resolved: 2019-05-09

## 2019-05-09 PROBLEM — Z98.890 HISTORY OF PARATHYROIDECTOMY: Status: ACTIVE | Noted: 2019-05-09

## 2019-05-09 PROBLEM — Z90.89 HISTORY OF PARATHYROIDECTOMY: Status: ACTIVE | Noted: 2019-05-09

## 2019-05-13 ENCOUNTER — OFFICE VISIT (OUTPATIENT)
Dept: FAMILY MEDICINE CLINIC | Facility: CLINIC | Age: 69
End: 2019-05-13
Payer: COMMERCIAL

## 2019-05-13 VITALS
HEIGHT: 69 IN | WEIGHT: 224 LBS | BODY MASS INDEX: 33.18 KG/M2 | OXYGEN SATURATION: 97 % | SYSTOLIC BLOOD PRESSURE: 132 MMHG | RESPIRATION RATE: 15 BRPM | DIASTOLIC BLOOD PRESSURE: 64 MMHG | HEART RATE: 87 BPM

## 2019-05-13 DIAGNOSIS — E11.9 TYPE 2 DIABETES MELLITUS WITHOUT COMPLICATION, WITHOUT LONG-TERM CURRENT USE OF INSULIN (HCC): Primary | ICD-10-CM

## 2019-05-13 DIAGNOSIS — E66.01 MORBID OBESITY (HCC): ICD-10-CM

## 2019-05-13 DIAGNOSIS — E11.9 TYPE 2 DIABETES MELLITUS WITHOUT COMPLICATION, WITHOUT LONG-TERM CURRENT USE OF INSULIN (HCC): ICD-10-CM

## 2019-05-13 DIAGNOSIS — A09 DIARRHEA OF INFECTIOUS ORIGIN: Primary | ICD-10-CM

## 2019-05-13 LAB — SL AMB POCT HEMOGLOBIN AIC: 9.2 (ref ?–6.5)

## 2019-05-13 PROCEDURE — 1036F TOBACCO NON-USER: CPT | Performed by: FAMILY MEDICINE

## 2019-05-13 PROCEDURE — 99214 OFFICE O/P EST MOD 30 MIN: CPT | Performed by: FAMILY MEDICINE

## 2019-05-13 PROCEDURE — 83036 HEMOGLOBIN GLYCOSYLATED A1C: CPT

## 2019-05-13 PROCEDURE — 3008F BODY MASS INDEX DOCD: CPT | Performed by: FAMILY MEDICINE

## 2019-05-13 PROCEDURE — 1160F RVW MEDS BY RX/DR IN RCRD: CPT | Performed by: FAMILY MEDICINE

## 2019-05-13 RX ORDER — METRONIDAZOLE 500 MG/1
500 TABLET ORAL EVERY 8 HOURS SCHEDULED
Qty: 21 TABLET | Refills: 0 | Status: SHIPPED | OUTPATIENT
Start: 2019-05-13 | End: 2019-05-20

## 2019-05-13 RX ORDER — ATORVASTATIN CALCIUM 40 MG/1
40 TABLET, FILM COATED ORAL DAILY
Qty: 90 TABLET | Refills: 3 | Status: SHIPPED | OUTPATIENT
Start: 2019-05-13 | End: 2020-06-29 | Stop reason: SDUPTHER

## 2019-05-14 ENCOUNTER — APPOINTMENT (OUTPATIENT)
Dept: LAB | Facility: HOSPITAL | Age: 69
End: 2019-05-14
Attending: FAMILY MEDICINE
Payer: COMMERCIAL

## 2019-05-14 DIAGNOSIS — A09 DIARRHEA OF INFECTIOUS ORIGIN: ICD-10-CM

## 2019-05-14 PROCEDURE — 87493 C DIFF AMPLIFIED PROBE: CPT

## 2019-05-15 ENCOUNTER — OFFICE VISIT (OUTPATIENT)
Dept: SURGICAL ONCOLOGY | Facility: HOSPITAL | Age: 69
End: 2019-05-15
Payer: COMMERCIAL

## 2019-05-15 VITALS
HEART RATE: 111 BPM | DIASTOLIC BLOOD PRESSURE: 70 MMHG | SYSTOLIC BLOOD PRESSURE: 124 MMHG | RESPIRATION RATE: 16 BRPM | HEIGHT: 69 IN | TEMPERATURE: 99.3 F | BODY MASS INDEX: 33.03 KG/M2 | WEIGHT: 223 LBS

## 2019-05-15 DIAGNOSIS — E89.2 HISTORY OF PARATHYROIDECTOMY (HCC): Primary | ICD-10-CM

## 2019-05-15 LAB — C DIFF TOX GENS STL QL NAA+PROBE: NORMAL

## 2019-05-15 PROCEDURE — 3060F POS MICROALBUMINURIA REV: CPT | Performed by: SURGERY

## 2019-05-15 PROCEDURE — 2022F DILAT RTA XM EVC RTNOPTHY: CPT | Performed by: SURGERY

## 2019-05-15 PROCEDURE — 1160F RVW MEDS BY RX/DR IN RCRD: CPT | Performed by: SURGERY

## 2019-05-15 PROCEDURE — 3046F HEMOGLOBIN A1C LEVEL >9.0%: CPT | Performed by: SURGERY

## 2019-05-15 PROCEDURE — 99213 OFFICE O/P EST LOW 20 MIN: CPT | Performed by: SURGERY

## 2019-06-05 DIAGNOSIS — E11.8 TYPE 2 DIABETES MELLITUS WITH COMPLICATION, WITH LONG-TERM CURRENT USE OF INSULIN (HCC): Primary | ICD-10-CM

## 2019-06-05 DIAGNOSIS — E11.9 TYPE 2 DIABETES MELLITUS WITHOUT COMPLICATION, WITHOUT LONG-TERM CURRENT USE OF INSULIN (HCC): ICD-10-CM

## 2019-06-05 DIAGNOSIS — Z79.4 TYPE 2 DIABETES MELLITUS WITH COMPLICATION, WITH LONG-TERM CURRENT USE OF INSULIN (HCC): Primary | ICD-10-CM

## 2019-07-08 DIAGNOSIS — E11.9 TYPE 2 DIABETES MELLITUS WITHOUT COMPLICATION, WITHOUT LONG-TERM CURRENT USE OF INSULIN (HCC): Primary | ICD-10-CM

## 2019-07-26 ENCOUNTER — DOCTOR'S OFFICE (OUTPATIENT)
Dept: URBAN - NONMETROPOLITAN AREA CLINIC 1 | Facility: CLINIC | Age: 69
Setting detail: OPHTHALMOLOGY
End: 2019-07-26
Payer: COMMERCIAL

## 2019-07-26 DIAGNOSIS — E11.9: ICD-10-CM

## 2019-07-26 DIAGNOSIS — H40.023: ICD-10-CM

## 2019-07-26 DIAGNOSIS — H25.13: ICD-10-CM

## 2019-07-26 DIAGNOSIS — Z79.84: ICD-10-CM

## 2019-07-26 DIAGNOSIS — H35.363: ICD-10-CM

## 2019-07-26 DIAGNOSIS — E11.3293: ICD-10-CM

## 2019-07-26 PROCEDURE — 92014 COMPRE OPH EXAM EST PT 1/>: CPT | Performed by: OPHTHALMOLOGY

## 2019-07-26 PROCEDURE — 92083 EXTENDED VISUAL FIELD XM: CPT | Performed by: OPHTHALMOLOGY

## 2019-07-26 PROCEDURE — 76514 ECHO EXAM OF EYE THICKNESS: CPT | Performed by: OPHTHALMOLOGY

## 2019-07-26 PROCEDURE — 92134 CPTRZ OPH DX IMG PST SGM RTA: CPT | Performed by: OPHTHALMOLOGY

## 2019-07-26 ASSESSMENT — LID POSITION - PTOSIS
OS_PTOSIS: LUL
OD_PTOSIS: RUL

## 2019-07-26 ASSESSMENT — REFRACTION_CURRENTRX
OD_CYLINDER: -1.50
OD_VPRISM_DIRECTION: PROGS
OD_CYLINDER: -1.50
OS_OVR_VA: 20/
OS_AXIS: 010
OD_OVR_VA: 20/
OS_OVR_VA: 20/
OD_SPHERE: -5.25
OS_AXIS: 6
OS_SPHERE: -7.75
OD_ADD: +2.25
OS_VPRISM_DIRECTION: PROGS
OD_AXIS: 180
OS_SPHERE: -6.75
OD_AXIS: 180
OS_CYLINDER: -1.00
OD_ADD: +2.00
OS_ADD: +2.00
OD_SPHERE: -5.75
OS_OVR_VA: 20/
OD_VPRISM_DIRECTION: PROGS
OS_CYLINDER: -0.75
OS_ADD: +2.25
OD_OVR_VA: 20/
OD_OVR_VA: 20/
OS_VPRISM_DIRECTION: PROGS

## 2019-07-26 ASSESSMENT — REFRACTION_MANIFEST
OD_AXIS: 180
OS_VA3: 20/
OS_VA1: 20/25-2
OD_ADD: +2.25
OD_SPHERE: -5.00
OS_SPHERE: -6.50
OS_ADD: +1.75
OD_VA2: 20/25-2
OS_VA3: 20/
OD_VA3: 20/
OS_CYLINDER: -1.00
OD_VA1: 20/25-2
OS_VA1: 20/25-2
OD_CYLINDER: -1.25
OU_VA: 20/
OS_CYLINDER: -0.75
OD_ADD: +1.75
OU_VA: 20/
OS_ADD: +2.25
OS_AXIS: 010
OS_SPHERE: -6.50
OD_VA1: 20/25-2
OS_VA2: 20/25-2
OD_VA3: 20/
OD_AXIS: 180
OS_AXIS: 010
OD_SPHERE: -4.75
OS_VA2: 20/25-2
OD_VA2: 20/25-2
OD_CYLINDER: -1.25

## 2019-07-26 ASSESSMENT — VISUAL ACUITY
OS_BCVA: 20/20-1
OD_BCVA: 20/30-2

## 2019-07-26 ASSESSMENT — REFRACTION_AUTOREFRACTION
OS_SPHERE: -5.50
OS_AXIS: 037
OD_SPHERE: -4.25
OS_CYLINDER: -0.50
OD_AXIS: 172
OD_CYLINDER: -1.50

## 2019-07-26 ASSESSMENT — SPHEQUIV_DERIVED
OS_SPHEQUIV: -5.75
OS_SPHEQUIV: -6.875
OD_SPHEQUIV: -5
OD_SPHEQUIV: -5.375
OS_SPHEQUIV: -7
OD_SPHEQUIV: -5.625

## 2019-07-26 ASSESSMENT — PACHYMETRY
OS_CT_UM: 623
OD_CT_CORRECTION: -5
OD_CT_UM: 613
OS_CT_CORRECTION: -6

## 2019-07-26 ASSESSMENT — LID POSITION - DERMATOCHALASIS
OD_DERMATOCHALASIS: RUL
OS_DERMATOCHALASIS: LUL

## 2019-07-26 ASSESSMENT — CONFRONTATIONAL VISUAL FIELD TEST (CVF)
OS_FINDINGS: FULL
OD_FINDINGS: FULL

## 2019-07-30 LAB
LEFT EYE DIABETIC RETINOPATHY: NORMAL
RIGHT EYE DIABETIC RETINOPATHY: NORMAL

## 2019-07-30 PROCEDURE — 3072F LOW RISK FOR RETINOPATHY: CPT | Performed by: FAMILY MEDICINE

## 2019-08-12 ENCOUNTER — OFFICE VISIT (OUTPATIENT)
Dept: FAMILY MEDICINE CLINIC | Facility: CLINIC | Age: 69
End: 2019-08-12
Payer: COMMERCIAL

## 2019-08-12 VITALS
DIASTOLIC BLOOD PRESSURE: 74 MMHG | HEIGHT: 68 IN | WEIGHT: 222 LBS | RESPIRATION RATE: 16 BRPM | HEART RATE: 88 BPM | BODY MASS INDEX: 33.65 KG/M2 | SYSTOLIC BLOOD PRESSURE: 136 MMHG | OXYGEN SATURATION: 98 %

## 2019-08-12 DIAGNOSIS — E11.9 TYPE 2 DIABETES MELLITUS WITHOUT COMPLICATION, WITHOUT LONG-TERM CURRENT USE OF INSULIN (HCC): ICD-10-CM

## 2019-08-12 DIAGNOSIS — I10 HYPERTENSION, UNSPECIFIED TYPE: ICD-10-CM

## 2019-08-12 DIAGNOSIS — E11.8 TYPE 2 DIABETES MELLITUS WITH COMPLICATION, WITH LONG-TERM CURRENT USE OF INSULIN (HCC): Primary | ICD-10-CM

## 2019-08-12 DIAGNOSIS — Z79.4 TYPE 2 DIABETES MELLITUS WITH COMPLICATION, WITH LONG-TERM CURRENT USE OF INSULIN (HCC): ICD-10-CM

## 2019-08-12 DIAGNOSIS — E11.8 TYPE 2 DIABETES MELLITUS WITH COMPLICATION, WITH LONG-TERM CURRENT USE OF INSULIN (HCC): ICD-10-CM

## 2019-08-12 DIAGNOSIS — Z79.4 TYPE 2 DIABETES MELLITUS WITH COMPLICATION, WITH LONG-TERM CURRENT USE OF INSULIN (HCC): Primary | ICD-10-CM

## 2019-08-12 DIAGNOSIS — E21.3 HYPERPARATHYROIDISM (HCC): Primary | ICD-10-CM

## 2019-08-12 LAB — SL AMB POCT HEMOGLOBIN AIC: 8.3 (ref ?–6.5)

## 2019-08-12 PROCEDURE — 1036F TOBACCO NON-USER: CPT | Performed by: FAMILY MEDICINE

## 2019-08-12 PROCEDURE — 1160F RVW MEDS BY RX/DR IN RCRD: CPT | Performed by: FAMILY MEDICINE

## 2019-08-12 PROCEDURE — 83036 HEMOGLOBIN GLYCOSYLATED A1C: CPT

## 2019-08-12 PROCEDURE — 3075F SYST BP GE 130 - 139MM HG: CPT | Performed by: FAMILY MEDICINE

## 2019-08-12 PROCEDURE — 3008F BODY MASS INDEX DOCD: CPT | Performed by: FAMILY MEDICINE

## 2019-08-12 PROCEDURE — 99214 OFFICE O/P EST MOD 30 MIN: CPT | Performed by: FAMILY MEDICINE

## 2019-08-12 RX ORDER — HYDROCHLOROTHIAZIDE 12.5 MG/1
12.5 TABLET ORAL DAILY
Qty: 90 TABLET | Refills: 3 | Status: SHIPPED | OUTPATIENT
Start: 2019-08-12 | End: 2020-06-29 | Stop reason: SDUPTHER

## 2019-08-12 RX ORDER — HYDROCHLOROTHIAZIDE 12.5 MG/1
12.5 TABLET ORAL DAILY
Qty: 90 TABLET | Refills: 3 | Status: SHIPPED | OUTPATIENT
Start: 2019-08-12 | End: 2019-08-12 | Stop reason: SDUPTHER

## 2019-08-12 NOTE — PROGRESS NOTES
Assessment/Plan:    Hyperparathyroidism ([address] )  Stable  Continue current  Hypertension  Stable  Continue current  Other hyperlipidemia  Stable  Continue current  Diagnoses and all orders for this visit:    Hyperparathyroidism ([address] )    Hypertension, unspecified type  -     Discontinue: hydrochlorothiazide (HYDRODIURIL) 12 5 mg tablet; Take 1 tablet (12 5 mg total) by mouth daily  -     hydrochlorothiazide (HYDRODIURIL) 12 5 mg tablet; Take 1 tablet (12 5 mg total) by mouth daily    Type 2 diabetes mellitus without complication, without long-term current use of insulin ([facility] )    Type 2 diabetes mellitus with complication, with long-term current use of insulin ([facility])          Subjective:      Patient ID: Alicja Peres is a 81 y o  male  His A1c is improving on his current regimen  He has no side effects or hypoglycemia  He is on high intensity statin therapy  He has no RUQ pain or muscle weakness  His LDL is at goal     His BP is well controlled on his current therapy  He has no CP or SOB  He has no HA or vision changes  The following portions of the patient's history were reviewed and updated as appropriate:   He  has a past medical history of Arthritis, Back pain, Diabetes mellitus ([address] ), and Hypertension    He   Patient Active Problem List    Diagnosis Date Noted    History of parathyroidectomy 05/09/2019    Type 2 diabetes mellitus with complication, with long-term current use of insulin ([place] [id number] ) 02/11/2019    Morbid obesity ([facility] [address] ) 02/11/2019    Medicare annual wellness visit, subsequent 02/11/2019    LILIA (obstructive sleep apnea) 11/12/2018    Multiple thyroid nodules 09/05/2018    Hyperparathyroidism ([facility] ) 05/08/2018    Other hyperlipidemia 05/08/2018    Hypertension 05/08/2018    BPH with obstruction/lower urinary tract symptoms 10/10/2017    Hypercalciuria 07/24/2017    Knee osteoarthritis 06/10/2014    Prepatellar bursitis 06/10/2014     He  has a past surgical history that includes US guided thyroid biopsy (9/7/2018); Tonsillectomy; Hernia repair (Left, 1998); and pr explore parathyroid glands (Right, 11/1/2018)  His family history includes Arthritis in his mother; Coronary artery disease in his father and mother; Diabetes in his maternal uncle and paternal uncle; Hypertension in his father and mother  He  reports that he has never smoked  He has never used smokeless tobacco  He reports that he drinks alcohol  He reports that he does not use drugs    Current Outpatient Medications   Medication Sig Dispense Refill    aspirin (ECOTRIN LOW STRENGTH) 81 mg EC tablet Take 81 mg by mouth daily      atorvastatin (LIPITOR) 40 mg tablet Take 1 tablet (40 mg total) by mouth daily 90 tablet 3    dutasteride (AVODART) 0 5 mg capsule Take 1 capsule (0 5 mg total) by mouth daily 90 capsule 3    gabapentin (NEURONTIN) 600 MG tablet Take 1 tablet by mouth 3 (three) times a day      glimepiride (AMARYL) 4 mg tablet Take 2 tablets (8 mg total) by mouth daily with breakfast 180 tablet 3    hydrochlorothiazide (HYDRODIURIL) 12 5 mg tablet Take 1 tablet (12 5 mg total) by mouth daily 90 tablet 3    insulin detemir (LEVEMIR) 100 units/mL subcutaneous injection Inject 60 Units under the skin 2 (two) times a day 10 mL 5    insulin lispro (HUMALOG KWIKPEN) 100 units/mL injection pen Inject 10 Units under the skin 3 (three) times a day with meals 5 pen 5    Insulin Pen Needle (NOVOFINE) 32G X 6 MM MISC by Does not apply route 3 (three) times a day 300 each 3    lisinopril (ZESTRIL) 40 mg tablet Take 1 tablet (40 mg total) by mouth daily 90 tablet 3    meclizine (ANTIVERT) 25 mg tablet Take 1 tablet (25 mg total) by mouth 3 (three) times a day as needed for dizziness 30 tablet 0    metFORMIN (GLUCOPHAGE) 1000 MG tablet Take 1 tablet (1,000 mg total) by mouth 2 (two) times a day with meals 180 tablet 3    ONE TOUCH ULTRA TEST test strip 4X daily with insulin injections 1200 each 1    sitaGLIPtin (JANUVIA) 100 mg tablet Take 1 tablet (100 mg total) by mouth daily 90 tablet 3    SUPER B COMPLEX/C PO Take by mouth      tamsulosin (FLOMAX) 0 4 mg Take 1 capsule (0 4 mg total) by mouth daily 90 capsule 3     No current facility-administered medications for this visit  Current Outpatient Medications on File Prior to Visit   Medication Sig    aspirin (ECOTRIN LOW STRENGTH) 81 mg EC tablet Take 81 mg by mouth daily    atorvastatin (LIPITOR) 40 mg tablet Take 1 tablet (40 mg total) by mouth daily    dutasteride (AVODART) 0 5 mg capsule Take 1 capsule (0 5 mg total) by mouth daily    gabapentin (NEURONTIN) 600 MG tablet Take 1 tablet by mouth 3 (three) times a day    glimepiride (AMARYL) 4 mg tablet Take 2 tablets (8 mg total) by mouth daily with breakfast    insulin detemir (LEVEMIR) 100 units/mL subcutaneous injection Inject 60 Units under the skin 2 (two) times a day    insulin lispro (HUMALOG KWIKPEN) 100 units/mL injection pen Inject 10 Units under the skin 3 (three) times a day with meals    Insulin Pen Needle (NOVOFINE) 32G X 6 MM MISC by Does not apply route 3 (three) times a day    lisinopril (ZESTRIL) 40 mg tablet Take 1 tablet (40 mg total) by mouth daily    meclizine (ANTIVERT) 25 mg tablet Take 1 tablet (25 mg total) by mouth 3 (three) times a day as needed for dizziness    metFORMIN (GLUCOPHAGE) 1000 MG tablet Take 1 tablet (1,000 mg total) by mouth 2 (two) times a day with meals    ONE TOUCH ULTRA TEST test strip 4X daily with insulin injections    sitaGLIPtin (JANUVIA) 100 mg tablet Take 1 tablet (100 mg total) by mouth daily    SUPER B COMPLEX/C PO Take by mouth    tamsulosin (FLOMAX) 0 4 mg Take 1 capsule (0 4 mg total) by mouth daily    [DISCONTINUED] hydrochlorothiazide (HYDRODIURIL) 25 mg tablet Take 1 tablet (25 mg total) by mouth daily (Patient taking differently: Take 12 5 mg by mouth daily  )     No current facility-administered medications on file prior to visit  He has No Known Allergies       Review of Systems   All other systems reviewed and are negative  Objective:      /74   Pulse 88   Resp 16   Ht 5' 8" (1 727 m)   Wt 101 kg (222 lb)   SpO2 98%   BMI 33 75 kg/m²          Physical Exam   Constitutional: He is oriented to person, place, and time  He appears well-developed and well-nourished  Neck: Normal range of motion  Neck supple  Cardiovascular: Normal rate, regular rhythm, normal heart sounds and intact distal pulses  Pulmonary/Chest: Effort normal and breath sounds normal    Abdominal: Soft  Bowel sounds are normal    Musculoskeletal: Normal range of motion  Neurological: He is alert and oriented to person, place, and time  Skin: Skin is warm and dry  Capillary refill takes less than 2 seconds  Psychiatric: He has a normal mood and affect  His behavior is normal  Judgment and thought content normal    Nursing note and vitals reviewed

## 2019-11-05 DIAGNOSIS — N40.0 BENIGN PROSTATIC HYPERPLASIA, UNSPECIFIED WHETHER LOWER URINARY TRACT SYMPTOMS PRESENT: ICD-10-CM

## 2019-11-05 DIAGNOSIS — E11.9 TYPE 2 DIABETES MELLITUS WITHOUT COMPLICATION, WITHOUT LONG-TERM CURRENT USE OF INSULIN (HCC): ICD-10-CM

## 2019-11-05 DIAGNOSIS — Z79.4 TYPE 2 DIABETES MELLITUS WITHOUT COMPLICATION, WITH LONG-TERM CURRENT USE OF INSULIN (HCC): Primary | ICD-10-CM

## 2019-11-05 DIAGNOSIS — N50.812 TESTICULAR PAIN, LEFT: ICD-10-CM

## 2019-11-05 DIAGNOSIS — E11.9 TYPE 2 DIABETES MELLITUS WITHOUT COMPLICATION, WITH LONG-TERM CURRENT USE OF INSULIN (HCC): Primary | ICD-10-CM

## 2019-11-05 RX ORDER — GABAPENTIN 600 MG/1
600 TABLET ORAL 3 TIMES DAILY
Qty: 270 TABLET | Refills: 3 | Status: SHIPPED | OUTPATIENT
Start: 2019-11-05 | End: 2020-11-19 | Stop reason: SDUPTHER

## 2019-11-05 RX ORDER — TAMSULOSIN HYDROCHLORIDE 0.4 MG/1
0.4 CAPSULE ORAL DAILY
Qty: 90 CAPSULE | Refills: 3 | Status: SHIPPED | OUTPATIENT
Start: 2019-11-05 | End: 2020-11-19 | Stop reason: SDUPTHER

## 2019-11-12 ENCOUNTER — OFFICE VISIT (OUTPATIENT)
Dept: FAMILY MEDICINE CLINIC | Facility: CLINIC | Age: 69
End: 2019-11-12
Payer: COMMERCIAL

## 2019-11-12 VITALS
HEIGHT: 68 IN | WEIGHT: 225 LBS | OXYGEN SATURATION: 96 % | DIASTOLIC BLOOD PRESSURE: 80 MMHG | HEART RATE: 94 BPM | BODY MASS INDEX: 34.1 KG/M2 | SYSTOLIC BLOOD PRESSURE: 136 MMHG

## 2019-11-12 DIAGNOSIS — I10 ESSENTIAL HYPERTENSION: ICD-10-CM

## 2019-11-12 DIAGNOSIS — E11.8 TYPE 2 DIABETES MELLITUS WITH COMPLICATION, WITH LONG-TERM CURRENT USE OF INSULIN (HCC): ICD-10-CM

## 2019-11-12 DIAGNOSIS — E11.9 TYPE 2 DIABETES MELLITUS WITHOUT COMPLICATION, WITHOUT LONG-TERM CURRENT USE OF INSULIN (HCC): Primary | ICD-10-CM

## 2019-11-12 DIAGNOSIS — Z79.4 TYPE 2 DIABETES MELLITUS WITH COMPLICATION, WITH LONG-TERM CURRENT USE OF INSULIN (HCC): ICD-10-CM

## 2019-11-12 DIAGNOSIS — E78.49 OTHER HYPERLIPIDEMIA: ICD-10-CM

## 2019-11-12 DIAGNOSIS — G47.33 OSA (OBSTRUCTIVE SLEEP APNEA): ICD-10-CM

## 2019-11-12 DIAGNOSIS — Z00.00 MEDICARE ANNUAL WELLNESS VISIT, SUBSEQUENT: ICD-10-CM

## 2019-11-12 DIAGNOSIS — Z23 NEED FOR IMMUNIZATION AGAINST INFLUENZA: Primary | ICD-10-CM

## 2019-11-12 DIAGNOSIS — E21.3 HYPERPARATHYROIDISM (HCC): ICD-10-CM

## 2019-11-12 DIAGNOSIS — E66.01 MORBID OBESITY (HCC): ICD-10-CM

## 2019-11-12 LAB — SL AMB POCT HEMOGLOBIN AIC: 8.5 (ref ?–6.5)

## 2019-11-12 PROCEDURE — 1160F RVW MEDS BY RX/DR IN RCRD: CPT | Performed by: FAMILY MEDICINE

## 2019-11-12 PROCEDURE — 83036 HEMOGLOBIN GLYCOSYLATED A1C: CPT | Performed by: FAMILY MEDICINE

## 2019-11-12 PROCEDURE — 3052F HG A1C>EQUAL 8.0%<EQUAL 9.0%: CPT | Performed by: FAMILY MEDICINE

## 2019-11-12 PROCEDURE — 90662 IIV NO PRSV INCREASED AG IM: CPT

## 2019-11-12 PROCEDURE — 99397 PER PM REEVAL EST PAT 65+ YR: CPT | Performed by: FAMILY MEDICINE

## 2019-11-12 PROCEDURE — 90471 IMMUNIZATION ADMIN: CPT

## 2019-11-12 NOTE — PROGRESS NOTES
Assessment and Plan:     Problem List Items Addressed This Visit        Endocrine    Hyperparathyroidism ([facility] )    Type 2 diabetes mellitus with complication, with long-term current use of insulin ([facility])       Respiratory    LILIA (obstructive sleep apnea)       Cardiovascular and Mediastinum    Hypertension       Other    Other hyperlipidemia    Morbid obesity ([facility] [address] )    Medicare annual wellness visit, subsequent      Other Visit Diagnoses     Type 2 diabetes mellitus without complication, without long-term current use of insulin ([facility] )    -  Primary    Relevant Orders    POCT hemoglobin A1c    Lipid panel    Comprehensive metabolic panel           Preventive health issues were discussed with patient, and age appropriate screening tests were ordered as noted in patient's After Visit Summary  Personalized health advice and appropriate referrals for health education or preventive services given if needed, as noted in patient's After Visit Summary  History of Present Illness:     Patient presents for Welcome to Medicare visit  Patient Care Team:  Bang Crabtree MD as PCP - General  MD Ramses Roman MD as Surgeon (Surgical Oncology)     Review of Systems:     Review of Systems   All other systems reviewed and are negative       Problem List:     Patient Active Problem List   Diagnosis    Hyperparathyroidism ([facility] )    Other hyperlipidemia    Hypertension    BPH with obstruction/lower urinary tract symptoms    Hypercalciuria    Knee osteoarthritis    Prepatellar bursitis    Multiple thyroid nodules    LILIA (obstructive sleep apnea)    Type 2 diabetes mellitus with complication, with long-term current use of insulin ([facility] )    Morbid obesity ([facility] )    Medicare annual wellness visit, subsequent    History of parathyroidectomy      Past Medical and Surgical History:     Past Medical History:   Diagnosis Date    Arthritis     Back pain     Diabetes mellitus ([facility] )     Hypertension      Past Surgical History:   Procedure Laterality Date    HERNIA REPAIR Left 1998    with mesh to groin    MN EXPLORE PARATHYROID GLANDS Right 11/1/2018    Procedure: MINIMALLY INVASIVE RIGHT PARATHYROIDECTOMY WITH PTH MONITORING;  Surgeon: Rut Velasco MD;  Location: [facility];  Service: Surgical Oncology    TONSILLECTOMY      US GUIDED THYROID BIOPSY  9/7/2018      Family History:     Family History   Problem Relation Age of Onset    Hypertension Father     Coronary artery disease Father     Hypertension Mother     Arthritis Mother     Coronary artery disease Mother     Diabetes Maternal Uncle     Diabetes Paternal Uncle       Social History:     Social History     Socioeconomic History    Marital status: /Civil Union     Spouse name: None    Number of children: None    Years of education: None    Highest education level: None   Occupational History    None   Social Needs    Financial resource strain: None    Food insecurity:     Worry: None     Inability: None    Transportation needs:     Medical: None     Non-medical: None   Tobacco Use    Smoking status: Never Smoker    Smokeless tobacco: Never Used   Substance and Sexual Activity    Alcohol use: Yes     Comment: rarely    Drug use: No    Sexual activity: Yes   Lifestyle    Physical activity:     Days per week: None     Minutes per session: None    Stress: None   Relationships    Social connections:     Talks on phone: None     Gets together: None     Attends Caoda[employer] service: None     Active member of club or organization: None     Attends meetings of clubs or organizations: None     Relationship status: None    Intimate partner violence:     Fear of current or ex partner: None     Emotionally abused: None     Physically abused: None     Forced sexual activity: None   Other Topics Concern    None   Social History Narrative    None      Medications and Allergies:     Current Outpatient Medications   Medication Sig Dispense Refill    aspirin (ECOTRIN LOW STRENGTH) 81 mg EC tablet Take 81 mg by mouth daily      atorvastatin (LIPITOR) 40 mg tablet Take 1 tablet (40 mg total) by mouth daily 90 tablet 3    dutasteride (AVODART) 0 5 mg capsule Take 1 capsule (0 5 mg total) by mouth daily 90 capsule 3    gabapentin (NEURONTIN) 600 MG tablet Take 1 tablet (600 mg total) by mouth 3 (three) times a day 270 tablet 3    glimepiride (AMARYL) 4 mg tablet Take 2 tablets (8 mg total) by mouth daily with breakfast 180 tablet 3    hydrochlorothiazide (HYDRODIURIL) 12 5 mg tablet Take 1 tablet (12 5 mg total) by mouth daily 90 tablet 3    insulin detemir (LEVEMIR) 100 units/mL subcutaneous injection Inject 60 Units under the skin 2 (two) times a day 10 mL 5    insulin lispro (HUMALOG KWIKPEN) 100 units/mL injection pen Inject 10 Units under the skin 3 (three) times a day with meals 5 pen 5    Insulin Pen Needle (NOVOFINE) 32G X 6 MM MISC by Does not apply route 3 (three) times a day 300 each 3    lisinopril (ZESTRIL) 40 mg tablet Take 1 tablet (40 mg total) by mouth daily 90 tablet 3    meclizine (ANTIVERT) 25 mg tablet Take 1 tablet (25 mg total) by mouth 3 (three) times a day as needed for dizziness 30 tablet 0    metFORMIN (GLUCOPHAGE) 1000 MG tablet Take 1 tablet (1,000 mg total) by mouth 2 (two) times a day with meals 180 tablet 3    ONE TOUCH ULTRA TEST test strip 4X daily with insulin injections 1200 each 1    sitaGLIPtin (JANUVIA) 100 mg tablet Take 1 tablet (100 mg total) by mouth daily 90 tablet 3    SUPER B COMPLEX/C PO Take by mouth      tamsulosin (FLOMAX) 0 4 mg Take 1 capsule (0 4 mg total) by mouth daily 90 capsule 3     No current facility-administered medications for this visit        No Known Allergies   Immunizations:     Immunization History   Administered Date(s) Administered    Pneumococcal Conjugate 13-Valent 02/11/2019    Pneumococcal Polysaccharide PPV23 01/09/2018      Health Maintenance:         Topic Date Due    CRC Screening: Colonoscopy  02/26/2022    Hepatitis C Screening  Completed         Topic Date Due    HEPATITIS B VACCINES (1 of 3 - Risk 3-dose series) 04/07/1971    DTaP,Tdap,and Td Vaccines (1 - Tdap) 04/07/1973    INFLUENZA VACCINE  07/01/2019      Medicare Screening Tests and Risk Assessments:     Juliana Horowitz is here for his Subsequent Wellness visit  Health Risk Assessment:   Patient rates overall health as very good  Patient feels that their physical health rating is much better  Eyesight was rated as same  Hearing was rated as same  Patient feels that their emotional and mental health rating is same  Pain experienced in the last 7 days has been some  Patient's pain rating has been 3/10  Patient states that he has experienced no weight loss or gain in last 6 months  Depression Screening:   PHQ-2 Score: 0      Fall Risk Screening: In the past year, patient has experienced: no history of falling in past year      Home Safety:  Patient does not have trouble with stairs inside or outside of their home  Patient has working smoke alarms and has working carbon monoxide detector  Home safety hazards include: none  Nutrition:   Current diet is Diabetic, Low Carb, Limited junk food and No Added Salt  Medications:   Patient is not currently taking any over-the-counter supplements  Patient is able to manage medications  Activities of Daily Living (ADLs)/Instrumental Activities of Daily Living (IADLs):   Walk and transfer into and out of bed and chair?: Yes  Dress and groom yourself?: Yes    Bathe or shower yourself?: Yes    Feed yourself?  Yes  Do your laundry/housekeeping?: Yes  Manage your money, pay your bills and track your expenses?: Yes  Make your own meals?: Yes    Do your own shopping?: Yes    Previous Hospitalizations:   Any hospitalizations or ED visits within the last 12 months?: No      Advance Care Planning:   Living will: Yes    Advanced directive: Yes      PREVENTIVE SCREENINGS      Cardiovascular Screening:    General: Screening Not Indicated and History Lipid Disorder      Diabetes Screening:     General: Screening Not Indicated and History Diabetes      Colorectal Cancer Screening:     General: Screening Current      Prostate Cancer Screening:    General: Screening Not Indicated      Abdominal Aortic Aneurysm (AAA) Screening:    Risk factors include: age between 73-69 yo        General: Screening Not Indicated      Lung Cancer Screening:     General: Screening Not Indicated      Hepatitis C Screening:    General: Screening Current    No exam data present     Physical Exam:     /80   Pulse 94   Ht 5' 8" (1 727 m)   Wt 102 kg (225 lb)   SpO2 96%   BMI 34 21 kg/m²     Physical Exam   Constitutional: He is oriented to person, place, and time  He appears well-developed and well-nourished  Neck: Normal range of motion  Neck supple  Cardiovascular: Normal rate, regular rhythm, normal heart sounds and intact distal pulses  Pulmonary/Chest: Effort normal and breath sounds normal    Abdominal: Soft  Bowel sounds are normal    Musculoskeletal: Normal range of motion  Neurological: He is alert and oriented to person, place, and time  Skin: Skin is warm and dry  Capillary refill takes less than 2 seconds  Psychiatric: He has a normal mood and affect  His behavior is normal  Judgment and thought content normal    Nursing note and vitals reviewed        Syl Silver MD not applicable (Male)

## 2019-11-12 NOTE — PATIENT INSTRUCTIONS

## 2019-12-06 DIAGNOSIS — N40.0 BENIGN PROSTATIC HYPERPLASIA, UNSPECIFIED WHETHER LOWER URINARY TRACT SYMPTOMS PRESENT: ICD-10-CM

## 2019-12-07 RX ORDER — DUTASTERIDE 0.5 MG/1
0.5 CAPSULE, LIQUID FILLED ORAL DAILY
Qty: 90 CAPSULE | Refills: 3 | Status: SHIPPED | OUTPATIENT
Start: 2019-12-07 | End: 2020-11-19 | Stop reason: SDUPTHER

## 2020-01-28 ENCOUNTER — DOCTOR'S OFFICE (OUTPATIENT)
Dept: URBAN - NONMETROPOLITAN AREA CLINIC 1 | Facility: CLINIC | Age: 70
Setting detail: OPHTHALMOLOGY
End: 2020-01-28
Payer: COMMERCIAL

## 2020-01-28 VITALS — HEIGHT: 60 IN

## 2020-01-28 DIAGNOSIS — H35.363: ICD-10-CM

## 2020-01-28 DIAGNOSIS — H40.023: ICD-10-CM

## 2020-01-28 DIAGNOSIS — H25.13: ICD-10-CM

## 2020-01-28 DIAGNOSIS — E11.9: ICD-10-CM

## 2020-01-28 LAB
LEFT EYE DIABETIC RETINOPATHY: NORMAL
RIGHT EYE DIABETIC RETINOPATHY: NORMAL

## 2020-01-28 PROCEDURE — 92134 CPTRZ OPH DX IMG PST SGM RTA: CPT | Performed by: OPHTHALMOLOGY

## 2020-01-28 PROCEDURE — 99214 OFFICE O/P EST MOD 30 MIN: CPT | Performed by: OPHTHALMOLOGY

## 2020-01-28 ASSESSMENT — REFRACTION_CURRENTRX
OS_ADD: +2.00
OS_OVR_VA: 20/
OS_CYLINDER: -2.50
OS_ADD: +2.50
OD_SPHERE: -5.75
OD_ADD: +2.00
OS_SPHERE: -7.75
OD_ADD: +2.50
OS_AXIS: 007
OD_VPRISM_DIRECTION: PROGS
OD_CYLINDER: -3.00
OD_SPHERE: -5.25
OS_CYLINDER: -1.00
OS_SPHERE: -7.00
OD_AXIS: 180
OD_OVR_VA: 20/
OS_OVR_VA: 20/
OD_OVR_VA: 20/
OD_VPRISM_DIRECTION: PROGS
OD_AXIS: 176
OS_VPRISM_DIRECTION: PROGS
OS_VPRISM_DIRECTION: PROGS
OS_AXIS: 010
OD_CYLINDER: -1.50

## 2020-01-28 ASSESSMENT — REFRACTION_AUTOREFRACTION
OD_SPHERE: -5.00
OS_AXIS: 160
OS_SPHERE: -6.00
OD_AXIS: 156
OS_CYLINDER: -0.50
OD_CYLINDER: -1.00

## 2020-01-28 ASSESSMENT — REFRACTION_MANIFEST
OD_SPHERE: -5.00
OS_VA1: 20/25-2
OS_ADD: +2.25
OD_VA3: 20/
OS_AXIS: 010
OD_CYLINDER: -1.25
OD_VA2: 20/25-2
OS_VA1: 20/25-2
OD_AXIS: 180
OS_AXIS: 010
OD_SPHERE: -4.75
OU_VA: 20/
OD_VA3: 20/
OD_VA2: 20/25-2
OS_VA2: 20/25-2
OD_CYLINDER: -1.25
OS_VA2: 20/25-2
OS_VA3: 20/
OS_SPHERE: -6.50
OD_ADD: +1.75
OS_ADD: +1.75
OS_VA3: 20/
OS_SPHERE: -6.50
OD_ADD: +2.25
OD_VA1: 20/25-2
OS_CYLINDER: -0.75
OD_VA1: 20/25-2
OU_VA: 20/
OD_AXIS: 180
OS_CYLINDER: -1.00

## 2020-01-28 ASSESSMENT — SPHEQUIV_DERIVED
OD_SPHEQUIV: -5.5
OS_SPHEQUIV: -6.25
OS_SPHEQUIV: -7
OS_SPHEQUIV: -6.875
OD_SPHEQUIV: -5.375
OD_SPHEQUIV: -5.625

## 2020-01-28 ASSESSMENT — VISUAL ACUITY
OS_BCVA: 20/25-1
OD_BCVA: 20/30+2

## 2020-01-28 ASSESSMENT — LID POSITION - DERMATOCHALASIS
OD_DERMATOCHALASIS: RUL
OS_DERMATOCHALASIS: LUL

## 2020-01-28 ASSESSMENT — LID POSITION - PTOSIS
OS_PTOSIS: LUL
OD_PTOSIS: RUL

## 2020-01-28 ASSESSMENT — CONFRONTATIONAL VISUAL FIELD TEST (CVF)
OS_FINDINGS: FULL
OD_FINDINGS: FULL

## 2020-02-03 ENCOUNTER — APPOINTMENT (OUTPATIENT)
Dept: LAB | Facility: MEDICAL CENTER | Age: 70
End: 2020-02-03
Payer: COMMERCIAL

## 2020-02-03 DIAGNOSIS — E11.9 TYPE 2 DIABETES MELLITUS WITHOUT COMPLICATION, WITHOUT LONG-TERM CURRENT USE OF INSULIN (HCC): ICD-10-CM

## 2020-02-03 LAB
ALBUMIN SERPL BCP-MCNC: 3.8 G/DL (ref 3.5–5)
ALP SERPL-CCNC: 74 U/L (ref 46–116)
ALT SERPL W P-5'-P-CCNC: 38 U/L (ref 12–78)
ANION GAP SERPL CALCULATED.3IONS-SCNC: 5 MMOL/L (ref 4–13)
AST SERPL W P-5'-P-CCNC: 16 U/L (ref 5–45)
BILIRUB SERPL-MCNC: 1.46 MG/DL (ref 0.2–1)
BUN SERPL-MCNC: 12 MG/DL (ref 5–25)
CALCIUM SERPL-MCNC: 8.6 MG/DL (ref 8.3–10.1)
CHLORIDE SERPL-SCNC: 103 MMOL/L (ref 100–108)
CHOLEST SERPL-MCNC: 170 MG/DL (ref 50–200)
CO2 SERPL-SCNC: 28 MMOL/L (ref 21–32)
CREAT SERPL-MCNC: 0.74 MG/DL (ref 0.6–1.3)
GFR SERPL CREATININE-BSD FRML MDRD: 95 ML/MIN/1.73SQ M
GLUCOSE P FAST SERPL-MCNC: 211 MG/DL (ref 65–99)
HDLC SERPL-MCNC: 35 MG/DL
LDLC SERPL CALC-MCNC: 88 MG/DL (ref 0–100)
NONHDLC SERPL-MCNC: 135 MG/DL
POTASSIUM SERPL-SCNC: 3.7 MMOL/L (ref 3.5–5.3)
PROT SERPL-MCNC: 7.3 G/DL (ref 6.4–8.2)
SODIUM SERPL-SCNC: 136 MMOL/L (ref 136–145)
TRIGL SERPL-MCNC: 237 MG/DL

## 2020-02-03 PROCEDURE — 80053 COMPREHEN METABOLIC PANEL: CPT

## 2020-02-03 PROCEDURE — 36415 COLL VENOUS BLD VENIPUNCTURE: CPT

## 2020-02-03 PROCEDURE — 80061 LIPID PANEL: CPT

## 2020-02-17 ENCOUNTER — OFFICE VISIT (OUTPATIENT)
Dept: FAMILY MEDICINE CLINIC | Facility: CLINIC | Age: 70
End: 2020-02-17
Payer: COMMERCIAL

## 2020-02-17 VITALS
OXYGEN SATURATION: 97 % | SYSTOLIC BLOOD PRESSURE: 136 MMHG | DIASTOLIC BLOOD PRESSURE: 78 MMHG | BODY MASS INDEX: 34.25 KG/M2 | HEART RATE: 84 BPM | HEIGHT: 68 IN | WEIGHT: 226 LBS

## 2020-02-17 DIAGNOSIS — Z79.4 TYPE 2 DIABETES MELLITUS WITH OTHER SPECIFIED COMPLICATION, WITH LONG-TERM CURRENT USE OF INSULIN (HCC): Primary | ICD-10-CM

## 2020-02-17 DIAGNOSIS — E66.01 MORBID OBESITY (HCC): ICD-10-CM

## 2020-02-17 DIAGNOSIS — Z79.4 TYPE 2 DIABETES MELLITUS WITH COMPLICATION, WITH LONG-TERM CURRENT USE OF INSULIN (HCC): ICD-10-CM

## 2020-02-17 DIAGNOSIS — E78.49 OTHER HYPERLIPIDEMIA: ICD-10-CM

## 2020-02-17 DIAGNOSIS — E11.69 TYPE 2 DIABETES MELLITUS WITH OTHER SPECIFIED COMPLICATION, WITH LONG-TERM CURRENT USE OF INSULIN (HCC): Primary | ICD-10-CM

## 2020-02-17 DIAGNOSIS — E11.8 TYPE 2 DIABETES MELLITUS WITH COMPLICATION, WITH LONG-TERM CURRENT USE OF INSULIN (HCC): ICD-10-CM

## 2020-02-17 DIAGNOSIS — E21.3 HYPERPARATHYROIDISM (HCC): ICD-10-CM

## 2020-02-17 DIAGNOSIS — E11.9 TYPE 2 DIABETES MELLITUS WITHOUT COMPLICATION, WITHOUT LONG-TERM CURRENT USE OF INSULIN (HCC): ICD-10-CM

## 2020-02-17 DIAGNOSIS — G47.33 OSA (OBSTRUCTIVE SLEEP APNEA): ICD-10-CM

## 2020-02-17 DIAGNOSIS — I10 ESSENTIAL HYPERTENSION: ICD-10-CM

## 2020-02-17 LAB — SL AMB POCT HEMOGLOBIN AIC: 8.8 (ref ?–6.5)

## 2020-02-17 PROCEDURE — 3008F BODY MASS INDEX DOCD: CPT | Performed by: FAMILY MEDICINE

## 2020-02-17 PROCEDURE — 99214 OFFICE O/P EST MOD 30 MIN: CPT | Performed by: FAMILY MEDICINE

## 2020-02-17 PROCEDURE — 1036F TOBACCO NON-USER: CPT | Performed by: FAMILY MEDICINE

## 2020-02-17 PROCEDURE — 3078F DIAST BP <80 MM HG: CPT | Performed by: FAMILY MEDICINE

## 2020-02-17 PROCEDURE — 3052F HG A1C>EQUAL 8.0%<EQUAL 9.0%: CPT | Performed by: FAMILY MEDICINE

## 2020-02-17 PROCEDURE — 83036 HEMOGLOBIN GLYCOSYLATED A1C: CPT | Performed by: FAMILY MEDICINE

## 2020-02-17 PROCEDURE — 2022F DILAT RTA XM EVC RTNOPTHY: CPT | Performed by: FAMILY MEDICINE

## 2020-02-17 PROCEDURE — 1160F RVW MEDS BY RX/DR IN RCRD: CPT | Performed by: FAMILY MEDICINE

## 2020-02-17 PROCEDURE — 3075F SYST BP GE 130 - 139MM HG: CPT | Performed by: FAMILY MEDICINE

## 2020-02-17 PROCEDURE — 4040F PNEUMOC VAC/ADMIN/RCVD: CPT | Performed by: FAMILY MEDICINE

## 2020-02-17 RX ORDER — GLIMEPIRIDE 4 MG/1
8 TABLET ORAL
Qty: 180 TABLET | Refills: 3 | Status: SHIPPED | OUTPATIENT
Start: 2020-02-17 | End: 2020-02-24 | Stop reason: SDUPTHER

## 2020-02-17 NOTE — PROGRESS NOTES
Diabetic Foot Exam    Patient's shoes and socks removed  Right Foot/Ankle   Right Foot Inspection  Skin Exam: skin normal and skin intact no dry skin, no warmth, no callus, no erythema, no maceration, no abnormal color, no pre-ulcer, no ulcer and no callus                          Toe Exam: ROM and strength within normal limits  Sensory   Vibration: intact  Proprioception: intact   Monofilament testing: intact  Vascular  Capillary refills: < 3 seconds  The right DP pulse is 2+  The right PT pulse is 2+  Left Foot/Ankle  Left Foot Inspection  Skin Exam: skin normal and skin intactno dry skin, no warmth, no erythema, no maceration, normal color, no pre-ulcer, no ulcer and no callus                         Toe Exam: ROM and strength within normal limits                   Sensory   Vibration: intact  Proprioception: intact  Monofilament: intact  Vascular    The left DP pulse is 2+  The left PT pulse is 2+  Assign Risk Category:  No deformity present; No loss of protective sensation;  No weak pulses       Risk: 0

## 2020-02-17 NOTE — PROGRESS NOTES
Assessment/Plan:    No problem-specific Assessment & Plan notes found for this encounter  Diagnoses and all orders for this visit:    Type 2 diabetes mellitus with other specified complication, with long-term current use of insulin ([facility] )  -     POCT hemoglobin A1c    Type 2 diabetes mellitus without complication, without long-term current use of insulin ([facility])  -     glimepiride (AMARYL) 4 mg tablet; Take 2 tablets (8 mg total) by mouth daily with breakfast    Morbid obesity ([facility] [address] )    Hyperparathyroidism ([facility] )    Type 2 diabetes mellitus with complication, with long-term current use of insulin ([facility] [address] )    Essential hypertension    LILIA (obstructive sleep apnea)    Other hyperlipidemia          Subjective:      Patient ID: Ailyn Swain is a 81 y o  male  His A1c is not well controlled at 8 8%  He has no side effects or hypoglycemia  His BP is well controlled on his current regimen, which includes and ACE-I  He has no cough  He has no CP or SOB  He has no PND, orthopnea, or GONZALES  He is on high-intensity statin therapy  His LFTs are WNL  He perea no myalgia or muscle weakness  The following portions of the patient's history were reviewed and updated as appropriate:   He  has a past medical history of Arthritis, Back pain, Diabetes mellitus ([address] ), and Hypertension    He   Patient Active Problem List    Diagnosis Date Noted    History of parathyroidectomy 05/09/2019    Type 2 diabetes mellitus with complication, with long-term current use of insulin ([facility] ) 02/11/2019    Morbid obesity ([facility] ) 02/11/2019    Medicare annual wellness visit, subsequent 02/11/2019    LILIA (obstructive sleep apnea) 11/12/2018    Multiple thyroid nodules 09/05/2018    Hyperparathyroidism ([address] ) 05/08/2018    Other hyperlipidemia 05/08/2018    Hypertension 05/08/2018    BPH with obstruction/lower urinary tract symptoms 10/10/2017    Hypercalciuria 07/24/2017    Knee osteoarthritis 06/10/2014    Prepatellar bursitis 06/10/2014     He  has a past surgical history that includes US guided thyroid biopsy (9/7/2018); Tonsillectomy; Hernia repair (Left, 1998); and pr explore parathyroid glands (Right, 11/1/2018)  His family history includes Arthritis in his mother; Coronary artery disease in his father and mother; Diabetes in his maternal uncle and paternal uncle; Hypertension in his father and mother  He  reports that he has never smoked  He has never used smokeless tobacco  He reports that he drinks alcohol  He reports that he does not use drugs    Current Outpatient Medications   Medication Sig Dispense Refill    aspirin (ECOTRIN LOW STRENGTH) 81 mg EC tablet Take 81 mg by mouth daily      atorvastatin (LIPITOR) 40 mg tablet Take 1 tablet (40 mg total) by mouth daily 90 tablet 3    dutasteride (AVODART) 0 5 mg capsule Take 1 capsule (0 5 mg total) by mouth daily 90 capsule 3    gabapentin (NEURONTIN) 600 MG tablet Take 1 tablet (600 mg total) by mouth 3 (three) times a day 270 tablet 3    glimepiride (AMARYL) 4 mg tablet Take 2 tablets (8 mg total) by mouth daily with breakfast 180 tablet 3    hydrochlorothiazide (HYDRODIURIL) 12 5 mg tablet Take 1 tablet (12 5 mg total) by mouth daily 90 tablet 3    insulin detemir (LEVEMIR) 100 units/mL subcutaneous injection Inject 60 Units under the skin 2 (two) times a day 10 mL 5    insulin lispro (HUMALOG KWIKPEN) 100 units/mL injection pen Inject 10 Units under the skin 3 (three) times a day with meals 5 pen 5    Insulin Pen Needle (NOVOFINE) 32G X 6 MM MISC by Does not apply route 3 (three) times a day 300 each 3    lisinopril (ZESTRIL) 40 mg tablet Take 1 tablet (40 mg total) by mouth daily 90 tablet 3    meclizine (ANTIVERT) 25 mg tablet Take 1 tablet (25 mg total) by mouth 3 (three) times a day as needed for dizziness 30 tablet 0    metFORMIN (GLUCOPHAGE) 1000 MG tablet Take 1 tablet (1,000 mg total) by mouth 2 (two) times a day with meals 180 tablet 3    ONE TOUCH ULTRA TEST test strip 4X daily with insulin injections 1200 each 1    sitaGLIPtin (JANUVIA) 100 mg tablet Take 1 tablet (100 mg total) by mouth daily 90 tablet 3    SUPER B COMPLEX/C PO Take by mouth      tamsulosin (FLOMAX) 0 4 mg Take 1 capsule (0 4 mg total) by mouth daily 90 capsule 3     No current facility-administered medications for this visit        Current Outpatient Medications on File Prior to Visit   Medication Sig    aspirin (ECOTRIN LOW STRENGTH) 81 mg EC tablet Take 81 mg by mouth daily    atorvastatin (LIPITOR) 40 mg tablet Take 1 tablet (40 mg total) by mouth daily    dutasteride (AVODART) 0 5 mg capsule Take 1 capsule (0 5 mg total) by mouth daily    gabapentin (NEURONTIN) 600 MG tablet Take 1 tablet (600 mg total) by mouth 3 (three) times a day    hydrochlorothiazide (HYDRODIURIL) 12 5 mg tablet Take 1 tablet (12 5 mg total) by mouth daily    insulin detemir (LEVEMIR) 100 units/mL subcutaneous injection Inject 60 Units under the skin 2 (two) times a day    insulin lispro (HUMALOG KWIKPEN) 100 units/mL injection pen Inject 10 Units under the skin 3 (three) times a day with meals    Insulin Pen Needle (NOVOFINE) 32G X 6 MM MISC by Does not apply route 3 (three) times a day    lisinopril (ZESTRIL) 40 mg tablet Take 1 tablet (40 mg total) by mouth daily    meclizine (ANTIVERT) 25 mg tablet Take 1 tablet (25 mg total) by mouth 3 (three) times a day as needed for dizziness    metFORMIN (GLUCOPHAGE) 1000 MG tablet Take 1 tablet (1,000 mg total) by mouth 2 (two) times a day with meals    ONE TOUCH ULTRA TEST test strip 4X daily with insulin injections    sitaGLIPtin (JANUVIA) 100 mg tablet Take 1 tablet (100 mg total) by mouth daily    SUPER B COMPLEX/C PO Take by mouth    tamsulosin (FLOMAX) 0 4 mg Take 1 capsule (0 4 mg total) by mouth daily    [DISCONTINUED] glimepiride (AMARYL) 4 mg tablet Take 2 tablets (8 mg total) by mouth daily with breakfast     No current facility-administered medications on file prior to visit  He has No Known Allergies       Review of Systems   All other systems reviewed and are negative  Objective:      /78   Pulse 84   Ht 5' 8" (1 727 m)   Wt 103 kg (226 lb)   SpO2 97%   BMI 34 36 kg/m²          Physical Exam   Constitutional: He is oriented to person, place, and time  He appears well-developed and well-nourished  Neck: Normal range of motion  Neck supple  Cardiovascular: Normal rate, regular rhythm, normal heart sounds and intact distal pulses  Pulmonary/Chest: Effort normal and breath sounds normal    Abdominal: Soft  Bowel sounds are normal    Musculoskeletal: Normal range of motion  Neurological: He is alert and oriented to person, place, and time  Skin: Skin is warm and dry  Capillary refill takes less than 2 seconds  Psychiatric: He has a normal mood and affect  His behavior is normal  Judgment and thought content normal    Nursing note and vitals reviewed

## 2020-02-24 DIAGNOSIS — E11.9 TYPE 2 DIABETES MELLITUS WITHOUT COMPLICATION, WITHOUT LONG-TERM CURRENT USE OF INSULIN (HCC): ICD-10-CM

## 2020-02-24 RX ORDER — GLIMEPIRIDE 4 MG/1
8 TABLET ORAL
Qty: 180 TABLET | Refills: 3 | Status: SHIPPED | OUTPATIENT
Start: 2020-02-24 | End: 2021-02-08 | Stop reason: SDUPTHER

## 2020-03-31 ENCOUNTER — TELEMEDICINE (OUTPATIENT)
Dept: FAMILY MEDICINE CLINIC | Facility: CLINIC | Age: 70
End: 2020-03-31
Payer: COMMERCIAL

## 2020-03-31 DIAGNOSIS — Z79.4 TYPE 2 DIABETES MELLITUS WITH COMPLICATION, WITH LONG-TERM CURRENT USE OF INSULIN (HCC): Primary | ICD-10-CM

## 2020-03-31 DIAGNOSIS — E66.01 MORBID OBESITY (HCC): ICD-10-CM

## 2020-03-31 DIAGNOSIS — I10 ESSENTIAL HYPERTENSION: ICD-10-CM

## 2020-03-31 DIAGNOSIS — E11.8 TYPE 2 DIABETES MELLITUS WITH COMPLICATION, WITH LONG-TERM CURRENT USE OF INSULIN (HCC): Primary | ICD-10-CM

## 2020-03-31 PROCEDURE — 1160F RVW MEDS BY RX/DR IN RCRD: CPT | Performed by: FAMILY MEDICINE

## 2020-03-31 PROCEDURE — 99214 OFFICE O/P EST MOD 30 MIN: CPT | Performed by: FAMILY MEDICINE

## 2020-04-01 NOTE — ADDENDUM NOTE
Addendum  created 11/01/18 1428 by Darius Li CRNA    Anesthesia Intra LDAs edited, LDA properties accepted Syncope

## 2020-04-22 DIAGNOSIS — E11.9 TYPE 2 DIABETES MELLITUS WITHOUT COMPLICATION, WITHOUT LONG-TERM CURRENT USE OF INSULIN (HCC): ICD-10-CM

## 2020-06-18 DIAGNOSIS — E11.8 TYPE 2 DIABETES MELLITUS WITH COMPLICATION, WITH LONG-TERM CURRENT USE OF INSULIN (HCC): ICD-10-CM

## 2020-06-18 DIAGNOSIS — Z79.4 TYPE 2 DIABETES MELLITUS WITH COMPLICATION, WITH LONG-TERM CURRENT USE OF INSULIN (HCC): ICD-10-CM

## 2020-06-18 RX ORDER — BLOOD SUGAR DIAGNOSTIC
STRIP MISCELLANEOUS
Qty: 1200 EACH | Refills: 3 | Status: SHIPPED | OUTPATIENT
Start: 2020-06-18 | End: 2020-12-10 | Stop reason: SDUPTHER

## 2020-06-29 DIAGNOSIS — E11.9 TYPE 2 DIABETES MELLITUS WITHOUT COMPLICATION, WITHOUT LONG-TERM CURRENT USE OF INSULIN (HCC): ICD-10-CM

## 2020-06-29 DIAGNOSIS — I10 HYPERTENSION, UNSPECIFIED TYPE: ICD-10-CM

## 2020-06-29 PROCEDURE — 4010F ACE/ARB THERAPY RXD/TAKEN: CPT | Performed by: FAMILY MEDICINE

## 2020-06-29 RX ORDER — ATORVASTATIN CALCIUM 40 MG/1
40 TABLET, FILM COATED ORAL DAILY
Qty: 90 TABLET | Refills: 3 | Status: SHIPPED | OUTPATIENT
Start: 2020-06-29 | End: 2021-04-09 | Stop reason: SDUPTHER

## 2020-06-29 RX ORDER — HYDROCHLOROTHIAZIDE 12.5 MG/1
12.5 TABLET ORAL DAILY
Qty: 90 TABLET | Refills: 3 | Status: SHIPPED | OUTPATIENT
Start: 2020-06-29 | End: 2020-09-08 | Stop reason: SDUPTHER

## 2020-06-29 RX ORDER — LISINOPRIL 40 MG/1
40 TABLET ORAL DAILY
Qty: 90 TABLET | Refills: 3 | Status: SHIPPED | OUTPATIENT
Start: 2020-06-29 | End: 2020-11-19 | Stop reason: SDUPTHER

## 2020-07-21 ENCOUNTER — DOCTOR'S OFFICE (OUTPATIENT)
Dept: URBAN - NONMETROPOLITAN AREA CLINIC 1 | Facility: CLINIC | Age: 70
Setting detail: OPHTHALMOLOGY
End: 2020-07-21
Payer: COMMERCIAL

## 2020-07-21 VITALS — HEIGHT: 60 IN

## 2020-07-21 DIAGNOSIS — H25.13: ICD-10-CM

## 2020-07-21 DIAGNOSIS — H40.023: ICD-10-CM

## 2020-07-21 DIAGNOSIS — H35.363: ICD-10-CM

## 2020-07-21 DIAGNOSIS — H44.23: ICD-10-CM

## 2020-07-21 DIAGNOSIS — E11.9: ICD-10-CM

## 2020-07-21 LAB
LEFT EYE DIABETIC RETINOPATHY: NORMAL
RIGHT EYE DIABETIC RETINOPATHY: NORMAL

## 2020-07-21 PROCEDURE — 92014 COMPRE OPH EXAM EST PT 1/>: CPT | Performed by: OPHTHALMOLOGY

## 2020-07-21 PROCEDURE — 92133 CPTRZD OPH DX IMG PST SGM ON: CPT | Performed by: OPHTHALMOLOGY

## 2020-07-21 ASSESSMENT — REFRACTION_CURRENTRX
OD_AXIS: 180
OD_VPRISM_DIRECTION: PROGS
OS_VPRISM_DIRECTION: PROGS
OS_CYLINDER: -2.50
OS_AXIS: 007
OS_SPHERE: -7.75
OS_OVR_VA: 20/
OD_CYLINDER: -1.50
OD_SPHERE: -5.75
OS_OVR_VA: 20/
OS_CYLINDER: -1.00
OS_ADD: +2.00
OD_AXIS: 176
OS_SPHERE: -7.00
OD_ADD: +2.50
OD_OVR_VA: 20/
OD_CYLINDER: -3.00
OD_VPRISM_DIRECTION: PROGS
OS_VPRISM_DIRECTION: PROGS
OD_SPHERE: -5.25
OS_ADD: +2.50
OD_OVR_VA: 20/
OD_ADD: +2.00
OS_AXIS: 010

## 2020-07-21 ASSESSMENT — REFRACTION_MANIFEST
OD_CYLINDER: -1.25
OS_VA1: 20/25-2
OS_ADD: +1.75
OS_AXIS: 010
OD_AXIS: 180
OD_SPHERE: -5.00
OD_ADD: +2.25
OS_ADD: +2.25
OS_CYLINDER: -0.75
OD_VA2: 20/25-2
OD_CYLINDER: -1.25
OS_VA2: 20/25-2
OS_SPHERE: -6.50
OD_VA1: 20/25-2
OD_VA2: 20/25-2
OD_ADD: +1.75
OD_VA1: 20/25-2
OS_CYLINDER: -1.00
OS_AXIS: 010
OS_VA1: 20/25-2
OD_AXIS: 180
OS_SPHERE: -6.50
OS_VA2: 20/25-2
OD_SPHERE: -4.75

## 2020-07-21 ASSESSMENT — REFRACTION_AUTOREFRACTION
OD_CYLINDER: -1.25
OS_AXIS: 101
OD_AXIS: 159
OS_SPHERE: -5.75
OS_CYLINDER: -0.50
OD_SPHERE: -3.75

## 2020-07-21 ASSESSMENT — SPHEQUIV_DERIVED
OS_SPHEQUIV: -7
OD_SPHEQUIV: -5.625
OD_SPHEQUIV: -5.375
OS_SPHEQUIV: -6
OS_SPHEQUIV: -6.875
OD_SPHEQUIV: -4.375

## 2020-07-21 ASSESSMENT — CONFRONTATIONAL VISUAL FIELD TEST (CVF)
OS_FINDINGS: FULL
OD_FINDINGS: FULL

## 2020-07-21 ASSESSMENT — LID POSITION - PTOSIS
OD_PTOSIS: RUL
OS_PTOSIS: LUL

## 2020-07-21 ASSESSMENT — VISUAL ACUITY
OS_BCVA: 20/40+1
OD_BCVA: 20/30+2

## 2020-07-21 ASSESSMENT — LID POSITION - DERMATOCHALASIS
OS_DERMATOCHALASIS: LUL
OD_DERMATOCHALASIS: RUL

## 2020-09-08 DIAGNOSIS — I10 HYPERTENSION, UNSPECIFIED TYPE: ICD-10-CM

## 2020-09-08 RX ORDER — HYDROCHLOROTHIAZIDE 12.5 MG/1
12.5 TABLET ORAL DAILY
Qty: 90 TABLET | Refills: 3 | Status: SHIPPED | OUTPATIENT
Start: 2020-09-08 | End: 2021-09-20 | Stop reason: SDUPTHER

## 2020-11-19 ENCOUNTER — TELEPHONE (OUTPATIENT)
Dept: FAMILY MEDICINE CLINIC | Facility: CLINIC | Age: 70
End: 2020-11-19

## 2020-11-19 DIAGNOSIS — E11.9 TYPE 2 DIABETES MELLITUS WITHOUT COMPLICATION, WITHOUT LONG-TERM CURRENT USE OF INSULIN (HCC): ICD-10-CM

## 2020-11-19 DIAGNOSIS — N40.0 BENIGN PROSTATIC HYPERPLASIA, UNSPECIFIED WHETHER LOWER URINARY TRACT SYMPTOMS PRESENT: ICD-10-CM

## 2020-11-19 DIAGNOSIS — N50.812 TESTICULAR PAIN, LEFT: ICD-10-CM

## 2020-11-19 RX ORDER — DUTASTERIDE 0.5 MG/1
0.5 CAPSULE, LIQUID FILLED ORAL DAILY
Qty: 90 CAPSULE | Refills: 3 | Status: SHIPPED | OUTPATIENT
Start: 2020-11-19 | End: 2021-10-29 | Stop reason: SDUPTHER

## 2020-11-19 RX ORDER — TAMSULOSIN HYDROCHLORIDE 0.4 MG/1
0.4 CAPSULE ORAL DAILY
Qty: 90 CAPSULE | Refills: 3 | Status: SHIPPED | OUTPATIENT
Start: 2020-11-19 | End: 2021-10-29 | Stop reason: SDUPTHER

## 2020-11-19 RX ORDER — GABAPENTIN 600 MG/1
600 TABLET ORAL 3 TIMES DAILY
Qty: 270 TABLET | Refills: 3 | Status: SHIPPED | OUTPATIENT
Start: 2020-11-19 | End: 2021-10-29 | Stop reason: SDUPTHER

## 2020-11-19 RX ORDER — LISINOPRIL 40 MG/1
20 TABLET ORAL DAILY
Qty: 90 TABLET | Refills: 3 | Status: SHIPPED | OUTPATIENT
Start: 2020-11-19 | End: 2021-10-29 | Stop reason: SDUPTHER

## 2020-11-20 ENCOUNTER — VBI (OUTPATIENT)
Dept: ADMINISTRATIVE | Facility: OTHER | Age: 70
End: 2020-11-20

## 2020-12-07 DIAGNOSIS — E11.9 TYPE 2 DIABETES MELLITUS WITHOUT COMPLICATION, WITHOUT LONG-TERM CURRENT USE OF INSULIN (HCC): ICD-10-CM

## 2020-12-10 ENCOUNTER — CLINICAL SUPPORT (OUTPATIENT)
Dept: FAMILY MEDICINE CLINIC | Facility: CLINIC | Age: 70
End: 2020-12-10
Payer: COMMERCIAL

## 2020-12-10 DIAGNOSIS — Z96.41 TYPE 2 DIABETES MELLITUS WITH COMPLICATION, WITH LONG TERM CURRENT USE OF INSULIN PUMP (HCC): ICD-10-CM

## 2020-12-10 DIAGNOSIS — E11.8 TYPE 2 DIABETES MELLITUS WITH COMPLICATION, WITH LONG TERM CURRENT USE OF INSULIN PUMP (HCC): ICD-10-CM

## 2020-12-10 DIAGNOSIS — E11.8 TYPE 2 DIABETES MELLITUS WITH COMPLICATION, WITH LONG-TERM CURRENT USE OF INSULIN (HCC): ICD-10-CM

## 2020-12-10 DIAGNOSIS — Z79.4 TYPE 2 DIABETES MELLITUS WITH COMPLICATION, WITH LONG-TERM CURRENT USE OF INSULIN (HCC): ICD-10-CM

## 2020-12-10 DIAGNOSIS — Z23 NEED FOR VACCINATION: Primary | ICD-10-CM

## 2020-12-10 LAB — SL AMB POCT HEMOGLOBIN AIC: 6.9 (ref ?–6.5)

## 2020-12-10 PROCEDURE — 90662 IIV NO PRSV INCREASED AG IM: CPT

## 2020-12-10 PROCEDURE — 83036 HEMOGLOBIN GLYCOSYLATED A1C: CPT

## 2020-12-10 PROCEDURE — 90471 IMMUNIZATION ADMIN: CPT

## 2020-12-11 RX ORDER — BLOOD SUGAR DIAGNOSTIC
1 STRIP MISCELLANEOUS 4 TIMES DAILY
Qty: 1200 EACH | Refills: 3 | Status: SHIPPED | OUTPATIENT
Start: 2020-12-11 | End: 2021-12-21

## 2021-01-26 ENCOUNTER — DOCTOR'S OFFICE (OUTPATIENT)
Dept: URBAN - NONMETROPOLITAN AREA CLINIC 1 | Facility: CLINIC | Age: 71
Setting detail: OPHTHALMOLOGY
End: 2021-01-26
Payer: COMMERCIAL

## 2021-01-26 VITALS — HEIGHT: 60 IN

## 2021-01-26 DIAGNOSIS — H35.363: ICD-10-CM

## 2021-01-26 DIAGNOSIS — E11.9: ICD-10-CM

## 2021-01-26 DIAGNOSIS — H25.13: ICD-10-CM

## 2021-01-26 DIAGNOSIS — H40.023: ICD-10-CM

## 2021-01-26 DIAGNOSIS — H44.23: ICD-10-CM

## 2021-01-26 LAB
LEFT EYE DIABETIC RETINOPATHY: NORMAL
RIGHT EYE DIABETIC RETINOPATHY: NORMAL

## 2021-01-26 PROCEDURE — 76514 ECHO EXAM OF EYE THICKNESS: CPT | Performed by: OPHTHALMOLOGY

## 2021-01-26 PROCEDURE — 92250 FUNDUS PHOTOGRAPHY W/I&R: CPT | Performed by: OPHTHALMOLOGY

## 2021-01-26 PROCEDURE — 92083 EXTENDED VISUAL FIELD XM: CPT | Performed by: OPHTHALMOLOGY

## 2021-01-26 PROCEDURE — 92014 COMPRE OPH EXAM EST PT 1/>: CPT | Performed by: OPHTHALMOLOGY

## 2021-01-26 ASSESSMENT — REFRACTION_MANIFEST
OD_VA2: 20/25-2
OS_VA2: 20/25-2
OD_CYLINDER: -1.25
OD_SPHERE: -4.75
OD_CYLINDER: -1.25
OD_ADD: +1.75
OS_SPHERE: -6.50
OD_VA2: 20/25-2
OS_AXIS: 010
OS_SPHERE: -6.50
OD_AXIS: 180
OS_VA2: 20/25-2
OS_ADD: +2.25
OD_SPHERE: -5.00
OS_VA1: 20/25-2
OS_CYLINDER: -0.75
OD_VA1: 20/25-2
OS_ADD: +1.75
OS_CYLINDER: -1.00
OD_ADD: +2.25
OD_AXIS: 180
OS_AXIS: 010
OD_VA1: 20/25-2
OS_VA1: 20/25-2

## 2021-01-26 ASSESSMENT — CONFRONTATIONAL VISUAL FIELD TEST (CVF)
OS_FINDINGS: FULL
OD_FINDINGS: FULL

## 2021-01-26 ASSESSMENT — REFRACTION_CURRENTRX
OS_CYLINDER: -2.50
OD_SPHERE: -5.25
OD_SPHERE: -5.75
OS_ADD: +2.50
OD_AXIS: 180
OS_AXIS: 010
OD_CYLINDER: -1.50
OS_OVR_VA: 20/
OD_VPRISM_DIRECTION: PROGS
OS_CYLINDER: -1.00
OD_OVR_VA: 20/
OS_VPRISM_DIRECTION: PROGS
OD_CYLINDER: -3.00
OD_ADD: +2.00
OD_AXIS: 176
OD_OVR_VA: 20/
OS_OVR_VA: 20/
OD_ADD: +2.50
OS_ADD: +2.00
OS_AXIS: 007
OS_VPRISM_DIRECTION: PROGS
OS_SPHERE: -7.75
OS_SPHERE: -7.00
OD_VPRISM_DIRECTION: PROGS

## 2021-01-26 ASSESSMENT — VISUAL ACUITY
OS_BCVA: 20/25
OD_BCVA: 20/25-1

## 2021-01-26 ASSESSMENT — LID POSITION - DERMATOCHALASIS
OD_DERMATOCHALASIS: RUL
OS_DERMATOCHALASIS: LUL

## 2021-01-26 ASSESSMENT — LID POSITION - PTOSIS
OD_PTOSIS: RUL
OS_PTOSIS: LUL

## 2021-01-26 ASSESSMENT — PACHYMETRY
OD_CT_CORRECTION: -5
OS_CT_UM: 623
OD_CT_UM: 613
OS_CT_CORRECTION: -6

## 2021-01-26 ASSESSMENT — TONOMETRY
OS_IOP_MMHG: 21
OD_IOP_MMHG: 19

## 2021-01-26 ASSESSMENT — REFRACTION_AUTOREFRACTION
OS_SPHERE: -5.75
OD_CYLINDER: -1.25
OS_CYLINDER: -0.50
OD_AXIS: 159
OS_AXIS: 101
OD_SPHERE: -3.75

## 2021-01-26 ASSESSMENT — SPHEQUIV_DERIVED
OD_SPHEQUIV: -5.625
OD_SPHEQUIV: -5.375
OS_SPHEQUIV: -6.875
OS_SPHEQUIV: -7
OS_SPHEQUIV: -6
OD_SPHEQUIV: -4.375

## 2021-02-08 DIAGNOSIS — E11.9 TYPE 2 DIABETES MELLITUS WITHOUT COMPLICATION, WITHOUT LONG-TERM CURRENT USE OF INSULIN (HCC): ICD-10-CM

## 2021-02-08 RX ORDER — GLIMEPIRIDE 4 MG/1
8 TABLET ORAL
Qty: 180 TABLET | Refills: 3 | Status: SHIPPED | OUTPATIENT
Start: 2021-02-08 | End: 2022-02-03 | Stop reason: SDUPTHER

## 2021-02-22 ENCOUNTER — TELEPHONE (OUTPATIENT)
Dept: OTHER | Facility: OTHER | Age: 71
End: 2021-02-22

## 2021-02-22 DIAGNOSIS — E11.69 TYPE 2 DIABETES MELLITUS WITH OTHER SPECIFIED COMPLICATION, WITH LONG-TERM CURRENT USE OF INSULIN (HCC): ICD-10-CM

## 2021-02-22 DIAGNOSIS — Z79.4 TYPE 2 DIABETES MELLITUS WITH OTHER SPECIFIED COMPLICATION, WITH LONG-TERM CURRENT USE OF INSULIN (HCC): ICD-10-CM

## 2021-02-22 NOTE — TELEPHONE ENCOUNTER
Pt called to leave a message for the office to give them a call back regarding [facility]

## 2021-03-10 DIAGNOSIS — Z23 ENCOUNTER FOR IMMUNIZATION: ICD-10-CM

## 2021-04-09 ENCOUNTER — OFFICE VISIT (OUTPATIENT)
Dept: FAMILY MEDICINE CLINIC | Facility: CLINIC | Age: 71
End: 2021-04-09
Payer: COMMERCIAL

## 2021-04-09 VITALS
SYSTOLIC BLOOD PRESSURE: 138 MMHG | WEIGHT: 223 LBS | OXYGEN SATURATION: 97 % | BODY MASS INDEX: 33.8 KG/M2 | HEIGHT: 68 IN | HEART RATE: 81 BPM | DIASTOLIC BLOOD PRESSURE: 78 MMHG | TEMPERATURE: 98.1 F

## 2021-04-09 DIAGNOSIS — Z96.41 TYPE 2 DIABETES MELLITUS WITH COMPLICATION, WITH LONG TERM CURRENT USE OF INSULIN PUMP (HCC): ICD-10-CM

## 2021-04-09 DIAGNOSIS — E04.2 MULTIPLE THYROID NODULES: ICD-10-CM

## 2021-04-09 DIAGNOSIS — E21.3 HYPERPARATHYROIDISM (HCC): Primary | ICD-10-CM

## 2021-04-09 DIAGNOSIS — Z79.4 TYPE 2 DIABETES MELLITUS WITH COMPLICATION, WITH LONG-TERM CURRENT USE OF INSULIN (HCC): ICD-10-CM

## 2021-04-09 DIAGNOSIS — E78.49 OTHER HYPERLIPIDEMIA: ICD-10-CM

## 2021-04-09 DIAGNOSIS — E11.9 TYPE 2 DIABETES MELLITUS WITHOUT COMPLICATION, WITHOUT LONG-TERM CURRENT USE OF INSULIN (HCC): ICD-10-CM

## 2021-04-09 DIAGNOSIS — Z23 ENCOUNTER FOR IMMUNIZATION: ICD-10-CM

## 2021-04-09 DIAGNOSIS — E11.8 TYPE 2 DIABETES MELLITUS WITH COMPLICATION, WITH LONG TERM CURRENT USE OF INSULIN PUMP (HCC): ICD-10-CM

## 2021-04-09 DIAGNOSIS — E11.8 TYPE 2 DIABETES MELLITUS WITH COMPLICATION, WITH LONG-TERM CURRENT USE OF INSULIN (HCC): ICD-10-CM

## 2021-04-09 DIAGNOSIS — I10 ESSENTIAL HYPERTENSION: ICD-10-CM

## 2021-04-09 DIAGNOSIS — G47.33 OSA (OBSTRUCTIVE SLEEP APNEA): ICD-10-CM

## 2021-04-09 PROBLEM — E66.01 MORBID OBESITY (HCC): Status: RESOLVED | Noted: 2019-02-11 | Resolved: 2021-04-09

## 2021-04-09 LAB — SL AMB POCT HEMOGLOBIN AIC: 7 (ref ?–6.5)

## 2021-04-09 PROCEDURE — 3725F SCREEN DEPRESSION PERFORMED: CPT | Performed by: FAMILY MEDICINE

## 2021-04-09 PROCEDURE — 3288F FALL RISK ASSESSMENT DOCD: CPT | Performed by: FAMILY MEDICINE

## 2021-04-09 PROCEDURE — 99214 OFFICE O/P EST MOD 30 MIN: CPT | Performed by: FAMILY MEDICINE

## 2021-04-09 PROCEDURE — 1125F AMNT PAIN NOTED PAIN PRSNT: CPT | Performed by: FAMILY MEDICINE

## 2021-04-09 PROCEDURE — 1170F FXNL STATUS ASSESSED: CPT | Performed by: FAMILY MEDICINE

## 2021-04-09 PROCEDURE — 83036 HEMOGLOBIN GLYCOSYLATED A1C: CPT | Performed by: FAMILY MEDICINE

## 2021-04-09 PROCEDURE — 99397 PER PM REEVAL EST PAT 65+ YR: CPT | Performed by: FAMILY MEDICINE

## 2021-04-09 PROCEDURE — 1160F RVW MEDS BY RX/DR IN RCRD: CPT | Performed by: FAMILY MEDICINE

## 2021-04-09 PROCEDURE — 1101F PT FALLS ASSESS-DOCD LE1/YR: CPT | Performed by: FAMILY MEDICINE

## 2021-04-09 PROCEDURE — 3008F BODY MASS INDEX DOCD: CPT | Performed by: FAMILY MEDICINE

## 2021-04-09 PROCEDURE — 1036F TOBACCO NON-USER: CPT | Performed by: FAMILY MEDICINE

## 2021-04-09 PROCEDURE — 3051F HG A1C>EQUAL 7.0%<8.0%: CPT | Performed by: FAMILY MEDICINE

## 2021-04-09 RX ORDER — ATORVASTATIN CALCIUM 40 MG/1
40 TABLET, FILM COATED ORAL DAILY
Qty: 90 TABLET | Refills: 3 | Status: SHIPPED | OUTPATIENT
Start: 2021-04-09 | End: 2022-06-23 | Stop reason: SDUPTHER

## 2021-04-09 NOTE — PATIENT INSTRUCTIONS
Medicare Preventive Visit Patient Instructions  Thank you for completing your Welcome to Medicare Visit or Medicare Annual Wellness Visit today  Your next wellness visit will be due in one year (4/10/2022)  The screening/preventive services that you may require over the next 5-10 years are detailed below  Some tests may not apply to you based off risk factors and/or age  Screening tests ordered at today's visit but not completed yet may show as past due  Also, please note that scanned in results may not display below  Preventive Screenings:  Service Recommendations Previous Testing/Comments   Colorectal Cancer Screening  · Colonoscopy    · Fecal Occult Blood Test (FOBT)/Fecal Immunochemical Test (FIT)  · Fecal DNA/Cologuard Test  · Flexible Sigmoidoscopy Age: 54-65 years old   Colonoscopy: every 10 years (May be performed more frequently if at higher risk)  OR  FOBT/FIT: every 1 year  OR  Cologuard: every 3 years  OR  Sigmoidoscopy: every 5 years  Screening may be recommended earlier than age 48 if at higher risk for colorectal cancer  Also, an individualized decision between you and your healthcare provider will decide whether screening between the ages of 74-80 would be appropriate   Colonoscopy: 02/26/2019  FOBT/FIT: Not on file  Cologuard: Not on file  Sigmoidoscopy: Not on file    Screening Current     Prostate Cancer Screening Individualized decision between patient and health care provider in men between ages of 53-78   Medicare will cover every 12 months beginning on the day after your 50th birthday PSA: No results in last 5 years           Hepatitis C Screening Once for adults born between 1945 and 1965  More frequently in patients at high risk for Hepatitis C Hep C Antibody: 01/12/2018    Screening Current   Diabetes Screening 1-2 times per year if you're at risk for diabetes or have pre-diabetes Fasting glucose: 211 mg/dL   A1C: 6 9    Screening Not Indicated  History Diabetes   Cholesterol Screening Once every 5 years if you don't have a lipid disorder  May order more often based on risk factors  Lipid panel: 02/03/2020    Screening Not Indicated  History Lipid Disorder      Other Preventive Screenings Covered by Medicare:  1  Abdominal Aortic Aneurysm (AAA) Screening: covered once if your at risk  You're considered to be at risk if you have a family history of AAA or a male between the age of 73-68 who smoking at least 100 cigarettes in your lifetime  2  Lung Cancer Screening: covers low dose CT scan once per year if you meet all of the following conditions: (1) Age 50-69; (2) No signs or symptoms of lung cancer; (3) Current smoker or have quit smoking within the last 15 years; (4) You have a tobacco smoking history of at least 30 pack years (packs per day x number of years you smoked); (5) You get a written order from a healthcare provider  3  Glaucoma Screening: covered annually if you're considered high risk: (1) You have diabetes OR (2) Family history of glaucoma OR (3)  aged 48 and older OR (3)  American aged 72 and older  3  Osteoporosis Screening: covered every 2 years if you meet one of the following conditions: (1) Have a vertebral abnormality; (2) On glucocorticoid therapy for more than 3 months; (3) Have primary hyperparathyroidism; (4) On osteoporosis medications and need to assess response to drug therapy  5  HIV Screening: covered annually if you're between the age of 12-76  Also covered annually if you are younger than 13 and older than 72 with risk factors for HIV infection  For pregnant patients, it is covered up to 3 times per pregnancy      Immunizations:  Immunization Recommendations   Influenza Vaccine Annual influenza vaccination during flu season is recommended for all persons aged >= 6 months who do not have contraindications   Pneumococcal Vaccine (Prevnar and Pneumovax)  * Prevnar = PCV13  * Pneumovax = PPSV23 Adults 25-60 years old: 1-3 doses may be recommended based on certain risk factors  Adults 72 years old: Prevnar (PCV13) vaccine recommended followed by Pneumovax (PPSV23) vaccine  If already received PPSV23 since turning 65, then PCV13 recommended at least one year after PPSV23 dose  Hepatitis B Vaccine 3 dose series if at intermediate or high risk (ex: diabetes, end stage renal disease, liver disease)   Tetanus (Td) Vaccine - COST NOT COVERED BY MEDICARE PART B Following completion of primary series, a booster dose should be given every 10 years to maintain immunity against tetanus  Td may also be given as tetanus wound prophylaxis  Tdap Vaccine - COST NOT COVERED BY MEDICARE PART B Recommended at least once for all adults  For pregnant patients, recommended with each pregnancy  Shingles Vaccine (Shingrix) - COST NOT COVERED BY MEDICARE PART B  2 shot series recommended in those aged 48 and above     Health Maintenance Due:      Topic Date Due    Colonoscopy Surveillance  02/26/2022    Colorectal Cancer Screening  02/26/2029    Hepatitis C Screening  Completed     Immunizations Due:      Topic Date Due    DTaP,Tdap,and Td Vaccines (1 - Tdap) Never done    COVID-19 Vaccine (2 - Moderna 2-dose series) 04/07/2021     Advance Directives   What are advance directives? Advance directives are legal documents that state your wishes and plans for medical care  These plans are made ahead of time in case you lose your ability to make decisions for yourself  Advance directives can apply to any medical decision, such as the treatments you want, and if you want to donate organs  What are the types of advance directives? There are many types of advance directives, and each state has rules about how to use them  You may choose a combination of any of the following:  · Living will: This is a written record of the treatment you want  You can also choose which treatments you do not want, which to limit, and which to stop at a certain time   This includes surgery, medicine, IV fluid, and tube feedings  · Durable power of  for healthcare [facility]): This is a written record that states who you want to make healthcare choices for you when you are unable to make them for yourself  This person, called a proxy, is usually a family member or a friend  You may choose more than 1 proxy  · Do not resuscitate (DNR) order:  A DNR order is used in case your heart stops beating or you stop breathing  It is a request not to have certain forms of treatment, such as CPR  A DNR order may be included in other types of advance directives  · Medical directive: This covers the care that you want if you are in a coma, near death, or unable to make decisions for yourself  You can list the treatments you want for each condition  Treatment may include pain medicine, surgery, blood transfusions, dialysis, IV or tube feedings, and a ventilator (breathing machine)  · Values history: This document has questions about your views, beliefs, and how you feel and think about life  This information can help others choose the care that you would choose  Why are advance directives important? An advance directive helps you control your care  Although spoken wishes may be used, it is better to have your wishes written down  Spoken wishes can be misunderstood, or not followed  Treatments may be given even if you do not want them  An advance directive may make it easier for your family to make difficult choices about your care  Fall Prevention    Fall prevention  includes ways to make your home and other areas safer  It also includes ways you can move more carefully to prevent a fall  Health conditions that cause changes in your blood pressure, vision, or muscle strength and coordination may increase your risk for falls  Medicines may also increase your risk for falls if they make you dizzy, weak, or sleepy  Fall prevention tips:   · Stand or sit up slowly      · Use assistive devices as directed  · Wear shoes that fit well and have soles that   · Wear a personal alarm  · Stay active  · Manage your medical conditions  Home Safety Tips:  · Add items to prevent falls in the bathroom  · Keep paths clear  · Install bright lights in your home  · Keep items you use often on shelves within reach  · Paint or place reflective tape on the edges of your stairs  Weight Management   Why it is important to manage your weight:  Being overweight increases your risk of health conditions such as heart disease, high blood pressure, type 2 diabetes, and certain types of cancer  It can also increase your risk for osteoarthritis, sleep apnea, and other respiratory problems  Aim for a slow, steady weight loss  Even a small amount of weight loss can lower your risk of health problems  How to lose weight safely:  A safe and healthy way to lose weight is to eat fewer calories and get regular exercise  You can lose up about 1 pound a week by decreasing the number of calories you eat by 500 calories each day  Healthy meal plan for weight management:  A healthy meal plan includes a variety of foods, contains fewer calories, and helps you stay healthy  A healthy meal plan includes the following:  · Eat whole-grain foods more often  A healthy meal plan should contain fiber  Fiber is the part of grains, fruits, and vegetables that is not broken down by your body  Whole-grain foods are healthy and provide extra fiber in your diet  Some examples of whole-grain foods are whole-wheat breads and pastas, oatmeal, brown rice, and bulgur  · Eat a variety of vegetables every day  Include dark, leafy greens such as spinach, kale, herlinda greens, and mustard greens  Eat yellow and orange vegetables such as carrots, sweet potatoes, and winter squash  · Eat a variety of fruits every day  Choose fresh or canned fruit (canned in its own juice or light syrup) instead of juice   Fruit juice has very little or no fiber  · Eat low-fat dairy foods  Drink fat-free (skim) milk or 1% milk  Eat fat-free yogurt and low-fat cottage cheese  Try low-fat cheeses such as mozzarella and other reduced-fat cheeses  · Choose meat and other protein foods that are low in fat  Choose beans or other legumes such as split peas or lentils  Choose fish, skinless poultry (chicken or turkey), or lean cuts of red meat (beef or pork)  Before you cook meat or poultry, cut off any visible fat  · Use less fat and oil  Try baking foods instead of frying them  Add less fat, such as margarine, sour cream, regular salad dressing and mayonnaise to foods  Eat fewer high-fat foods  Some examples of high-fat foods include french fries, doughnuts, ice cream, and cakes  · Eat fewer sweets  Limit foods and drinks that are high in sugar  This includes candy, cookies, regular soda, and sweetened drinks  Exercise:  Exercise at least 30 minutes per day on most days of the week  Some examples of exercise include walking, biking, dancing, and swimming  You can also fit in more physical activity by taking the stairs instead of the elevator or parking farther away from stores  Ask your healthcare provider about the best exercise plan for you  © Copyright [employer] 2018 Information is for End User's use only and may not be sold, redistributed or otherwise used for commercial purposes   All illustrations and images included in CareNotes® are the copyrighted property of A D A M , Inc  or [address] [place] [address]

## 2021-04-09 NOTE — PROGRESS NOTES
Assessment/Plan:    Hyperparathyroidism ([facility] )  In remission  Type 2 diabetes mellitus with complication, with long-term current use of insulin ([facility])    Lab Results   Component Value Date    HGBA1C 7 0 (A) 04/09/2021   A1c at goal   Stable  Continue current  LILIA (obstructive sleep apnea)   Stable  Continue current  Hypertension   Stable  Continue current  Other hyperlipidemia   Stable  Continue current  Diagnoses and all orders for this visit:    Hyperparathyroidism ([facility] )    Encounter for immunization    Type 2 diabetes mellitus with complication, with long term current use of insulin pump ([place] [address] )  -     POCT hemoglobin A1c    Multiple thyroid nodules    Type 2 diabetes mellitus with complication, with long-term current use of insulin ([facility])    LILIA (obstructive sleep apnea)    Essential hypertension    BMI 33 0-33 9,adult    Other hyperlipidemia    Type 2 diabetes mellitus without complication, without long-term current use of insulin ([facility])  -     atorvastatin (LIPITOR) 40 mg tablet; Take 1 tablet (40 mg total) by mouth daily  -     sitaGLIPtin (Januvia) 100 mg tablet; Take 1 tablet (100 mg total) by mouth daily    Other orders  -     Cancel: TDAP VACCINE GREATER THAN OR EQUAL TO 6YO IM          Subjective:      Patient ID: Yung Villalobos is a 73 y o  male  His A1c is at goal   He has no hypoglycemia or side effects  He is doing well on his current regimne  His lipids are at goal on his current regimen  He is on high-intensity statin therapy  He has no myalgia or muscle weakness  His BP is at goal on his current regimen  He has no CP or SOB  He has no HA or vision changes  He has no PND or orthopnea  The following portions of the patient's history were reviewed and updated as appropriate:   He  has a past medical history of Arthritis, Back pain, Diabetes mellitus ([facility] ), and Hypertension    He   Patient Active Problem List    Diagnosis Date Noted    BMI 33 0-33 9,adult 04/09/2021    History of parathyroidectomy 05/09/2019    Type 2 diabetes mellitus with complication, with long-term current use of insulin ([facility] ) 02/11/2019    Medicare annual wellness visit, subsequent 02/11/2019    LILIA (obstructive sleep apnea) 11/12/2018    Multiple thyroid nodules 09/05/2018    Hyperparathyroidism ([facility] ) 05/08/2018    Other hyperlipidemia 05/08/2018    Hypertension 05/08/2018    BPH with obstruction/lower urinary tract symptoms 10/10/2017    Hypercalciuria 07/24/2017    Knee osteoarthritis 06/10/2014    Prepatellar bursitis 06/10/2014     He  has a past surgical history that includes US guided thyroid biopsy (9/7/2018); Tonsillectomy; Hernia repair (Left, 1998); and pr explore parathyroid glands (Right, 11/1/2018)  His family history includes Arthritis in his mother; Coronary artery disease in his father and mother; Diabetes in his maternal uncle and paternal uncle; Hypertension in his father and mother  He  reports that he has never smoked  He has never used smokeless tobacco  He reports current alcohol use  He reports that he does not use drugs    Current Outpatient Medications   Medication Sig Dispense Refill    aspirin (ECOTRIN LOW STRENGTH) 81 mg EC tablet Take 81 mg by mouth daily      atorvastatin (LIPITOR) 40 mg tablet Take 1 tablet (40 mg total) by mouth daily 90 tablet 3    canagliflozin (Invokana) 100 mg Take 1 tablet (100 mg total) by mouth daily before breakfast 90 tablet 3    dutasteride (AVODART) 0 5 mg capsule Take 1 capsule (0 5 mg total) by mouth daily 90 capsule 3    gabapentin (NEURONTIN) 600 MG tablet Take 1 tablet (600 mg total) by mouth 3 (three) times a day 270 tablet 3    glimepiride (AMARYL) 4 mg tablet Take 2 tablets (8 mg total) by mouth daily with breakfast 180 tablet 3    hydrochlorothiazide (HYDRODIURIL) 12 5 mg tablet Take 1 tablet (12 5 mg total) by mouth daily 90 tablet 3    insulin detemir (Levemir) 100 units/mL subcutaneous injection Inject 60 Units under the skin 2 (two) times a day 10 mL 5    lisinopril (ZESTRIL) 40 mg tablet Take 0 5 tablets (20 mg total) by mouth daily 90 tablet 3    meclizine (ANTIVERT) 25 mg tablet Take 1 tablet (25 mg total) by mouth 3 (three) times a day as needed for dizziness 30 tablet 0    metFORMIN (GLUCOPHAGE) 1000 MG tablet Take 1 tablet (1,000 mg total) by mouth 2 (two) times a day with meals 180 tablet 3    OneTouch Ultra test strip Use 1 each 4 (four) times a day Use as instructed 1200 each 3    sitaGLIPtin (Januvia) 100 mg tablet Take 1 tablet (100 mg total) by mouth daily 90 tablet 3    SUPER B COMPLEX/C PO Take by mouth      tamsulosin (FLOMAX) 0 4 mg Take 1 capsule (0 4 mg total) by mouth daily 90 capsule 3    insulin lispro (HUMALOG KWIKPEN) 100 units/mL injection pen Inject 10 Units under the skin 3 (three) times a day with meals (Patient not taking: Reported on 4/9/2021) 5 pen 5    Insulin Pen Needle (NOVOFINE) 32G X 6 MM MISC by Does not apply route 3 (three) times a day (Patient not taking: Reported on 4/9/2021) 300 each 3     No current facility-administered medications for this visit        Current Outpatient Medications on File Prior to Visit   Medication Sig    aspirin (ECOTRIN LOW STRENGTH) 81 mg EC tablet Take 81 mg by mouth daily    canagliflozin (Invokana) 100 mg Take 1 tablet (100 mg total) by mouth daily before breakfast    dutasteride (AVODART) 0 5 mg capsule Take 1 capsule (0 5 mg total) by mouth daily    gabapentin (NEURONTIN) 600 MG tablet Take 1 tablet (600 mg total) by mouth 3 (three) times a day    glimepiride (AMARYL) 4 mg tablet Take 2 tablets (8 mg total) by mouth daily with breakfast    hydrochlorothiazide (HYDRODIURIL) 12 5 mg tablet Take 1 tablet (12 5 mg total) by mouth daily    insulin detemir (Levemir) 100 units/mL subcutaneous injection Inject 60 Units under the skin 2 (two) times a day    lisinopril (ZESTRIL) 40 mg tablet Take 0 5 tablets (20 mg total) by mouth daily  meclizine (ANTIVERT) 25 mg tablet Take 1 tablet (25 mg total) by mouth 3 (three) times a day as needed for dizziness    metFORMIN (GLUCOPHAGE) 1000 MG tablet Take 1 tablet (1,000 mg total) by mouth 2 (two) times a day with meals    OneTouch Ultra test strip Use 1 each 4 (four) times a day Use as instructed    SUPER B COMPLEX/C PO Take by mouth    tamsulosin (FLOMAX) 0 4 mg Take 1 capsule (0 4 mg total) by mouth daily    [DISCONTINUED] atorvastatin (LIPITOR) 40 mg tablet Take 1 tablet (40 mg total) by mouth daily    [DISCONTINUED] sitaGLIPtin (Januvia) 100 mg tablet Take 1 tablet (100 mg total) by mouth daily    insulin lispro (HUMALOG KWIKPEN) 100 units/mL injection pen Inject 10 Units under the skin 3 (three) times a day with meals (Patient not taking: Reported on 4/9/2021)    Insulin Pen Needle (NOVOFINE) 32G X 6 MM MISC by Does not apply route 3 (three) times a day (Patient not taking: Reported on 4/9/2021)     No current facility-administered medications on file prior to visit  He has No Known Allergies       Review of Systems   All other systems reviewed and are negative  Objective:      /78   Pulse 81   Temp 98 1 °F (36 7 °C)   Ht 5' 8" (1 727 m)   Wt 101 kg (223 lb)   SpO2 97%   BMI 33 91 kg/m²          Physical Exam  Vitals signs and nursing note reviewed  Constitutional:       Appearance: Normal appearance  He is obese  HENT:      Head: Normocephalic and atraumatic  Neck:      Musculoskeletal: Normal range of motion and neck supple  Cardiovascular:      Rate and Rhythm: Normal rate and regular rhythm  Pulses: Normal pulses  Heart sounds: Normal heart sounds  Pulmonary:      Effort: Pulmonary effort is normal       Breath sounds: Normal breath sounds  Abdominal:      General: Abdomen is flat  Bowel sounds are normal    Musculoskeletal: Normal range of motion  Skin:     General: Skin is warm and dry        Capillary Refill: Capillary refill takes less than 2 seconds  Neurological:      General: No focal deficit present  Mental Status: He is alert and oriented to person, place, and time  Mental status is at baseline  Psychiatric:         Mood and Affect: Mood normal          Behavior: Behavior normal          Thought Content: Thought content normal          Judgment: Judgment normal        BMI Counseling: Body mass index is 33 91 kg/m²  The BMI is above normal  Nutrition recommendations include reducing portion sizes, decreasing overall calorie intake, 3-5 servings of fruits/vegetables daily, reducing fast food intake, consuming healthier snacks, decreasing soda and/or juice intake, moderation in carbohydrate intake, increasing intake of lean protein, reducing intake of saturated fat and trans fat and reducing intake of cholesterol  Exercise recommendations include moderate aerobic physical activity for 150 minutes/week, vigorous aerobic physical activity for 75 minutes/week, exercising 3-5 times per week, joining a gym and strength training exercises

## 2021-04-09 NOTE — PROGRESS NOTES
Assessment and Plan:     Problem List Items Addressed This Visit     None      Visit Diagnoses     Encounter for immunization        Type 2 diabetes mellitus with complication, with long term current use of insulin pump ([address] )               Preventive health issues were discussed with patient, and age appropriate screening tests were ordered as noted in patient's After Visit Summary  Personalized health advice and appropriate referrals for health education or preventive services given if needed, as noted in patient's After Visit Summary  History of Present Illness:     Patient presents for Welcome to Medicare visit       Patient Care Team:  Rickey Díaz MD as PCP - General  Rickey Díaz MD as PCP - PCP-[facility]  MD Roly Quintero MD as Surgeon (Surgical Oncology)     Review of Systems:     Review of Systems   Problem List:     Patient Active Problem List   Diagnosis    Hyperparathyroidism [facility])    Other hyperlipidemia    Hypertension    BPH with obstruction/lower urinary tract symptoms    Hypercalciuria    Knee osteoarthritis    Prepatellar bursitis    Multiple thyroid nodules    LILIA (obstructive sleep apnea)    Type 2 diabetes mellitus with complication, with long-term current use of insulin ([address] )    Morbid obesity ([address] )    Medicare annual wellness visit, subsequent    History of parathyroidectomy      Past Medical and Surgical History:     Past Medical History:   Diagnosis Date    Arthritis     Back pain     Diabetes mellitus ([address] )     Hypertension      Past Surgical History:   Procedure Laterality Date    HERNIA REPAIR Left 1998    with mesh to groin    LA EXPLORE PARATHYROID GLANDS Right 11/1/2018    Procedure: MINIMALLY INVASIVE RIGHT PARATHYROIDECTOMY WITH PTH MONITORING;  Surgeon: Roly Guzman MD;  Location: [facility];  Service: Surgical Oncology    TONSILLECTOMY      US GUIDED THYROID BIOPSY  9/7/2018      Family History: Family History   Problem Relation Age of Onset    Hypertension Father     Coronary artery disease Father     Hypertension Mother     Arthritis Mother     Coronary artery disease Mother     Diabetes Maternal Uncle     Diabetes Paternal Uncle       Social History:        Social History     Socioeconomic History    Marital status: /Civil Union     Spouse name: Not on file    Number of children: Not on file    Years of education: Not on file    Highest education level: Not on file   Occupational History    Not on file   Social Needs    Financial resource strain: Not on file    Food insecurity     Worry: Not on file     Inability: Not on file   [employer] needs     Medical: Not on file     Non-medical: Not on file   Tobacco Use    Smoking status: Never Smoker    Smokeless tobacco: Never Used   Substance and Sexual Activity    Alcohol use: Yes     Comment: rarely    Drug use: No    Sexual activity: Yes   Lifestyle    Physical activity     Days per week: Not on file     Minutes per session: Not on file    Stress: Not on file   Relationships    Social connections     Talks on phone: Not on file     Gets together: Not on file     Attends Protestant service: Not on file     Active member of club or organization: Not on file     Attends meetings of clubs or organizations: Not on file     Relationship status: Not on file    Intimate partner violence     Fear of current or ex partner: Not on file     Emotionally abused: Not on file     Physically abused: Not on file     Forced sexual activity: Not on file   Other Topics Concern    Not on file   Social History Narrative    Not on file      Medications and Allergies:     Current Outpatient Medications   Medication Sig Dispense Refill    aspirin (ECOTRIN LOW STRENGTH) 81 mg EC tablet Take 81 mg by mouth daily      atorvastatin (LIPITOR) 40 mg tablet Take 1 tablet (40 mg total) by mouth daily 90 tablet 3    canagliflozin (Invokana) 100 mg Take 1 tablet (100 mg total) by mouth daily before breakfast 90 tablet 3    dutasteride (AVODART) 0 5 mg capsule Take 1 capsule (0 5 mg total) by mouth daily 90 capsule 3    gabapentin (NEURONTIN) 600 MG tablet Take 1 tablet (600 mg total) by mouth 3 (three) times a day 270 tablet 3    glimepiride (AMARYL) 4 mg tablet Take 2 tablets (8 mg total) by mouth daily with breakfast 180 tablet 3    hydrochlorothiazide (HYDRODIURIL) 12 5 mg tablet Take 1 tablet (12 5 mg total) by mouth daily 90 tablet 3    insulin detemir (Levemir) 100 units/mL subcutaneous injection Inject 60 Units under the skin 2 (two) times a day 10 mL 5    insulin lispro (HUMALOG KWIKPEN) 100 units/mL injection pen Inject 10 Units under the skin 3 (three) times a day with meals 5 pen 5    Insulin Pen Needle (NOVOFINE) 32G X 6 MM MISC by Does not apply route 3 (three) times a day 300 each 3    lisinopril (ZESTRIL) 40 mg tablet Take 0 5 tablets (20 mg total) by mouth daily 90 tablet 3    meclizine (ANTIVERT) 25 mg tablet Take 1 tablet (25 mg total) by mouth 3 (three) times a day as needed for dizziness 30 tablet 0    metFORMIN (GLUCOPHAGE) 1000 MG tablet Take 1 tablet (1,000 mg total) by mouth 2 (two) times a day with meals 180 tablet 3    OneTouch Ultra test strip Use 1 each 4 (four) times a day Use as instructed 1200 each 3    sitaGLIPtin (Januvia) 100 mg tablet Take 1 tablet (100 mg total) by mouth daily 90 tablet 3    SUPER B COMPLEX/C PO Take by mouth      tamsulosin (FLOMAX) 0 4 mg Take 1 capsule (0 4 mg total) by mouth daily 90 capsule 3     No current facility-administered medications for this visit        No Known Allergies   Immunizations:     Immunization History   Administered Date(s) Administered    Influenza, high dose seasonal 0 7 mL 11/12/2019, 12/10/2020    Pneumococcal Conjugate 13-Valent 02/11/2019    Pneumococcal Polysaccharide PPV23 01/09/2018    SARS-CoV-2 / COVID-19 mRNA IM ([facility]) 03/10/2021      [facility] Maintenance:         Topic Date Due    Colonoscopy Surveillance  02/26/2022    Colorectal Cancer Screening  02/26/2029    Hepatitis C Screening  Completed         Topic Date Due    DTaP,Tdap,and Td Vaccines (1 - Tdap) Never done    COVID-19 Vaccine (2 - Moderna 2-dose series) 04/07/2021      Medicare Screening Tests and Risk Assessments:     Albertina Bedolla is here for his Subsequent Wellness visit  Last Medicare Wellness visit information reviewed, patient interviewed, no change since last AWV  Health Risk Assessment:   Patient rates overall health as very good  Patient feels that their physical health rating is slightly better  Patient is very satisfied with their life  Eyesight was rated as same  Hearing was rated as same  Patient feels that their emotional and mental health rating is much better  Patients states they are never, rarely angry  Patient states they are never, rarely unusually tired/fatigued  Pain experienced in the last 7 days has been some  Patient's pain rating has been 2/10  Patient states that he has experienced no weight loss or gain in last 6 months  Depression Screening:   PHQ-2 Score: 0      Fall Risk Screening: In the past year, patient has experienced: history of falling in past year    Number of falls: 1  Injured during fall?: No    Feels unsteady when standing or walking?: No    Worried about falling?: No      Home Safety:  Patient does not have trouble with stairs inside or outside of their home  Patient has working smoke alarms and has working carbon monoxide detector  Home safety hazards include: none  Nutrition:   Current diet is Diabetic  Medications:   Patient is currently taking over-the-counter supplements  OTC medications include: see medication list  Patient is able to manage medications       Activities of Daily Living (ADLs)/Instrumental Activities of Daily Living (IADLs):   Walk and transfer into and out of bed and chair?: Yes  Dress and groom yourself?: Yes Bathe or shower yourself?: Yes    Feed yourself? Yes  Do your laundry/housekeeping?: Yes  Manage your money, pay your bills and track your expenses?: Yes  Make your own meals?: Yes    Do your own shopping?: Yes    Previous Hospitalizations:   Any hospitalizations or ED visits within the last 12 months?: No      Advance Care Planning:   Living will: Yes    Advanced directive: Yes      Cognitive Screening:   Provider or family/friend/caregiver concerned regarding cognition?: No    PREVENTIVE SCREENINGS      Cardiovascular Screening:    General: Screening Not Indicated and History Lipid Disorder      Diabetes Screening:     General: Screening Not Indicated and History Diabetes      Colorectal Cancer Screening:     General: Screening Current      Prostate Cancer Screening:    General: Screening Not Indicated      Osteoporosis Screening:    General: Screening Not Indicated      Abdominal Aortic Aneurysm (AAA) Screening:    Risk factors include: age between 73-69 yo        Lung Cancer Screening:     General: Screening Not Indicated      Hepatitis C Screening:    General: Screening Current    Screening, Brief Intervention, and Referral to Treatment (SBIRT)    Screening  Typical number of drinks in a day: 0    Single Item Drug Screening:  How often have you used an illegal drug (including marijuana) or a prescription medication for non-medical reasons in the past year? never    Single Item Drug Screen Score: 0  Interpretation: Negative screen for possible drug use disorder    No exam data present     Physical Exam:     There were no vitals taken for this visit      Physical Exam     Radha James MD

## 2021-04-09 NOTE — PROGRESS NOTES
Assessment/Plan:    Hyperparathyroidism ([facility] [place] [facility] )  In remission  Type 2 diabetes mellitus with complication, with long-term current use of insulin ([facility])    Lab Results   Component Value Date    HGBA1C 7 0 (A) 04/09/2021   A1c at goal   Stable  Continue current  LILIA (obstructive sleep apnea)   Stable  Continue current  Hypertension   Stable  Continue current  Other hyperlipidemia   Stable  Continue current  Diagnoses and all orders for this visit:    Hyperparathyroidism ([facility] [place] [facility] )    Encounter for immunization    Type 2 diabetes mellitus with complication, with long term current use of insulin pump ([place] [id number] )  -     POCT hemoglobin A1c    Multiple thyroid nodules    Type 2 diabetes mellitus with complication, with long-term current use of insulin ([facility])    LILIA (obstructive sleep apnea)    Essential hypertension    BMI 33 0-33 9,adult    Other hyperlipidemia    Type 2 diabetes mellitus without complication, without long-term current use of insulin ([facility])  -     atorvastatin (LIPITOR) 40 mg tablet; Take 1 tablet (40 mg total) by mouth daily  -     sitaGLIPtin (Januvia) 100 mg tablet; Take 1 tablet (100 mg total) by mouth daily    Other orders  -     Cancel: TDAP VACCINE GREATER THAN OR EQUAL TO 8YO IM          Subjective:      Patient ID: Fely Curtis is a 73 y o  male  He has T2DM  His A1c is at goal   He has no side effects or hypoglycemia  His lipids are at goal on his current regimen  He has no myalgia or muscle weakness  He has normal liver function testing  His BP is at goal   He has no HA or vision changes  He has no CP or SOB  He has no PND, orthopnea, or GONZALES  The following portions of the patient's history were reviewed and updated as appropriate:   He  has a past medical history of Arthritis, Back pain, Diabetes mellitus ([facility] [place] [facility] ), and Hypertension    He   Patient Active Problem List    Diagnosis Date Noted    BMI 33 0-33 9,adult 04/09/2021    History of parathyroidectomy 05/09/2019  Type 2 diabetes mellitus with complication, with long-term current use of insulin ([facility] ) 02/11/2019    Medicare annual wellness visit, subsequent 02/11/2019    LILIA (obstructive sleep apnea) 11/12/2018    Multiple thyroid nodules 09/05/2018    Hyperparathyroidism ([facility] ) 05/08/2018    Other hyperlipidemia 05/08/2018    Hypertension 05/08/2018    BPH with obstruction/lower urinary tract symptoms 10/10/2017    Hypercalciuria 07/24/2017    Knee osteoarthritis 06/10/2014    Prepatellar bursitis 06/10/2014     He  has a past surgical history that includes US guided thyroid biopsy (9/7/2018); Tonsillectomy; Hernia repair (Left, 1998); and pr explore parathyroid glands (Right, 11/1/2018)  His family history includes Arthritis in his mother; Coronary artery disease in his father and mother; Diabetes in his maternal uncle and paternal uncle; Hypertension in his father and mother  He  reports that he has never smoked  He has never used smokeless tobacco  He reports current alcohol use  He reports that he does not use drugs    Current Outpatient Medications   Medication Sig Dispense Refill    aspirin (ECOTRIN LOW STRENGTH) 81 mg EC tablet Take 81 mg by mouth daily      atorvastatin (LIPITOR) 40 mg tablet Take 1 tablet (40 mg total) by mouth daily 90 tablet 3    canagliflozin (Invokana) 100 mg Take 1 tablet (100 mg total) by mouth daily before breakfast 90 tablet 3    dutasteride (AVODART) 0 5 mg capsule Take 1 capsule (0 5 mg total) by mouth daily 90 capsule 3    gabapentin (NEURONTIN) 600 MG tablet Take 1 tablet (600 mg total) by mouth 3 (three) times a day 270 tablet 3    glimepiride (AMARYL) 4 mg tablet Take 2 tablets (8 mg total) by mouth daily with breakfast 180 tablet 3    hydrochlorothiazide (HYDRODIURIL) 12 5 mg tablet Take 1 tablet (12 5 mg total) by mouth daily 90 tablet 3    insulin detemir (Levemir) 100 units/mL subcutaneous injection Inject 60 Units under the skin 2 (two) times a day 10 mL 5    lisinopril (ZESTRIL) 40 mg tablet Take 0 5 tablets (20 mg total) by mouth daily 90 tablet 3    meclizine (ANTIVERT) 25 mg tablet Take 1 tablet (25 mg total) by mouth 3 (three) times a day as needed for dizziness 30 tablet 0    metFORMIN (GLUCOPHAGE) 1000 MG tablet Take 1 tablet (1,000 mg total) by mouth 2 (two) times a day with meals 180 tablet 3    OneTouch Ultra test strip Use 1 each 4 (four) times a day Use as instructed 1200 each 3    sitaGLIPtin (Januvia) 100 mg tablet Take 1 tablet (100 mg total) by mouth daily 90 tablet 3    SUPER B COMPLEX/C PO Take by mouth      tamsulosin (FLOMAX) 0 4 mg Take 1 capsule (0 4 mg total) by mouth daily 90 capsule 3    insulin lispro (HUMALOG KWIKPEN) 100 units/mL injection pen Inject 10 Units under the skin 3 (three) times a day with meals (Patient not taking: Reported on 4/9/2021) 5 pen 5    Insulin Pen Needle (NOVOFINE) 32G X 6 MM MISC by Does not apply route 3 (three) times a day (Patient not taking: Reported on 4/9/2021) 300 each 3     No current facility-administered medications for this visit        Current Outpatient Medications on File Prior to Visit   Medication Sig    aspirin (ECOTRIN LOW STRENGTH) 81 mg EC tablet Take 81 mg by mouth daily    canagliflozin (Invokana) 100 mg Take 1 tablet (100 mg total) by mouth daily before breakfast    dutasteride (AVODART) 0 5 mg capsule Take 1 capsule (0 5 mg total) by mouth daily    gabapentin (NEURONTIN) 600 MG tablet Take 1 tablet (600 mg total) by mouth 3 (three) times a day    glimepiride (AMARYL) 4 mg tablet Take 2 tablets (8 mg total) by mouth daily with breakfast    hydrochlorothiazide (HYDRODIURIL) 12 5 mg tablet Take 1 tablet (12 5 mg total) by mouth daily    insulin detemir (Levemir) 100 units/mL subcutaneous injection Inject 60 Units under the skin 2 (two) times a day    lisinopril (ZESTRIL) 40 mg tablet Take 0 5 tablets (20 mg total) by mouth daily    meclizine (ANTIVERT) 25 mg tablet Take 1 tablet (25 mg total) by mouth 3 (three) times a day as needed for dizziness    metFORMIN (GLUCOPHAGE) 1000 MG tablet Take 1 tablet (1,000 mg total) by mouth 2 (two) times a day with meals    OneTouch Ultra test strip Use 1 each 4 (four) times a day Use as instructed    SUPER B COMPLEX/C PO Take by mouth    tamsulosin (FLOMAX) 0 4 mg Take 1 capsule (0 4 mg total) by mouth daily    [DISCONTINUED] atorvastatin (LIPITOR) 40 mg tablet Take 1 tablet (40 mg total) by mouth daily    [DISCONTINUED] sitaGLIPtin (Januvia) 100 mg tablet Take 1 tablet (100 mg total) by mouth daily    insulin lispro (HUMALOG KWIKPEN) 100 units/mL injection pen Inject 10 Units under the skin 3 (three) times a day with meals (Patient not taking: Reported on 4/9/2021)    Insulin Pen Needle (NOVOFINE) 32G X 6 MM MISC by Does not apply route 3 (three) times a day (Patient not taking: Reported on 4/9/2021)     No current facility-administered medications on file prior to visit  He has No Known Allergies       Review of Systems   All other systems reviewed and are negative  Objective:      /78   Pulse 81   Temp 98 1 °F (36 7 °C)   Ht 5' 8" (1 727 m)   Wt 101 kg (223 lb)   SpO2 97%   BMI 33 91 kg/m²          Physical Exam  Vitals signs and nursing note reviewed  Constitutional:       Appearance: Normal appearance  He is obese  Neck:      Musculoskeletal: Normal range of motion and neck supple  Cardiovascular:      Rate and Rhythm: Normal rate and regular rhythm  Pulses: Normal pulses  Heart sounds: Normal heart sounds  Pulmonary:      Effort: Pulmonary effort is normal       Breath sounds: Normal breath sounds  Abdominal:      General: Abdomen is flat  Bowel sounds are normal       Palpations: Abdomen is soft  Musculoskeletal: Normal range of motion  Skin:     General: Skin is warm and dry  Capillary Refill: Capillary refill takes less than 2 seconds  Neurological:      General: No focal deficit present  Mental Status: He is alert and oriented to person, place, and time  Psychiatric:         Mood and Affect: Mood normal          Behavior: Behavior normal          Thought Content:  Thought content normal          Judgment: Judgment normal

## 2021-04-09 NOTE — ASSESSMENT & PLAN NOTE
Lab Results   Component Value Date    HGBA1C 7 0 (A) 04/09/2021   A1c at goal   Stable  Continue current

## 2021-04-18 ENCOUNTER — TELEPHONE (OUTPATIENT)
Dept: ADMINISTRATIVE | Facility: OTHER | Age: 71
End: 2021-04-18

## 2021-04-18 NOTE — TELEPHONE ENCOUNTER
----- Message from Carolina Cabello sent at 4/16/2021 11:16 AM EDT -----  Regarding:  qualityupdate  Diabetic foot exam in chart review

## 2021-04-26 NOTE — TELEPHONE ENCOUNTER
Upon review of the In Basket request we have found/obtained the documentation  After careful review of the document we are unable to complete this request for Diabetic Foot Exam because the documentation does not have the proper verbiage (wording) needed to close the requested care gap(s)  The pedal pulses are missing in documentation  I was able to speak with Wilfredo Diane at the facility and she stated that this is the office note they have for patient  Any additional questions or concerns should be emailed to the Practice Liaisons via [email or web address] email, please do not reply via In Basket      Thank you  Shannan Edmond

## 2021-05-25 DIAGNOSIS — E11.9 TYPE 2 DIABETES MELLITUS WITHOUT COMPLICATION, WITHOUT LONG-TERM CURRENT USE OF INSULIN (HCC): ICD-10-CM

## 2021-05-26 RX ORDER — INSULIN DETEMIR 100 [IU]/ML
INJECTION, SOLUTION SUBCUTANEOUS
Qty: 150 ML | Refills: 3 | Status: SHIPPED | OUTPATIENT
Start: 2021-05-26 | End: 2022-06-23 | Stop reason: SDUPTHER

## 2021-07-06 ENCOUNTER — RA CDI HCC (OUTPATIENT)
Dept: OTHER | Facility: HOSPITAL | Age: 71
End: 2021-07-06

## 2021-07-06 NOTE — PROGRESS NOTES
[facility]  coding opportunities          Chart reviewed, no opportunity found: CHART REVIEWED, NO OPPORTUNITY FOUND                     Patients insurance company: [employer] (Medicare Advantage and Commercial)

## 2021-07-12 ENCOUNTER — OFFICE VISIT (OUTPATIENT)
Dept: FAMILY MEDICINE CLINIC | Facility: CLINIC | Age: 71
End: 2021-07-12
Payer: COMMERCIAL

## 2021-07-12 VITALS
DIASTOLIC BLOOD PRESSURE: 76 MMHG | BODY MASS INDEX: 33.34 KG/M2 | OXYGEN SATURATION: 97 % | HEIGHT: 68 IN | TEMPERATURE: 98 F | WEIGHT: 220 LBS | SYSTOLIC BLOOD PRESSURE: 136 MMHG | HEART RATE: 95 BPM

## 2021-07-12 DIAGNOSIS — G47.33 OSA (OBSTRUCTIVE SLEEP APNEA): ICD-10-CM

## 2021-07-12 DIAGNOSIS — E11.9 TYPE 2 DIABETES MELLITUS WITHOUT COMPLICATION, WITHOUT LONG-TERM CURRENT USE OF INSULIN (HCC): Primary | ICD-10-CM

## 2021-07-12 DIAGNOSIS — I10 ESSENTIAL HYPERTENSION: ICD-10-CM

## 2021-07-12 DIAGNOSIS — M62.838 MUSCLE SPASM: ICD-10-CM

## 2021-07-12 DIAGNOSIS — Z79.4 TYPE 2 DIABETES MELLITUS WITH COMPLICATION, WITH LONG-TERM CURRENT USE OF INSULIN (HCC): ICD-10-CM

## 2021-07-12 DIAGNOSIS — E21.3 HYPERPARATHYROIDISM (HCC): ICD-10-CM

## 2021-07-12 DIAGNOSIS — E11.8 TYPE 2 DIABETES MELLITUS WITH COMPLICATION, WITH LONG-TERM CURRENT USE OF INSULIN (HCC): ICD-10-CM

## 2021-07-12 DIAGNOSIS — E89.2 HISTORY OF PARATHYROIDECTOMY (HCC): ICD-10-CM

## 2021-07-12 DIAGNOSIS — E78.49 OTHER HYPERLIPIDEMIA: ICD-10-CM

## 2021-07-12 LAB — SL AMB POCT HEMOGLOBIN AIC: 7.2 (ref ?–6.5)

## 2021-07-12 PROCEDURE — 3075F SYST BP GE 130 - 139MM HG: CPT | Performed by: FAMILY MEDICINE

## 2021-07-12 PROCEDURE — 83036 HEMOGLOBIN GLYCOSYLATED A1C: CPT | Performed by: FAMILY MEDICINE

## 2021-07-12 PROCEDURE — 99214 OFFICE O/P EST MOD 30 MIN: CPT | Performed by: FAMILY MEDICINE

## 2021-07-12 PROCEDURE — 3078F DIAST BP <80 MM HG: CPT | Performed by: FAMILY MEDICINE

## 2021-07-12 PROCEDURE — 3051F HG A1C>EQUAL 7.0%<8.0%: CPT | Performed by: FAMILY MEDICINE

## 2021-07-12 PROCEDURE — 3008F BODY MASS INDEX DOCD: CPT | Performed by: FAMILY MEDICINE

## 2021-07-12 PROCEDURE — 1160F RVW MEDS BY RX/DR IN RCRD: CPT | Performed by: FAMILY MEDICINE

## 2021-07-12 PROCEDURE — 1036F TOBACCO NON-USER: CPT | Performed by: FAMILY MEDICINE

## 2021-07-12 RX ORDER — TIZANIDINE HYDROCHLORIDE 4 MG/1
4 CAPSULE, GELATIN COATED ORAL 3 TIMES DAILY
Qty: 30 CAPSULE | Refills: 0 | Status: SHIPPED | OUTPATIENT
Start: 2021-07-12 | End: 2021-07-12 | Stop reason: SDUPTHER

## 2021-07-12 RX ORDER — TIZANIDINE HYDROCHLORIDE 4 MG/1
4 CAPSULE, GELATIN COATED ORAL 3 TIMES DAILY
Qty: 30 CAPSULE | Refills: 0 | Status: SHIPPED | OUTPATIENT
Start: 2021-07-12 | End: 2021-10-29

## 2021-07-12 NOTE — PROGRESS NOTES
Assessment/Plan:    No problem-specific Assessment & Plan notes found for this encounter  Diagnoses and all orders for this visit:    Type 2 diabetes mellitus without complication, without long-term current use of insulin ([facility])  -     POCT hemoglobin A1c    Hyperparathyroidism ([facility] )    Type 2 diabetes mellitus with complication, with long-term current use of insulin ([facility])    LILIA (obstructive sleep apnea)    Essential hypertension    Other hyperlipidemia    Muscle spasm  -     TiZANidine (ZANAFLEX) 4 MG capsule; Take 1 capsule (4 mg total) by mouth 3 (three) times a day    History of parathyroidectomy ([facility] )          Subjective:      Patient ID: Chelo Hernández is a 73 y o  male  Hi A1c is fairly well controlled at 7 2% in the office today  He has excessive urination, but it is to be expected with Invokana  Otherwise, he has no side effects or hypoglycemia  His lipids are at goal on his current regimen  He is on high-intensity statin therapy  His BP is at goal on his current regimen which includes lisinopril  He has no cough  He denies CP or SOB  He has no HA or vision changes  He has no PND, orthopnea, or GONZALES  He has a h/o hyperparathyroidism and is s/p parathyroidectomy  He has a symptomatic sebaceous cyst on the right mid back  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 ) 05/09/2019    Type 2 diabetes mellitus with complication, with long-term current use of insulin ([facility] ) 02/11/2019    Medicare annual wellness visit, subsequent 02/11/2019    LILIA (obstructive sleep apnea) 11/12/2018    Multiple thyroid nodules 09/05/2018    Hyperparathyroidism ([facility] ) 05/08/2018    Other hyperlipidemia 05/08/2018    Hypertension 05/08/2018    BPH with obstruction/lower urinary tract symptoms 10/10/2017    Hypercalciuria 07/24/2017    Knee osteoarthritis 06/10/2014    Prepatellar bursitis 06/10/2014     He  has a past surgical history that includes US guided thyroid biopsy (9/7/2018); Tonsillectomy; Hernia repair (Left, 1998); and pr explore parathyroid glands (Right, 11/1/2018)  His family history includes Arthritis in his mother; Coronary artery disease in his father and mother; Diabetes in his maternal uncle and paternal uncle; Hypertension in his father and mother  He  reports that he has never smoked  He has never used smokeless tobacco  He reports current alcohol use  He reports that he does not use drugs    Current Outpatient Medications   Medication Sig Dispense Refill    aspirin (ECOTRIN LOW STRENGTH) 81 mg EC tablet Take 81 mg by mouth daily      atorvastatin (LIPITOR) 40 mg tablet Take 1 tablet (40 mg total) by mouth daily 90 tablet 3    canagliflozin (Invokana) 100 mg Take 1 tablet (100 mg total) by mouth daily before breakfast 90 tablet 3    dutasteride (AVODART) 0 5 mg capsule Take 1 capsule (0 5 mg total) by mouth daily 90 capsule 3    gabapentin (NEURONTIN) 600 MG tablet Take 1 tablet (600 mg total) by mouth 3 (three) times a day 270 tablet 3    glimepiride (AMARYL) 4 mg tablet Take 2 tablets (8 mg total) by mouth daily with breakfast 180 tablet 3    hydrochlorothiazide (HYDRODIURIL) 12 5 mg tablet Take 1 tablet (12 5 mg total) by mouth daily 90 tablet 3    Levemir FlexTouch 100 units/mL injection pen INJECT 60 UNITS UNDER THE SKIN TWICE A  mL 3    lisinopril (ZESTRIL) 40 mg tablet Take 0 5 tablets (20 mg total) by mouth daily 90 tablet 3    meclizine (ANTIVERT) 25 mg tablet Take 1 tablet (25 mg total) by mouth 3 (three) times a day as needed for dizziness 30 tablet 0    metFORMIN (GLUCOPHAGE) 1000 MG tablet Take 1 tablet (1,000 mg total) by mouth 2 (two) times a day with meals 180 tablet 3    OneTouch Ultra test strip Use 1 each 4 (four) times a day Use as instructed 1200 each 3    sitaGLIPtin (Januvia) 100 mg tablet Take 1 tablet (100 mg total) by mouth daily 90 tablet 3    SUPER B COMPLEX/C PO Take by mouth      tamsulosin (FLOMAX) 0 4 mg Take 1 capsule (0 4 mg total) by mouth daily 90 capsule 3    TiZANidine (ZANAFLEX) 4 MG capsule Take 1 capsule (4 mg total) by mouth 3 (three) times a day 30 capsule 0     No current facility-administered medications for this visit       Current Outpatient Medications on File Prior to Visit   Medication Sig    aspirin (ECOTRIN LOW STRENGTH) 81 mg EC tablet Take 81 mg by mouth daily    atorvastatin (LIPITOR) 40 mg tablet Take 1 tablet (40 mg total) by mouth daily    canagliflozin (Invokana) 100 mg Take 1 tablet (100 mg total) by mouth daily before breakfast    dutasteride (AVODART) 0 5 mg capsule Take 1 capsule (0 5 mg total) by mouth daily    gabapentin (NEURONTIN) 600 MG tablet Take 1 tablet (600 mg total) by mouth 3 (three) times a day    glimepiride (AMARYL) 4 mg tablet Take 2 tablets (8 mg total) by mouth daily with breakfast    hydrochlorothiazide (HYDRODIURIL) 12 5 mg tablet Take 1 tablet (12 5 mg total) by mouth daily    Levemir FlexTouch 100 units/mL injection pen INJECT 60 UNITS UNDER THE SKIN TWICE A DAY    lisinopril (ZESTRIL) 40 mg tablet Take 0 5 tablets (20 mg total) by mouth daily    meclizine (ANTIVERT) 25 mg tablet Take 1 tablet (25 mg total) by mouth 3 (three) times a day as needed for dizziness    metFORMIN (GLUCOPHAGE) 1000 MG tablet Take 1 tablet (1,000 mg total) by mouth 2 (two) times a day with meals    OneTouch Ultra test strip Use 1 each 4 (four) times a day Use as instructed    sitaGLIPtin (Januvia) 100 mg tablet Take 1 tablet (100 mg total) by mouth daily    SUPER B COMPLEX/C PO Take by mouth    tamsulosin (FLOMAX) 0 4 mg Take 1 capsule (0 4 mg total) by mouth daily    [DISCONTINUED] insulin lispro (HUMALOG KWIKPEN) 100 units/mL injection pen Inject 10 Units under the skin 3 (three) times a day with meals (Patient not taking: Reported on 4/9/2021)    [DISCONTINUED] Insulin Pen Needle (NOVOFINE) 32G X 6 MM MISC by Does not apply route 3 (three) times a day (Patient not taking: Reported on 4/9/2021)     No current facility-administered medications on file prior to visit  He has No Known Allergies       Review of Systems   All other systems reviewed and are negative  Objective:      /76   Pulse 95   Temp 98 °F (36 7 °C)   Ht 5' 8" (1 727 m)   Wt 99 8 kg (220 lb)   SpO2 97%   BMI 33 45 kg/m²          Physical Exam  Vitals and nursing note reviewed  Constitutional:       Appearance: Normal appearance  He is obese  HENT:      Head: Normocephalic and atraumatic  Cardiovascular:      Rate and Rhythm: Normal rate and regular rhythm  Pulses: Normal pulses  Heart sounds: Normal heart sounds  Pulmonary:      Effort: Pulmonary effort is normal       Breath sounds: Normal breath sounds  Abdominal:      General: Abdomen is flat  Bowel sounds are normal       Palpations: Abdomen is soft  Musculoskeletal:         General: Normal range of motion  Cervical back: Normal range of motion and neck supple  Skin:     General: Skin is warm and dry  Capillary Refill: Capillary refill takes less than 2 seconds  Neurological:      General: No focal deficit present  Mental Status: He is alert and oriented to person, place, and time  Mental status is at baseline  Psychiatric:         Mood and Affect: Mood normal          Behavior: Behavior normal          Thought Content:  Thought content normal          Judgment: Judgment normal

## 2021-08-03 ENCOUNTER — DOCTOR'S OFFICE (OUTPATIENT)
Dept: URBAN - NONMETROPOLITAN AREA CLINIC 1 | Facility: CLINIC | Age: 71
Setting detail: OPHTHALMOLOGY
End: 2021-08-03
Payer: COMMERCIAL

## 2021-08-03 VITALS — HEIGHT: 60 IN

## 2021-08-03 DIAGNOSIS — E11.9: ICD-10-CM

## 2021-08-03 DIAGNOSIS — H40.023: ICD-10-CM

## 2021-08-03 DIAGNOSIS — H44.23: ICD-10-CM

## 2021-08-03 DIAGNOSIS — H25.13: ICD-10-CM

## 2021-08-03 DIAGNOSIS — H35.363: ICD-10-CM

## 2021-08-03 PROCEDURE — 92133 CPTRZD OPH DX IMG PST SGM ON: CPT | Performed by: OPHTHALMOLOGY

## 2021-08-03 PROCEDURE — 92014 COMPRE OPH EXAM EST PT 1/>: CPT | Performed by: OPHTHALMOLOGY

## 2021-08-03 ASSESSMENT — LID POSITION - PTOSIS
OS_PTOSIS: LUL
OD_PTOSIS: RUL

## 2021-08-03 ASSESSMENT — REFRACTION_AUTOREFRACTION
OD_SPHERE: -3.75
OD_CYLINDER: -1.25
OS_SPHERE: -5.75
OS_AXIS: 101
OD_AXIS: 159
OS_CYLINDER: -0.50

## 2021-08-03 ASSESSMENT — REFRACTION_CURRENTRX
OS_CYLINDER: -1.00
OD_ADD: +2.00
OS_AXIS: 007
OS_ADD: +2.50
OD_AXIS: 180
OS_CYLINDER: -2.50
OD_ADD: +2.50
OD_AXIS: 176
OD_VPRISM_DIRECTION: PROGS
OD_CYLINDER: -3.00
OS_AXIS: 010
OD_OVR_VA: 20/
OD_OVR_VA: 20/
OS_SPHERE: -7.75
OS_SPHERE: -7.00
OS_VPRISM_DIRECTION: PROGS
OS_OVR_VA: 20/
OS_OVR_VA: 20/
OS_VPRISM_DIRECTION: PROGS
OS_ADD: +2.00
OD_SPHERE: -5.25
OD_CYLINDER: -1.50
OD_VPRISM_DIRECTION: PROGS
OD_SPHERE: -5.75

## 2021-08-03 ASSESSMENT — SPHEQUIV_DERIVED
OD_SPHEQUIV: -5.375
OS_SPHEQUIV: -6
OS_SPHEQUIV: -6.875
OD_SPHEQUIV: -5.625
OD_SPHEQUIV: -4.375
OS_SPHEQUIV: -7

## 2021-08-03 ASSESSMENT — REFRACTION_MANIFEST
OS_ADD: +1.75
OD_VA1: 20/25-2
OD_ADD: +1.75
OS_VA2: 20/25-2
OD_VA2: 20/25-2
OS_SPHERE: -6.50
OS_VA1: 20/25-2
OD_VA2: 20/25-2
OS_AXIS: 010
OS_CYLINDER: -0.75
OD_AXIS: 180
OD_SPHERE: -4.75
OS_CYLINDER: -1.00
OD_VA1: 20/25-2
OS_VA1: 20/25-2
OS_AXIS: 010
OD_CYLINDER: -1.25
OD_CYLINDER: -1.25
OS_SPHERE: -6.50
OD_AXIS: 180
OS_ADD: +2.25
OS_VA2: 20/25-2
OD_ADD: +2.25
OD_SPHERE: -5.00

## 2021-08-03 ASSESSMENT — TONOMETRY
OD_IOP_MMHG: 17
OS_IOP_MMHG: 16

## 2021-08-03 ASSESSMENT — PACHYMETRY
OS_CT_UM: 623
OS_CT_CORRECTION: -6
OD_CT_CORRECTION: -5
OD_CT_UM: 613

## 2021-08-03 ASSESSMENT — CONFRONTATIONAL VISUAL FIELD TEST (CVF)
OS_FINDINGS: FULL
OD_FINDINGS: FULL

## 2021-08-03 ASSESSMENT — LID POSITION - DERMATOCHALASIS
OD_DERMATOCHALASIS: RUL
OS_DERMATOCHALASIS: LUL

## 2021-08-03 ASSESSMENT — VISUAL ACUITY
OD_BCVA: 20/25-2
OS_BCVA: 20/25+1

## 2021-09-02 ENCOUNTER — APPOINTMENT (OUTPATIENT)
Dept: LAB | Facility: MEDICAL CENTER | Age: 71
End: 2021-09-02
Payer: COMMERCIAL

## 2021-09-02 DIAGNOSIS — G47.33 OSA (OBSTRUCTIVE SLEEP APNEA): ICD-10-CM

## 2021-09-02 DIAGNOSIS — E11.8 TYPE 2 DIABETES MELLITUS WITH COMPLICATION, WITH LONG-TERM CURRENT USE OF INSULIN (HCC): ICD-10-CM

## 2021-09-02 DIAGNOSIS — E78.49 OTHER HYPERLIPIDEMIA: ICD-10-CM

## 2021-09-02 DIAGNOSIS — I10 ESSENTIAL HYPERTENSION: ICD-10-CM

## 2021-09-02 DIAGNOSIS — M62.838 MUSCLE SPASM: ICD-10-CM

## 2021-09-02 DIAGNOSIS — E89.2 HISTORY OF PARATHYROIDECTOMY (HCC): ICD-10-CM

## 2021-09-02 DIAGNOSIS — Z79.4 TYPE 2 DIABETES MELLITUS WITH COMPLICATION, WITH LONG-TERM CURRENT USE OF INSULIN (HCC): ICD-10-CM

## 2021-09-02 DIAGNOSIS — E21.3 HYPERPARATHYROIDISM (HCC): ICD-10-CM

## 2021-09-02 DIAGNOSIS — E11.9 TYPE 2 DIABETES MELLITUS WITHOUT COMPLICATION, WITHOUT LONG-TERM CURRENT USE OF INSULIN (HCC): ICD-10-CM

## 2021-09-02 LAB
ALBUMIN SERPL BCP-MCNC: 3.5 G/DL (ref 3.5–5)
ALP SERPL-CCNC: 78 U/L (ref 46–116)
ALT SERPL W P-5'-P-CCNC: 66 U/L (ref 12–78)
ANION GAP SERPL CALCULATED.3IONS-SCNC: 3 MMOL/L (ref 4–13)
AST SERPL W P-5'-P-CCNC: 36 U/L (ref 5–45)
BILIRUB SERPL-MCNC: 1.43 MG/DL (ref 0.2–1)
BUN SERPL-MCNC: 12 MG/DL (ref 5–25)
CALCIUM SERPL-MCNC: 8.6 MG/DL (ref 8.3–10.1)
CHLORIDE SERPL-SCNC: 109 MMOL/L (ref 100–108)
CHOLEST SERPL-MCNC: 144 MG/DL (ref 50–200)
CO2 SERPL-SCNC: 25 MMOL/L (ref 21–32)
CREAT SERPL-MCNC: 0.64 MG/DL (ref 0.6–1.3)
CREAT UR-MCNC: 77.2 MG/DL
GFR SERPL CREATININE-BSD FRML MDRD: 100 ML/MIN/1.73SQ M
GLUCOSE P FAST SERPL-MCNC: 151 MG/DL (ref 65–99)
HDLC SERPL-MCNC: 36 MG/DL
LDLC SERPL CALC-MCNC: 75 MG/DL (ref 0–100)
MICROALBUMIN UR-MCNC: 132 MG/L (ref 0–20)
MICROALBUMIN/CREAT 24H UR: 171 MG/G CREATININE (ref 0–30)
NONHDLC SERPL-MCNC: 108 MG/DL
POTASSIUM SERPL-SCNC: 3.9 MMOL/L (ref 3.5–5.3)
PROT SERPL-MCNC: 7.2 G/DL (ref 6.4–8.2)
SODIUM SERPL-SCNC: 137 MMOL/L (ref 136–145)
TRIGL SERPL-MCNC: 164 MG/DL
TSH SERPL DL<=0.05 MIU/L-ACNC: 1.93 UIU/ML (ref 0.36–3.74)

## 2021-09-02 PROCEDURE — 82043 UR ALBUMIN QUANTITATIVE: CPT | Performed by: FAMILY MEDICINE

## 2021-09-02 PROCEDURE — 82570 ASSAY OF URINE CREATININE: CPT | Performed by: FAMILY MEDICINE

## 2021-09-02 PROCEDURE — 80061 LIPID PANEL: CPT

## 2021-09-02 PROCEDURE — 84443 ASSAY THYROID STIM HORMONE: CPT

## 2021-09-02 PROCEDURE — 3060F POS MICROALBUMINURIA REV: CPT | Performed by: FAMILY MEDICINE

## 2021-09-02 PROCEDURE — 80053 COMPREHEN METABOLIC PANEL: CPT

## 2021-09-02 PROCEDURE — 36415 COLL VENOUS BLD VENIPUNCTURE: CPT

## 2021-09-20 DIAGNOSIS — I10 HYPERTENSION, UNSPECIFIED TYPE: ICD-10-CM

## 2021-09-20 RX ORDER — HYDROCHLOROTHIAZIDE 12.5 MG/1
12.5 TABLET ORAL DAILY
Qty: 90 TABLET | Refills: 3 | Status: SHIPPED | OUTPATIENT
Start: 2021-09-20 | End: 2022-02-03 | Stop reason: SDUPTHER

## 2021-10-29 ENCOUNTER — OFFICE VISIT (OUTPATIENT)
Dept: FAMILY MEDICINE CLINIC | Facility: CLINIC | Age: 71
End: 2021-10-29
Payer: COMMERCIAL

## 2021-10-29 VITALS
HEART RATE: 89 BPM | HEIGHT: 68 IN | TEMPERATURE: 98 F | BODY MASS INDEX: 33.19 KG/M2 | WEIGHT: 219 LBS | OXYGEN SATURATION: 97 % | SYSTOLIC BLOOD PRESSURE: 144 MMHG | DIASTOLIC BLOOD PRESSURE: 78 MMHG

## 2021-10-29 DIAGNOSIS — I10 PRIMARY HYPERTENSION: ICD-10-CM

## 2021-10-29 DIAGNOSIS — E21.3 HYPERPARATHYROIDISM (HCC): ICD-10-CM

## 2021-10-29 DIAGNOSIS — Z79.4 TYPE 2 DIABETES MELLITUS WITH COMPLICATION, WITH LONG-TERM CURRENT USE OF INSULIN (HCC): Primary | ICD-10-CM

## 2021-10-29 DIAGNOSIS — N40.0 BENIGN PROSTATIC HYPERPLASIA, UNSPECIFIED WHETHER LOWER URINARY TRACT SYMPTOMS PRESENT: ICD-10-CM

## 2021-10-29 DIAGNOSIS — N50.812 TESTICULAR PAIN, LEFT: ICD-10-CM

## 2021-10-29 DIAGNOSIS — Z23 NEEDS FLU SHOT: ICD-10-CM

## 2021-10-29 DIAGNOSIS — Z23 NEED FOR SHINGLES VACCINE: ICD-10-CM

## 2021-10-29 DIAGNOSIS — E78.49 OTHER HYPERLIPIDEMIA: ICD-10-CM

## 2021-10-29 DIAGNOSIS — G47.33 OSA (OBSTRUCTIVE SLEEP APNEA): ICD-10-CM

## 2021-10-29 DIAGNOSIS — E89.2 HISTORY OF PARATHYROIDECTOMY (HCC): ICD-10-CM

## 2021-10-29 DIAGNOSIS — E11.9 TYPE 2 DIABETES MELLITUS WITHOUT COMPLICATION, WITHOUT LONG-TERM CURRENT USE OF INSULIN (HCC): ICD-10-CM

## 2021-10-29 DIAGNOSIS — E11.8 TYPE 2 DIABETES MELLITUS WITH COMPLICATION, WITH LONG-TERM CURRENT USE OF INSULIN (HCC): Primary | ICD-10-CM

## 2021-10-29 LAB — SL AMB POCT HEMOGLOBIN AIC: 6.7 (ref ?–6.5)

## 2021-10-29 PROCEDURE — 90662 IIV NO PRSV INCREASED AG IM: CPT | Performed by: FAMILY MEDICINE

## 2021-10-29 PROCEDURE — 83036 HEMOGLOBIN GLYCOSYLATED A1C: CPT | Performed by: FAMILY MEDICINE

## 2021-10-29 PROCEDURE — G0008 ADMIN INFLUENZA VIRUS VAC: HCPCS | Performed by: FAMILY MEDICINE

## 2021-10-29 PROCEDURE — 1123F ACP DISCUSS/DSCN MKR DOCD: CPT | Performed by: FAMILY MEDICINE

## 2021-10-29 PROCEDURE — 99214 OFFICE O/P EST MOD 30 MIN: CPT | Performed by: FAMILY MEDICINE

## 2021-10-29 RX ORDER — DUTASTERIDE 0.5 MG/1
0.5 CAPSULE, LIQUID FILLED ORAL DAILY
Qty: 90 CAPSULE | Refills: 3 | Status: SHIPPED | OUTPATIENT
Start: 2021-10-29

## 2021-10-29 RX ORDER — LISINOPRIL 40 MG/1
20 TABLET ORAL DAILY
Qty: 90 TABLET | Refills: 3 | Status: SHIPPED | OUTPATIENT
Start: 2021-10-29

## 2021-10-29 RX ORDER — ZOSTER VACCINE RECOMBINANT, ADJUVANTED 50 MCG/0.5
0.5 KIT INTRAMUSCULAR ONCE
Qty: 1 EACH | Refills: 1 | Status: SHIPPED | OUTPATIENT
Start: 2021-10-29 | End: 2021-10-29

## 2021-10-29 RX ORDER — GABAPENTIN 600 MG/1
600 TABLET ORAL 3 TIMES DAILY
Qty: 270 TABLET | Refills: 3 | Status: SHIPPED | OUTPATIENT
Start: 2021-10-29

## 2021-10-29 RX ORDER — TAMSULOSIN HYDROCHLORIDE 0.4 MG/1
0.4 CAPSULE ORAL DAILY
Qty: 90 CAPSULE | Refills: 3 | Status: SHIPPED | OUTPATIENT
Start: 2021-10-29

## 2021-12-20 DIAGNOSIS — E11.8 TYPE 2 DIABETES MELLITUS WITH COMPLICATION, WITH LONG-TERM CURRENT USE OF INSULIN (HCC): ICD-10-CM

## 2021-12-20 DIAGNOSIS — Z79.4 TYPE 2 DIABETES MELLITUS WITH COMPLICATION, WITH LONG-TERM CURRENT USE OF INSULIN (HCC): ICD-10-CM

## 2021-12-21 RX ORDER — BLOOD SUGAR DIAGNOSTIC
STRIP MISCELLANEOUS
Qty: 100 STRIP | Refills: 3 | Status: SHIPPED | OUTPATIENT
Start: 2021-12-21 | End: 2022-02-03 | Stop reason: SDUPTHER

## 2022-01-04 ENCOUNTER — APPOINTMENT (OUTPATIENT)
Dept: LAB | Facility: CLINIC | Age: 72
End: 2022-01-04
Payer: COMMERCIAL

## 2022-01-04 DIAGNOSIS — I10 PRIMARY HYPERTENSION: ICD-10-CM

## 2022-01-04 DIAGNOSIS — E21.3 HYPERPARATHYROIDISM (HCC): ICD-10-CM

## 2022-01-04 DIAGNOSIS — Z79.4 TYPE 2 DIABETES MELLITUS WITH COMPLICATION, WITH LONG-TERM CURRENT USE OF INSULIN (HCC): ICD-10-CM

## 2022-01-04 DIAGNOSIS — E78.49 OTHER HYPERLIPIDEMIA: ICD-10-CM

## 2022-01-04 DIAGNOSIS — E11.9 TYPE 2 DIABETES MELLITUS WITHOUT COMPLICATION, WITHOUT LONG-TERM CURRENT USE OF INSULIN (HCC): ICD-10-CM

## 2022-01-04 DIAGNOSIS — G47.33 OSA (OBSTRUCTIVE SLEEP APNEA): ICD-10-CM

## 2022-01-04 DIAGNOSIS — E11.8 TYPE 2 DIABETES MELLITUS WITH COMPLICATION, WITH LONG-TERM CURRENT USE OF INSULIN (HCC): ICD-10-CM

## 2022-01-04 DIAGNOSIS — E89.2 HISTORY OF PARATHYROIDECTOMY (HCC): ICD-10-CM

## 2022-01-04 LAB
ALBUMIN SERPL BCP-MCNC: 3.7 G/DL (ref 3.5–5)
ALP SERPL-CCNC: 75 U/L (ref 46–116)
ALT SERPL W P-5'-P-CCNC: 44 U/L (ref 12–78)
ANION GAP SERPL CALCULATED.3IONS-SCNC: 2 MMOL/L (ref 4–13)
AST SERPL W P-5'-P-CCNC: 22 U/L (ref 5–45)
BILIRUB SERPL-MCNC: 1.29 MG/DL (ref 0.2–1)
BUN SERPL-MCNC: 11 MG/DL (ref 5–25)
CALCIUM SERPL-MCNC: 8.7 MG/DL (ref 8.3–10.1)
CHLORIDE SERPL-SCNC: 109 MMOL/L (ref 100–108)
CHOLEST SERPL-MCNC: 153 MG/DL
CO2 SERPL-SCNC: 27 MMOL/L (ref 21–32)
CREAT SERPL-MCNC: 0.67 MG/DL (ref 0.6–1.3)
EST. AVERAGE GLUCOSE BLD GHB EST-MCNC: 146 MG/DL
GFR SERPL CREATININE-BSD FRML MDRD: 98 ML/MIN/1.73SQ M
GLUCOSE P FAST SERPL-MCNC: 121 MG/DL (ref 65–99)
HBA1C MFR BLD: 6.7 %
HDLC SERPL-MCNC: 39 MG/DL
LDLC SERPL CALC-MCNC: 91 MG/DL (ref 0–100)
NONHDLC SERPL-MCNC: 114 MG/DL
POTASSIUM SERPL-SCNC: 4.1 MMOL/L (ref 3.5–5.3)
PROT SERPL-MCNC: 7.3 G/DL (ref 6.4–8.2)
SODIUM SERPL-SCNC: 138 MMOL/L (ref 136–145)
TRIGL SERPL-MCNC: 113 MG/DL
TSH SERPL DL<=0.05 MIU/L-ACNC: 2.29 UIU/ML (ref 0.36–3.74)

## 2022-01-04 PROCEDURE — 84443 ASSAY THYROID STIM HORMONE: CPT

## 2022-01-04 PROCEDURE — 36415 COLL VENOUS BLD VENIPUNCTURE: CPT

## 2022-01-04 PROCEDURE — 80061 LIPID PANEL: CPT

## 2022-01-04 PROCEDURE — 83036 HEMOGLOBIN GLYCOSYLATED A1C: CPT

## 2022-01-04 PROCEDURE — 80053 COMPREHEN METABOLIC PANEL: CPT

## 2022-01-27 ENCOUNTER — RA CDI HCC (OUTPATIENT)
Dept: OTHER | Facility: HOSPITAL | Age: 72
End: 2022-01-27

## 2022-01-27 NOTE — PROGRESS NOTES
[facility]  coding opportunities          Number of diagnosis code(s) already on the problem list added to FYI flag: 3     Chart Reviewed * (Number of) Inbasket suggestions sent to Provider: 2                  Patients insurance company: [employer])           1) E11 36: Type 2 diabetes mellitus with diabetic cataract ([facility] )  H25 13 Age-related nuclear cataract, bilateral   ---- Per ICD 10 coding guidelines, cataracts in diabetic patients should be coded as linked conditions    2) Refer to UACR dated 9/2/21  E11 29, R80 9  T2DM with other diabetic kidney complication, proteinuria, unspecified ([facility] )      With the beginning of the new year, this is a reminder to address ALL HCC (risk adjustment) codes for 2022 as patient scores reset to zero SHERRY   1)E21 3 Hyperparathyroidism (HCC)used  2)E11 8, Z79 4 Type 2 diabetes mellitus with complication, with long-term current use of insulin (HCC)used  [facility]  coding opportunities          Number of diagnosis code(s) already on the problem list added to FYI flag: 3     Chart Reviewed * (Number of) Inbasket suggestions sent to Provider: 2            Number of suggestions used: 2      Number of suggestions NOT actually used: 3     Patients insurance company: [employer])     Visit status: Patient arrived for their scheduled appointment     Provider never responded to [facility]  coding request       3)E89 2 History of parathyroidectomy (HCC)used

## 2022-02-01 ENCOUNTER — DOCTOR'S OFFICE (OUTPATIENT)
Dept: URBAN - NONMETROPOLITAN AREA CLINIC 1 | Facility: CLINIC | Age: 72
Setting detail: OPHTHALMOLOGY
End: 2022-02-01
Payer: COMMERCIAL

## 2022-02-01 DIAGNOSIS — H25.13: ICD-10-CM

## 2022-02-01 DIAGNOSIS — E11.9: ICD-10-CM

## 2022-02-01 DIAGNOSIS — H40.023: ICD-10-CM

## 2022-02-01 DIAGNOSIS — H44.23: ICD-10-CM

## 2022-02-01 DIAGNOSIS — H35.363: ICD-10-CM

## 2022-02-01 PROCEDURE — 76514 ECHO EXAM OF EYE THICKNESS: CPT | Performed by: OPHTHALMOLOGY

## 2022-02-01 PROCEDURE — 92134 CPTRZ OPH DX IMG PST SGM RTA: CPT | Performed by: OPHTHALMOLOGY

## 2022-02-01 PROCEDURE — 92083 EXTENDED VISUAL FIELD XM: CPT | Performed by: OPHTHALMOLOGY

## 2022-02-01 PROCEDURE — 99214 OFFICE O/P EST MOD 30 MIN: CPT | Performed by: OPHTHALMOLOGY

## 2022-02-01 ASSESSMENT — REFRACTION_MANIFEST
OD_AXIS: 180
OD_VA2: 20/25-2
OD_CYLINDER: -1.25
OS_VA2: 20/25-2
OS_VA1: 20/25-2
OD_SPHERE: -4.75
OS_CYLINDER: -1.00
OD_VA1: 20/25-2
OD_ADD: +1.75
OS_CYLINDER: -0.75
OD_ADD: +2.25
OD_CYLINDER: -1.25
OD_AXIS: 180
OS_ADD: +2.25
OS_VA2: 20/25-2
OS_SPHERE: -6.50
OS_ADD: +1.75
OS_AXIS: 010
OS_SPHERE: -6.50
OS_VA1: 20/25-2
OD_VA2: 20/25-2
OS_AXIS: 010
OD_VA1: 20/25-2
OD_SPHERE: -5.00

## 2022-02-01 ASSESSMENT — LID POSITION - DERMATOCHALASIS
OD_DERMATOCHALASIS: RUL
OS_DERMATOCHALASIS: LUL

## 2022-02-01 ASSESSMENT — SPHEQUIV_DERIVED
OS_SPHEQUIV: -7
OD_SPHEQUIV: -5.375
OD_SPHEQUIV: -5.625
OD_SPHEQUIV: -5.25
OS_SPHEQUIV: -7.5
OS_SPHEQUIV: -6.875

## 2022-02-01 ASSESSMENT — REFRACTION_CURRENTRX
OD_ADD: +2.00
OD_CYLINDER: -1.50
OS_ADD: +2.50
OD_OVR_VA: 20/
OS_OVR_VA: 20/
OD_OVR_VA: 20/
OS_VPRISM_DIRECTION: PROGS
OS_AXIS: 010
OD_ADD: +2.50
OD_AXIS: 180
OS_OVR_VA: 20/
OD_VPRISM_DIRECTION: PROGS
OS_ADD: +2.00
OD_AXIS: 176
OS_AXIS: 007
OS_CYLINDER: -2.50
OD_CYLINDER: -3.00
OS_SPHERE: -7.00
OS_SPHERE: -7.75
OS_VPRISM_DIRECTION: PROGS
OD_SPHERE: -5.25
OS_CYLINDER: -1.00
OD_VPRISM_DIRECTION: PROGS
OD_SPHERE: -5.75

## 2022-02-01 ASSESSMENT — REFRACTION_AUTOREFRACTION
OD_AXIS: 171
OS_CYLINDER: -0.50
OS_SPHERE: -7.25
OD_CYLINDER: -2.50
OD_SPHERE: -4.00
OS_AXIS: 127

## 2022-02-01 ASSESSMENT — KERATOMETRY
OD_K2POWER_DIOPTERS: 46.25
OD_K1POWER_DIOPTERS: 45.00
OS_AXISANGLE_DEGREES: 097
OD_AXISANGLE_DEGREES: 091
OS_K2POWER_DIOPTERS: 45.25
OS_K1POWER_DIOPTERS: 44.50

## 2022-02-01 ASSESSMENT — LID POSITION - PTOSIS
OD_PTOSIS: RUL
OS_PTOSIS: LUL

## 2022-02-01 ASSESSMENT — PACHYMETRY
OS_CT_UM: 623
OD_CT_UM: 613
OS_CT_CORRECTION: -6
OD_CT_CORRECTION: -5

## 2022-02-01 ASSESSMENT — TONOMETRY
OD_IOP_MMHG: 17
OS_IOP_MMHG: 20

## 2022-02-01 ASSESSMENT — VISUAL ACUITY
OD_BCVA: 20/25+2
OS_BCVA: 20/25+1

## 2022-02-01 ASSESSMENT — CONFRONTATIONAL VISUAL FIELD TEST (CVF)
OS_FINDINGS: FULL
OD_FINDINGS: FULL

## 2022-02-01 ASSESSMENT — AXIALLENGTH_DERIVED
OD_AL: 25.0927
OD_AL: 24.983
OS_AL: 25.9859
OS_AL: 26.2848
OD_AL: 24.9285
OS_AL: 26.0452

## 2022-02-03 ENCOUNTER — OFFICE VISIT (OUTPATIENT)
Dept: FAMILY MEDICINE CLINIC | Facility: CLINIC | Age: 72
End: 2022-02-03
Payer: COMMERCIAL

## 2022-02-03 VITALS
BODY MASS INDEX: 32.89 KG/M2 | OXYGEN SATURATION: 97 % | DIASTOLIC BLOOD PRESSURE: 78 MMHG | TEMPERATURE: 97.8 F | HEART RATE: 91 BPM | WEIGHT: 217 LBS | SYSTOLIC BLOOD PRESSURE: 142 MMHG | HEIGHT: 68 IN

## 2022-02-03 DIAGNOSIS — E78.49 OTHER HYPERLIPIDEMIA: ICD-10-CM

## 2022-02-03 DIAGNOSIS — I10 HYPERTENSION, UNSPECIFIED TYPE: ICD-10-CM

## 2022-02-03 DIAGNOSIS — E89.2 HISTORY OF PARATHYROIDECTOMY (HCC): ICD-10-CM

## 2022-02-03 DIAGNOSIS — E21.3 HYPERPARATHYROIDISM (HCC): ICD-10-CM

## 2022-02-03 DIAGNOSIS — Z79.4 TYPE 2 DIABETES MELLITUS WITH COMPLICATION, WITH LONG-TERM CURRENT USE OF INSULIN (HCC): Primary | ICD-10-CM

## 2022-02-03 DIAGNOSIS — Z23 NEED FOR SHINGLES VACCINE: ICD-10-CM

## 2022-02-03 DIAGNOSIS — E11.8 TYPE 2 DIABETES MELLITUS WITH COMPLICATION, WITH LONG-TERM CURRENT USE OF INSULIN (HCC): Primary | ICD-10-CM

## 2022-02-03 DIAGNOSIS — E11.9 TYPE 2 DIABETES MELLITUS WITHOUT COMPLICATION, WITHOUT LONG-TERM CURRENT USE OF INSULIN (HCC): ICD-10-CM

## 2022-02-03 PROBLEM — Z01.818 PREOP EXAMINATION: Status: ACTIVE | Noted: 2022-02-03

## 2022-02-03 PROCEDURE — 1036F TOBACCO NON-USER: CPT | Performed by: FAMILY MEDICINE

## 2022-02-03 PROCEDURE — 3077F SYST BP >= 140 MM HG: CPT | Performed by: FAMILY MEDICINE

## 2022-02-03 PROCEDURE — 3008F BODY MASS INDEX DOCD: CPT | Performed by: FAMILY MEDICINE

## 2022-02-03 PROCEDURE — 99214 OFFICE O/P EST MOD 30 MIN: CPT | Performed by: FAMILY MEDICINE

## 2022-02-03 PROCEDURE — 3078F DIAST BP <80 MM HG: CPT | Performed by: FAMILY MEDICINE

## 2022-02-03 PROCEDURE — 1160F RVW MEDS BY RX/DR IN RCRD: CPT | Performed by: FAMILY MEDICINE

## 2022-02-03 RX ORDER — ZOSTER VACCINE RECOMBINANT, ADJUVANTED 50 MCG/0.5
0.5 KIT INTRAMUSCULAR ONCE
Qty: 1 EACH | Refills: 1 | Status: SHIPPED | OUTPATIENT
Start: 2022-02-03 | End: 2022-02-03 | Stop reason: SDUPTHER

## 2022-02-03 RX ORDER — BLOOD SUGAR DIAGNOSTIC
1 STRIP MISCELLANEOUS 3 TIMES DAILY
Qty: 400 STRIP | Refills: 3 | Status: SHIPPED | OUTPATIENT
Start: 2022-02-03

## 2022-02-03 RX ORDER — HYDROCHLOROTHIAZIDE 25 MG/1
25 TABLET ORAL DAILY
Qty: 90 TABLET | Refills: 3 | Status: SHIPPED | OUTPATIENT
Start: 2022-02-03

## 2022-02-03 RX ORDER — GLIMEPIRIDE 4 MG/1
8 TABLET ORAL
Qty: 180 TABLET | Refills: 3 | Status: SHIPPED | OUTPATIENT
Start: 2022-02-03

## 2022-02-03 RX ORDER — ZOSTER VACCINE RECOMBINANT, ADJUVANTED 50 MCG/0.5
0.5 KIT INTRAMUSCULAR ONCE
Qty: 1 EACH | Refills: 1 | Status: SHIPPED | OUTPATIENT
Start: 2022-02-03 | End: 2022-02-03

## 2022-02-03 NOTE — PROGRESS NOTES
Assessment/Plan:    Preop examination  Cleared for cataract surgery  Hyperparathyroidism ([facility] )  Stable  Continue current  Type 2 diabetes mellitus with complication, with long-term current use of insulin ([facility])    Lab Results   Component Value Date    HGBA1C 6 7 (H) 01/04/2022     Stable  Continue current  Hypertension  Stable  Continue current  Other hyperlipidemia  Stable  Continue current  Diagnoses and all orders for this visit:    Type 2 diabetes mellitus with complication, with long-term current use of insulin ([facility] )  -     Ambulatory referral to clinical pharmacy; Future  -     OneTouch Ultra test strip; Use 1 each 3 (three) times a day Use as instructed  -     Empagliflozin (Jardiance) 10 MG TABS; Take 1 tablet (10 mg total) by mouth every morning    Hyperparathyroidism (HCC)    Need for shingles vaccine  -     Discontinue: Zoster Vac Recomb Adjuvanted (Shingrix) 50 MCG/0 5ML SUSR; Inject 0 5 mL into a muscle once for 1 dose Repeat dose in 2 to 6 months  -     Zoster Vac Recomb Adjuvanted (Shingrix) 50 MCG/0 5ML SUSR; Inject 0 5 mL into a muscle once for 1 dose Repeat dose in 2 to 6 months    Hypertension, unspecified type  -     hydrochlorothiazide (HYDRODIURIL) 25 mg tablet; Take 1 tablet (25 mg total) by mouth daily    Other hyperlipidemia    History of parathyroidectomy ([facility] )          Subjective:      Patient ID: Dat Mccormick is a 73 y o  male  His is going for cataract surgery  His surgeon is Dr Romulo Brooke  He has no known coronary artery disease  He has no CP or SOB  He is physically active  He can walk 2 flights of stairs or 4 city blocks  He has no history of intolerance to anesthesia  He has no bleeding disorders  His A1c is at goal   He has no side effects or hypoglycemia  He just saw ophthalmology  His LDL is at goal   He is on high intensity statin therapy            The following portions of the patient's history were reviewed and updated as appropriate:   He  has a past medical history of Arthritis, Back pain, Diabetes mellitus ([facility] ), and Hypertension  He   Patient Active Problem List    Diagnosis Date Noted    Preop examination 02/03/2022    BMI 33 0-33 9,adult 04/09/2021    History of parathyroidectomy ([facility] ) 05/09/2019    Type 2 diabetes mellitus with complication, with long-term current use of insulin ([place] [address] ) 02/11/2019    Medicare annual wellness visit, subsequent 02/11/2019    LILIA (obstructive sleep apnea) 11/12/2018    Multiple thyroid nodules 09/05/2018    Hyperparathyroidism ([facility] ) 05/08/2018    Other hyperlipidemia 05/08/2018    Hypertension 05/08/2018    BPH with obstruction/lower urinary tract symptoms 10/10/2017    Hypercalciuria 07/24/2017    Knee osteoarthritis 06/10/2014    Prepatellar bursitis 06/10/2014     He  has a past surgical history that includes US guided thyroid biopsy (9/7/2018); Tonsillectomy; Hernia repair (Left, 1998); and pr explore parathyroid glands (Right, 11/1/2018)  His family history includes Arthritis in his mother; Coronary artery disease in his father and mother; Diabetes in his maternal uncle and paternal uncle; Hypertension in his father and mother  He  reports that he has never smoked  He has never used smokeless tobacco  He reports current alcohol use  He reports that he does not use drugs    Current Outpatient Medications   Medication Sig Dispense Refill    aspirin (ECOTRIN LOW STRENGTH) 81 mg EC tablet Take 81 mg by mouth daily      atorvastatin (LIPITOR) 40 mg tablet Take 1 tablet (40 mg total) by mouth daily 90 tablet 3    dutasteride (AVODART) 0 5 mg capsule Take 1 capsule (0 5 mg total) by mouth daily 90 capsule 3    Empagliflozin (Jardiance) 10 MG TABS Take 1 tablet (10 mg total) by mouth every morning 90 tablet 3    gabapentin (NEURONTIN) 600 MG tablet Take 1 tablet (600 mg total) by mouth 3 (three) times a day 270 tablet 3    glimepiride (AMARYL) 4 mg tablet Take 2 tablets (8 mg total) by mouth daily with breakfast 180 tablet 3    hydrochlorothiazide (HYDRODIURIL) 25 mg tablet Take 1 tablet (25 mg total) by mouth daily 90 tablet 3    Levemir FlexTouch 100 units/mL injection pen INJECT 60 UNITS UNDER THE SKIN TWICE A  mL 3    lisinopril (ZESTRIL) 40 mg tablet Take 0 5 tablets (20 mg total) by mouth daily 90 tablet 3    meclizine (ANTIVERT) 25 mg tablet Take 1 tablet (25 mg total) by mouth 3 (three) times a day as needed for dizziness 30 tablet 0    metFORMIN (GLUCOPHAGE) 1000 MG tablet Take 1 tablet (1,000 mg total) by mouth 2 (two) times a day with meals 180 tablet 3    OneTouch Ultra test strip Use 1 each 3 (three) times a day Use as instructed 400 strip 3    sitaGLIPtin (Januvia) 100 mg tablet Take 1 tablet (100 mg total) by mouth daily 90 tablet 3    SUPER B COMPLEX/C PO Take by mouth      tamsulosin (FLOMAX) 0 4 mg Take 1 capsule (0 4 mg total) by mouth daily 90 capsule 3    Zoster Vac Recomb Adjuvanted (Shingrix) 50 MCG/0 5ML SUSR Inject 0 5 mL into a muscle once for 1 dose Repeat dose in 2 to 6 months 1 each 1     No current facility-administered medications for this visit       Current Outpatient Medications on File Prior to Visit   Medication Sig    aspirin (ECOTRIN LOW STRENGTH) 81 mg EC tablet Take 81 mg by mouth daily    atorvastatin (LIPITOR) 40 mg tablet Take 1 tablet (40 mg total) by mouth daily    dutasteride (AVODART) 0 5 mg capsule Take 1 capsule (0 5 mg total) by mouth daily    gabapentin (NEURONTIN) 600 MG tablet Take 1 tablet (600 mg total) by mouth 3 (three) times a day    glimepiride (AMARYL) 4 mg tablet Take 2 tablets (8 mg total) by mouth daily with breakfast    Levemir FlexTouch 100 units/mL injection pen INJECT 60 UNITS UNDER THE SKIN TWICE A DAY    lisinopril (ZESTRIL) 40 mg tablet Take 0 5 tablets (20 mg total) by mouth daily    meclizine (ANTIVERT) 25 mg tablet Take 1 tablet (25 mg total) by mouth 3 (three) times a day as needed for dizziness    metFORMIN (GLUCOPHAGE) 1000 MG tablet Take 1 tablet (1,000 mg total) by mouth 2 (two) times a day with meals    sitaGLIPtin (Januvia) 100 mg tablet Take 1 tablet (100 mg total) by mouth daily    SUPER B COMPLEX/C PO Take by mouth    tamsulosin (FLOMAX) 0 4 mg Take 1 capsule (0 4 mg total) by mouth daily    [DISCONTINUED] canagliflozin (Invokana) 100 mg Take 1 tablet (100 mg total) by mouth daily before breakfast    [DISCONTINUED] hydrochlorothiazide (HYDRODIURIL) 12 5 mg tablet Take 1 tablet (12 5 mg total) by mouth daily    [DISCONTINUED] OneTouch Ultra test strip USE 1 EACH FOUR TIMES A DAY AS INSTRUCTED     No current facility-administered medications on file prior to visit  He has No Known Allergies       Review of Systems      Objective:      /78   Pulse 91   Temp 97 8 °F (36 6 °C)   Ht 5' 8" (1 727 m)   Wt 98 4 kg (217 lb)   SpO2 97%   BMI 32 99 kg/m²          Physical Exam

## 2022-02-09 ENCOUNTER — CLINICAL SUPPORT (OUTPATIENT)
Dept: FAMILY MEDICINE CLINIC | Facility: CLINIC | Age: 72
End: 2022-02-09

## 2022-02-09 DIAGNOSIS — Z79.4 TYPE 2 DIABETES MELLITUS WITH COMPLICATION, WITH LONG-TERM CURRENT USE OF INSULIN (HCC): ICD-10-CM

## 2022-02-09 DIAGNOSIS — E11.8 TYPE 2 DIABETES MELLITUS WITH COMPLICATION, WITH LONG-TERM CURRENT USE OF INSULIN (HCC): ICD-10-CM

## 2022-02-09 NOTE — PROGRESS NOTES
Patient spoke with [facility] and there was an issue with the script since it has his home address on the script and not his PO box which is where all his prescriptions get mailed to  Patient states his wife "took care of it" but they may need a new script from the physicians office but he is not 100% sure of that  Confirmed with [facility] that they have anything they need to dispense Jardiance at this time  No further cost or coverage issues at this time

## 2022-04-15 ENCOUNTER — TELEPHONE (OUTPATIENT)
Dept: ADMINISTRATIVE | Facility: OTHER | Age: 72
End: 2022-04-15

## 2022-04-15 NOTE — LETTER
Diabetic Foot Exam Form    Date Requested: 22  Patient: Jorge Client  Patient : 1950   Referring Provider: Zuhair Davies MD    Diabetic Foot Exam Performed with shoes and socks removed        Yes         No     Date of Diabetic Foot Exam ______________________________  Risk Score ____________________________________________    Left Foot       Visual Inspection         Monofilament Testing Sensory Exam        Pedal Pulses         Additional Comments         Right Foot      Visual Inspection         Monofilament Testing Sensory Exam       Pedal Pulses         Additional Comments         Comments __________________________________________________________    Practice Providing Exam ______________________________________________    Exam Performed By (print name) _______________________________________      Provider Signature ___________________________________________________      These reports are needed for  compliance  Please fax this completed form and a copy of the Diabetic Foot Exam report to our office located at [place] [address] as soon as possible via [phone] gideon Mcmahon: Phone [phone]    We thank you for your assistance in treating our mutual patient

## 2022-04-15 NOTE — TELEPHONE ENCOUNTER
----- Message from Geovanna Rai sent at 4/13/2022  1:48 PM EDT -----  Regarding: Care Gap Request  04/13/22 1:48 PM    Hello, our patient attached above has had foot exam completed/performed  Please assist in updating the patient chart   The date of service is 2022, dr Oksana Hernandez    Thank you,  Geovanna Rai  [facility]

## 2022-04-15 NOTE — LETTER
Diabetic Foot Exam Form    Date Requested: 22  Patient: Josh Underwood  Patient : 1950   Referring Provider: Elizabeth Lieberman MD    Diabetic Foot Exam Performed with shoes and socks removed        Yes         No     Date of Diabetic Foot Exam ______________________________  Risk Score ____________________________________________    Left Foot       Visual Inspection         Monofilament Testing Sensory Exam        Pedal Pulses         Additional Comments         Right Foot      Visual Inspection         Monofilament Testing Sensory Exam       Pedal Pulses         Additional Comments         Comments DOS: 2022    Practice Providing Exam ______________________________________________    Exam Performed By (print name) _______________________________________      Provider Signature ___________________________________________________      These reports are needed for  compliance  Please fax this completed form and a copy of the Diabetic Foot Exam report to our office located at [place] [address] as soon as possible via [phone] attention McAndrews: Phone [phone]    We thank you for your assistance in treating our mutual patient

## 2022-04-18 NOTE — TELEPHONE ENCOUNTER
Upon review of the In Basket request and the patient's chart, initial outreach has been made via fax, please see Contacts section for details       Thank you  Mac Navarrete

## 2022-05-02 ENCOUNTER — RA CDI HCC (OUTPATIENT)
Dept: OTHER | Facility: HOSPITAL | Age: 72
End: 2022-05-02

## 2022-05-02 NOTE — PROGRESS NOTES
[place] [address]  coding opportunities          Chart Reviewed number of suggestions sent to Provider: 2   1) E11 36: Type 2 diabetes mellitus with diabetic cataract ([place] [address] )  H25 13 Age-related nuclear cataract, bilateral   ---- Per ICD 10 coding guidelines, cataracts in diabetic patients should be coded as linked conditions     2) Refer to UACR dated 9/2/21  E11 29, R80 9  T2DM with other diabetic kidney complication, proteinuria, unspecified ([place] [address] )     Patients Insurance        Commercial Insurance: [address] [place] [address]

## 2022-05-03 NOTE — TELEPHONE ENCOUNTER
As a follow-up, a second attempt has been made for outreach via fax, please see Contacts section for details      Thank you  Mariella Danielle

## 2022-05-09 ENCOUNTER — OFFICE VISIT (OUTPATIENT)
Dept: FAMILY MEDICINE CLINIC | Facility: CLINIC | Age: 72
End: 2022-05-09
Payer: COMMERCIAL

## 2022-05-09 VITALS
DIASTOLIC BLOOD PRESSURE: 62 MMHG | OXYGEN SATURATION: 98 % | SYSTOLIC BLOOD PRESSURE: 118 MMHG | BODY MASS INDEX: 32.13 KG/M2 | WEIGHT: 212 LBS | HEIGHT: 68 IN | TEMPERATURE: 98 F | RESPIRATION RATE: 16 BRPM

## 2022-05-09 DIAGNOSIS — E11.8 TYPE 2 DIABETES MELLITUS WITH COMPLICATION, WITH LONG-TERM CURRENT USE OF INSULIN (HCC): Primary | ICD-10-CM

## 2022-05-09 DIAGNOSIS — Z23 NEED FOR SHINGLES VACCINE: ICD-10-CM

## 2022-05-09 DIAGNOSIS — E21.3 HYPERPARATHYROIDISM (HCC): ICD-10-CM

## 2022-05-09 DIAGNOSIS — E78.49 OTHER HYPERLIPIDEMIA: ICD-10-CM

## 2022-05-09 DIAGNOSIS — Z79.4 TYPE 2 DIABETES MELLITUS WITH COMPLICATION, WITH LONG-TERM CURRENT USE OF INSULIN (HCC): Primary | ICD-10-CM

## 2022-05-09 DIAGNOSIS — I10 PRIMARY HYPERTENSION: ICD-10-CM

## 2022-05-09 DIAGNOSIS — E04.2 MULTIPLE THYROID NODULES: ICD-10-CM

## 2022-05-09 LAB — SL AMB POCT HEMOGLOBIN AIC: 7.5 (ref ?–6.5)

## 2022-05-09 PROCEDURE — 3074F SYST BP LT 130 MM HG: CPT | Performed by: FAMILY MEDICINE

## 2022-05-09 PROCEDURE — 3078F DIAST BP <80 MM HG: CPT | Performed by: FAMILY MEDICINE

## 2022-05-09 PROCEDURE — 3051F HG A1C>EQUAL 7.0%<8.0%: CPT | Performed by: FAMILY MEDICINE

## 2022-05-09 PROCEDURE — 3008F BODY MASS INDEX DOCD: CPT | Performed by: FAMILY MEDICINE

## 2022-05-09 PROCEDURE — 1160F RVW MEDS BY RX/DR IN RCRD: CPT | Performed by: FAMILY MEDICINE

## 2022-05-09 PROCEDURE — 83036 HEMOGLOBIN GLYCOSYLATED A1C: CPT | Performed by: FAMILY MEDICINE

## 2022-05-09 PROCEDURE — 1036F TOBACCO NON-USER: CPT | Performed by: FAMILY MEDICINE

## 2022-05-09 PROCEDURE — 3288F FALL RISK ASSESSMENT DOCD: CPT | Performed by: FAMILY MEDICINE

## 2022-05-09 PROCEDURE — 99214 OFFICE O/P EST MOD 30 MIN: CPT | Performed by: FAMILY MEDICINE

## 2022-05-09 PROCEDURE — 1100F PTFALLS ASSESS-DOCD GE2>/YR: CPT | Performed by: FAMILY MEDICINE

## 2022-05-09 RX ORDER — ZOSTER VACCINE RECOMBINANT, ADJUVANTED 50 MCG/0.5
0.5 KIT INTRAMUSCULAR ONCE
Qty: 1 EACH | Refills: 1 | Status: SHIPPED | OUTPATIENT
Start: 2022-05-09 | End: 2022-05-09

## 2022-05-09 NOTE — PROGRESS NOTES
Assessment/Plan:    Hypertension  Stable  Continue current  Other hyperlipidemia  Stable  Continue current  Diagnoses and all orders for this visit:    Type 2 diabetes mellitus with complication, with long-term current use of insulin ([facility])  -     POCT hemoglobin A1c  -     Empagliflozin (Jardiance) 25 MG TABS; Take 1 tablet (25 mg total) by mouth every morning    Hyperparathyroidism ([facility] [address] )    Multiple thyroid nodules    Primary hypertension    Other hyperlipidemia          Subjective:      Patient ID: Randell Mendoza is a 81 y o  male  His A1c is at goal   He has no side effects and no hypoglycemia  His BP is at goal   He has no CP or SOB  He has no HA or vision changes  He has no PND or orthopnea  His lipids are at goal   His LFTs are normal and he has no myalgia or muscle weakness  The following portions of the patient's history were reviewed and updated as appropriate:   He  has a past medical history of Arthritis, Back pain, Diabetes mellitus ([facility] [address] ), and Hypertension  He   Patient Active Problem List    Diagnosis Date Noted    Preop examination 02/03/2022    BMI 33 0-33 9,adult 04/09/2021    History of parathyroidectomy ([facility] [address] ) 05/09/2019    Type 2 diabetes mellitus with complication, with long-term current use of insulin ([facility] [address] ) 02/11/2019    Medicare annual wellness visit, subsequent 02/11/2019    LILIA (obstructive sleep apnea) 11/12/2018    Multiple thyroid nodules 09/05/2018    Hyperparathyroidism ([facility] [address] ) 05/08/2018    Other hyperlipidemia 05/08/2018    Hypertension 05/08/2018    BPH with obstruction/lower urinary tract symptoms 10/10/2017    Hypercalciuria 07/24/2017    Knee osteoarthritis 06/10/2014    Prepatellar bursitis 06/10/2014     He  has a past surgical history that includes US guided thyroid biopsy (9/7/2018); Tonsillectomy; Hernia repair (Left, 1998); and pr explore parathyroid glands (Right, 11/1/2018)    His family history includes Arthritis in his mother; Coronary artery disease in his father and mother; Diabetes in his maternal uncle and paternal uncle; Hypertension in his father and mother  He  reports that he has never smoked  He has never used smokeless tobacco  He reports current alcohol use  He reports that he does not use drugs    Current Outpatient Medications   Medication Sig Dispense Refill    aspirin (ECOTRIN LOW STRENGTH) 81 mg EC tablet Take 81 mg by mouth daily      atorvastatin (LIPITOR) 40 mg tablet Take 1 tablet (40 mg total) by mouth daily 90 tablet 3    dutasteride (AVODART) 0 5 mg capsule Take 1 capsule (0 5 mg total) by mouth daily 90 capsule 3    Empagliflozin (Jardiance) 10 MG TABS Take 1 tablet (10 mg total) by mouth every morning 90 tablet 3    gabapentin (NEURONTIN) 600 MG tablet Take 1 tablet (600 mg total) by mouth 3 (three) times a day 270 tablet 3    glimepiride (AMARYL) 4 mg tablet Take 2 tablets (8 mg total) by mouth daily with breakfast 180 tablet 3    hydrochlorothiazide (HYDRODIURIL) 25 mg tablet Take 1 tablet (25 mg total) by mouth daily 90 tablet 3    Levemir FlexTouch 100 units/mL injection pen INJECT 60 UNITS UNDER THE SKIN TWICE A  mL 3    lisinopril (ZESTRIL) 40 mg tablet Take 0 5 tablets (20 mg total) by mouth daily 90 tablet 3    meclizine (ANTIVERT) 25 mg tablet Take 1 tablet (25 mg total) by mouth 3 (three) times a day as needed for dizziness 30 tablet 0    metFORMIN (GLUCOPHAGE) 1000 MG tablet Take 1 tablet (1,000 mg total) by mouth 2 (two) times a day with meals 180 tablet 3    OneTouch Ultra test strip Use 1 each 3 (three) times a day Use as instructed 400 strip 3    sitaGLIPtin (Januvia) 100 mg tablet Take 1 tablet (100 mg total) by mouth daily 90 tablet 3    tamsulosin (FLOMAX) 0 4 mg Take 1 capsule (0 4 mg total) by mouth daily 90 capsule 3    Empagliflozin (Jardiance) 25 MG TABS Take 1 tablet (25 mg total) by mouth every morning 90 tablet 3    SUPER B COMPLEX/C PO Take by mouth       No current facility-administered medications for this visit  Current Outpatient Medications on File Prior to Visit   Medication Sig    aspirin (ECOTRIN LOW STRENGTH) 81 mg EC tablet Take 81 mg by mouth daily    atorvastatin (LIPITOR) 40 mg tablet Take 1 tablet (40 mg total) by mouth daily    dutasteride (AVODART) 0 5 mg capsule Take 1 capsule (0 5 mg total) by mouth daily    Empagliflozin (Jardiance) 10 MG TABS Take 1 tablet (10 mg total) by mouth every morning    gabapentin (NEURONTIN) 600 MG tablet Take 1 tablet (600 mg total) by mouth 3 (three) times a day    glimepiride (AMARYL) 4 mg tablet Take 2 tablets (8 mg total) by mouth daily with breakfast    hydrochlorothiazide (HYDRODIURIL) 25 mg tablet Take 1 tablet (25 mg total) by mouth daily    Levemir FlexTouch 100 units/mL injection pen INJECT 60 UNITS UNDER THE SKIN TWICE A DAY    lisinopril (ZESTRIL) 40 mg tablet Take 0 5 tablets (20 mg total) by mouth daily    meclizine (ANTIVERT) 25 mg tablet Take 1 tablet (25 mg total) by mouth 3 (three) times a day as needed for dizziness    metFORMIN (GLUCOPHAGE) 1000 MG tablet Take 1 tablet (1,000 mg total) by mouth 2 (two) times a day with meals    OneTouch Ultra test strip Use 1 each 3 (three) times a day Use as instructed    sitaGLIPtin (Januvia) 100 mg tablet Take 1 tablet (100 mg total) by mouth daily    tamsulosin (FLOMAX) 0 4 mg Take 1 capsule (0 4 mg total) by mouth daily    SUPER B COMPLEX/C PO Take by mouth     No current facility-administered medications on file prior to visit  He has No Known Allergies       Review of Systems      Objective:      /62 (BP Location: Right arm, Patient Position: Sitting, Cuff Size: Large)   Temp 98 °F (36 7 °C) (Temporal)   Resp 16   Ht 5' 8" (1 727 m)   Wt 96 2 kg (212 lb)   SpO2 98%   BMI 32 23 kg/m²          Physical Exam  Vitals and nursing note reviewed  Constitutional:       Appearance: Normal appearance  He is obese     Cardiovascular: Rate and Rhythm: Normal rate and regular rhythm  Pulses: Normal pulses  Heart sounds: Normal heart sounds  Pulmonary:      Effort: Pulmonary effort is normal       Breath sounds: Normal breath sounds  Abdominal:      General: Abdomen is flat  Bowel sounds are normal       Palpations: Abdomen is soft  Musculoskeletal:         General: Normal range of motion  Cervical back: Normal range of motion and neck supple  Skin:     General: Skin is warm and dry  Capillary Refill: Capillary refill takes less than 2 seconds  Neurological:      General: No focal deficit present  Mental Status: He is alert and oriented to person, place, and time  Mental status is at baseline  Psychiatric:         Mood and Affect: Mood normal          Behavior: Behavior normal          Thought Content:  Thought content normal          Judgment: Judgment normal

## 2022-05-17 NOTE — TELEPHONE ENCOUNTER
Upon review of the In Basket request we have found as a result of outreach that patient did not have the requested item(s) completed  Per office, patient has not yet been seen this year for a DM foot exam  He will be seen later this month  Any additional questions or concerns should be emailed to the Practice Liaisons via [email or web address] email, please do not reply via [employer]      Thank you  Geoffrey Mustafa

## 2022-06-10 ENCOUNTER — RX ONLY (RX ONLY)
Age: 72
End: 2022-06-10

## 2022-06-10 ENCOUNTER — DOCTOR'S OFFICE (OUTPATIENT)
Dept: URBAN - NONMETROPOLITAN AREA CLINIC 1 | Facility: CLINIC | Age: 72
Setting detail: OPHTHALMOLOGY
End: 2022-06-10
Payer: COMMERCIAL

## 2022-06-10 VITALS — HEIGHT: 60 IN

## 2022-06-10 DIAGNOSIS — H44.23: ICD-10-CM

## 2022-06-10 DIAGNOSIS — H35.363: ICD-10-CM

## 2022-06-10 DIAGNOSIS — E11.3293: ICD-10-CM

## 2022-06-10 DIAGNOSIS — H25.13: ICD-10-CM

## 2022-06-10 DIAGNOSIS — H40.023: ICD-10-CM

## 2022-06-10 LAB
LEFT EYE DIABETIC RETINOPATHY: NORMAL
RIGHT EYE DIABETIC RETINOPATHY: NORMAL
SEVERITY (EYE EXAM): NORMAL

## 2022-06-10 PROCEDURE — 99214 OFFICE O/P EST MOD 30 MIN: CPT | Performed by: OPHTHALMOLOGY

## 2022-06-10 PROCEDURE — 92133 CPTRZD OPH DX IMG PST SGM ON: CPT | Performed by: OPHTHALMOLOGY

## 2022-06-10 ASSESSMENT — REFRACTION_MANIFEST
OD_CYLINDER: -1.25
OD_VA1: 20/25-2
OS_ADD: +2.25
OD_SPHERE: -5.00
OD_VA2: 20/25-2
OS_AXIS: 010
OS_CYLINDER: -0.75
OS_SPHERE: -6.50
OS_VA2: 20/25-2
OD_AXIS: 180
OS_VA1: 20/25-2
OD_AXIS: 180
OS_VA2: 20/25-2
OD_ADD: +1.75
OD_CYLINDER: -1.25
OD_SPHERE: -4.75
OS_SPHERE: -6.50
OS_CYLINDER: -1.00
OS_ADD: +1.75
OD_VA1: 20/25-2
OS_AXIS: 010
OS_VA1: 20/25-2
OD_ADD: +2.25
OD_VA2: 20/25-2

## 2022-06-10 ASSESSMENT — KERATOMETRY
OD_K1POWER_DIOPTERS: 45.00
OS_AXISANGLE_DEGREES: 097
OD_AXISANGLE_DEGREES: 091
OD_K2POWER_DIOPTERS: 46.25
OS_K2POWER_DIOPTERS: 45.25
OS_K1POWER_DIOPTERS: 44.50

## 2022-06-10 ASSESSMENT — REFRACTION_CURRENTRX
OD_OVR_VA: 20/
OS_VPRISM_DIRECTION: PROGS
OD_CYLINDER: -3.00
OS_SPHERE: -7.75
OD_SPHERE: -5.75
OD_ADD: +2.50
OD_AXIS: 180
OD_VPRISM_DIRECTION: PROGS
OS_VPRISM_DIRECTION: PROGS
OS_SPHERE: -7.00
OS_OVR_VA: 20/
OS_AXIS: 007
OS_CYLINDER: -1.00
OD_AXIS: 176
OS_AXIS: 010
OD_OVR_VA: 20/
OD_VPRISM_DIRECTION: PROGS
OD_SPHERE: -5.25
OS_CYLINDER: -2.50
OS_OVR_VA: 20/
OD_CYLINDER: -1.50
OS_ADD: +2.00
OD_ADD: +2.00
OS_ADD: +2.50

## 2022-06-10 ASSESSMENT — LID POSITION - DERMATOCHALASIS
OD_DERMATOCHALASIS: RUL
OS_DERMATOCHALASIS: LUL

## 2022-06-10 ASSESSMENT — AXIALLENGTH_DERIVED
OS_AL: 26.2848
OD_AL: 24.9285
OD_AL: 25.0927
OS_AL: 25.9859
OS_AL: 26.0452
OD_AL: 24.983

## 2022-06-10 ASSESSMENT — SPHEQUIV_DERIVED
OS_SPHEQUIV: -7
OS_SPHEQUIV: -6.875
OD_SPHEQUIV: -5.25
OD_SPHEQUIV: -5.375
OD_SPHEQUIV: -5.625
OS_SPHEQUIV: -7.5

## 2022-06-10 ASSESSMENT — REFRACTION_AUTOREFRACTION
OS_AXIS: 127
OS_CYLINDER: -0.50
OD_AXIS: 171
OS_SPHERE: -7.25
OD_CYLINDER: -2.50
OD_SPHERE: -4.00

## 2022-06-10 ASSESSMENT — VISUAL ACUITY
OD_BCVA: 20/30-2
OS_BCVA: 20/30-1

## 2022-06-10 ASSESSMENT — LID POSITION - PTOSIS
OD_PTOSIS: RUL
OS_PTOSIS: LUL

## 2022-06-23 DIAGNOSIS — E11.9 TYPE 2 DIABETES MELLITUS WITHOUT COMPLICATION, WITHOUT LONG-TERM CURRENT USE OF INSULIN (HCC): ICD-10-CM

## 2022-06-23 RX ORDER — ATORVASTATIN CALCIUM 40 MG/1
40 TABLET, FILM COATED ORAL DAILY
Qty: 90 TABLET | Refills: 3 | Status: SHIPPED | OUTPATIENT
Start: 2022-06-23

## 2022-06-23 RX ORDER — INSULIN DETEMIR 100 [IU]/ML
60 INJECTION, SOLUTION SUBCUTANEOUS EVERY 12 HOURS SCHEDULED
Qty: 150 ML | Refills: 3 | Status: SHIPPED | OUTPATIENT
Start: 2022-06-23

## 2022-06-28 ENCOUNTER — TELEPHONE (OUTPATIENT)
Dept: FAMILY MEDICINE CLINIC | Facility: CLINIC | Age: 72
End: 2022-06-28

## 2022-06-28 NOTE — TELEPHONE ENCOUNTER
Called Dr Jalen Zendejas office regarding foot exam due - pt scheduled to see them 8/2/22   Will follow up after that for foot exam

## 2022-08-22 ENCOUNTER — OFFICE VISIT (OUTPATIENT)
Dept: FAMILY MEDICINE CLINIC | Facility: CLINIC | Age: 72
End: 2022-08-22
Payer: COMMERCIAL

## 2022-08-22 VITALS
TEMPERATURE: 97.7 F | RESPIRATION RATE: 20 BRPM | OXYGEN SATURATION: 94 % | SYSTOLIC BLOOD PRESSURE: 134 MMHG | HEIGHT: 68 IN | WEIGHT: 201.6 LBS | HEART RATE: 91 BPM | DIASTOLIC BLOOD PRESSURE: 74 MMHG | BODY MASS INDEX: 30.55 KG/M2

## 2022-08-22 DIAGNOSIS — G47.33 OSA (OBSTRUCTIVE SLEEP APNEA): ICD-10-CM

## 2022-08-22 DIAGNOSIS — N50.812 TESTICULAR PAIN, LEFT: ICD-10-CM

## 2022-08-22 DIAGNOSIS — E11.9 TYPE 2 DIABETES MELLITUS WITHOUT COMPLICATION, WITHOUT LONG-TERM CURRENT USE OF INSULIN (HCC): ICD-10-CM

## 2022-08-22 DIAGNOSIS — E89.2 HISTORY OF PARATHYROIDECTOMY (HCC): ICD-10-CM

## 2022-08-22 DIAGNOSIS — Z79.4 TYPE 2 DIABETES MELLITUS WITH COMPLICATION, WITH LONG-TERM CURRENT USE OF INSULIN (HCC): Primary | ICD-10-CM

## 2022-08-22 DIAGNOSIS — I10 PRIMARY HYPERTENSION: ICD-10-CM

## 2022-08-22 DIAGNOSIS — N40.0 BENIGN PROSTATIC HYPERPLASIA, UNSPECIFIED WHETHER LOWER URINARY TRACT SYMPTOMS PRESENT: ICD-10-CM

## 2022-08-22 DIAGNOSIS — E11.8 TYPE 2 DIABETES MELLITUS WITH COMPLICATION, WITH LONG-TERM CURRENT USE OF INSULIN (HCC): Primary | ICD-10-CM

## 2022-08-22 PROBLEM — E21.3 HYPERPARATHYROIDISM (HCC): Status: RESOLVED | Noted: 2018-05-08 | Resolved: 2022-08-22

## 2022-08-22 LAB — SL AMB POCT HEMOGLOBIN AIC: 7 (ref ?–6.5)

## 2022-08-22 PROCEDURE — 3075F SYST BP GE 130 - 139MM HG: CPT | Performed by: FAMILY MEDICINE

## 2022-08-22 PROCEDURE — 4010F ACE/ARB THERAPY RXD/TAKEN: CPT | Performed by: FAMILY MEDICINE

## 2022-08-22 PROCEDURE — 83036 HEMOGLOBIN GLYCOSYLATED A1C: CPT | Performed by: FAMILY MEDICINE

## 2022-08-22 PROCEDURE — 1160F RVW MEDS BY RX/DR IN RCRD: CPT | Performed by: FAMILY MEDICINE

## 2022-08-22 PROCEDURE — 3078F DIAST BP <80 MM HG: CPT | Performed by: FAMILY MEDICINE

## 2022-08-22 PROCEDURE — 1125F AMNT PAIN NOTED PAIN PRSNT: CPT | Performed by: FAMILY MEDICINE

## 2022-08-22 PROCEDURE — 3288F FALL RISK ASSESSMENT DOCD: CPT | Performed by: FAMILY MEDICINE

## 2022-08-22 PROCEDURE — 1170F FXNL STATUS ASSESSED: CPT | Performed by: FAMILY MEDICINE

## 2022-08-22 PROCEDURE — G0439 PPPS, SUBSEQ VISIT: HCPCS | Performed by: FAMILY MEDICINE

## 2022-08-22 PROCEDURE — 99214 OFFICE O/P EST MOD 30 MIN: CPT | Performed by: FAMILY MEDICINE

## 2022-08-22 PROCEDURE — 3725F SCREEN DEPRESSION PERFORMED: CPT | Performed by: FAMILY MEDICINE

## 2022-08-22 PROCEDURE — 3051F HG A1C>EQUAL 7.0%<8.0%: CPT | Performed by: FAMILY MEDICINE

## 2022-08-22 RX ORDER — TAMSULOSIN HYDROCHLORIDE 0.4 MG/1
0.4 CAPSULE ORAL DAILY
Qty: 90 CAPSULE | Refills: 3 | Status: SHIPPED | OUTPATIENT
Start: 2022-08-22

## 2022-08-22 RX ORDER — GABAPENTIN 600 MG/1
600 TABLET ORAL 3 TIMES DAILY
Qty: 270 TABLET | Refills: 3 | Status: SHIPPED | OUTPATIENT
Start: 2022-08-22

## 2022-08-22 RX ORDER — DUTASTERIDE 0.5 MG/1
0.5 CAPSULE, LIQUID FILLED ORAL DAILY
Qty: 90 CAPSULE | Refills: 3 | Status: SHIPPED | OUTPATIENT
Start: 2022-08-22

## 2022-08-22 RX ORDER — LISINOPRIL 40 MG/1
20 TABLET ORAL DAILY
Qty: 90 TABLET | Refills: 3 | Status: SHIPPED | OUTPATIENT
Start: 2022-08-22

## 2022-08-22 NOTE — PATIENT INSTRUCTIONS
Medicare Preventive Visit Patient Instructions  Thank you for completing your Welcome to Medicare Visit or Medicare Annual Wellness Visit today  Your next wellness visit will be due in one year (8/23/2023)  The screening/preventive services that you may require over the next 5-10 years are detailed below  Some tests may not apply to you based off risk factors and/or age  Screening tests ordered at today's visit but not completed yet may show as past due  Also, please note that scanned in results may not display below  Preventive Screenings:  Service Recommendations Previous Testing/Comments   Colorectal Cancer Screening  · Colonoscopy    · Fecal Occult Blood Test (FOBT)/Fecal Immunochemical Test (FIT)  · Fecal DNA/Cologuard Test  · Flexible Sigmoidoscopy Age: 39-70 years old   Colonoscopy: every 10 years (May be performed more frequently if at higher risk)  OR  FOBT/FIT: every 1 year  OR  Cologuard: every 3 years  OR  Sigmoidoscopy: every 5 years  Screening may be recommended earlier than age 39 if at higher risk for colorectal cancer  Also, an individualized decision between you and your healthcare provider will decide whether screening between the ages of 74-80 would be appropriate   Colonoscopy: 02/26/2019  FOBT/FIT: Not on file  Cologuard: Not on file  Sigmoidoscopy: Not on file    Screening Current     Prostate Cancer Screening Individualized decision between patient and health care provider in men between ages of 53-78   Medicare will cover every 12 months beginning on the day after your 50th birthday PSA: No results in last 5 years           Hepatitis C Screening Once for adults born between 1945 and 1965  More frequently in patients at high risk for Hepatitis C Hep C Antibody: 01/12/2018    Screening Current   Diabetes Screening 1-2 times per year if you're at risk for diabetes or have pre-diabetes Fasting glucose: 121 mg/dL (1/4/2022)  A1C: 7 5 (5/9/2022)  Screening Not Indicated  History Diabetes Cholesterol Screening Once every 5 years if you don't have a lipid disorder  May order more often based on risk factors  Lipid panel: 01/04/2022  Screening Not Indicated  History Lipid Disorder      Other Preventive Screenings Covered by Medicare:  1  Abdominal Aortic Aneurysm (AAA) Screening: covered once if your at risk  You're considered to be at risk if you have a family history of AAA or a male between the age of 73-68 who smoking at least 100 cigarettes in your lifetime  2  Lung Cancer Screening: covers low dose CT scan once per year if you meet all of the following conditions: (1) Age 50-69; (2) No signs or symptoms of lung cancer; (3) Current smoker or have quit smoking within the last 15 years; (4) You have a tobacco smoking history of at least 20 pack years (packs per day x number of years you smoked); (5) You get a written order from a healthcare provider  3  Glaucoma Screening: covered annually if you're considered high risk: (1) You have diabetes OR (2) Family history of glaucoma OR (3)  aged 48 and older OR (3)  American aged 72 and older  3  Osteoporosis Screening: covered every 2 years if you meet one of the following conditions: (1) Have a vertebral abnormality; (2) On glucocorticoid therapy for more than 3 months; (3) Have primary hyperparathyroidism; (4) On osteoporosis medications and need to assess response to drug therapy  5  HIV Screening: covered annually if you're between the age of 12-76  Also covered annually if you are younger than 13 and older than 72 with risk factors for HIV infection  For pregnant patients, it is covered up to 3 times per pregnancy      Immunizations:  Immunization Recommendations   Influenza Vaccine Annual influenza vaccination during flu season is recommended for all persons aged >= 6 months who do not have contraindications   Pneumococcal Vaccine   * Pneumococcal conjugate vaccine = PCV13 (Prevnar 13), PCV15 (Vaxneuvance), PCV20 (Prevnar 20)  * Pneumococcal polysaccharide vaccine = PPSV23 (Pneumovax) Adults 2364 years old: 1-3 doses may be recommended based on certain risk factors  Adults 72 years old: 1-2 doses may be recommended based off what pneumonia vaccine you previously received   Hepatitis B Vaccine 3 dose series if at intermediate or high risk (ex: diabetes, end stage renal disease, liver disease)   Tetanus (Td) Vaccine - COST NOT COVERED BY MEDICARE PART B Following completion of primary series, a booster dose should be given every 10 years to maintain immunity against tetanus  Td may also be given as tetanus wound prophylaxis  Tdap Vaccine - COST NOT COVERED BY MEDICARE PART B Recommended at least once for all adults  For pregnant patients, recommended with each pregnancy  Shingles Vaccine (Shingrix) - COST NOT COVERED BY MEDICARE PART B  2 shot series recommended in those aged 48 and above     Health Maintenance Due:      Topic Date Due    Colorectal Cancer Screening  02/26/2022    Hepatitis C Screening  Completed     Immunizations Due:      Topic Date Due    COVID-19 Vaccine (2 - Moderna series) 04/07/2021    Influenza Vaccine (1) 09/01/2022     Advance Directives   What are advance directives? Advance directives are legal documents that state your wishes and plans for medical care  These plans are made ahead of time in case you lose your ability to make decisions for yourself  Advance directives can apply to any medical decision, such as the treatments you want, and if you want to donate organs  What are the types of advance directives? There are many types of advance directives, and each state has rules about how to use them  You may choose a combination of any of the following:  · Living will: This is a written record of the treatment you want  You can also choose which treatments you do not want, which to limit, and which to stop at a certain time  This includes surgery, medicine, IV fluid, and tube feedings  · Durable power of  for healthcare [employer] [facility]): This is a written record that states who you want to make healthcare choices for you when you are unable to make them for yourself  This person, called a proxy, is usually a family member or a friend  You may choose more than 1 proxy  · Do not resuscitate (DNR) order:  A DNR order is used in case your heart stops beating or you stop breathing  It is a request not to have certain forms of treatment, such as CPR  A DNR order may be included in other types of advance directives  · Medical directive: This covers the care that you want if you are in a coma, near death, or unable to make decisions for yourself  You can list the treatments you want for each condition  Treatment may include pain medicine, surgery, blood transfusions, dialysis, IV or tube feedings, and a ventilator (breathing machine)  · Values history: This document has questions about your views, beliefs, and how you feel and think about life  This information can help others choose the care that you would choose  Why are advance directives important? An advance directive helps you control your care  Although spoken wishes may be used, it is better to have your wishes written down  Spoken wishes can be misunderstood, or not followed  Treatments may be given even if you do not want them  An advance directive may make it easier for your family to make difficult choices about your care  Fall Prevention    Fall prevention  includes ways to make your home and other areas safer  It also includes ways you can move more carefully to prevent a fall  Health conditions that cause changes in your blood pressure, vision, or muscle strength and coordination may increase your risk for falls  Medicines may also increase your risk for falls if they make you dizzy, weak, or sleepy  Fall prevention tips:   · Stand or sit up slowly  · Use assistive devices as directed      · Wear shoes that fit well and have soles that   · Wear a personal alarm  · Stay active  · Manage your medical conditions  Home Safety Tips:  · Add items to prevent falls in the bathroom  · Keep paths clear  · Install bright lights in your home  · Keep items you use often on shelves within reach  · Paint or place reflective tape on the edges of your stairs  Weight Management   Why it is important to manage your weight:  Being overweight increases your risk of health conditions such as heart disease, high blood pressure, type 2 diabetes, and certain types of cancer  It can also increase your risk for osteoarthritis, sleep apnea, and other respiratory problems  Aim for a slow, steady weight loss  Even a small amount of weight loss can lower your risk of health problems  How to lose weight safely:  A safe and healthy way to lose weight is to eat fewer calories and get regular exercise  You can lose up about 1 pound a week by decreasing the number of calories you eat by 500 calories each day  Healthy meal plan for weight management:  A healthy meal plan includes a variety of foods, contains fewer calories, and helps you stay healthy  A healthy meal plan includes the following:  · Eat whole-grain foods more often  A healthy meal plan should contain fiber  Fiber is the part of grains, fruits, and vegetables that is not broken down by your body  Whole-grain foods are healthy and provide extra fiber in your diet  Some examples of whole-grain foods are whole-wheat breads and pastas, oatmeal, brown rice, and bulgur  · Eat a variety of vegetables every day  Include dark, leafy greens such as spinach, kale, herlinda greens, and mustard greens  Eat yellow and orange vegetables such as carrots, sweet potatoes, and winter squash  · Eat a variety of fruits every day  Choose fresh or canned fruit (canned in its own juice or light syrup) instead of juice  Fruit juice has very little or no fiber  · Eat low-fat dairy foods    Drink fat-free (skim) milk or 1% milk  Eat fat-free yogurt and low-fat cottage cheese  Try low-fat cheeses such as mozzarella and other reduced-fat cheeses  · Choose meat and other protein foods that are low in fat  Choose beans or other legumes such as split peas or lentils  Choose fish, skinless poultry (chicken or turkey), or lean cuts of red meat (beef or pork)  Before you cook meat or poultry, cut off any visible fat  · Use less fat and oil  Try baking foods instead of frying them  Add less fat, such as margarine, sour cream, regular salad dressing and mayonnaise to foods  Eat fewer high-fat foods  Some examples of high-fat foods include french fries, doughnuts, ice cream, and cakes  · Eat fewer sweets  Limit foods and drinks that are high in sugar  This includes candy, cookies, regular soda, and sweetened drinks  Exercise:  Exercise at least 30 minutes per day on most days of the week  Some examples of exercise include walking, biking, dancing, and swimming  You can also fit in more physical activity by taking the stairs instead of the elevator or parking farther away from stores  Ask your healthcare provider about the best exercise plan for you  © Copyright [employer] 2018 Information is for End User's use only and may not be sold, redistributed or otherwise used for commercial purposes   All illustrations and images included in CareNotes® are the copyrighted property of [employer]  or [address] [place] [address]

## 2022-08-22 NOTE — PROGRESS NOTES
Assessment and Plan:     Problem List Items Addressed This Visit        Endocrine    Type 2 diabetes mellitus with complication, with long-term current use of insulin ([facility] ) - Primary          Preventive health issues were discussed with patient, and age appropriate screening tests were ordered as noted in patient's After Visit Summary  Personalized health advice and appropriate referrals for health education or preventive services given if needed, as noted in patient's After Visit Summary       History of Present Illness:     Patient presents for a Medicare Wellness Visit    HPI   Patient Care Team:  Rickey Díaz MD as PCP - General  Rickey Díaz MD as PCP - PCP-[facility]  MD Roly Quintero MD as Surgeon (Surgical Oncology)     Review of Systems:     Review of Systems     Problem List:     Patient Active Problem List   Diagnosis    Hyperparathyroidism ([address] )    Other hyperlipidemia    Hypertension    BPH with obstruction/lower urinary tract symptoms    Hypercalciuria    Knee osteoarthritis    Prepatellar bursitis    Multiple thyroid nodules    LILIA (obstructive sleep apnea)    Type 2 diabetes mellitus with complication, with long-term current use of insulin ([facility] [address] )    Medicare annual wellness visit, subsequent    History of parathyroidectomy ([facility] [address] )    BMI 33 0-33 9,adult    Preop examination      Past Medical and Surgical History:     Past Medical History:   Diagnosis Date    Arthritis     Back pain     Diabetes mellitus ([address] )     Hypertension      Past Surgical History:   Procedure Laterality Date    HERNIA REPAIR Left 1998    with mesh to groin    FL EXPLORE PARATHYROID GLANDS Right 11/1/2018    Procedure: MINIMALLY INVASIVE RIGHT PARATHYROIDECTOMY WITH PTH MONITORING;  Surgeon: Roly Guzman MD;  Location: [facility];  Service: Surgical Oncology    TONSILLECTOMY      US GUIDED THYROID BIOPSY  9/7/2018      Family History:     Family History Problem Relation Age of Onset    Hypertension Father     Coronary artery disease Father     Hypertension Mother     Arthritis Mother     Coronary artery disease Mother     Diabetes Maternal Uncle     Diabetes Paternal Uncle       Social History:     Social History     Socioeconomic History    Marital status: /Civil Union     Spouse name: None    Number of children: None    Years of education: None    Highest education level: None   Occupational History    None   Tobacco Use    Smoking status: Never Smoker    Smokeless tobacco: Never Used   Vaping Use    Vaping Use: Never used   Substance and Sexual Activity    Alcohol use: Yes     Comment: rarely    Drug use: No    Sexual activity: Yes   Other Topics Concern    None   Social History Narrative    None     Social Determinants of Health     Financial Resource Strain: Not on file   Food Insecurity: Not on file   Transportation Needs: Not on file   Physical Activity: Not on file   Stress: Not on file   Social Connections: Not on file   Intimate Partner Violence: Not on file   Housing Stability: Not on file      Medications and Allergies:     Current Outpatient Medications   Medication Sig Dispense Refill    aspirin (ECOTRIN LOW STRENGTH) 81 mg EC tablet Take 81 mg by mouth daily      atorvastatin (LIPITOR) 40 mg tablet Take 1 tablet (40 mg total) by mouth daily 90 tablet 3    dutasteride (AVODART) 0 5 mg capsule Take 1 capsule (0 5 mg total) by mouth daily 90 capsule 3    Empagliflozin (Jardiance) 10 MG TABS Take 1 tablet (10 mg total) by mouth every morning 90 tablet 3    Empagliflozin (Jardiance) 25 MG TABS Take 1 tablet (25 mg total) by mouth every morning 90 tablet 3    gabapentin (NEURONTIN) 600 MG tablet Take 1 tablet (600 mg total) by mouth 3 (three) times a day 270 tablet 3    glimepiride (AMARYL) 4 mg tablet Take 2 tablets (8 mg total) by mouth daily with breakfast 180 tablet 3    hydrochlorothiazide (HYDRODIURIL) 25 mg tablet Take 1 tablet (25 mg total) by mouth daily 90 tablet 3    insulin detemir (Levemir FlexTouch) 100 Units/mL injection pen Inject 60 Units under the skin every 12 (twelve) hours 150 mL 3    lisinopril (ZESTRIL) 40 mg tablet Take 0 5 tablets (20 mg total) by mouth daily 90 tablet 3    metFORMIN (GLUCOPHAGE) 1000 MG tablet Take 1 tablet (1,000 mg total) by mouth 2 (two) times a day with meals 180 tablet 3    OneTouch Ultra test strip Use 1 each 3 (three) times a day Use as instructed 400 strip 3    sitaGLIPtin (Januvia) 100 mg tablet Take 1 tablet (100 mg total) by mouth daily 90 tablet 3    SUPER B COMPLEX/C PO Take by mouth      tamsulosin (FLOMAX) 0 4 mg Take 1 capsule (0 4 mg total) by mouth daily 90 capsule 3    meclizine (ANTIVERT) 25 mg tablet Take 1 tablet (25 mg total) by mouth 3 (three) times a day as needed for dizziness (Patient not taking: Reported on 8/22/2022) 30 tablet 0     No current facility-administered medications for this visit  No Known Allergies   Immunizations:     Immunization History   Administered Date(s) Administered    COVID-19 MODERNA VACC 0 5 ML IM 03/10/2021    Influenza, high dose seasonal 0 7 mL 11/12/2019, 12/10/2020, 10/29/2021    Pneumococcal Conjugate 13-Valent 02/11/2019    Pneumococcal Polysaccharide PPV23 01/09/2018      Health Maintenance:         Topic Date Due    Colorectal Cancer Screening  02/26/2022    Hepatitis C Screening  Completed         Topic Date Due    COVID-19 Vaccine (2 - Moderna series) 04/07/2021    Influenza Vaccine (1) 09/01/2022      Medicare Screening Tests and Risk Assessments:     Lissette Zaidi is here for his Subsequent Wellness visit  Last Medicare Wellness visit information reviewed, patient interviewed and updates made to the record today  Health Risk Assessment:   Patient rates overall health as very good  Patient feels that their physical health rating is slightly better  Patient is very satisfied with their life   Eyesight was rated as same  Hearing was rated as same  Patient feels that their emotional and mental health rating is slightly better  Patients states they are never, rarely angry  Patient states they are sometimes unusually tired/fatigued  Pain experienced in the last 7 days has been some  Patient's pain rating has been 2/10  Patient states that he has experienced no weight loss or gain in last 6 months  Depression Screening:   PHQ-2 Score: 0  PHQ-9 Score: 0      Fall Risk Screening: In the past year, patient has experienced: history of falling in past year    Number of falls: 2 or more  Injured during fall?: No    Feels unsteady when standing or walking?: No    Worried about falling?: No      Home Safety:  Patient does not have trouble with stairs inside or outside of their home  Patient has working smoke alarms and has working carbon monoxide detector  Home safety hazards include: none  Nutrition:   Current diet is Diabetic  Medications:   Patient is currently taking over-the-counter supplements  OTC medications include: see medication list  Patient is able to manage medications  Activities of Daily Living (ADLs)/Instrumental Activities of Daily Living (IADLs):   Walk and transfer into and out of bed and chair?: Yes  Dress and groom yourself?: Yes    Bathe or shower yourself?: Yes    Feed yourself? Yes  Do your laundry/housekeeping?: Yes  Manage your money, pay your bills and track your expenses?: Yes  Make your own meals?: Yes    Do your own shopping?: Yes    Previous Hospitalizations:   Any hospitalizations or ED visits within the last 12 months?: No      Advance Care Planning:   Living will: Yes    Durable POA for healthcare:  Yes    Advanced directive: Yes      Cognitive Screening:   Provider or family/friend/caregiver concerned regarding cognition?: No    PREVENTIVE SCREENINGS      Cardiovascular Screening:    General: Screening Not Indicated and History Lipid Disorder      Diabetes Screening: General: Screening Not Indicated and History Diabetes      Colorectal Cancer Screening:     General: Screening Current      Prostate Cancer Screening:    General: Screening Not Indicated      Osteoporosis Screening:    General: Screening Not Indicated      Abdominal Aortic Aneurysm (AAA) Screening:    Risk factors include: age between 73-69 yo        Lung Cancer Screening:     General: Screening Not Indicated      Hepatitis C Screening:    General: Screening Current    Screening, Brief Intervention, and Referral to Treatment (SBIRT)    Screening  Typical number of drinks in a day: 0    No exam data present     Physical Exam:     /74 (BP Location: Left arm, Patient Position: Sitting, Cuff Size: Large)   Pulse 91   Temp 97 7 °F (36 5 °C) (Temporal)   Resp 20   Ht 5' 8" (1 727 m)   Wt 91 4 kg (201 lb 9 6 oz)   SpO2 94%   BMI 30 65 kg/m²     Physical Exam     Marcela Radford MD

## 2022-08-22 NOTE — PROGRESS NOTES
Assessment/Plan:    Type 2 diabetes mellitus with complication, with long-term current use of insulin ([facility])    Lab Results   Component Value Date    HGBA1C 7 5 (A) 05/09/2022     A1c at goal   Continue current  Hypertension  BP at goal   Continue current  Other hyperlipidemia  LDL at goal   Continue current  Diagnoses and all orders for this visit:    Type 2 diabetes mellitus with complication, with long-term current use of insulin ([facility])  -     POCT hemoglobin A1c    LILIA (obstructive sleep apnea)    Primary hypertension    History of parathyroidectomy ([facility])    Benign prostatic hyperplasia, unspecified whether lower urinary tract symptoms present  -     dutasteride (AVODART) 0 5 mg capsule; Take 1 capsule (0 5 mg total) by mouth daily  -     tamsulosin (FLOMAX) 0 4 mg; Take 1 capsule (0 4 mg total) by mouth daily    Testicular pain, left  -     gabapentin (NEURONTIN) 600 MG tablet; Take 1 tablet (600 mg total) by mouth 3 (three) times a day    Type 2 diabetes mellitus without complication, without long-term current use of insulin ([facility])  -     lisinopril (ZESTRIL) 40 mg tablet; Take 0 5 tablets (20 mg total) by mouth daily  -     metFORMIN (GLUCOPHAGE) 1000 MG tablet; Take 1 tablet (1,000 mg total) by mouth 2 (two) times a day with meals          Subjective:      Patient ID: Salena Bridges is a 81 y o  male  His A1c is fairly well controlled on his current regimen  He has no side effects and no hypoglycemia  His BP is at goal on his current regimen  He has no CP or SOB  He has no HA or vision changes  His lipids are at goal on his current regimen  His LFTs are normal and he has no myalgia or muscle weakness  The following portions of the patient's history were reviewed and updated as appropriate:   He  has a past medical history of Arthritis, Back pain, Diabetes mellitus ([address] ), and Hypertension    He   Patient Active Problem List    Diagnosis Date Noted    Preop examination 02/03/2022  BMI 33 0-33 9,adult 04/09/2021    History of parathy[facility]idectomy ([facility] ) 05/09/2019    Type 2 diabetes mellitus with complication, with long-term current use of insulin ([facility] ) 02/11/2019    Medicare annual wellness visit, subsequent 02/11/2019    LIILA (obstructive sleep apnea) 11/12/2018    Multiple thyroid nodules 09/05/2018    Other hyperlipidemia 05/08/2018    Hypertension 05/08/2018    BPH with obstruction/lower urinary tract symptoms 10/10/2017    Hypercalciuria 07/24/2017    Knee osteoarthritis 06/10/2014    Prepatellar bursitis 06/10/2014     He  has a past surgical history that includes US guided thyroid biopsy (9/7/2018); Tonsillectomy; Hernia repair (Left, 1998); and pr explore parathyroid glands (Right, 11/1/2018)  His family history includes Arthritis in his mother; Coronary artery disease in his father and mother; Diabetes in his maternal uncle and paternal uncle; Hypertension in his father and mother  He  reports that he has never smoked  He has never used smokeless tobacco  He reports current alcohol use  He reports that he does not use drugs    Current Outpatient Medications   Medication Sig Dispense Refill    aspirin (ECOTRIN LOW STRENGTH) 81 mg EC tablet Take 81 mg by mouth daily      atorvastatin (LIPITOR) 40 mg tablet Take 1 tablet (40 mg total) by mouth daily 90 tablet 3    dutasteride (AVODART) 0 5 mg capsule Take 1 capsule (0 5 mg total) by mouth daily 90 capsule 3    Empagliflozin (Jardiance) 10 MG TABS Take 1 tablet (10 mg total) by mouth every morning 90 tablet 3    Empagliflozin (Jardiance) 25 MG TABS Take 1 tablet (25 mg total) by mouth every morning 90 tablet 3    gabapentin (NEURONTIN) 600 MG tablet Take 1 tablet (600 mg total) by mouth 3 (three) times a day 270 tablet 3    glimepiride (AMARYL) 4 mg tablet Take 2 tablets (8 mg total) by mouth daily with breakfast 180 tablet 3    hydrochlorothiazide (HYDRODIURIL) 25 mg tablet Take 1 tablet (25 mg total) by mouth daily 90 tablet 3    insulin detemir (Levemir FlexTouch) 100 Units/mL injection pen Inject 60 Units under the skin every 12 (twelve) hours 150 mL 3    lisinopril (ZESTRIL) 40 mg tablet Take 0 5 tablets (20 mg total) by mouth daily 90 tablet 3    metFORMIN (GLUCOPHAGE) 1000 MG tablet Take 1 tablet (1,000 mg total) by mouth 2 (two) times a day with meals 180 tablet 3    OneTouch Ultra test strip Use 1 each 3 (three) times a day Use as instructed 400 strip 3    sitaGLIPtin (Januvia) 100 mg tablet Take 1 tablet (100 mg total) by mouth daily 90 tablet 3    SUPER B COMPLEX/C PO Take by mouth      tamsulosin (FLOMAX) 0 4 mg Take 1 capsule (0 4 mg total) by mouth daily 90 capsule 3    meclizine (ANTIVERT) 25 mg tablet Take 1 tablet (25 mg total) by mouth 3 (three) times a day as needed for dizziness (Patient not taking: Reported on 8/22/2022) 30 tablet 0     No current facility-administered medications for this visit       Current Outpatient Medications on File Prior to Visit   Medication Sig    aspirin (ECOTRIN LOW STRENGTH) 81 mg EC tablet Take 81 mg by mouth daily    atorvastatin (LIPITOR) 40 mg tablet Take 1 tablet (40 mg total) by mouth daily    Empagliflozin (Jardiance) 10 MG TABS Take 1 tablet (10 mg total) by mouth every morning    Empagliflozin (Jardiance) 25 MG TABS Take 1 tablet (25 mg total) by mouth every morning    glimepiride (AMARYL) 4 mg tablet Take 2 tablets (8 mg total) by mouth daily with breakfast    hydrochlorothiazide (HYDRODIURIL) 25 mg tablet Take 1 tablet (25 mg total) by mouth daily    insulin detemir (Levemir FlexTouch) 100 Units/mL injection pen Inject 60 Units under the skin every 12 (twelve) hours    OneTouch Ultra test strip Use 1 each 3 (three) times a day Use as instructed    sitaGLIPtin (Januvia) 100 mg tablet Take 1 tablet (100 mg total) by mouth daily    SUPER B COMPLEX/C PO Take by mouth    [DISCONTINUED] dutasteride (AVODART) 0 5 mg capsule Take 1 capsule (0 5 mg total) by mouth daily    [DISCONTINUED] gabapentin (NEURONTIN) 600 MG tablet Take 1 tablet (600 mg total) by mouth 3 (three) times a day    [DISCONTINUED] lisinopril (ZESTRIL) 40 mg tablet Take 0 5 tablets (20 mg total) by mouth daily    [DISCONTINUED] metFORMIN (GLUCOPHAGE) 1000 MG tablet Take 1 tablet (1,000 mg total) by mouth 2 (two) times a day with meals    [DISCONTINUED] tamsulosin (FLOMAX) 0 4 mg Take 1 capsule (0 4 mg total) by mouth daily    meclizine (ANTIVERT) 25 mg tablet Take 1 tablet (25 mg total) by mouth 3 (three) times a day as needed for dizziness (Patient not taking: Reported on 8/22/2022)     No current facility-administered medications on file prior to visit  He has No Known Allergies       Review of Systems   All other systems reviewed and are negative  Objective:      /74 (BP Location: Left arm, Patient Position: Sitting, Cuff Size: Large)   Pulse 91   Temp 97 7 °F (36 5 °C) (Temporal)   Resp 20   Ht 5' 8" (1 727 m)   Wt 91 4 kg (201 lb 9 6 oz)   SpO2 94%   BMI 30 65 kg/m²          Physical Exam  Vitals and nursing note reviewed  Constitutional:       Appearance: Normal appearance  HENT:      Head: Normocephalic and atraumatic  Cardiovascular:      Rate and Rhythm: Normal rate and regular rhythm  Pulses: Normal pulses  Heart sounds: Normal heart sounds  Pulmonary:      Effort: Pulmonary effort is normal       Breath sounds: Normal breath sounds  Abdominal:      General: Abdomen is flat  Bowel sounds are normal       Palpations: Abdomen is soft  Musculoskeletal:         General: Normal range of motion  Cervical back: Normal range of motion and neck supple  Skin:     General: Skin is warm and dry  Capillary Refill: Capillary refill takes less than 2 seconds  Neurological:      General: No focal deficit present  Mental Status: He is alert and oriented to person, place, and time  Mental status is at baseline     Psychiatric:         Mood and Affect: Mood normal          Behavior: Behavior normal          Thought Content:  Thought content normal          Judgment: Judgment normal

## 2022-09-20 ENCOUNTER — DOCTOR'S OFFICE (OUTPATIENT)
Dept: URBAN - NONMETROPOLITAN AREA CLINIC 1 | Facility: CLINIC | Age: 72
Setting detail: OPHTHALMOLOGY
End: 2022-09-20
Payer: COMMERCIAL

## 2022-09-20 DIAGNOSIS — H44.23: ICD-10-CM

## 2022-09-20 DIAGNOSIS — H35.363: ICD-10-CM

## 2022-09-20 DIAGNOSIS — H25.13: ICD-10-CM

## 2022-09-20 DIAGNOSIS — H40.023: ICD-10-CM

## 2022-09-20 DIAGNOSIS — E11.3293: ICD-10-CM

## 2022-09-20 PROCEDURE — 92134 CPTRZ OPH DX IMG PST SGM RTA: CPT | Performed by: OPHTHALMOLOGY

## 2022-09-20 PROCEDURE — 92083 EXTENDED VISUAL FIELD XM: CPT | Performed by: OPHTHALMOLOGY

## 2022-09-20 PROCEDURE — 99214 OFFICE O/P EST MOD 30 MIN: CPT | Performed by: OPHTHALMOLOGY

## 2022-09-20 ASSESSMENT — SPHEQUIV_DERIVED
OS_SPHEQUIV: -7
OS_SPHEQUIV: -5.75
OD_SPHEQUIV: -5.375
OD_SPHEQUIV: -4.25
OS_SPHEQUIV: -6.875
OD_SPHEQUIV: -5.625

## 2022-09-20 ASSESSMENT — REFRACTION_AUTOREFRACTION
OD_CYLINDER: -0.50
OD_SPHERE: -4.00
OS_SPHERE: -5.50
OS_CYLINDER: -0.50
OS_AXIS: 80
OD_AXIS: 5

## 2022-09-20 ASSESSMENT — REFRACTION_CURRENTRX
OS_SPHERE: -7.00
OS_AXIS: 007
OS_CYLINDER: -1.00
OD_VPRISM_DIRECTION: PROGS
OD_CYLINDER: -3.00
OD_AXIS: 176
OS_ADD: +2.00
OD_CYLINDER: -1.50
OD_SPHERE: -5.25
OS_OVR_VA: 20/
OS_VPRISM_DIRECTION: PROGS
OD_OVR_VA: 20/
OS_ADD: +2.50
OS_CYLINDER: -2.50
OS_SPHERE: -7.75
OD_OVR_VA: 20/
OS_VPRISM_DIRECTION: PROGS
OD_ADD: +2.00
OD_AXIS: 180
OS_OVR_VA: 20/
OD_ADD: +2.50
OS_AXIS: 010
OD_SPHERE: -5.75
OD_VPRISM_DIRECTION: PROGS

## 2022-09-20 ASSESSMENT — REFRACTION_MANIFEST
OD_SPHERE: -5.00
OD_AXIS: 180
OD_CYLINDER: -1.25
OS_CYLINDER: -0.75
OS_SPHERE: -6.50
OD_SPHERE: -4.75
OD_VA1: 20/25-2
OD_AXIS: 180
OD_ADD: +1.75
OD_VA2: 20/25-2
OS_ADD: +1.75
OS_VA1: 20/25-2
OS_SPHERE: -6.50
OD_CYLINDER: -1.25
OS_AXIS: 010
OS_ADD: +2.25
OS_CYLINDER: -1.00
OD_ADD: +2.25
OD_VA1: 20/25-2
OS_VA1: 20/25-2
OD_VA2: 20/25-2
OS_VA2: 20/25-2
OS_VA2: 20/25-2
OS_AXIS: 010

## 2022-09-20 ASSESSMENT — KERATOMETRY
OD_K1POWER_DIOPTERS: 45.00
OS_K1POWER_DIOPTERS: 44.50
OD_K2POWER_DIOPTERS: 46.25
OD_AXISANGLE_DEGREES: 091
OS_K2POWER_DIOPTERS: 45.25
OS_AXISANGLE_DEGREES: 097

## 2022-09-20 ASSESSMENT — AXIALLENGTH_DERIVED
OS_AL: 25.4648
OD_AL: 24.5009
OD_AL: 24.983
OD_AL: 25.0927
OS_AL: 26.0452
OS_AL: 25.9859

## 2022-09-20 ASSESSMENT — TONOMETRY
OS_IOP_MMHG: 18
OD_IOP_MMHG: 16

## 2022-09-20 ASSESSMENT — VISUAL ACUITY
OS_BCVA: 20/30-1
OD_BCVA: 20/25-1

## 2022-09-20 ASSESSMENT — PACHYMETRY
OD_CT_CORRECTION: -5
OS_CT_UM: 623
OD_CT_UM: 613
OS_CT_CORRECTION: -6

## 2022-09-20 ASSESSMENT — LID POSITION - DERMATOCHALASIS
OD_DERMATOCHALASIS: RUL
OS_DERMATOCHALASIS: LUL

## 2022-09-20 ASSESSMENT — CONFRONTATIONAL VISUAL FIELD TEST (CVF)
OD_FINDINGS: FULL
OS_FINDINGS: FULL

## 2022-09-20 ASSESSMENT — LID POSITION - PTOSIS
OS_PTOSIS: LUL
OD_PTOSIS: RUL

## 2022-12-07 ENCOUNTER — DOCTOR'S OFFICE (OUTPATIENT)
Dept: URBAN - NONMETROPOLITAN AREA CLINIC 1 | Facility: CLINIC | Age: 72
Setting detail: OPHTHALMOLOGY
End: 2022-12-07
Payer: COMMERCIAL

## 2022-12-07 DIAGNOSIS — H35.363: ICD-10-CM

## 2022-12-07 DIAGNOSIS — H35.3111: ICD-10-CM

## 2022-12-07 DIAGNOSIS — H35.3122: ICD-10-CM

## 2022-12-07 DIAGNOSIS — H25.13: ICD-10-CM

## 2022-12-07 DIAGNOSIS — H44.23: ICD-10-CM

## 2022-12-07 DIAGNOSIS — H40.023: ICD-10-CM

## 2022-12-07 DIAGNOSIS — E11.3293: ICD-10-CM

## 2022-12-07 PROCEDURE — 92134 CPTRZ OPH DX IMG PST SGM RTA: CPT | Performed by: OPHTHALMOLOGY

## 2022-12-07 PROCEDURE — 92014 COMPRE OPH EXAM EST PT 1/>: CPT | Performed by: OPHTHALMOLOGY

## 2022-12-07 ASSESSMENT — REFRACTION_CURRENTRX
OS_SPHERE: -7.00
OD_OVR_VA: 20/
OD_VPRISM_DIRECTION: PROGS
OD_AXIS: 180
OS_SPHERE: -7.75
OD_SPHERE: -5.75
OD_ADD: +2.50
OS_AXIS: 010
OS_CYLINDER: -2.50
OD_OVR_VA: 20/
OS_OVR_VA: 20/
OS_VPRISM_DIRECTION: PROGS
OD_ADD: +2.00
OS_ADD: +2.50
OS_AXIS: 007
OS_OVR_VA: 20/
OS_ADD: +2.00
OD_SPHERE: -5.25
OD_CYLINDER: -1.50
OS_VPRISM_DIRECTION: PROGS
OD_VPRISM_DIRECTION: PROGS
OD_AXIS: 176
OD_CYLINDER: -3.00
OS_CYLINDER: -1.00

## 2022-12-07 ASSESSMENT — REFRACTION_MANIFEST
OS_SPHERE: -6.50
OS_CYLINDER: -1.00
OS_VA2: 20/25-2
OD_VA2: 20/25-2
OD_VA2: 20/25-2
OS_ADD: +1.75
OD_ADD: +2.25
OD_SPHERE: -5.00
OS_VA1: 20/25-2
OS_AXIS: 010
OS_CYLINDER: -0.75
OD_VA1: 20/25-2
OS_VA1: 20/25-2
OS_ADD: +2.25
OS_AXIS: 010
OD_CYLINDER: -1.25
OD_CYLINDER: -1.25
OD_ADD: +1.75
OD_SPHERE: -4.75
OD_AXIS: 180
OD_VA1: 20/25-2
OD_AXIS: 180
OS_SPHERE: -6.50
OS_VA2: 20/25-2

## 2022-12-07 ASSESSMENT — LID POSITION - PTOSIS
OS_PTOSIS: LUL
OD_PTOSIS: RUL

## 2022-12-07 ASSESSMENT — KERATOMETRY
OS_K1POWER_DIOPTERS: 44.50
OD_K1POWER_DIOPTERS: 45.00
OD_AXISANGLE_DEGREES: 091
OS_K2POWER_DIOPTERS: 45.25
OS_AXISANGLE_DEGREES: 097
OD_K2POWER_DIOPTERS: 46.25

## 2022-12-07 ASSESSMENT — SPHEQUIV_DERIVED
OS_SPHEQUIV: -7
OD_SPHEQUIV: -5.625
OS_SPHEQUIV: -6.875
OD_SPHEQUIV: -5.375
OS_SPHEQUIV: -5.75
OD_SPHEQUIV: -4.25

## 2022-12-07 ASSESSMENT — LID POSITION - DERMATOCHALASIS
OS_DERMATOCHALASIS: LUL
OD_DERMATOCHALASIS: RUL

## 2022-12-07 ASSESSMENT — REFRACTION_AUTOREFRACTION
OD_AXIS: 5
OS_CYLINDER: -0.50
OD_SPHERE: -4.00
OD_CYLINDER: -0.50
OS_SPHERE: -5.50
OS_AXIS: 80

## 2022-12-07 ASSESSMENT — CONFRONTATIONAL VISUAL FIELD TEST (CVF)
OS_FINDINGS: FULL
OD_FINDINGS: FULL

## 2022-12-07 ASSESSMENT — VISUAL ACUITY
OD_BCVA: 20/25-1
OS_BCVA: 20/25-2

## 2022-12-07 ASSESSMENT — PACHYMETRY
OS_CT_CORRECTION: -6
OD_CT_CORRECTION: -5
OD_CT_UM: 613
OS_CT_UM: 623

## 2022-12-07 ASSESSMENT — AXIALLENGTH_DERIVED
OS_AL: 26.0452
OS_AL: 25.4648
OS_AL: 25.9859
OD_AL: 25.0927
OD_AL: 24.5009
OD_AL: 24.983

## 2022-12-07 ASSESSMENT — TONOMETRY
OD_IOP_MMHG: 19
OS_IOP_MMHG: 20

## 2023-01-03 ENCOUNTER — RX ONLY (RX ONLY)
Age: 73
End: 2023-01-03

## 2023-01-03 ENCOUNTER — DOCTOR'S OFFICE (OUTPATIENT)
Dept: URBAN - NONMETROPOLITAN AREA CLINIC 1 | Facility: CLINIC | Age: 73
Setting detail: OPHTHALMOLOGY
End: 2023-01-03
Payer: COMMERCIAL

## 2023-01-03 DIAGNOSIS — H25.11: ICD-10-CM

## 2023-01-03 PROCEDURE — 92136 OPHTHALMIC BIOMETRY: CPT | Performed by: OPHTHALMOLOGY

## 2023-01-03 RX ORDER — OFLOXACIN 3 MG/ML
SOLUTION OPHTHALMIC
Qty: 1 | Refills: 3 | Status: ACTIVE | OUTPATIENT

## 2023-01-03 RX ORDER — PREDNISOLONE ACETATE 10 MG/ML
SUSPENSION/ DROPS OPHTHALMIC
Qty: 1 | Refills: 3 | Status: ACTIVE | OUTPATIENT

## 2023-01-03 RX ORDER — KETOROLAC TROMETHAMINE 5 MG/ML
SOLUTION OPHTHALMIC
Qty: 1 | Refills: 3 | Status: ACTIVE | OUTPATIENT

## 2023-01-06 ENCOUNTER — CONSULT (OUTPATIENT)
Dept: FAMILY MEDICINE CLINIC | Facility: CLINIC | Age: 73
End: 2023-01-06

## 2023-01-06 VITALS
DIASTOLIC BLOOD PRESSURE: 64 MMHG | HEIGHT: 70 IN | SYSTOLIC BLOOD PRESSURE: 118 MMHG | OXYGEN SATURATION: 96 % | TEMPERATURE: 96.2 F | HEART RATE: 80 BPM | WEIGHT: 210 LBS | RESPIRATION RATE: 18 BRPM | BODY MASS INDEX: 30.06 KG/M2

## 2023-01-06 DIAGNOSIS — E11.8 TYPE 2 DIABETES MELLITUS WITH COMPLICATION, WITH LONG-TERM CURRENT USE OF INSULIN (HCC): ICD-10-CM

## 2023-01-06 DIAGNOSIS — I10 PRIMARY HYPERTENSION: ICD-10-CM

## 2023-01-06 DIAGNOSIS — E78.2 MIXED HYPERLIPIDEMIA: ICD-10-CM

## 2023-01-06 DIAGNOSIS — Z01.818 PREOPERATIVE CLEARANCE: Primary | ICD-10-CM

## 2023-01-06 DIAGNOSIS — Z79.4 TYPE 2 DIABETES MELLITUS WITH COMPLICATION, WITH LONG-TERM CURRENT USE OF INSULIN (HCC): ICD-10-CM

## 2023-01-06 DIAGNOSIS — E89.2 HISTORY OF PARATHYROIDECTOMY (HCC): ICD-10-CM

## 2023-01-06 DIAGNOSIS — G47.33 OSA (OBSTRUCTIVE SLEEP APNEA): ICD-10-CM

## 2023-01-06 LAB — SL AMB POCT HEMOGLOBIN AIC: 7.1 (ref ?–6.5)

## 2023-01-06 RX ORDER — OFLOXACIN 3 MG/ML
SOLUTION/ DROPS OPHTHALMIC
COMMUNITY
Start: 2023-01-03

## 2023-01-06 RX ORDER — KETOROLAC TROMETHAMINE 5 MG/ML
SOLUTION OPHTHALMIC
COMMUNITY
Start: 2023-01-03 | End: 2023-01-06 | Stop reason: SDUPTHER

## 2023-01-06 RX ORDER — BROMFENAC 1.03 MG/ML
SOLUTION/ DROPS OPHTHALMIC
COMMUNITY
Start: 2022-12-29

## 2023-01-06 RX ORDER — PREDNISOLONE ACETATE 10 MG/ML
SUSPENSION/ DROPS OPHTHALMIC
COMMUNITY
Start: 2023-01-03

## 2023-01-06 NOTE — PROGRESS NOTES
Assessment/Plan:     Patient examined by me today and is optimized medically as of today's date for planned surgery  DM - well controlled  Cont regimen  HTN - controlled  LILIA - not using PAP       81 y o  male with planned surgery as above  Known risk factors for perioperative complications: Diabetes mellitus - A1C - 7 1  Difficulty with intubation - n/a     Cardiac Risk Estimation: per the Revised Cardiac Risk Index:  Low risk  Current medications which may produce withdrawal symptoms if withheld perioperatively: per surgeons recommendation  1  Preoperative workup as follows none  2  Change in medication regimen before surgery: n/a - per surgeon's recommendation  3  Prophylaxis for cardiac events with perioperative beta-blockers: not indicated  4  Invasive hemodynamic monitoring perioperatively: not indicated  5  Deep vein thrombosis prophylaxis postoperatively:n/a       6  Surveillance for postoperative MI with ECG immediately postoperatively and on postoperative days 1 and 2 AND troponin levels 24 hours postoperatively and on day 4 or hospital discharge (whichever comes first): not indicated  7  Other measures: per surgical team recommendations/instruction      Return for keep f/u with Dr Mateo Ralph in Feb   this is for CDM  Lab slips given  Chief Complaint   Patient presents with   • Pre-op Exam     Cataract surgery on 01/12/23 and 01/19/23        Subjective:      Zeferino Campos is a 81 y o  male who presents to the office today for a preoperative consultation at the request of surgeon Dr Patricia Robertson who plans on performing R cataract extraction with possible introcular lens implant followed by the L on January 12 followed by 1/19/23  This consultation is requested for the specific conditions prompting preoperative evaluation (i e  because of potential affect on operative risk): Insulin Dependent DM - will do A1C today, HTN, HLD  He is s/p parathyroidectomy      Lab Results Component Value Date    HGBA1C 7 1 (A) 01/06/2023     Planned anesthesia is Topical  The patient has the following known anesthesia issues: n/a  Patient has a bleeding risk of: no recent abnormal bleeding and no remote history of abnormal bleeding  Patient n/a have objections to receiving blood products if needed  Patient feels well  No sob, cp, edema, dizziness, palpitations, etc   He feels well  BG is under good control  The following portions of the patient's history were reviewed and updated as appropriate: allergies, current medications, past family history, past medical history, past social history, past surgical history and problem list     Review of Systems  Pertinent items are noted in HPI         Objective:      Physical Exam  /64 (BP Location: Left arm, Patient Position: Sitting, Cuff Size: Large)   Pulse 80   Temp (!) 96 2 °F (35 7 °C) (Temporal)   Resp 18   Ht 5' 10" (1 778 m)   Wt 95 3 kg (210 lb)   SpO2 96%   BMI 30 13 kg/m²     General Appearance:    Alert, cooperative, no distress, appears stated age   Head:    Normocephalic, without obvious abnormality, atraumatic   Eyes:    conjunctiva/corneas clear        Ears:    Normal TM's and external ear canals, both ears   Nose:   Nares normal, septum midline, mucosa normal, no drainage    or sinus tenderness   Throat:   Lips, mucosa, and tongue normal; teeth and gums normal   Neck:   Supple, symmetrical    Back:     Symmetric   Lungs:     Clear to auscultation bilaterally, respirations unlabored   Chest wall:    No tenderness or deformity   Heart:    Regular rate and rhythm, S1 and S2 normal, no murmur, rub   or gallop   Abdomen:     Soft, non-tender, bowel sounds active all four quadrants,     no masses, no organomegaly   Genitalia:    n/a   Rectal:    n/a   Extremities:   Extremities normal, atraumatic, no cyanosis or edema   Pulses:   2+ and symmetric all extremities   Skin:   Skin color, texture, turgor normal, no rashes or lesions   Lymph nodes:   Cervical nodes normal   Neurologic:   Non focal        Predictors of intubation difficulty:   n/a    Cardiographics  ECG: n/a  Echocardiogram: not done    Imaging  Chest x-ray: n/a     Lab Review   Lab Results   Component Value Date    HGBA1C 7 1 (A) 01/06/2023

## 2023-01-12 ENCOUNTER — RX ONLY (RX ONLY)
Age: 73
End: 2023-01-12

## 2023-01-12 ENCOUNTER — AMBUL SURGICAL CARE (OUTPATIENT)
Dept: URBAN - NONMETROPOLITAN AREA SURGERY 1 | Facility: SURGERY | Age: 73
Setting detail: OPHTHALMOLOGY
End: 2023-01-12
Payer: COMMERCIAL

## 2023-01-12 DIAGNOSIS — H25.011: ICD-10-CM

## 2023-01-12 DIAGNOSIS — H25.11: ICD-10-CM

## 2023-01-12 DIAGNOSIS — H25.041: ICD-10-CM

## 2023-01-12 PROCEDURE — G8907 PT DOC NO EVENTS ON DISCHARG: HCPCS | Performed by: OPHTHALMOLOGY

## 2023-01-12 PROCEDURE — G8918 PT W/O PREOP ORDER IV AB PRO: HCPCS | Performed by: OPHTHALMOLOGY

## 2023-01-12 PROCEDURE — 66984 XCAPSL CTRC RMVL W/O ECP: CPT | Performed by: OPHTHALMOLOGY

## 2023-01-12 RX ORDER — DOXYCYCLINE 50 MG/1
TABLET, FILM COATED ORAL
Qty: 60 | Refills: 2 | Status: ACTIVE | OUTPATIENT

## 2023-01-13 ENCOUNTER — DOCTOR'S OFFICE (OUTPATIENT)
Dept: URBAN - NONMETROPOLITAN AREA CLINIC 1 | Facility: CLINIC | Age: 73
Setting detail: OPHTHALMOLOGY
End: 2023-01-13
Payer: COMMERCIAL

## 2023-01-13 ENCOUNTER — RX ONLY (RX ONLY)
Age: 73
End: 2023-01-13

## 2023-01-13 ENCOUNTER — OPTICAL OFFICE (OUTPATIENT)
Dept: URBAN - NONMETROPOLITAN AREA CLINIC 4 | Facility: CLINIC | Age: 73
Setting detail: OPHTHALMOLOGY
End: 2023-01-13

## 2023-01-13 DIAGNOSIS — H25.12: ICD-10-CM

## 2023-01-13 DIAGNOSIS — H52.7: ICD-10-CM

## 2023-01-13 DIAGNOSIS — Z96.1: ICD-10-CM

## 2023-01-13 PROCEDURE — V2100 LENS SPHER SINGLE PLANO 4.00: HCPCS | Performed by: OPHTHALMOLOGY

## 2023-01-13 PROCEDURE — 92136 OPHTHALMIC BIOMETRY: CPT | Performed by: OPHTHALMOLOGY

## 2023-01-13 PROCEDURE — 99024 POSTOP FOLLOW-UP VISIT: CPT | Performed by: OPHTHALMOLOGY

## 2023-01-13 ASSESSMENT — REFRACTION_MANIFEST
OS_ADD: +2.25
OD_VA1: 20/25-2
OS_SPHERE: -6.50
OS_VA2: 20/25-2
OS_AXIS: 010
OS_SPHERE: -6.50
OD_CYLINDER: -1.25
OS_ADD: +1.75
OD_VA1: 20/25-2
OS_AXIS: 010
OD_AXIS: 180
OD_ADD: +2.25
OS_CYLINDER: -0.75
OD_AXIS: 180
OD_SPHERE: -4.75
OS_VA1: 20/25-2
OS_VA2: 20/25-2
OS_CYLINDER: -1.00
OD_VA2: 20/25-2
OD_SPHERE: -5.00
OD_ADD: +1.75
OS_VA1: 20/25-2
OD_CYLINDER: -1.25
OD_VA2: 20/25-2

## 2023-01-13 ASSESSMENT — AXIALLENGTH_DERIVED
OS_AL: 26.0452
OD_AL: 22.7455
OS_AL: 25.9859
OD_AL: 24.983
OS_AL: 25.4648
OD_AL: 25.0927

## 2023-01-13 ASSESSMENT — VISUAL ACUITY
OS_BCVA: 20/30
OD_BCVA: 20/30-1

## 2023-01-13 ASSESSMENT — REFRACTION_CURRENTRX
OD_OVR_VA: 20/
OD_CYLINDER: -3.00
OS_CYLINDER: -1.00
OS_VPRISM_DIRECTION: PROGS
OD_OVR_VA: 20/
OD_VPRISM_DIRECTION: PROGS
OS_ADD: +2.50
OD_SPHERE: -5.25
OS_AXIS: 007
OD_SPHERE: -5.75
OS_SPHERE: -7.00
OD_ADD: +2.00
OS_VPRISM_DIRECTION: PROGS
OS_AXIS: 010
OD_AXIS: 176
OS_SPHERE: -7.75
OD_ADD: +2.50
OS_CYLINDER: -2.50
OD_VPRISM_DIRECTION: PROGS
OS_OVR_VA: 20/
OD_CYLINDER: -1.50
OD_AXIS: 180
OS_OVR_VA: 20/
OS_ADD: +2.00

## 2023-01-13 ASSESSMENT — SPHEQUIV_DERIVED
OS_SPHEQUIV: -7
OD_SPHEQUIV: -5.625
OD_SPHEQUIV: -5.375
OD_SPHEQUIV: 0.25
OS_SPHEQUIV: -6.875
OS_SPHEQUIV: -5.75

## 2023-01-13 ASSESSMENT — REFRACTION_AUTOREFRACTION
OD_AXIS: 011
OD_CYLINDER: -1.50
OS_CYLINDER: -0.50
OS_AXIS: 80
OS_SPHERE: -5.50
OD_SPHERE: +1.00

## 2023-01-13 ASSESSMENT — PACHYMETRY
OD_CT_CORRECTION: -5
OS_CT_CORRECTION: -6
OD_CT_UM: 613
OS_CT_UM: 623

## 2023-01-13 ASSESSMENT — KERATOMETRY
OS_K2POWER_DIOPTERS: 45.25
OS_AXISANGLE_DEGREES: 097
OD_K2POWER_DIOPTERS: 46.25
OS_K1POWER_DIOPTERS: 44.50
OD_K1POWER_DIOPTERS: 45.00
OD_AXISANGLE_DEGREES: 091

## 2023-01-19 ENCOUNTER — AMBUL SURGICAL CARE (OUTPATIENT)
Dept: URBAN - NONMETROPOLITAN AREA SURGERY 1 | Facility: SURGERY | Age: 73
Setting detail: OPHTHALMOLOGY
End: 2023-01-19
Payer: COMMERCIAL

## 2023-01-19 DIAGNOSIS — H25.012: ICD-10-CM

## 2023-01-19 DIAGNOSIS — H25.12: ICD-10-CM

## 2023-01-19 DIAGNOSIS — H25.042: ICD-10-CM

## 2023-01-19 PROCEDURE — 66984 XCAPSL CTRC RMVL W/O ECP: CPT | Performed by: OPHTHALMOLOGY

## 2023-01-19 PROCEDURE — G8907 PT DOC NO EVENTS ON DISCHARG: HCPCS | Performed by: OPHTHALMOLOGY

## 2023-01-19 PROCEDURE — G8918 PT W/O PREOP ORDER IV AB PRO: HCPCS | Performed by: OPHTHALMOLOGY

## 2023-01-20 ENCOUNTER — DOCTOR'S OFFICE (OUTPATIENT)
Dept: URBAN - NONMETROPOLITAN AREA CLINIC 1 | Facility: CLINIC | Age: 73
Setting detail: OPHTHALMOLOGY
End: 2023-01-20

## 2023-01-20 DIAGNOSIS — Z96.1: ICD-10-CM

## 2023-01-20 PROBLEM — H25.12 CATARACT NUCLEAR SCLEROSIS; LEFT EYE: Status: RESOLVED | Noted: 2023-01-13 | Resolved: 2023-01-20

## 2023-01-20 PROCEDURE — 99024 POSTOP FOLLOW-UP VISIT: CPT | Performed by: OPTOMETRIST

## 2023-01-20 ASSESSMENT — REFRACTION_AUTOREFRACTION
OD_AXIS: 180
OD_CYLINDER: -1.50
OS_AXIS: 016
OS_SPHERE: 0.00
OD_SPHERE: +0.75
OS_CYLINDER: -1.25

## 2023-01-20 ASSESSMENT — REFRACTION_CURRENTRX
OD_ADD: +2.50
OD_VPRISM_DIRECTION: PROGS
OS_ADD: +2.00
OD_VPRISM_DIRECTION: PROGS
OD_ADD: +2.00
OD_SPHERE: -5.75
OD_OVR_VA: 20/
OD_CYLINDER: -3.00
OD_OVR_VA: 20/
OS_SPHERE: -7.75
OS_OVR_VA: 20/
OD_SPHERE: -5.25
OS_VPRISM_DIRECTION: PROGS
OS_AXIS: 007
OD_CYLINDER: -1.50
OS_CYLINDER: -1.00
OD_AXIS: 180
OS_CYLINDER: -2.50
OS_SPHERE: -7.00
OS_ADD: +2.50
OS_AXIS: 010
OD_AXIS: 176
OS_OVR_VA: 20/
OS_VPRISM_DIRECTION: PROGS

## 2023-01-20 ASSESSMENT — REFRACTION_MANIFEST
OD_VA2: 20/25-2
OS_VA2: 20/25-2
OS_VA2: 20/25-2
OS_VA1: 20/25-2
OD_ADD: +1.75
OS_ADD: +2.25
OD_VA1: 20/25-2
OS_AXIS: 010
OD_AXIS: 180
OD_CYLINDER: -1.25
OD_ADD: +2.25
OD_AXIS: 180
OS_SPHERE: -6.50
OD_CYLINDER: -1.25
OS_ADD: +1.75
OD_SPHERE: -4.75
OS_AXIS: 010
OS_VA1: 20/25-2
OS_CYLINDER: -1.00
OS_SPHERE: -6.50
OS_CYLINDER: -0.75
OD_VA1: 20/25-2
OD_VA2: 20/25-2
OD_SPHERE: -5.00

## 2023-01-20 ASSESSMENT — LID POSITION - DERMATOCHALASIS
OS_DERMATOCHALASIS: LUL
OD_DERMATOCHALASIS: RUL

## 2023-01-20 ASSESSMENT — KERATOMETRY
OS_AXISANGLE_DEGREES: 097
OD_AXISANGLE_DEGREES: 091
OD_K1POWER_DIOPTERS: 45.00
OD_K2POWER_DIOPTERS: 46.25
OS_K2POWER_DIOPTERS: 45.25
OS_K1POWER_DIOPTERS: 44.50

## 2023-01-20 ASSESSMENT — AXIALLENGTH_DERIVED
OS_AL: 26.0452
OS_AL: 25.9859
OD_AL: 22.8364
OS_AL: 23.3332
OD_AL: 25.0927
OD_AL: 24.983

## 2023-01-20 ASSESSMENT — PACHYMETRY
OD_CT_CORRECTION: -5
OS_CT_CORRECTION: -6
OD_CT_UM: 613
OS_CT_UM: 623

## 2023-01-20 ASSESSMENT — LID POSITION - PTOSIS
OD_PTOSIS: RUL
OS_PTOSIS: LUL

## 2023-01-20 ASSESSMENT — SPHEQUIV_DERIVED
OS_SPHEQUIV: -0.625
OD_SPHEQUIV: 0
OS_SPHEQUIV: -7
OD_SPHEQUIV: -5.375
OS_SPHEQUIV: -6.875
OD_SPHEQUIV: -5.625

## 2023-01-20 ASSESSMENT — VISUAL ACUITY
OD_BCVA: 20/25
OS_BCVA: 20/20-2

## 2023-01-20 ASSESSMENT — TONOMETRY: OD_IOP_MMHG: 18

## 2023-02-01 ENCOUNTER — RX ONLY (RX ONLY)
Age: 73
End: 2023-02-01

## 2023-02-01 ENCOUNTER — DOCTOR'S OFFICE (OUTPATIENT)
Dept: URBAN - NONMETROPOLITAN AREA CLINIC 1 | Facility: CLINIC | Age: 73
Setting detail: OPHTHALMOLOGY
End: 2023-02-01
Payer: COMMERCIAL

## 2023-02-01 DIAGNOSIS — Z96.1: ICD-10-CM

## 2023-02-01 PROCEDURE — 99024 POSTOP FOLLOW-UP VISIT: CPT | Performed by: OPHTHALMOLOGY

## 2023-02-01 ASSESSMENT — AXIALLENGTH_DERIVED
OS_AL: 26.0452
OS_AL: 23.3332
OD_AL: 25.0927
OD_AL: 24.983
OS_AL: 25.9859
OD_AL: 22.8364

## 2023-02-01 ASSESSMENT — SPHEQUIV_DERIVED
OS_SPHEQUIV: -7
OS_SPHEQUIV: -0.625
OD_SPHEQUIV: 0
OS_SPHEQUIV: -6.875
OD_SPHEQUIV: -5.625
OD_SPHEQUIV: -5.375

## 2023-02-01 ASSESSMENT — REFRACTION_CURRENTRX
OS_VPRISM_DIRECTION: PROGS
OS_SPHERE: -7.75
OD_ADD: +2.00
OD_SPHERE: -5.25
OS_CYLINDER: -1.00
OD_AXIS: 176
OD_CYLINDER: -3.00
OD_CYLINDER: -1.50
OS_SPHERE: -7.00
OS_ADD: +2.00
OD_VPRISM_DIRECTION: PROGS
OS_OVR_VA: 20/
OD_ADD: +2.50
OS_AXIS: 010
OD_VPRISM_DIRECTION: PROGS
OS_OVR_VA: 20/
OS_VPRISM_DIRECTION: PROGS
OD_OVR_VA: 20/
OD_OVR_VA: 20/
OS_AXIS: 007
OD_SPHERE: -5.75
OS_ADD: +2.50
OS_CYLINDER: -2.50
OD_AXIS: 180

## 2023-02-01 ASSESSMENT — TONOMETRY
OD_IOP_MMHG: 22
OS_IOP_MMHG: 21

## 2023-02-01 ASSESSMENT — REFRACTION_MANIFEST
OS_SPHERE: -6.50
OS_ADD: +2.25
OD_AXIS: 180
OS_CYLINDER: -0.75
OD_VA2: 20/25-2
OS_VA1: 20/25-2
OS_SPHERE: -6.50
OD_ADD: +1.75
OD_ADD: +2.25
OD_CYLINDER: -1.25
OD_VA1: 20/25-2
OD_SPHERE: -4.75
OS_CYLINDER: -1.00
OS_VA1: 20/25-2
OD_CYLINDER: -1.25
OS_ADD: +1.75
OD_VA2: 20/25-2
OS_AXIS: 010
OD_SPHERE: -5.00
OS_VA2: 20/25-2
OS_AXIS: 010
OD_VA1: 20/25-2
OD_AXIS: 180
OS_VA2: 20/25-2

## 2023-02-01 ASSESSMENT — REFRACTION_AUTOREFRACTION
OD_SPHERE: +0.75
OS_CYLINDER: -1.25
OD_CYLINDER: -1.50
OS_AXIS: 016
OD_AXIS: 180
OS_SPHERE: 0.00

## 2023-02-01 ASSESSMENT — KERATOMETRY
OD_AXISANGLE_DEGREES: 091
OS_K1POWER_DIOPTERS: 44.50
OD_K1POWER_DIOPTERS: 45.00
OD_K2POWER_DIOPTERS: 46.25
OS_AXISANGLE_DEGREES: 097
OS_K2POWER_DIOPTERS: 45.25

## 2023-02-01 ASSESSMENT — PACHYMETRY
OS_CT_CORRECTION: -6
OD_CT_CORRECTION: -5
OD_CT_UM: 613
OS_CT_UM: 623

## 2023-02-01 ASSESSMENT — VISUAL ACUITY
OS_BCVA: 20/25+1
OD_BCVA: 20/25+2

## 2023-02-03 ENCOUNTER — TELEPHONE (OUTPATIENT)
Dept: FAMILY MEDICINE CLINIC | Facility: CLINIC | Age: 73
End: 2023-02-03

## 2023-02-03 DIAGNOSIS — E11.9 TYPE 2 DIABETES MELLITUS WITHOUT COMPLICATION, WITHOUT LONG-TERM CURRENT USE OF INSULIN (HCC): ICD-10-CM

## 2023-02-03 RX ORDER — INSULIN DETEMIR 100 [IU]/ML
60 INJECTION, SOLUTION SUBCUTANEOUS EVERY 12 HOURS SCHEDULED
Qty: 150 ML | Refills: 3 | Status: SHIPPED | OUTPATIENT
Start: 2023-02-03

## 2023-02-03 NOTE — TELEPHONE ENCOUNTER
levemir insulin pens are out of stock with [facility]  Is there any way we can send a script to [facility] in [place]

## 2023-02-07 ENCOUNTER — APPOINTMENT (OUTPATIENT)
Dept: LAB | Facility: CLINIC | Age: 73
End: 2023-02-07

## 2023-02-07 DIAGNOSIS — Z79.4 TYPE 2 DIABETES MELLITUS WITH COMPLICATION, WITH LONG-TERM CURRENT USE OF INSULIN (HCC): ICD-10-CM

## 2023-02-07 DIAGNOSIS — E89.2 HISTORY OF PARATHYROIDECTOMY (HCC): ICD-10-CM

## 2023-02-07 DIAGNOSIS — E11.8 TYPE 2 DIABETES MELLITUS WITH COMPLICATION, WITH LONG-TERM CURRENT USE OF INSULIN (HCC): ICD-10-CM

## 2023-02-07 DIAGNOSIS — E78.2 MIXED HYPERLIPIDEMIA: ICD-10-CM

## 2023-02-07 DIAGNOSIS — I10 PRIMARY HYPERTENSION: ICD-10-CM

## 2023-02-07 LAB
ALBUMIN SERPL BCP-MCNC: 3.8 G/DL (ref 3.5–5)
ALP SERPL-CCNC: 72 U/L (ref 46–116)
ALT SERPL W P-5'-P-CCNC: 46 U/L (ref 12–78)
ANION GAP SERPL CALCULATED.3IONS-SCNC: 5 MMOL/L (ref 4–13)
AST SERPL W P-5'-P-CCNC: 23 U/L (ref 5–45)
BASOPHILS # BLD AUTO: 0.06 THOUSANDS/ÂΜL (ref 0–0.1)
BASOPHILS NFR BLD AUTO: 1 % (ref 0–1)
BILIRUB SERPL-MCNC: 1.26 MG/DL (ref 0.2–1)
BUN SERPL-MCNC: 16 MG/DL (ref 5–25)
CALCIUM SERPL-MCNC: 9 MG/DL (ref 8.3–10.1)
CHLORIDE SERPL-SCNC: 105 MMOL/L (ref 96–108)
CHOLEST SERPL-MCNC: 147 MG/DL
CO2 SERPL-SCNC: 29 MMOL/L (ref 21–32)
CREAT SERPL-MCNC: 0.67 MG/DL (ref 0.6–1.3)
CREAT UR-MCNC: 84.2 MG/DL
EOSINOPHIL # BLD AUTO: 0.22 THOUSAND/ÂΜL (ref 0–0.61)
EOSINOPHIL NFR BLD AUTO: 3 % (ref 0–6)
ERYTHROCYTE [DISTWIDTH] IN BLOOD BY AUTOMATED COUNT: 13.9 % (ref 11.6–15.1)
GFR SERPL CREATININE-BSD FRML MDRD: 97 ML/MIN/1.73SQ M
GLUCOSE P FAST SERPL-MCNC: 114 MG/DL (ref 65–99)
HCT VFR BLD AUTO: 50.5 % (ref 36.5–49.3)
HDLC SERPL-MCNC: 39 MG/DL
HGB BLD-MCNC: 16.1 G/DL (ref 12–17)
IMM GRANULOCYTES # BLD AUTO: 0.11 THOUSAND/UL (ref 0–0.2)
IMM GRANULOCYTES NFR BLD AUTO: 1 % (ref 0–2)
LDLC SERPL CALC-MCNC: 71 MG/DL (ref 0–100)
LYMPHOCYTES # BLD AUTO: 1.71 THOUSANDS/ÂΜL (ref 0.6–4.47)
LYMPHOCYTES NFR BLD AUTO: 19 % (ref 14–44)
MCH RBC QN AUTO: 27.6 PG (ref 26.8–34.3)
MCHC RBC AUTO-ENTMCNC: 31.9 G/DL (ref 31.4–37.4)
MCV RBC AUTO: 87 FL (ref 82–98)
MICROALBUMIN UR-MCNC: 9.1 MG/L (ref 0–20)
MICROALBUMIN/CREAT 24H UR: 11 MG/G CREATININE (ref 0–30)
MONOCYTES # BLD AUTO: 0.49 THOUSAND/ÂΜL (ref 0.17–1.22)
MONOCYTES NFR BLD AUTO: 6 % (ref 4–12)
NEUTROPHILS # BLD AUTO: 6.35 THOUSANDS/ÂΜL (ref 1.85–7.62)
NEUTS SEG NFR BLD AUTO: 70 % (ref 43–75)
NRBC BLD AUTO-RTO: 0 /100 WBCS
PLATELET # BLD AUTO: 205 THOUSANDS/UL (ref 149–390)
PMV BLD AUTO: 10.3 FL (ref 8.9–12.7)
POTASSIUM SERPL-SCNC: 3.9 MMOL/L (ref 3.5–5.3)
PROT SERPL-MCNC: 7.4 G/DL (ref 6.4–8.4)
RBC # BLD AUTO: 5.83 MILLION/UL (ref 3.88–5.62)
SODIUM SERPL-SCNC: 139 MMOL/L (ref 135–147)
TRIGL SERPL-MCNC: 185 MG/DL
TSH SERPL DL<=0.05 MIU/L-ACNC: 2.01 UIU/ML (ref 0.45–4.5)
WBC # BLD AUTO: 8.94 THOUSAND/UL (ref 4.31–10.16)

## 2023-02-15 ENCOUNTER — OPTICAL OFFICE (OUTPATIENT)
Dept: URBAN - NONMETROPOLITAN AREA CLINIC 4 | Facility: CLINIC | Age: 73
Setting detail: OPHTHALMOLOGY
End: 2023-02-15
Payer: COMMERCIAL

## 2023-02-15 ENCOUNTER — DOCTOR'S OFFICE (OUTPATIENT)
Dept: URBAN - NONMETROPOLITAN AREA CLINIC 1 | Facility: CLINIC | Age: 73
Setting detail: OPHTHALMOLOGY
End: 2023-02-15
Payer: COMMERCIAL

## 2023-02-15 VITALS — HEIGHT: 60 IN

## 2023-02-15 DIAGNOSIS — H52.223: ICD-10-CM

## 2023-02-15 DIAGNOSIS — H52.4: ICD-10-CM

## 2023-02-15 PROCEDURE — V2750 ANTI-REFLECTIVE COATING: HCPCS | Performed by: OPTOMETRIST

## 2023-02-15 PROCEDURE — V2781 PROGRESSIVE LENS PER LENS: HCPCS | Performed by: OPTOMETRIST

## 2023-02-15 PROCEDURE — V2020 VISION SVCS FRAMES PURCHASES: HCPCS | Performed by: OPTOMETRIST

## 2023-02-15 PROCEDURE — 92012 INTRM OPH EXAM EST PATIENT: CPT | Performed by: OPTOMETRIST

## 2023-02-15 PROCEDURE — V2784 LENS POLYCARB OR EQUAL: HCPCS | Performed by: OPTOMETRIST

## 2023-02-15 PROCEDURE — V2203 LENS SPHCYL BIFOCAL 4.00D/.1: HCPCS | Performed by: OPTOMETRIST

## 2023-02-15 PROCEDURE — V2744 TINT PHOTOCHROMATIC LENS/ES: HCPCS | Performed by: OPTOMETRIST

## 2023-02-15 ASSESSMENT — REFRACTION_AUTOREFRACTION
OS_AXIS: 016
OD_SPHERE: +0.75
OS_SPHERE: 0.00
OS_CYLINDER: -1.25
OD_CYLINDER: -1.50
OD_AXIS: 180

## 2023-02-15 ASSESSMENT — REFRACTION_MANIFEST
OS_VA1: 20/20-2
OS_AXIS: 020
OD_CYLINDER: -0.75
OD_VA1: 20/20-2
OD_SPHERE: +1.25
OD_AXIS: 010
OD_ADD: +1.25
OS_SPHERE: PLANO
OS_SPHERE: +1.25
OS_AXIS: 020
OD_ADD: +2.50
OS_CYLINDER: -0.75
OS_VA2: 20/25-2
OD_VA2: 20/25-2
OS_VA1: 20/25-2
OD_AXIS: 010
OD_VA2: 20/25-2
OD_SPHERE: PLANO
OD_VA1: 20/25-2
OD_CYLINDER: -0.75
OS_ADD: +2.50
OS_CYLINDER: -0.75
OS_VA2: 20/25-2
OS_ADD: +1.25

## 2023-02-15 ASSESSMENT — VISUAL ACUITY
OS_BCVA: 20/25
OD_BCVA: 20/30+1

## 2023-02-15 ASSESSMENT — KERATOMETRY
OD_AXISANGLE_DEGREES: 091
OD_K2POWER_DIOPTERS: 46.25
OS_AXISANGLE_DEGREES: 097
OS_K1POWER_DIOPTERS: 44.50
OS_K2POWER_DIOPTERS: 45.25
OD_K1POWER_DIOPTERS: 45.00

## 2023-02-15 ASSESSMENT — LID POSITION - DERMATOCHALASIS
OD_DERMATOCHALASIS: RUL
OS_DERMATOCHALASIS: LUL

## 2023-02-15 ASSESSMENT — REFRACTION_CURRENTRX
OD_OVR_VA: 20/
OS_CYLINDER: -2.50
OS_CYLINDER: -1.00
OD_VPRISM_DIRECTION: PROGS
OD_ADD: +2.00
OS_SPHERE: -7.75
OS_OVR_VA: 20/
OS_AXIS: 010
OD_SPHERE: -5.75
OS_OVR_VA: 20/
OS_VPRISM_DIRECTION: PROGS
OS_AXIS: 007
OD_VPRISM_DIRECTION: PROGS
OD_SPHERE: -5.25
OS_ADD: +2.50
OD_CYLINDER: -3.00
OD_AXIS: 176
OS_SPHERE: -7.00
OD_CYLINDER: -1.50
OS_VPRISM_DIRECTION: PROGS
OD_AXIS: 180
OD_ADD: +2.50
OD_OVR_VA: 20/
OS_ADD: +2.00

## 2023-02-15 ASSESSMENT — SPHEQUIV_DERIVED
OS_SPHEQUIV: -0.625
OS_SPHEQUIV: 0.875
OD_SPHEQUIV: 0.875
OD_SPHEQUIV: 0

## 2023-02-15 ASSESSMENT — AXIALLENGTH_DERIVED
OS_AL: 23.3332
OD_AL: 22.8364
OS_AL: 22.7752
OD_AL: 22.5214

## 2023-02-15 ASSESSMENT — LID POSITION - PTOSIS
OD_PTOSIS: RUL
OS_PTOSIS: LUL

## 2023-02-27 ENCOUNTER — OFFICE VISIT (OUTPATIENT)
Dept: FAMILY MEDICINE CLINIC | Facility: CLINIC | Age: 73
End: 2023-02-27

## 2023-02-27 VITALS
RESPIRATION RATE: 18 BRPM | DIASTOLIC BLOOD PRESSURE: 58 MMHG | TEMPERATURE: 98.6 F | SYSTOLIC BLOOD PRESSURE: 150 MMHG | WEIGHT: 213.4 LBS | OXYGEN SATURATION: 96 % | HEART RATE: 106 BPM | BODY MASS INDEX: 30.62 KG/M2

## 2023-02-27 DIAGNOSIS — Z12.11 SCREEN FOR COLON CANCER: ICD-10-CM

## 2023-02-27 DIAGNOSIS — I10 HYPERTENSION, UNSPECIFIED TYPE: ICD-10-CM

## 2023-02-27 DIAGNOSIS — E11.8 TYPE 2 DIABETES MELLITUS WITH COMPLICATION, WITH LONG-TERM CURRENT USE OF INSULIN (HCC): Primary | ICD-10-CM

## 2023-02-27 DIAGNOSIS — E11.9 TYPE 2 DIABETES MELLITUS WITHOUT COMPLICATION, WITHOUT LONG-TERM CURRENT USE OF INSULIN (HCC): ICD-10-CM

## 2023-02-27 DIAGNOSIS — Z79.4 TYPE 2 DIABETES MELLITUS WITH COMPLICATION, WITH LONG-TERM CURRENT USE OF INSULIN (HCC): Primary | ICD-10-CM

## 2023-02-27 RX ORDER — HYDROCHLOROTHIAZIDE 25 MG/1
25 TABLET ORAL DAILY
Qty: 90 TABLET | Refills: 3 | Status: SHIPPED | OUTPATIENT
Start: 2023-02-27

## 2023-02-27 RX ORDER — PEN NEEDLE, DIABETIC 31 GX5/16"
NEEDLE, DISPOSABLE MISCELLANEOUS 2 TIMES DAILY
Qty: 300 EACH | Refills: 3 | Status: SHIPPED | OUTPATIENT
Start: 2023-02-27

## 2023-02-27 RX ORDER — GLIMEPIRIDE 4 MG/1
8 TABLET ORAL
Qty: 180 TABLET | Refills: 3 | Status: SHIPPED | OUTPATIENT
Start: 2023-02-27

## 2023-02-27 RX ORDER — INSULIN DEGLUDEC INJECTION 100 U/ML
60 INJECTION, SOLUTION SUBCUTANEOUS EVERY 12 HOURS SCHEDULED
Qty: 40 ML | Refills: 3 | Status: SHIPPED | OUTPATIENT
Start: 2023-02-27

## 2023-02-27 NOTE — PROGRESS NOTES
Name: Abby Gustafson      : 1950      MRN: [id number]  Encounter Provider: Matias Abdalla MD  Encounter Date: 2023   Encounter department: [address] [place] [address]     1  Type 2 diabetes mellitus with complication, with long-term current use of insulin (HCC)  -     Empagliflozin (Jardiance) 25 MG TABS; Take 1 tablet (25 mg total) by mouth every morning  -     insulin degludec Chanelle Grime FlexTouch) 100 units/mL injection pen; Inject 60 Units under the skin every 12 (twelve) hours  -     Insulin Pen Needle (B-D ULTRAFINE III SHORT PEN) 31G X 8 MM MISC; Use 2 (two) times a day    2  Hypertension, unspecified type  -     hydrochlorothiazide (HYDRODIURIL) 25 mg tablet; Take 1 tablet (25 mg total) by mouth daily    3  Type 2 diabetes mellitus without complication, without long-term current use of insulin ([facility])  -     glimepiride (AMARYL) 4 mg tablet; Take 2 tablets (8 mg total) by mouth daily with breakfast           Subjective      His A1c is under much better control on his current regimen  He has no side effects and no hypoglycemia  His BP is usually under better control on his usual regimen  He has no CP or SOB  He has no HA or vision changes  He has no PND or orthopnea  His lipids are at goal on his current regimen  He has normal liver function testing  He has no increased myalgia or muscle weakness  Review of Systems   All other systems reviewed and are negative        Current Outpatient Medications on File Prior to Visit   Medication Sig   • aspirin (ECOTRIN LOW STRENGTH) 81 mg EC tablet Take 81 mg by mouth daily   • atorvastatin (LIPITOR) 40 mg tablet Take 1 tablet (40 mg total) by mouth daily   • dutasteride (AVODART) 0 5 mg capsule Take 1 capsule (0 5 mg total) by mouth daily   • gabapentin (NEURONTIN) 600 MG tablet Take 1 tablet (600 mg total) by mouth 3 (three) times a day   • insulin detemir (Levemir FlexTouch) 100 Units/mL injection pen Inject 60 Units under the skin every 12 (twelve) hours   • lisinopril (ZESTRIL) 40 mg tablet Take 0 5 tablets (20 mg total) by mouth daily   • metFORMIN (GLUCOPHAGE) 1000 MG tablet Take 1 tablet (1,000 mg total) by mouth 2 (two) times a day with meals   • OneTouch Ultra test strip Use 1 each 3 (three) times a day Use as instructed   • sitaGLIPtin (Januvia) 100 mg tablet Take 1 tablet (100 mg total) by mouth daily   • SUPER B COMPLEX/C PO Take by mouth   • tamsulosin (FLOMAX) 0 4 mg Take 1 capsule (0 4 mg total) by mouth daily   • [DISCONTINUED] Empagliflozin (Jardiance) 25 MG TABS Take 1 tablet (25 mg total) by mouth every morning   • [DISCONTINUED] glimepiride (AMARYL) 4 mg tablet Take 2 tablets (8 mg total) by mouth daily with breakfast   • [DISCONTINUED] hydrochlorothiazide (HYDRODIURIL) 25 mg tablet Take 1 tablet (25 mg total) by mouth daily   • Bromfenac Sodium, Once-Daily, 0 09 % SOLN  (Patient not taking: Reported on 2/27/2023)   • meclizine (ANTIVERT) 25 mg tablet Take 1 tablet (25 mg total) by mouth 3 (three) times a day as needed for dizziness   • ofloxacin (OCUFLOX) 0 3 % ophthalmic solution  (Patient not taking: Reported on 2/27/2023)   • prednisoLONE acetate (PRED FORTE) 1 % ophthalmic suspension  (Patient not taking: Reported on 2/27/2023)   • [DISCONTINUED] Empagliflozin (Jardiance) 10 MG TABS Take 1 tablet (10 mg total) by mouth every morning (Patient not taking: Reported on 2/27/2023)       Objective     /58 (BP Location: Left arm, Patient Position: Sitting, Cuff Size: Standard)   Pulse (!) 106   Temp 98 6 °F (37 °C) (Tympanic)   Resp 18   Wt 96 8 kg (213 lb 6 4 oz)   SpO2 96%   BMI 30 62 kg/m²     Physical Exam  Vitals and nursing note reviewed  Constitutional:       Appearance: Normal appearance  Cardiovascular:      Rate and Rhythm: Normal rate and regular rhythm  Pulses: Normal pulses  Heart sounds: Normal heart sounds     Pulmonary:      Effort: Pulmonary effort is normal  Breath sounds: Normal breath sounds  Abdominal:      General: Abdomen is flat  Bowel sounds are normal       Palpations: Abdomen is soft  Musculoskeletal:         General: Normal range of motion  Cervical back: Normal range of motion and neck supple  Skin:     General: Skin is warm and dry  Capillary Refill: Capillary refill takes less than 2 seconds  Neurological:      General: No focal deficit present  Mental Status: He is alert and oriented to person, place, and time  Mental status is at baseline  Psychiatric:         Mood and Affect: Mood normal          Behavior: Behavior normal          Thought Content:  Thought content normal          Judgment: Judgment normal        Froilan Becerra MD

## 2023-03-08 LAB — COLOGUARD RESULT REPORTABLE: POSITIVE

## 2023-03-10 DIAGNOSIS — R19.5 POSITIVE COLORECTAL CANCER SCREENING USING COLOGUARD TEST: Primary | ICD-10-CM

## 2023-03-10 NOTE — RESULT ENCOUNTER NOTE
Your Cologuard is positive  That DOES NOT mean you have cancer  It does mean you should have a colonoscopy

## 2023-03-29 ENCOUNTER — TELEPHONE (OUTPATIENT)
Dept: ADMINISTRATIVE | Facility: OTHER | Age: 73
End: 2023-03-29

## 2023-03-29 NOTE — TELEPHONE ENCOUNTER
Upon review of the In Basket request we were able to locate, review, and update the patient chart as requested for Diabetic Eye Exam     Any additional questions or concerns should be emailed to the Practice Liaisons via the appropriate education email address, please do not reply via In Basket      Thank you  Jada Mcallister

## 2023-03-29 NOTE — TELEPHONE ENCOUNTER
----- Message from Burnis Hatchet sent at 3/28/2023  1:14 PM EDT -----  03/28/23 1:14 PM    Hello, our patient No patient name on file  has had Diabetic Eye Exam completed/performed  Please assist in updating the patient chart by pulling the document from the Media Tab  The date of service is 6/10/2022       Thank you,  Burnis Hatchet [facility]

## 2023-06-05 DIAGNOSIS — Z79.4 TYPE 2 DIABETES MELLITUS WITH COMPLICATION, WITH LONG-TERM CURRENT USE OF INSULIN (HCC): ICD-10-CM

## 2023-06-05 DIAGNOSIS — E11.9 TYPE 2 DIABETES MELLITUS WITHOUT COMPLICATION, WITHOUT LONG-TERM CURRENT USE OF INSULIN (HCC): ICD-10-CM

## 2023-06-05 DIAGNOSIS — E11.8 TYPE 2 DIABETES MELLITUS WITH COMPLICATION, WITH LONG-TERM CURRENT USE OF INSULIN (HCC): ICD-10-CM

## 2023-06-05 RX ORDER — BLOOD SUGAR DIAGNOSTIC
1 STRIP MISCELLANEOUS 3 TIMES DAILY
Qty: 400 STRIP | Refills: 3 | Status: SHIPPED | OUTPATIENT
Start: 2023-06-05

## 2023-06-05 RX ORDER — ATORVASTATIN CALCIUM 40 MG/1
40 TABLET, FILM COATED ORAL DAILY
Qty: 90 TABLET | Refills: 3 | Status: SHIPPED | OUTPATIENT
Start: 2023-06-05

## 2023-07-05 DIAGNOSIS — E11.8 TYPE 2 DIABETES MELLITUS WITH COMPLICATION, WITH LONG-TERM CURRENT USE OF INSULIN (HCC): ICD-10-CM

## 2023-07-05 DIAGNOSIS — Z79.4 TYPE 2 DIABETES MELLITUS WITH COMPLICATION, WITH LONG-TERM CURRENT USE OF INSULIN (HCC): ICD-10-CM

## 2023-07-05 DIAGNOSIS — E11.9 TYPE 2 DIABETES MELLITUS WITHOUT COMPLICATION, WITHOUT LONG-TERM CURRENT USE OF INSULIN (HCC): ICD-10-CM

## 2023-07-27 DIAGNOSIS — E11.8 TYPE 2 DIABETES MELLITUS WITH COMPLICATION, WITH LONG-TERM CURRENT USE OF INSULIN (HCC): ICD-10-CM

## 2023-07-27 DIAGNOSIS — Z79.4 TYPE 2 DIABETES MELLITUS WITH COMPLICATION, WITH LONG-TERM CURRENT USE OF INSULIN (HCC): ICD-10-CM

## 2023-07-27 RX ORDER — INSULIN DEGLUDEC INJECTION 100 U/ML
60 INJECTION, SOLUTION SUBCUTANEOUS EVERY 12 HOURS SCHEDULED
Qty: 40 ML | Refills: 3 | Status: SHIPPED | OUTPATIENT
Start: 2023-07-27

## 2023-08-03 ENCOUNTER — TELEPHONE (OUTPATIENT)
Dept: FAMILY MEDICINE CLINIC | Facility: CLINIC | Age: 73
End: 2023-08-03

## 2023-08-07 NOTE — TELEPHONE ENCOUNTER
Nothing is wrong with the script. He said [facility] keeps saying they are trying to get a hold of us. I advised that rx was sent on 7/27 and he should contact [facility] and find out what is going on and let us know if we need to do anything.

## 2023-08-08 ENCOUNTER — DOCTOR'S OFFICE (OUTPATIENT)
Dept: URBAN - NONMETROPOLITAN AREA CLINIC 1 | Facility: CLINIC | Age: 73
Setting detail: OPHTHALMOLOGY
End: 2023-08-08
Payer: COMMERCIAL

## 2023-08-08 DIAGNOSIS — H35.3111: ICD-10-CM

## 2023-08-08 DIAGNOSIS — H40.023: ICD-10-CM

## 2023-08-08 DIAGNOSIS — E11.3293: ICD-10-CM

## 2023-08-08 DIAGNOSIS — H35.363: ICD-10-CM

## 2023-08-08 DIAGNOSIS — H44.23: ICD-10-CM

## 2023-08-08 DIAGNOSIS — H35.3122: ICD-10-CM

## 2023-08-08 LAB
LEFT EYE DIABETIC RETINOPATHY: POSITIVE
RIGHT EYE DIABETIC RETINOPATHY: POSITIVE

## 2023-08-08 PROCEDURE — 92014 COMPRE OPH EXAM EST PT 1/>: CPT | Performed by: OPHTHALMOLOGY

## 2023-08-08 PROCEDURE — 92133 CPTRZD OPH DX IMG PST SGM ON: CPT | Performed by: OPHTHALMOLOGY

## 2023-08-08 ASSESSMENT — KERATOMETRY
OS_AXISANGLE_DEGREES: 097
OS_K1POWER_DIOPTERS: 44.50
OS_K2POWER_DIOPTERS: 45.25
OD_K1POWER_DIOPTERS: 45.00
OD_AXISANGLE_DEGREES: 091
OD_K2POWER_DIOPTERS: 46.25

## 2023-08-08 ASSESSMENT — REFRACTION_AUTOREFRACTION
OD_SPHERE: +0.75
OS_AXIS: 016
OS_CYLINDER: -1.25
OS_SPHERE: 0.00
OD_CYLINDER: -1.50
OD_AXIS: 180

## 2023-08-08 ASSESSMENT — REFRACTION_CURRENTRX
OS_CYLINDER: -2.50
OS_OVR_VA: 20/
OS_CYLINDER: -1.00
OS_ADD: +2.50
OD_CYLINDER: -3.00
OS_AXIS: 010
OD_SPHERE: -5.75
OS_AXIS: 007
OD_AXIS: 180
OD_OVR_VA: 20/
OD_AXIS: 176
OD_OVR_VA: 20/
OS_SPHERE: -7.00
OD_ADD: +2.50
OD_VPRISM_DIRECTION: PROGS
OS_VPRISM_DIRECTION: PROGS
OS_SPHERE: -7.75
OS_VPRISM_DIRECTION: PROGS
OD_VPRISM_DIRECTION: PROGS
OS_ADD: +2.00
OD_ADD: +2.00
OD_SPHERE: -5.25
OD_CYLINDER: -1.50
OS_OVR_VA: 20/

## 2023-08-08 ASSESSMENT — REFRACTION_MANIFEST
OS_VA1: 20/25-2
OD_CYLINDER: -0.75
OD_VA2: 20/25-2
OD_ADD: +1.25
OS_CYLINDER: -0.75
OS_SPHERE: PLANO
OS_ADD: +2.50
OS_AXIS: 020
OD_SPHERE: PLANO
OD_CYLINDER: -0.75
OS_VA2: 20/25-2
OS_CYLINDER: -0.75
OD_AXIS: 010
OD_SPHERE: +1.25
OS_AXIS: 020
OS_VA1: 20/20-2
OS_ADD: +1.25
OD_AXIS: 010
OD_VA1: 20/20-2
OS_SPHERE: +1.25
OS_VA2: 20/25-2
OD_VA2: 20/25-2
OD_ADD: +2.50
OD_VA1: 20/25-2

## 2023-08-08 ASSESSMENT — AXIALLENGTH_DERIVED
OD_AL: 22.5214
OS_AL: 23.3332
OS_AL: 22.7752
OD_AL: 22.8364

## 2023-08-08 ASSESSMENT — SPHEQUIV_DERIVED
OD_SPHEQUIV: 0.875
OS_SPHEQUIV: 0.875
OS_SPHEQUIV: -0.625
OD_SPHEQUIV: 0

## 2023-08-08 ASSESSMENT — TONOMETRY
OS_IOP_MMHG: 13
OD_IOP_MMHG: 13

## 2023-08-08 ASSESSMENT — VISUAL ACUITY
OS_BCVA: 20/20
OD_BCVA: 20/20

## 2023-08-08 ASSESSMENT — LID POSITION - DERMATOCHALASIS
OD_DERMATOCHALASIS: RUL
OS_DERMATOCHALASIS: LUL

## 2023-08-08 ASSESSMENT — CONFRONTATIONAL VISUAL FIELD TEST (CVF)
OS_FINDINGS: FULL
OD_FINDINGS: FULL

## 2023-08-08 ASSESSMENT — LID POSITION - PTOSIS
OD_PTOSIS: RUL
OS_PTOSIS: LUL

## 2023-08-08 ASSESSMENT — PACHYMETRY
OD_CT_UM: 613
OS_CT_CORRECTION: -6
OD_CT_CORRECTION: -5
OS_CT_UM: 623

## 2023-08-25 ENCOUNTER — TELEPHONE (OUTPATIENT)
Age: 73
End: 2023-08-25

## 2023-08-25 NOTE — TELEPHONE ENCOUNTER
08/25/23  Screened by: Erin Mccartney    Referring Provider DR. JOHNSON    Pre- Screening: There is no height or weight on file to calculate BMI. Has patient been referred for a routine screening Colonoscopy? YES  Is the patient between 43-73 years old? YES      Previous Colonoscopy YES   If yes:    Date:  10yrs    Facility:  [address] [place] [address]    Reason: screening      SCHEDULING STAFF: If the patient is between 45yrs-49yrs, please advise patient to confirm benefits/coverage with their insurance company for a routine screening colonoscopy, some insurance carriers will only cover at [address] [place] [address] or older. If the patient is over 66years old, please schedule an office visit. Does the patient want to see a Gastroenterologist prior to their procedure OR are they having any GI symptoms? no     Has the patient been hospitalized or had abdominal surgery in the past 6 months? yes    Does the patient use supplemental oxygen? no    Does the patient take Coumadin, Lovenox, Plavix, Elliquis, Xarelto, or other blood thinning medication? no    Has the patient had a stroke, cardiac event, or stent placed in the past year? Yes    Failed oa    SCHEDULING STAFF: If patient answers NO to above questions, then schedule procedure. If patient answers YES to above questions, then schedule office appointment. If patient is between 45yrs - 49yrs, please advise patient that we will have to confirm benefits & coverage with their insurance company for a routine screening colonoscopy.

## 2023-09-01 ENCOUNTER — OFFICE VISIT (OUTPATIENT)
Dept: FAMILY MEDICINE CLINIC | Facility: CLINIC | Age: 73
End: 2023-09-01
Payer: COMMERCIAL

## 2023-09-01 VITALS
OXYGEN SATURATION: 95 % | HEART RATE: 96 BPM | DIASTOLIC BLOOD PRESSURE: 68 MMHG | RESPIRATION RATE: 18 BRPM | WEIGHT: 216.6 LBS | TEMPERATURE: 98 F | BODY MASS INDEX: 31.08 KG/M2 | SYSTOLIC BLOOD PRESSURE: 160 MMHG

## 2023-09-01 DIAGNOSIS — E78.49 OTHER HYPERLIPIDEMIA: ICD-10-CM

## 2023-09-01 DIAGNOSIS — I10 PRIMARY HYPERTENSION: ICD-10-CM

## 2023-09-01 DIAGNOSIS — E11.8 TYPE 2 DIABETES MELLITUS WITH COMPLICATION, WITH LONG-TERM CURRENT USE OF INSULIN (HCC): Primary | ICD-10-CM

## 2023-09-01 DIAGNOSIS — I21.4 NON-STEMI (NON-ST ELEVATED MYOCARDIAL INFARCTION) (HCC): ICD-10-CM

## 2023-09-01 DIAGNOSIS — E11.9 TYPE 2 DIABETES MELLITUS WITHOUT COMPLICATION, WITHOUT LONG-TERM CURRENT USE OF INSULIN (HCC): ICD-10-CM

## 2023-09-01 DIAGNOSIS — Z79.4 TYPE 2 DIABETES MELLITUS WITH COMPLICATION, WITH LONG-TERM CURRENT USE OF INSULIN (HCC): Primary | ICD-10-CM

## 2023-09-01 PROCEDURE — 99214 OFFICE O/P EST MOD 30 MIN: CPT | Performed by: FAMILY MEDICINE

## 2023-09-01 PROCEDURE — 83036 HEMOGLOBIN GLYCOSYLATED A1C: CPT | Performed by: FAMILY MEDICINE

## 2023-09-01 NOTE — PROGRESS NOTES
Name: Sadaf Gray      : 1950      MRN: [id number]  Encounter Provider: Barbara Nagel MD  Encounter Date: 2023   Encounter department: [address]     1. Type 2 diabetes mellitus with complication, with long-term current use of insulin [facility])  Assessment & Plan:    Lab Results   Component Value Date    HGBA1C 7.1 (A) 2023   discontinue Amaryl. Orders:  -     POCT hemoglobin A1c    2. Primary hypertension  Assessment & Plan:  BP at goal 140/70.      3. Other hyperlipidemia    4. Non-STEMI (non-ST elevated myocardial infarction) [facility])  Assessment & Plan:  Continue Lipitor and lisinopril. 5. Type 2 diabetes mellitus without complication, without long-term current use of insulin (HCC)  -     metFORMIN (GLUCOPHAGE) 1000 MG tablet; Take 1 tablet (1,000 mg total) by mouth 2 (two) times a day with meals         Subjective      He had a syncopal event in [facility]. He took his diabetes medications but did not eat. He ended up getting admitted and was diagnosed with UTI and dehydration. He is taking Jardiance. He had elevated troponins but had a normal stress test.    HCTZ was discontinued. His SBP is mildly elevated. His A1c is 7.0% in the office today. He is on high intensity statin therapy. He has normal LFTs and no myalgia or muscle weakness. Review of Systems   All other systems reviewed and are negative.       Current Outpatient Medications on File Prior to Visit   Medication Sig   • aspirin (ECOTRIN LOW STRENGTH) 81 mg EC tablet Take 81 mg by mouth daily   • atorvastatin (LIPITOR) 40 mg tablet Take 1 tablet (40 mg total) by mouth daily   • dutasteride (AVODART) 0.5 mg capsule Take 1 capsule (0.5 mg total) by mouth daily   • Empagliflozin (Jardiance) 25 MG TABS Take 1 tablet (25 mg total) by mouth every morning   • gabapentin (NEURONTIN) 600 MG tablet Take 1 tablet (600 mg total) by mouth 3 (three) times a day   • insulin degludec Brunetta Brood FlexTouch) 100 units/mL injection pen Inject 60 Units under the skin every 12 (twelve) hours   • insulin detemir (Levemir FlexTouch) 100 Units/mL injection pen Inject 60 Units under the skin every 12 (twelve) hours   • Insulin Pen Needle (B-D ULTRAFINE III SHORT PEN) 31G X 8 MM MISC Use 2 (two) times a day   • lisinopril (ZESTRIL) 40 mg tablet Take 0.5 tablets (20 mg total) by mouth daily   • OneTouch Ultra test strip Use 1 each 3 (three) times a day Use as instructed   • sitaGLIPtin (Januvia) 100 mg tablet Take 1 tablet (100 mg total) by mouth daily   • SUPER B COMPLEX/C PO Take by mouth   • tamsulosin (FLOMAX) 0.4 mg Take 1 capsule (0.4 mg total) by mouth daily       Objective     /68   Pulse 96   Temp 98 °F (36.7 °C) (Temporal)   Resp 18   Wt 98.2 kg (216 lb 9.6 oz)   SpO2 95%   BMI 31.08 kg/m²     Physical Exam  Vitals and nursing note reviewed. Constitutional:       Appearance: Normal appearance. Cardiovascular:      Rate and Rhythm: Normal rate and regular rhythm. Pulses: Normal pulses. Heart sounds: Normal heart sounds. Pulmonary:      Effort: Pulmonary effort is normal.      Breath sounds: Normal breath sounds. Abdominal:      General: Abdomen is flat. Bowel sounds are normal.      Palpations: Abdomen is soft. Musculoskeletal:         General: Normal range of motion. Cervical back: Normal range of motion and neck supple. Skin:     General: Skin is warm and dry. Capillary Refill: Capillary refill takes less than 2 seconds. Neurological:      General: No focal deficit present. Mental Status: He is alert and oriented to person, place, and time. Mental status is at baseline. Psychiatric:         Mood and Affect: Mood normal.         Behavior: Behavior normal.         Thought Content:  Thought content normal.         Judgment: Judgment normal.       Sunday Doran MD

## 2023-09-07 ENCOUNTER — TELEPHONE (OUTPATIENT)
Dept: FAMILY MEDICINE CLINIC | Facility: CLINIC | Age: 73
End: 2023-09-07

## 2023-09-07 LAB — SL AMB POCT HEMOGLOBIN AIC: 7 (ref ?–6.5)

## 2023-09-07 NOTE — TELEPHONE ENCOUNTER
Pt called with morning BS per your request  9/2 at 7am 111  9/3 at 8am 118  9/4 at 7am 147  9/5 at 8am 154  9/6 at 7am 137  9/7 at 8am 163

## 2023-09-15 ENCOUNTER — CONSULT (OUTPATIENT)
Age: 73
End: 2023-09-15
Payer: COMMERCIAL

## 2023-09-15 VITALS
WEIGHT: 216 LBS | BODY MASS INDEX: 30.92 KG/M2 | RESPIRATION RATE: 18 BRPM | DIASTOLIC BLOOD PRESSURE: 80 MMHG | SYSTOLIC BLOOD PRESSURE: 132 MMHG | HEART RATE: 92 BPM | OXYGEN SATURATION: 94 % | HEIGHT: 70 IN

## 2023-09-15 DIAGNOSIS — R19.5 POSITIVE COLORECTAL CANCER SCREENING USING COLOGUARD TEST: Primary | ICD-10-CM

## 2023-09-15 PROCEDURE — 99244 OFF/OP CNSLTJ NEW/EST MOD 40: CPT | Performed by: INTERNAL MEDICINE

## 2023-09-15 RX ORDER — CEFDINIR 300 MG/1
CAPSULE ORAL
COMMUNITY
Start: 2023-08-21

## 2023-09-15 RX ORDER — ONDANSETRON 4 MG/1
TABLET, ORALLY DISINTEGRATING ORAL
COMMUNITY
Start: 2023-08-21

## 2023-09-15 NOTE — PATIENT INSTRUCTIONS
Scheduled date of colonoscopy (as of today): 11/1/23  Physician performing colonoscopy: Netta  Location of colonoscopy: [facility]  Bowel prep reviewed with patient: Miralax  Instructions reviewed with patient by: Maryam GUERRERO  Clearances:

## 2023-09-15 NOTE — LETTER
September 15, 2023     Toney Carbone MD  [address] [facility] [address]    Patient: Linnea Hatch   YOB: 1950   Date of Visit: 9/15/2023       Dear Dr. Annel Bean: Thank you for referring Flaquita Pugh to me for evaluation. Below are my notes for this consultation. If you have questions, please do not hesitate to call me. I look forward to following your patient along with you. Sincerely,        Neela Constantino MD        CC: No Recipients    Neela Constantino MD  9/15/2023  8:53 AM  Incomplete  [facility]    Dear Dr. Annel Bean,    I had the pleasure of seeing your patient Linnea Hatch in the office today and I thank you for this kind referral.       Chief Complaint: Abnormal stool test      HPI:  Linnea Hatch is a 72 y.o. male who presents with history of positive Cologuard test.  According to the patient he has had colonoscopy at age 63 and 50 which were unremarkable. Most recently he was given a Cologuard test which according to the patient was positive. Patient is seen in the office because of a history of emergency room visit for abnormal EKG. According to the patient he was standing on line in [place] at [facility] and began to feel very faint. He actually did lose consciousness shortly. He was taken by ambulance to a hospital.  EKG revealed an abnormality which according to the patient was an old abnormality from a serious chest contusion and damage many many years ago. Subsequent work-up showed that there was no MI or any significant cardiac issue behind his syncopal attack. Patient himself denies any abdominal pain, change in bowel habits, change in stool caliber, melena, hematochezia, rectal bleeding, tenesmus, or weight loss. He has no chest pain, shortness of breath, dizziness, or any other complaints of any kind at this time. .      Review of Systems:   Constitutional: No fever or chills, feels well, no tiredness, no recent weight gain or weight loss. HENT: No complaints of earache, no hearing loss, no nosebleeds, no nasal discharge, no sore throat, no hoarseness. Eyes: No complaints of eye pain, no red eyes, no discharge from eyes, no itchy eyes. Cardiovascular: No complaints of slow heart rate, no fast heart rate, no chest pain, no palpitations, no leg claudication, no lower extremity edema. Respiratory: No complaints of shortness of breath, no wheezing, no cough, no SOB on exertion, no orthopnea. Gastrointestinal: As noted in HPI  Genitourinary: No complaints of dysuria, no incontinence, no hesitancy, no nocturia. Musculoskeletal: No complaints of arthralgia, no myalgias, no joint swelling or stiffness, no limb pain or swelling. Neurological: No complaints of headache, no confusion, no convulsions, no numbness or tingling, no dizziness or fainting, no limb weakness, no difficulty walking. Skin: No complaints of skin rash or skin lesions, no itching, no skin wound, no dry skin. Hematological/Lymphatic: No complaints of swollen glands, does not bleed easy. Allergic/Immunologic: No immunocompromised state. Endocrine:  No complaints of polyuria, no polydipsia. Psychiatric/Behavioral: is not suicidal, no sleep disturbances, no anxiety or depression, no change in personality, no emotional problems.        Historical Information   Past Medical History:   Diagnosis Date   • Arthritis    • Back pain    • Diabetes mellitus ([address])    • Hypertension      Past Surgical History:   Procedure Laterality Date   • HERNIA REPAIR Left 1998    with mesh to groin   • FL PARATHYROIDECTOMY/EXPLORATION PARATHYROIDS Right 11/1/2018    Procedure: MINIMALLY INVASIVE RIGHT PARATHYROIDECTOMY WITH PTH MONITORING;  Surgeon: Harsha Bowles MD;  Location: [facility];  Service: Surgical Oncology   • TONSILLECTOMY     • US GUIDED THYROID BIOPSY  9/7/2018     Social History   Social History     Substance and Sexual Activity   Alcohol Use Yes    Comment: rarely     Social History     Substance and Sexual Activity   Drug Use No     Social History     Tobacco Use   Smoking Status Never   Smokeless Tobacco Never     Family History   Problem Relation Age of Onset   • Hypertension Father    • Coronary artery disease Father    • Hypertension Mother    • Arthritis Mother    • Coronary artery disease Mother    • Diabetes Maternal Uncle    • Diabetes Paternal Uncle          Current Medications: has a current medication list which includes the following prescription(s): aspirin, atorvastatin, cefdinir, dutasteride, empagliflozin, gabapentin, tresiba flextouch, b-d ultrafine iii short pen, lisinopril, metformin, ondansetron, onetouch ultra, sitagliptin, b complex-c, and tamsulosin. Vital Signs: /80   Pulse 92   Resp 18   Ht 5' 10" (1.778 m)   Wt 98 kg (216 lb)   SpO2 94%   BMI 30.99 kg/m²     Physical Exam:   Constitutional  General Appearance: No acute distress, well appearing and well nourished  Head  Normocephalic  Eyes  Conjunctivae and lids: No swelling, erythema, or discharge. Pupils and irises: Equal, round and reactive to light. Ears, Nose, Mouth, and Throat  External inspection of ears and nose: Normal  Nasal mucosa, septum and turbinates: Normal without edema or erythema/   Oropharynx: Normal with no erythema, edema, exudate or lesions. Neck  Normal range of motion. Neck supple. Cardiovascular  Auscultation of the heart: Normal rate and rhythm, normal S1 and S2 without murmurs. Examination of the extremities for edema and/or varicosities: Normal  Pulmonary/Chest  Respiratory effort: No increased work of breathing or signs of respiratory distress. Auscultation of lungs: Clear to auscultation, equal breath sounds bilaterally, no wheezes, rales, no rhonchi. Abdomen  Abdomen: Non-tender, no masses. Liver and spleen: No hepatomegaly or splenomegaly.    Musculoskeletal  Gait and station: normal.  Digits and Nails: normal without clubbing or cyanosis. Inspection/palpation of joints, bones, and muscles: Normal  Neurological  No nystagmus or asterixis. Skin  Skin and subcutaneous tissue: Normal without rashes or lesions. Lymphatic  Palpation of the lymph nodes in neck: No lymphadenopathy. Psychiatric  Orientation to person, place and time: Normal.  Mood and affect: Normal.         Labs:   Lab Results   Component Value Date    ALT 46 02/07/2023    AST 23 02/07/2023    BUN 16 02/07/2023    CALCIUM 9.0 02/07/2023     02/07/2023    CO2 29 02/07/2023    CREATININE 0.67 02/07/2023    HDL 39 (L) 02/07/2023    HCT 50.5 (H) 02/07/2023    HGB 16.1 02/07/2023    HGBA1C 7.0 (A) 09/07/2023     02/07/2023    K 3.9 02/07/2023    TRIG 185 (H) 02/07/2023    WBC 8.94 02/07/2023         X-Rays & Procedures:   No orders to display         ______________________________________________________________________      Assessment & Plan:      Diagnoses and all orders for this visit:    Positive colorectal cancer screening using Cologuard test  -     Colonoscopy; Future          I have taken liberty of scheduling the patient for colonoscopy. I will be happy to inform you of his results and further recommendations. I would like to thank you for allowing me to participate in his care.           With warmest regards,    Shine Ji MD, [facility]

## 2023-09-15 NOTE — PROGRESS NOTES
[facility]    Dear Dr. Annel Bean,    I had the pleasure of seeing your patient Linnea Hatch in the office today and I thank you for this kind referral.       Chief Complaint: Abnormal stool test      HPI:  Linnea Hatch is a 72 y.o. male who presents with history of positive Cologuard test.  According to the patient he has had colonoscopy at age 63 and 50 which were unremarkable. Most recently he was given a Cologuard test which according to the patient was positive. Patient is seen in the office because of a history of emergency room visit for abnormal EKG. According to the patient he was standing on line in [place] at [facility] and began to feel very faint. He actually did lose consciousness shortly. He was taken by ambulance to a hospital.  EKG revealed an abnormality which according to the patient was an old abnormality from a serious chest contusion and damage many many years ago. Subsequent work-up showed that there was no MI or any significant cardiac issue behind his syncopal attack. Patient himself denies any abdominal pain, change in bowel habits, change in stool caliber, melena, hematochezia, rectal bleeding, tenesmus, or weight loss. He has no chest pain, shortness of breath, dizziness, or any other complaints of any kind at this time. .      Review of Systems:   Constitutional: No fever or chills, feels well, no tiredness, no recent weight gain or weight loss. HENT: No complaints of earache, no hearing loss, no nosebleeds, no nasal discharge, no sore throat, no hoarseness. Eyes: No complaints of eye pain, no red eyes, no discharge from eyes, no itchy eyes. Cardiovascular: No complaints of slow heart rate, no fast heart rate, no chest pain, no palpitations, no leg claudication, no lower extremity edema. Respiratory: No complaints of shortness of breath, no wheezing, no cough, no SOB on exertion, no orthopnea.    Gastrointestinal: As noted in HPI  Genitourinary: No complaints of dysuria, no incontinence, no hesitancy, no nocturia. Musculoskeletal: No complaints of arthralgia, no myalgias, no joint swelling or stiffness, no limb pain or swelling. Neurological: No complaints of headache, no confusion, no convulsions, no numbness or tingling, no dizziness or fainting, no limb weakness, no difficulty walking. Skin: No complaints of skin rash or skin lesions, no itching, no skin wound, no dry skin. Hematological/Lymphatic: No complaints of swollen glands, does not bleed easy. Allergic/Immunologic: No immunocompromised state. Endocrine:  No complaints of polyuria, no polydipsia. Psychiatric/Behavioral: is not suicidal, no sleep disturbances, no anxiety or depression, no change in personality, no emotional problems.        Historical Information   Past Medical History:   Diagnosis Date   • Arthritis    • Back pain    • Diabetes mellitus ([address])    • Hypertension      Past Surgical History:   Procedure Laterality Date   • HERNIA REPAIR Left 1998    with mesh to groin   • [place] PARATHYROIDECTOMY/EXPLORATION PARATHYROIDS Right 11/1/2018    Procedure: MINIMALLY INVASIVE RIGHT PARATHYROIDECTOMY WITH PTH MONITORING;  Surgeon: Meng Riley MD;  Location: [facility];  Service: Surgical Oncology   • TONSILLECTOMY     • US GUIDED THYROID BIOPSY  9/7/2018     Social History   Social History     Substance and Sexual Activity   Alcohol Use Yes    Comment: rarely     Social History     Substance and Sexual Activity   Drug Use No     Social History     Tobacco Use   Smoking Status Never   Smokeless Tobacco Never     Family History   Problem Relation Age of Onset   • Hypertension Father    • Coronary artery disease Father    • Hypertension Mother    • Arthritis Mother    • Coronary artery disease Mother    • Diabetes Maternal Uncle    • Diabetes Paternal Uncle          Current Medications: has a current medication list which includes the following prescription(s): aspirin, atorvastatin, cefdinir, dutasteride, empagliflozin, gabapentin, tresiba flextouch, b-d ultrafine iii short pen, lisinopril, metformin, ondansetron, onetouch ultra, sitagliptin, b complex-c, and tamsulosin. Vital Signs: /80   Pulse 92   Resp 18   Ht 5' 10" (1.778 m)   Wt 98 kg (216 lb)   SpO2 94%   BMI 30.99 kg/m²     Physical Exam:   Constitutional  General Appearance: No acute distress, well appearing and well nourished  Head  Normocephalic  Eyes  Conjunctivae and lids: No swelling, erythema, or discharge. Pupils and irises: Equal, round and reactive to light. Ears, Nose, Mouth, and Throat  External inspection of ears and nose: Normal  Nasal mucosa, septum and turbinates: Normal without edema or erythema/   Oropharynx: Normal with no erythema, edema, exudate or lesions. Neck  Normal range of motion. Neck supple. Cardiovascular  Auscultation of the heart: Normal rate and rhythm, normal S1 and S2 without murmurs. Examination of the extremities for edema and/or varicosities: Normal  Pulmonary/Chest  Respiratory effort: No increased work of breathing or signs of respiratory distress. Auscultation of lungs: Clear to auscultation, equal breath sounds bilaterally, no wheezes, rales, no rhonchi. Abdomen  Abdomen: Non-tender, no masses. Liver and spleen: No hepatomegaly or splenomegaly. Musculoskeletal  Gait and station: normal.  Digits and Nails: normal without clubbing or cyanosis. Inspection/palpation of joints, bones, and muscles: Normal  Neurological  No nystagmus or asterixis. Skin  Skin and subcutaneous tissue: Normal without rashes or lesions. Lymphatic  Palpation of the lymph nodes in neck: No lymphadenopathy.    Psychiatric  Orientation to person, place and time: Normal.  Mood and affect: Normal.         Labs:   Lab Results   Component Value Date    ALT 46 02/07/2023    AST 23 02/07/2023    BUN 16 02/07/2023    CALCIUM 9.0 02/07/2023     02/07/2023    CO2 29 02/07/2023    CREATININE 0.67 02/07/2023    HDL 39 (L) 02/07/2023    HCT 50.5 (H) 02/07/2023    HGB 16.1 02/07/2023    HGBA1C 7.0 (A) 09/07/2023     02/07/2023    K 3.9 02/07/2023    TRIG 185 (H) 02/07/2023    WBC 8.94 02/07/2023         X-Rays & Procedures:   No orders to display         ______________________________________________________________________      Assessment & Plan:      Diagnoses and all orders for this visit:    Positive colorectal cancer screening using Cologuard test  -     Colonoscopy; Future          I have taken liberty of scheduling the patient for colonoscopy. I will be happy to inform you of his results and further recommendations. I would like to thank you for allowing me to participate in his care.           With warmest regards,    Halle Hi MD, [facility]

## 2023-09-27 ENCOUNTER — RX ONLY (RX ONLY)
Age: 73
End: 2023-09-27

## 2023-09-27 ENCOUNTER — DOCTOR'S OFFICE (OUTPATIENT)
Dept: URBAN - NONMETROPOLITAN AREA CLINIC 1 | Facility: CLINIC | Age: 73
Setting detail: OPHTHALMOLOGY
End: 2023-09-27
Payer: COMMERCIAL

## 2023-09-27 DIAGNOSIS — E11.3293: ICD-10-CM

## 2023-09-27 DIAGNOSIS — H35.363: ICD-10-CM

## 2023-09-27 DIAGNOSIS — H35.3111: ICD-10-CM

## 2023-09-27 DIAGNOSIS — H35.3122: ICD-10-CM

## 2023-09-27 DIAGNOSIS — H40.023: ICD-10-CM

## 2023-09-27 DIAGNOSIS — H44.23: ICD-10-CM

## 2023-09-27 DIAGNOSIS — N50.812 TESTICULAR PAIN, LEFT: ICD-10-CM

## 2023-09-27 DIAGNOSIS — H26.493: ICD-10-CM

## 2023-09-27 DIAGNOSIS — Z96.1: ICD-10-CM

## 2023-09-27 PROCEDURE — 99214 OFFICE O/P EST MOD 30 MIN: CPT | Performed by: OPHTHALMOLOGY

## 2023-09-27 PROCEDURE — 92083 EXTENDED VISUAL FIELD XM: CPT | Performed by: OPHTHALMOLOGY

## 2023-09-27 PROCEDURE — 76514 ECHO EXAM OF EYE THICKNESS: CPT | Performed by: OPHTHALMOLOGY

## 2023-09-27 ASSESSMENT — PACHYMETRY
OD_CT_CORRECTION: -5
OS_CT_CORRECTION: -6
OD_CT_UM: 613
OS_CT_UM: 623

## 2023-09-27 ASSESSMENT — REFRACTION_AUTOREFRACTION
OS_SPHERE: +0.25
OD_CYLINDER: -0.75
OD_SPHERE: +1.25
OS_AXIS: 072
OD_AXIS: 106
OS_CYLINDER: -0.50

## 2023-09-27 ASSESSMENT — REFRACTION_CURRENTRX
OS_SPHERE: -7.75
OD_AXIS: 180
OS_VPRISM_DIRECTION: PROGS
OS_SPHERE: -7.00
OS_ADD: +2.50
OS_ADD: +2.00
OD_ADD: +2.50
OS_AXIS: 010
OD_SPHERE: -5.25
OS_AXIS: 007
OD_OVR_VA: 20/
OD_VPRISM_DIRECTION: PROGS
OS_OVR_VA: 20/
OD_SPHERE: -5.75
OS_VPRISM_DIRECTION: PROGS
OD_CYLINDER: -3.00
OD_ADD: +2.00
OD_CYLINDER: -1.50
OD_VPRISM_DIRECTION: PROGS
OD_AXIS: 176
OS_CYLINDER: -2.50
OD_OVR_VA: 20/
OS_OVR_VA: 20/
OS_CYLINDER: -1.00

## 2023-09-27 ASSESSMENT — LID POSITION - DERMATOCHALASIS
OS_DERMATOCHALASIS: LUL
OD_DERMATOCHALASIS: RUL

## 2023-09-27 ASSESSMENT — KERATOMETRY
OS_AXISANGLE_DEGREES: 097
OD_K2POWER_DIOPTERS: 46.25
OS_K1POWER_DIOPTERS: 44.50
OD_AXISANGLE_DEGREES: 091
OD_K1POWER_DIOPTERS: 45.00
OS_K2POWER_DIOPTERS: 45.25

## 2023-09-27 ASSESSMENT — AXIALLENGTH_DERIVED
OS_AL: 22.7752
OS_AL: 23.0974
OD_AL: 22.5214
OD_AL: 22.5214

## 2023-09-27 ASSESSMENT — REFRACTION_MANIFEST
OS_CYLINDER: -0.75
OS_ADD: +2.50
OD_SPHERE: PLANO
OD_VA1: 20/25-2
OD_ADD: +1.25
OD_AXIS: 010
OS_AXIS: 020
OD_VA1: 20/20-2
OS_VA2: 20/25-2
OS_AXIS: 020
OS_SPHERE: PLANO
OS_VA2: 20/25-2
OD_CYLINDER: -0.75
OD_VA2: 20/25-2
OD_VA2: 20/25-2
OS_VA1: 20/25-2
OD_CYLINDER: -0.75
OS_VA1: 20/20-2
OD_ADD: +2.50
OS_ADD: +1.25
OD_AXIS: 010
OS_CYLINDER: -0.75
OD_SPHERE: +1.25
OS_SPHERE: +1.25

## 2023-09-27 ASSESSMENT — SPHEQUIV_DERIVED
OS_SPHEQUIV: 0
OD_SPHEQUIV: 0.875
OD_SPHEQUIV: 0.875
OS_SPHEQUIV: 0.875

## 2023-09-27 ASSESSMENT — LID POSITION - PTOSIS
OS_PTOSIS: LUL
OD_PTOSIS: RUL

## 2023-09-27 ASSESSMENT — CONFRONTATIONAL VISUAL FIELD TEST (CVF)
OS_FINDINGS: FULL
OD_FINDINGS: FULL

## 2023-09-27 ASSESSMENT — TONOMETRY
OD_IOP_MMHG: 15
OS_IOP_MMHG: 15

## 2023-09-27 ASSESSMENT — VISUAL ACUITY
OS_BCVA: 20/20
OD_BCVA: 20/20

## 2023-09-28 RX ORDER — GABAPENTIN 600 MG/1
600 TABLET ORAL 3 TIMES DAILY
Qty: 270 TABLET | Refills: 3 | Status: SHIPPED | OUTPATIENT
Start: 2023-09-28

## 2023-10-04 DIAGNOSIS — N40.0 BENIGN PROSTATIC HYPERPLASIA, UNSPECIFIED WHETHER LOWER URINARY TRACT SYMPTOMS PRESENT: ICD-10-CM

## 2023-10-05 RX ORDER — DUTASTERIDE 0.5 MG/1
0.5 CAPSULE, LIQUID FILLED ORAL DAILY
Qty: 90 CAPSULE | Refills: 3 | Status: SHIPPED | OUTPATIENT
Start: 2023-10-05

## 2023-10-05 RX ORDER — TAMSULOSIN HYDROCHLORIDE 0.4 MG/1
0.4 CAPSULE ORAL DAILY
Qty: 90 CAPSULE | Refills: 3 | Status: SHIPPED | OUTPATIENT
Start: 2023-10-05

## 2023-10-30 ENCOUNTER — TELEPHONE (OUTPATIENT)
Age: 73
End: 2023-10-30

## 2023-10-30 NOTE — TELEPHONE ENCOUNTER
Spoke with Ismael Rutledge. Patients stopped his Jardiance, will not take his insulin in the morning. Patient has a ride and is ready to go.

## 2023-10-31 ENCOUNTER — VBI (OUTPATIENT)
Dept: ADMINISTRATIVE | Facility: OTHER | Age: 73
End: 2023-10-31

## 2023-10-31 NOTE — TELEPHONE ENCOUNTER
10/31/23 9:36 AM     VB [employer] SmartForm used to document caregap status.     [facility]

## 2023-11-01 ENCOUNTER — ANESTHESIA (OUTPATIENT)
Dept: GASTROENTEROLOGY | Facility: HOSPITAL | Age: 73
End: 2023-11-01

## 2023-11-01 ENCOUNTER — ANESTHESIA EVENT (OUTPATIENT)
Dept: GASTROENTEROLOGY | Facility: HOSPITAL | Age: 73
End: 2023-11-01

## 2023-11-01 ENCOUNTER — HOSPITAL ENCOUNTER (OUTPATIENT)
Dept: GASTROENTEROLOGY | Facility: HOSPITAL | Age: 73
Setting detail: OUTPATIENT SURGERY
Discharge: HOME | End: 2023-11-01
Attending: INTERNAL MEDICINE
Payer: COMMERCIAL

## 2023-11-01 VITALS
TEMPERATURE: 98 F | SYSTOLIC BLOOD PRESSURE: 133 MMHG | OXYGEN SATURATION: 96 % | HEIGHT: 69 IN | RESPIRATION RATE: 22 BRPM | DIASTOLIC BLOOD PRESSURE: 73 MMHG | BODY MASS INDEX: 31.54 KG/M2 | WEIGHT: 212.96 LBS | HEART RATE: 74 BPM

## 2023-11-01 DIAGNOSIS — R19.5 POSITIVE COLORECTAL CANCER SCREENING USING COLOGUARD TEST: ICD-10-CM

## 2023-11-01 LAB — GLUCOSE SERPL-MCNC: 104 MG/DL (ref 65–140)

## 2023-11-01 PROCEDURE — 88305 TISSUE EXAM BY PATHOLOGIST: CPT | Performed by: PATHOLOGY

## 2023-11-01 PROCEDURE — 82948 REAGENT STRIP/BLOOD GLUCOSE: CPT

## 2023-11-01 PROCEDURE — 45380 COLONOSCOPY AND BIOPSY: CPT | Performed by: INTERNAL MEDICINE

## 2023-11-01 RX ORDER — LIDOCAINE HYDROCHLORIDE 20 MG/ML
INJECTION, SOLUTION EPIDURAL; INFILTRATION; INTRACAUDAL; PERINEURAL AS NEEDED
Status: DISCONTINUED | OUTPATIENT
Start: 2023-11-01 | End: 2023-11-01

## 2023-11-01 RX ORDER — LIDOCAINE HYDROCHLORIDE 10 MG/ML
0.5 INJECTION, SOLUTION EPIDURAL; INFILTRATION; INTRACAUDAL; PERINEURAL ONCE AS NEEDED
Status: CANCELLED | OUTPATIENT
Start: 2023-11-01

## 2023-11-01 RX ORDER — PROPOFOL 10 MG/ML
INJECTION, EMULSION INTRAVENOUS AS NEEDED
Status: DISCONTINUED | OUTPATIENT
Start: 2023-11-01 | End: 2023-11-01

## 2023-11-01 RX ORDER — SODIUM CHLORIDE, SODIUM LACTATE, POTASSIUM CHLORIDE, CALCIUM CHLORIDE 600; 310; 30; 20 MG/100ML; MG/100ML; MG/100ML; MG/100ML
50 INJECTION, SOLUTION INTRAVENOUS CONTINUOUS
Status: CANCELLED | OUTPATIENT
Start: 2023-11-01

## 2023-11-01 RX ORDER — SODIUM CHLORIDE, SODIUM LACTATE, POTASSIUM CHLORIDE, CALCIUM CHLORIDE 600; 310; 30; 20 MG/100ML; MG/100ML; MG/100ML; MG/100ML
INJECTION, SOLUTION INTRAVENOUS CONTINUOUS PRN
Status: DISCONTINUED | OUTPATIENT
Start: 2023-11-01 | End: 2023-11-01

## 2023-11-01 RX ADMIN — PROPOFOL 20 MG: 10 INJECTION, EMULSION INTRAVENOUS at 08:39

## 2023-11-01 RX ADMIN — PROPOFOL 20 MG: 10 INJECTION, EMULSION INTRAVENOUS at 08:47

## 2023-11-01 RX ADMIN — PROPOFOL 20 MG: 10 INJECTION, EMULSION INTRAVENOUS at 08:41

## 2023-11-01 RX ADMIN — SODIUM CHLORIDE, SODIUM LACTATE, POTASSIUM CHLORIDE, AND CALCIUM CHLORIDE: .6; .31; .03; .02 INJECTION, SOLUTION INTRAVENOUS at 08:30

## 2023-11-01 RX ADMIN — PROPOFOL 100 MG: 10 INJECTION, EMULSION INTRAVENOUS at 08:37

## 2023-11-01 RX ADMIN — PROPOFOL 20 MG: 10 INJECTION, EMULSION INTRAVENOUS at 08:53

## 2023-11-01 RX ADMIN — PROPOFOL 20 MG: 10 INJECTION, EMULSION INTRAVENOUS at 08:45

## 2023-11-01 RX ADMIN — PROPOFOL 20 MG: 10 INJECTION, EMULSION INTRAVENOUS at 08:43

## 2023-11-01 RX ADMIN — PROPOFOL 20 MG: 10 INJECTION, EMULSION INTRAVENOUS at 08:49

## 2023-11-01 RX ADMIN — PROPOFOL 20 MG: 10 INJECTION, EMULSION INTRAVENOUS at 08:51

## 2023-11-01 RX ADMIN — LIDOCAINE HYDROCHLORIDE 100 MG: 20 INJECTION, SOLUTION EPIDURAL; INFILTRATION; INTRACAUDAL; PERINEURAL at 08:37

## 2023-11-01 NOTE — INTERVAL H&P NOTE
H&P reviewed. After examining the patient I find no changes in the patients condition since the H&P had been written.     Vitals:    11/01/23 0733   BP: 137/85   Pulse: 75   Resp: 16   Temp: (!) 97 °F (36.1 °C)   SpO2: 97%

## 2023-11-01 NOTE — H&P
[facility] and Physical -  [facility]  Starlene Brittle 72 y.o. male MRN: [id number]                  HPI: Starlene Brittle is a 72y.o. year old male who presents for colonoscopy for positive Cologuard. Last colonoscopy more than 10 years ago      REVIEW OF SYSTEMS: Per the HPI, and otherwise unremarkable. Historical Information   Past Medical History:   Diagnosis Date    Arthritis     Back pain     Diabetes mellitus ([address])     Hypertension      Past Surgical History:   Procedure Laterality Date    HERNIA REPAIR Left 1998    with mesh to groin    [place] PARATHYROIDECTOMY/EXPLORATION PARATHYROIDS Right 11/1/2018    Procedure: MINIMALLY INVASIVE RIGHT PARATHYROIDECTOMY WITH PTH MONITORING;  Surgeon: Mera Mcgee MD;  Location[facility];  Service: Surgical Oncology    TONSILLECTOMY      US GUIDED THYROID BIOPSY  9/7/2018     Social History   Social History     Substance and Sexual Activity   Alcohol Use Yes    Comment: rarely     Social History     Substance and Sexual Activity   Drug Use No     Social History     Tobacco Use   Smoking Status Never   Smokeless Tobacco Never     Family History   Problem Relation Age of Onset    Hypertension Father     Coronary artery disease Father     Hypertension Mother     Arthritis Mother     Coronary artery disease Mother     Diabetes Maternal Uncle     Diabetes Paternal Uncle        Meds/Allergies     (Not in a hospital admission)      No Known Allergies    Objective     Temperature (!) 97 °F (36.1 °C), temperature source Tympanic.       PHYSICAL EXAM    Gen: NAD  CV: RRR  CHEST: Clear  ABD: soft, NT/ND  EXT: no edema  Neuro: AAO      ASSESSMENT/PLAN:  This is a 72y.o. year old male here for positive Cologuard    PLAN:   Procedure: Colonoscopy

## 2023-11-01 NOTE — ANESTHESIA PREPROCEDURE EVALUATION
Procedure:  COLONOSCOPY    Relevant Problems   CARDIO   (+) Hypertension   (+) Non-STEMI (non-ST elevated myocardial infarction) ([address])   (+) Other hyperlipidemia      ENDO   (+) Type 2 diabetes mellitus with complication, with long-term current use of insulin (HCC)      /RENAL   (+) BPH with obstruction/lower urinary tract symptoms      PULMONARY   (+) LILIA (obstructive sleep apnea)      Other   (+) Morbid obesity ([address]) (Resolved)   Self discontinued CPAP    Cormack 3 with Mac 4 on 11/1/18    Confirmed NPO appropriate    Physical Exam    Airway    Mallampati score: III  TM Distance: >3 FB  Neck ROM: full     Dental   No notable dental hx     Cardiovascular      Pulmonary      Other Findings        8/20/23 TTE: Normal biventricular systolic function. No significant valvular disease. Anesthesia Plan  ASA Score- 3     Anesthesia Type- IV sedation with anesthesia with ASA Monitors. Additional Monitors:     Airway Plan:     Comment: I discussed the risks and benefits of IV sedation anesthesia including the possibility of the need to convert to general anesthesia and the potential risk of awareness. Plan Factors-Exercise tolerance (METS): >4 METS. Exercise comment: Able to lift percussion instruments up flight of stairs without cardiopulmonary limitation. Chart reviewed. Existing labs reviewed. Patient is not a current smoker. Patient did not smoke on day of surgery. Induction- intravenous. Postoperative Plan-     Informed Consent- Anesthetic plan and risks discussed with patient.

## 2023-11-01 NOTE — ANESTHESIA POSTPROCEDURE EVALUATION
Post-Op Assessment Note    CV Status:  Stable  Pain Score: 0    Pain management: adequate     Mental Status:  Alert and awake   Hydration Status:  Euvolemic   PONV Controlled:  Controlled   Airway Patency:  Patent      Post Op Vitals Reviewed: Yes      Staff: Anesthesiologist, CRNA         No notable events documented.     /71 (11/01/23 0857)    Temp 98 °F (36.7 °C) (11/01/23 0857)    Pulse 71 (11/01/23 0857)   Resp 17 (11/01/23 0857)    SpO2 97 % (11/01/23 0857)

## 2023-11-08 PROCEDURE — 88305 TISSUE EXAM BY PATHOLOGIST: CPT | Performed by: PATHOLOGY

## 2023-12-04 ENCOUNTER — OFFICE VISIT (OUTPATIENT)
Dept: FAMILY MEDICINE CLINIC | Facility: CLINIC | Age: 73
End: 2023-12-04
Payer: MEDICARE

## 2023-12-04 VITALS
BODY MASS INDEX: 31.72 KG/M2 | SYSTOLIC BLOOD PRESSURE: 138 MMHG | HEART RATE: 97 BPM | TEMPERATURE: 97.9 F | DIASTOLIC BLOOD PRESSURE: 76 MMHG | OXYGEN SATURATION: 91 % | WEIGHT: 214.8 LBS

## 2023-12-04 DIAGNOSIS — E78.49 OTHER HYPERLIPIDEMIA: ICD-10-CM

## 2023-12-04 DIAGNOSIS — E89.2 HISTORY OF PARATHYROIDECTOMY (HCC): ICD-10-CM

## 2023-12-04 DIAGNOSIS — Z79.4 TYPE 2 DIABETES MELLITUS WITH COMPLICATION, WITH LONG-TERM CURRENT USE OF INSULIN (HCC): ICD-10-CM

## 2023-12-04 DIAGNOSIS — G47.33 OSA (OBSTRUCTIVE SLEEP APNEA): ICD-10-CM

## 2023-12-04 DIAGNOSIS — I21.4 NON-STEMI (NON-ST ELEVATED MYOCARDIAL INFARCTION) (HCC): ICD-10-CM

## 2023-12-04 DIAGNOSIS — Z23 ENCOUNTER FOR IMMUNIZATION: ICD-10-CM

## 2023-12-04 DIAGNOSIS — E11.9 TYPE 2 DIABETES MELLITUS WITHOUT COMPLICATION, WITHOUT LONG-TERM CURRENT USE OF INSULIN (HCC): ICD-10-CM

## 2023-12-04 DIAGNOSIS — Z00.00 MEDICARE ANNUAL WELLNESS VISIT, SUBSEQUENT: Primary | ICD-10-CM

## 2023-12-04 DIAGNOSIS — I10 PRIMARY HYPERTENSION: ICD-10-CM

## 2023-12-04 DIAGNOSIS — E11.8 TYPE 2 DIABETES MELLITUS WITH COMPLICATION, WITH LONG-TERM CURRENT USE OF INSULIN (HCC): ICD-10-CM

## 2023-12-04 PROCEDURE — 99214 OFFICE O/P EST MOD 30 MIN: CPT | Performed by: FAMILY MEDICINE

## 2023-12-04 PROCEDURE — G0439 PPPS, SUBSEQ VISIT: HCPCS | Performed by: FAMILY MEDICINE

## 2023-12-04 PROCEDURE — G0008 ADMIN INFLUENZA VIRUS VAC: HCPCS | Performed by: FAMILY MEDICINE

## 2023-12-04 PROCEDURE — 90662 IIV NO PRSV INCREASED AG IM: CPT | Performed by: FAMILY MEDICINE

## 2023-12-04 RX ORDER — LISINOPRIL 40 MG/1
20 TABLET ORAL DAILY
Qty: 90 TABLET | Refills: 3 | Status: SHIPPED | OUTPATIENT
Start: 2023-12-04 | End: 2023-12-04

## 2023-12-04 RX ORDER — PERPHENAZINE 16 MG
600 TABLET ORAL DAILY
COMMUNITY

## 2023-12-04 RX ORDER — LISINOPRIL 20 MG/1
20 TABLET ORAL DAILY
Qty: 90 TABLET | Refills: 3 | Status: SHIPPED | OUTPATIENT
Start: 2023-12-04

## 2023-12-04 NOTE — PATIENT INSTRUCTIONS
Medicare Preventive Visit Patient Instructions  Thank you for completing your Welcome to Medicare Visit or Medicare Annual Wellness Visit today. Your next wellness visit will be due in one year (12/4/2024). The screening/preventive services that you may require over the next 5-10 years are detailed below. Some tests may not apply to you based off risk factors and/or age. Screening tests ordered at today's visit but not completed yet may show as past due. Also, please note that scanned in results may not display below. Preventive Screenings:  Service Recommendations Previous Testing/Comments   Colorectal Cancer Screening  Colonoscopy    Fecal Occult Blood Test (FOBT)/Fecal Immunochemical Test (FIT)  Fecal DNA/Cologuard Test  Flexible Sigmoidoscopy Age: 43-73 years old   Colonoscopy: every 10 years (May be performed more frequently if at higher risk)  OR  FOBT/FIT: every 1 year  OR  Cologuard: every 3 years  OR  Sigmoidoscopy: every 5 years  Screening may be recommended earlier than age 39 if at higher risk for colorectal cancer. Also, an individualized decision between you and your healthcare provider will decide whether screening between the ages of 77-80 would be appropriate.  Colonoscopy: 11/01/2023  FOBT/FIT: Not on file  Cologuard: 03/02/2023  Sigmoidoscopy: Not on file    Screening Current     Prostate Cancer Screening Individualized decision between patient and health care provider in men between ages of 53-66   Medicare will cover every 12 months beginning on the day after your 50th birthday PSA: No results in last 5 years           Hepatitis C Screening Once for adults born between 1945 and 1965  More frequently in patients at high risk for Hepatitis C Hep C Antibody: 01/12/2018    Screening Current   Diabetes Screening 1-2 times per year if you're at risk for diabetes or have pre-diabetes Fasting glucose: 114 mg/dL (2/7/2023)  A1C: 7.0 (9/7/2023)  Screening Not Indicated  History Diabetes   Cholesterol Screening Once every 5 years if you don't have a lipid disorder. May order more often based on risk factors. Lipid panel: 02/07/2023  Screening Not Indicated  History Lipid Disorder      Other Preventive Screenings Covered by Medicare:  Abdominal Aortic Aneurysm (AAA) Screening: covered once if your at risk. You're considered to be at risk if you have a family history of AAA or a male between the age of 70-76 who smoking at least 100 cigarettes in your lifetime. Lung Cancer Screening: covers low dose CT scan once per year if you meet all of the following conditions: (1) Age 48-67; (2) No signs or symptoms of lung cancer; (3) Current smoker or have quit smoking within the last 15 years; (4) You have a tobacco smoking history of at least 20 pack years (packs per day x number of years you smoked); (5) You get a written order from a healthcare provider. Glaucoma Screening: covered annually if you're considered high risk: (1) You have diabetes OR (2) Family history of glaucoma OR (3)  aged 48 and older OR (3)  American aged 72 and older  Osteoporosis Screening: covered every 2 years if you meet one of the following conditions: (1) Have a vertebral abnormality; (2) On glucocorticoid therapy for more than 3 months; (3) Have primary hyperparathyroidism; (4) On osteoporosis medications and need to assess response to drug therapy. HIV Screening: covered annually if you're between the age of 14-79. Also covered annually if you are younger than 13 and older than 72 with risk factors for HIV infection. For pregnant patients, it is covered up to 3 times per pregnancy.     Immunizations:  Immunization Recommendations   Influenza Vaccine Annual influenza vaccination during flu season is recommended for all persons aged >= 6 months who do not have contraindications   Pneumococcal Vaccine   * Pneumococcal conjugate vaccine = PCV13 (Prevnar 13), PCV15 (Vaxneuvance), PCV20 (Prevnar 20)  * Pneumococcal polysaccharide vaccine = PPSV23 (Pneumovax) Adults 96-80 yo with certain risk factors or if 69+ yo  If never received any pneumonia vaccine: recommend Prevnar 20 (PCV20)  Give PCV20 if previously received 1 dose of PCV13 or PPSV23   Hepatitis B Vaccine 3 dose series if at intermediate or high risk (ex: diabetes, end stage renal disease, liver disease)   Respiratory syncytial virus (RSV) Vaccine - COVERED BY MEDICARE PART D  * RSVPreF3 (Arexvy) CDC recommends that adults 61years of age and older may receive a single dose of RSV vaccine using shared clinical decision-making (SCDM)   Tetanus (Td) Vaccine - COST NOT COVERED BY MEDICARE PART B Following completion of primary series, a booster dose should be given every 10 years to maintain immunity against tetanus. Td may also be given as tetanus wound prophylaxis. Tdap Vaccine - COST NOT COVERED BY MEDICARE PART B Recommended at least once for all adults. For pregnant patients, recommended with each pregnancy. Shingles Vaccine (Shingrix) - COST NOT COVERED BY MEDICARE PART B  2 shot series recommended in those 19 years and older who have or will have weakened immune systems or those 50 years and older     Health Maintenance Due:      Topic Date Due   • Colorectal Cancer Screening  10/31/2024   • Hepatitis C Screening  Completed     Immunizations Due:      Topic Date Due   • DTaP,Tdap,and Td Vaccines (1 - Tdap) Never done   • COVID-19 Vaccine (2 - Moderna series) 05/05/2021   • Influenza Vaccine (1) 09/01/2023     Advance Directives   What are advance directives? Advance directives are legal documents that state your wishes and plans for medical care. These plans are made ahead of time in case you lose your ability to make decisions for yourself. Advance directives can apply to any medical decision, such as the treatments you want, and if you want to donate organs. What are the types of advance directives?   There are many types of advance directives, and each state has rules about how to use them. You may choose a combination of any of the following:  Living will: This is a written record of the treatment you want. You can also choose which treatments you do not want, which to limit, and which to stop at a certain time. This includes surgery, medicine, IV fluid, and tube feedings. Durable power of  for [facility]): This is a written record that states who you want to make healthcare choices for you when you are unable to make them for yourself. This person, called a proxy, is usually a family member or a friend. You may choose more than 1 proxy. Do not resuscitate (DNR) order:  A DNR order is used in case your heart stops beating or you stop breathing. It is a request not to have certain forms of treatment, such as CPR. A DNR order may be included in other types of advance directives. Medical directive: This covers the care that you want if you are in a coma, near death, or unable to make decisions for yourself. You can list the treatments you want for each condition. Treatment may include pain medicine, surgery, blood transfusions, dialysis, IV or tube feedings, and a ventilator (breathing machine). Values history: This document has questions about your views, beliefs, and how you feel and think about life. This information can help others choose the care that you would choose. Why are advance directives important? An advance directive helps you control your care. Although spoken wishes may be used, it is better to have your wishes written down. Spoken wishes can be misunderstood, or not followed. Treatments may be given even if you do not want them. An advance directive may make it easier for your family to make difficult choices about your care. Fall Prevention    Fall prevention  includes ways to make your home and other areas safer. It also includes ways you can move more carefully to prevent a fall.  Health conditions that cause changes in your blood pressure, vision, or muscle strength and coordination may increase your risk for falls. Medicines may also increase your risk for falls if they make you dizzy, weak, or sleepy. Fall prevention tips:   Stand or sit up slowly. Use assistive devices as directed. Wear shoes that fit well and have soles that . Wear a personal alarm. Stay active. Manage your medical conditions. Home Safety Tips:  Add items to prevent falls in the bathroom. Keep paths clear. Install bright lights in your home. Keep items you use often on shelves within reach. Paint or place reflective tape on the edges of your stairs. Weight Management   Why it is important to manage your weight:  Being overweight increases your risk of health conditions such as heart disease, high blood pressure, type 2 diabetes, and certain types of cancer. It can also increase your risk for osteoarthritis, sleep apnea, and other respiratory problems. Aim for a slow, steady weight loss. Even a small amount of weight loss can lower your risk of health problems. How to lose weight safely:  A safe and healthy way to lose weight is to eat fewer calories and get regular exercise. You can lose up about 1 pound a week by decreasing the number of calories you eat by 500 calories each day. Healthy meal plan for weight management:  A healthy meal plan includes a variety of foods, contains fewer calories, and helps you stay healthy. A healthy meal plan includes the following:  Eat whole-grain foods more often. A healthy meal plan should contain fiber. Fiber is the part of grains, fruits, and vegetables that is not broken down by your body. Whole-grain foods are healthy and provide extra fiber in your diet. Some examples of whole-grain foods are whole-wheat breads and pastas, oatmeal, brown rice, and bulgur. Eat a variety of vegetables every day.   Include dark, leafy greens such as spinach, kale, herlinda greens, and mustard greens. Eat yellow and orange vegetables such as carrots, sweet potatoes, and winter squash. Eat a variety of fruits every day. Choose fresh or canned fruit (canned in its own juice or light syrup) instead of juice. Fruit juice has very little or no fiber. Eat low-fat dairy foods. Drink fat-free (skim) milk or 1% milk. Eat fat-free yogurt and low-fat cottage cheese. Try low-fat cheeses such as mozzarella and other reduced-fat cheeses. Choose meat and other protein foods that are low in fat. Choose beans or other legumes such as split peas or lentils. Choose fish, skinless poultry (chicken or turkey), or lean cuts of red meat (beef or pork). Before you cook meat or poultry, cut off any visible fat. Use less fat and oil. Try baking foods instead of frying them. Add less fat, such as margarine, sour cream, regular salad dressing and mayonnaise to foods. Eat fewer high-fat foods. Some examples of high-fat foods include french fries, doughnuts, ice cream, and cakes. Eat fewer sweets. Limit foods and drinks that are high in sugar. This includes candy, cookies, regular soda, and sweetened drinks. Exercise:  Exercise at least 30 minutes per day on most days of the week. Some examples of exercise include walking, biking, dancing, and swimming. You can also fit in more physical activity by taking the stairs instead of the elevator or parking farther away from stores. Ask your healthcare provider about the best exercise plan for you. © Copyright [employer] 2018 Information is for End User's use only and may not be sold, redistributed or otherwise used for commercial purposes.  All illustrations and images included in CareNotes® are the copyrighted property of [employer] or  [employer]

## 2023-12-04 NOTE — PROGRESS NOTES
Assessment/Plan:    No problem-specific Assessment & Plan notes found for this encounter. Diagnoses and all orders for this visit:    Encounter for immunization  -     influenza vaccine, high-dose, PF 0.7 mL (FLUZONE HIGH-DOSE)  -     HEMOGLOBIN A1C W/ EAG ESTIMATION; Future  -     Lipid panel; Future  -     Comprehensive metabolic panel; Future    Type 2 diabetes mellitus with complication, with long-term current use of insulin (HCC)  -     Basic metabolic panel; Future  -     Albumin / creatinine urine ratio; Future  -     HEMOGLOBIN A1C W/ EAG ESTIMATION; Future  -     Lipid panel; Future  -     Comprehensive metabolic panel; Future    Primary hypertension  -     HEMOGLOBIN A1C W/ EAG ESTIMATION; Future  -     Lipid panel; Future  -     Comprehensive metabolic panel; Future    Other hyperlipidemia  -     HEMOGLOBIN A1C W/ EAG ESTIMATION; Future  -     Lipid panel; Future  -     Comprehensive metabolic panel; Future    Medicare annual wellness visit, subsequent    LILIA (obstructive sleep apnea)    Non-STEMI (non-ST elevated myocardial infarction) ([address])    History of parathyroidectomy ([address])    Other orders  -     Alpha-Lipoic Acid 600 MG CAPS; Take 600 mg by mouth daily          Subjective:      Patient ID: Judith Robin is a 72 y.o. male. His A1c is at goal.  He has no side effects and no hypoglycemia. His lipids are at goal.  He has no myalgia or muscle weakness. His BP is at goal.  He has no CP or SOB. He has no HA or vision changes. The following portions of the patient's history were reviewed and updated as appropriate: He  has a past medical history of Arthritis, Back pain, Diabetes mellitus ([address]), and Hypertension.   He   Patient Active Problem List    Diagnosis Date Noted    Non-STEMI (non-ST elevated myocardial infarction) ([address]) 08/20/2023    Preop examination 02/03/2022    BMI 33.0-33.9,adult 04/09/2021    History of parathyroidectomy ([address]) 05/09/2019    Type 2 diabetes mellitus with complication, with long-term current use of insulin ([address]) 02/11/2019    Medicare annual wellness visit, subsequent 02/11/2019    LILIA (obstructive sleep apnea) 11/12/2018    Multiple thyroid nodules 09/05/2018    Other hyperlipidemia 05/08/2018    Hypertension 05/08/2018    BPH with obstruction/lower urinary tract symptoms 10/10/2017    Hypercalciuria 07/24/2017    Knee osteoarthritis 06/10/2014    Prepatellar bursitis 06/10/2014     He  has a past surgical history that includes US guided thyroid biopsy (9/7/2018); Tonsillectomy; Hernia repair (Left, 1998); and pr parathyroidectomy/exploration parathyroids (Right, 11/1/2018). His family history includes Arthritis in his mother; Coronary artery disease in his father and mother; Diabetes in his maternal uncle and paternal uncle; Hypertension in his father and mother. He  reports that he has never smoked. He has never used smokeless tobacco. He reports current alcohol use. He reports that he does not use drugs.   Current Outpatient Medications   Medication Sig Dispense Refill    Alpha-Lipoic Acid 600 MG CAPS Take 600 mg by mouth daily      aspirin (ECOTRIN LOW STRENGTH) 81 mg EC tablet Take 81 mg by mouth daily      atorvastatin (LIPITOR) 40 mg tablet Take 1 tablet (40 mg total) by mouth daily 90 tablet 3    dutasteride (AVODART) 0.5 mg capsule Take 1 capsule (0.5 mg total) by mouth daily 90 capsule 3    Empagliflozin (Jardiance) 25 MG TABS Take 1 tablet (25 mg total) by mouth every morning 90 tablet 3    insulin degludec Jannaanthony White FlexTouch) 100 units/mL injection pen Inject 60 Units under the skin every 12 (twelve) hours 40 mL 3    Insulin Pen Needle (B-D ULTRAFINE III SHORT PEN) 31G X 8 MM MISC Use 2 (two) times a day 300 each 3    lisinopril (ZESTRIL) 20 mg tablet Take 1 tablet (20 mg total) by mouth daily 90 tablet 3    metFORMIN (GLUCOPHAGE) 1000 MG tablet Take 1 tablet (1,000 mg total) by mouth 2 (two) times a day with meals 180 tablet 3    OneTouch Ultra test strip Use 1 each 3 (three) times a day Use as instructed 400 strip 3    sitaGLIPtin (Januvia) 100 mg tablet Take 1 tablet (100 mg total) by mouth daily 90 tablet 3    SUPER B COMPLEX/C PO Take by mouth      tamsulosin (FLOMAX) 0.4 mg Take 1 capsule (0.4 mg total) by mouth daily 90 capsule 3     No current facility-administered medications for this visit. Current Outpatient Medications on File Prior to Visit   Medication Sig    Alpha-Lipoic Acid 600 MG CAPS Take 600 mg by mouth daily    aspirin (ECOTRIN LOW STRENGTH) 81 mg EC tablet Take 81 mg by mouth daily    atorvastatin (LIPITOR) 40 mg tablet Take 1 tablet (40 mg total) by mouth daily    dutasteride (AVODART) 0.5 mg capsule Take 1 capsule (0.5 mg total) by mouth daily    Empagliflozin (Jardiance) 25 MG TABS Take 1 tablet (25 mg total) by mouth every morning    insulin degludec Meryl Cabrales FlexTouch) 100 units/mL injection pen Inject 60 Units under the skin every 12 (twelve) hours    Insulin Pen Needle (B-D ULTRAFINE III SHORT PEN) 31G X 8 MM MISC Use 2 (two) times a day    metFORMIN (GLUCOPHAGE) 1000 MG tablet Take 1 tablet (1,000 mg total) by mouth 2 (two) times a day with meals    OneTouch Ultra test strip Use 1 each 3 (three) times a day Use as instructed    sitaGLIPtin (Januvia) 100 mg tablet Take 1 tablet (100 mg total) by mouth daily    SUPER B COMPLEX/C PO Take by mouth    tamsulosin (FLOMAX) 0.4 mg Take 1 capsule (0.4 mg total) by mouth daily    [DISCONTINUED] lisinopril (ZESTRIL) 40 mg tablet Take 0.5 tablets (20 mg total) by mouth daily    [DISCONTINUED] gabapentin (NEURONTIN) 600 MG tablet TAKE 1 TABLET THREE TIMES A DAY (Patient not taking: Reported on 12/4/2023)    [DISCONTINUED] ondansetron (ZOFRAN-ODT) 4 mg disintegrating tablet  (Patient not taking: Reported on 12/4/2023)     No current facility-administered medications on file prior to visit. He has No Known Allergies. .    Review of Systems   All other systems reviewed and are negative. Objective:      /76 (BP Location: Left arm, Patient Position: Sitting)   Pulse 97   Temp 97.9 °F (36.6 °C) (Tympanic)   Wt 97.4 kg (214 lb 12.8 oz)   SpO2 91%   BMI 31.72 kg/m²          Physical Exam  Vitals and nursing note reviewed. Constitutional:       Appearance: Normal appearance. Cardiovascular:      Rate and Rhythm: Normal rate and regular rhythm. Pulses: Normal pulses. Heart sounds: Normal heart sounds. Pulmonary:      Effort: Pulmonary effort is normal.      Breath sounds: Normal breath sounds. Abdominal:      General: Abdomen is flat. Bowel sounds are normal.      Palpations: Abdomen is soft. Musculoskeletal:         General: Normal range of motion. Cervical back: Normal range of motion and neck supple. Skin:     General: Skin is warm and dry. Capillary Refill: Capillary refill takes less than 2 seconds. Neurological:      General: No focal deficit present. Mental Status: He is alert and oriented to person, place, and time. Mental status is at baseline. Psychiatric:         Mood and Affect: Mood normal.         Behavior: Behavior normal.         Thought Content:  Thought content normal.         Judgment: Judgment normal.

## 2023-12-04 NOTE — PROGRESS NOTES
Assessment and Plan:     Problem List Items Addressed This Visit       Type 2 diabetes mellitus with complication, with long-term current use of insulin ([address])     Other Visit Diagnoses       Encounter for immunization    -  Primary             Preventive health issues were discussed with patient, and age appropriate screening tests were ordered as noted in patient's After Visit Summary. Personalized health advice and appropriate referrals for health education or preventive services given if needed, as noted in patient's After Visit Summary.      History of Present Illness:     Patient presents for a Medicare Wellness Visit    HPI   Patient Care Team:  Chente Hurt MD as PCP - General  Chente Hurt MD as PCP - PCP-[facility]  MD Tam Quevedo MD as Surgeon (Surgical Oncology)     Review of Systems:     Review of Systems     Problem List:     Patient Active Problem List   Diagnosis    Other hyperlipidemia    Hypertension    BPH with obstruction/lower urinary tract symptoms    Hypercalciuria    Knee osteoarthritis    Prepatellar bursitis    Multiple thyroid nodules    LILIA (obstructive sleep apnea)    Type 2 diabetes mellitus with complication, with long-term current use of insulin ([address])    Medicare annual wellness visit, subsequent    History of parathyroidectomy ([address])    BMI 33.0-33.9,adult    Preop examination    Non-STEMI (non-ST elevated myocardial infarction) [facility])      Past Medical and Surgical History:     Past Medical History:   Diagnosis Date    Arthritis     Back pain     Diabetes mellitus ([address])     Hypertension      Past Surgical History:   Procedure Laterality Date    HERNIA REPAIR Left 1998    with mesh to groin    RI PARATHYROIDECTOMY/EXPLORATION PARATHYROIDS Right 11/1/2018    Procedure: MINIMALLY INVASIVE RIGHT PARATHYROIDECTOMY WITH PTH MONITORING;  Surgeon: Tam Weiss MD;  Location: [facility];  Service: Surgical Oncology    TONSILLECTOMY US GUIDED THYROID BIOPSY  9/7/2018      Family History:     Family History   Problem Relation Age of Onset    Hypertension Father     Coronary artery disease Father     Hypertension Mother     Arthritis Mother     Coronary artery disease Mother     Diabetes Maternal Uncle     Diabetes Paternal Uncle       Social History:     Social History     Socioeconomic History    Marital status: /Civil Union     Spouse name: Not on file    Number of children: Not on file    Years of education: Not on file    Highest education level: Not on file   Occupational History    Not on file   Tobacco Use    Smoking status: Never    Smokeless tobacco: Never   Vaping Use    Vaping Use: Never used   Substance and Sexual Activity    Alcohol use: Yes     Comment: rarely    Drug use: No    Sexual activity: Yes   Other Topics Concern    Not on file   Social History Narrative    Not on file     Social Determinants of Health     Financial Resource Strain: Not on file   Food Insecurity: Not on file   Transportation Needs: Not on file   Physical Activity: Not on file   Stress: Not on file   Social Connections: Not on file   Intimate Partner Violence: Not on file   Housing Stability: Not on file      Medications and Allergies:     Current Outpatient Medications   Medication Sig Dispense Refill    aspirin (ECOTRIN LOW STRENGTH) 81 mg EC tablet Take 81 mg by mouth daily      atorvastatin (LIPITOR) 40 mg tablet Take 1 tablet (40 mg total) by mouth daily 90 tablet 3    dutasteride (AVODART) 0.5 mg capsule Take 1 capsule (0.5 mg total) by mouth daily 90 capsule 3    Empagliflozin (Jardiance) 25 MG TABS Take 1 tablet (25 mg total) by mouth every morning 90 tablet 3    gabapentin (NEURONTIN) 600 MG tablet TAKE 1 TABLET THREE TIMES A  tablet 3    insulin degludec Candy Do FlexTouch) 100 units/mL injection pen Inject 60 Units under the skin every 12 (twelve) hours 40 mL 3    Insulin Pen Needle (B-D ULTRAFINE III SHORT PEN) 31G X 8 MM MISC Use 2 (two) times a day 300 each 3    lisinopril (ZESTRIL) 40 mg tablet Take 0.5 tablets (20 mg total) by mouth daily 90 tablet 3    metFORMIN (GLUCOPHAGE) 1000 MG tablet Take 1 tablet (1,000 mg total) by mouth 2 (two) times a day with meals 180 tablet 3    ondansetron (ZOFRAN-ODT) 4 mg disintegrating tablet       OneTouch Ultra test strip Use 1 each 3 (three) times a day Use as instructed 400 strip 3    sitaGLIPtin (Januvia) 100 mg tablet Take 1 tablet (100 mg total) by mouth daily 90 tablet 3    SUPER B COMPLEX/C PO Take by mouth      tamsulosin (FLOMAX) 0.4 mg Take 1 capsule (0.4 mg total) by mouth daily 90 capsule 3     No current facility-administered medications for this visit. No Known Allergies   Immunizations:     Immunization History   Administered Date(s) Administered    COVID-19 MODERNA VACC 0.5 ML IM 03/10/2021    Influenza, high dose seasonal 0.7 mL 11/12/2019, 12/10/2020, 10/29/2021    Pneumococcal Conjugate 13-Valent 02/11/2019    Pneumococcal Polysaccharide PPV23 01/09/2018      Health Maintenance:         Topic Date Due    Colorectal Cancer Screening  10/31/2024    Hepatitis C Screening  Completed         Topic Date Due    DTaP,Tdap,and Td Vaccines (1 - Tdap) Never done    COVID-19 Vaccine (2 - Moderna series) 05/05/2021    Influenza Vaccine (1) 09/01/2023      Medicare Screening Tests and Risk Assessments:         Health Risk Assessment:   Patient rates overall health as very good. Patient feels that their physical health rating is same. Patient is very satisfied with their life. Eyesight was rated as same. Hearing was rated as same. Patient feels that their emotional and mental health rating is same. Patients states they are never, rarely angry. Patient states they are sometimes unusually tired/fatigued. Pain experienced in the last 7 days has been none. Patient states that he has experienced no weight loss or gain in last 6 months.      Depression Screening:   PHQ-2 Score: 0      Fall Risk Screening: In the past year, patient has experienced: history of falling in past year    Number of falls: 2 or more  Injured during fall?: No    Feels unsteady when standing or walking?: No    Worried about falling?: No      Home Safety:  Patient does not have trouble with stairs inside or outside of their home. Patient has working smoke alarms and has working carbon monoxide detector. Home safety hazards include: none. Nutrition:   Current diet is Diabetic. Medications:   Patient is currently taking over-the-counter supplements. OTC medications include: see medication list. Patient is able to manage medications. Activities of Daily Living (ADLs)/Instrumental Activities of Daily Living (IADLs):   Walk and transfer into and out of bed and chair?: Yes  Dress and groom yourself?: Yes    Bathe or shower yourself?: Yes    Feed yourself? Yes  Do your laundry/housekeeping?: Yes  Manage your money, pay your bills and track your expenses?: Yes  Make your own meals?: Yes    Do your own shopping?: Yes    Previous Hospitalizations:   Any hospitalizations or ED visits within the last 12 months?: Yes    How many hospitalizations have you had in the last year?: 1-2    PREVENTIVE SCREENINGS      Cardiovascular Screening:    General: Screening Not Indicated and History Lipid Disorder      Diabetes Screening:     General: Screening Not Indicated and History Diabetes      Colorectal Cancer Screening:     General: Screening Current      Abdominal Aortic Aneurysm (AAA) Screening:    Risk factors include: age between 70-77 yo        Lung Cancer Screening:     General: Screening Not Indicated      Hepatitis C Screening:    General: Screening Current    Screening, Brief Intervention, and Referral to Treatment (SBIRT)    Screening  Typical number of drinks in a day: 0  Typical number of drinks in a week: 0  Interpretation: Low risk drinking behavior.     Single Item Drug Screening:  How often have you used an illegal drug (including marijuana) or a prescription medication for non-medical reasons in the past year? never    Single Item Drug Screen Score: 0  Interpretation: Negative screen for possible drug use disorder    No results found. Physical Exam:     There were no vitals taken for this visit.     Physical Exam     Kajal Morgan MD

## 2024-01-22 ENCOUNTER — OFFICE VISIT (OUTPATIENT)
Dept: PODIATRY | Facility: CLINIC | Age: 74
End: 2024-01-22
Payer: MEDICARE

## 2024-01-22 VITALS
WEIGHT: 214 LBS | DIASTOLIC BLOOD PRESSURE: 86 MMHG | SYSTOLIC BLOOD PRESSURE: 136 MMHG | BODY MASS INDEX: 31.6 KG/M2 | HEART RATE: 94 BPM

## 2024-01-22 DIAGNOSIS — E11.8 TYPE 2 DIABETES MELLITUS WITH COMPLICATION, WITH LONG-TERM CURRENT USE OF INSULIN (HCC): Primary | ICD-10-CM

## 2024-01-22 DIAGNOSIS — L84 CALLUS: ICD-10-CM

## 2024-01-22 DIAGNOSIS — Z79.4 TYPE 2 DIABETES MELLITUS WITH COMPLICATION, WITH LONG-TERM CURRENT USE OF INSULIN (HCC): Primary | ICD-10-CM

## 2024-01-22 DIAGNOSIS — B35.1 ONYCHOMYCOSIS: ICD-10-CM

## 2024-01-22 DIAGNOSIS — E11.8 DIABETIC FOOT (HCC): ICD-10-CM

## 2024-01-22 PROCEDURE — 99203 OFFICE O/P NEW LOW 30 MIN: CPT | Performed by: PODIATRIST

## 2024-01-22 PROCEDURE — 11056 PARNG/CUTG B9 HYPRKR LES 2-4: CPT | Performed by: PODIATRIST

## 2024-01-22 PROCEDURE — 11721 DEBRIDE NAIL 6 OR MORE: CPT | Performed by: PODIATRIST

## 2024-01-22 NOTE — PROGRESS NOTES
Diabetic Foot Exam    Patient's shoes and socks removed.    Right Foot/Ankle   Right Foot Inspection  Skin Exam: skin normal and skin intact. No dry skin, no warmth, no callus, no erythema, no maceration, no abnormal color, no pre-ulcer, no ulcer and no callus.     Sensory   Vibration: absent  Proprioception: absent  Monofilament testing: absent    Vascular  Capillary refills: elevated  The right DP pulse is 1+. The right PT pulse is 1+.     Left Foot/Ankle  Left Foot Inspection  Skin Exam: skin normal and skin intact. No dry skin, no warmth, no erythema, no maceration, normal color, no pre-ulcer, no ulcer and no callus.     Sensory   Vibration: absent  Proprioception: absent  Monofilament testing: absent    Vascular  Capillary refills: elevated  The left DP pulse is 1+. The left PT pulse is 1+.     Assign Risk Category  Deformity present  Loss of protective sensation  Weak pulses  Risk: 2                PATIENT:  Sg Munoz    1950    ASSESSMENT:     1. Type 2 diabetes mellitus with complication, with long-term current use of insulin ([facility])  Diabetic Shoe Inserts    Diabetic Shoe      2. Onychomycosis        3. Diabetic foot (HCC)  Diabetic Shoe Inserts    Diabetic Shoe      4. Callus              PLAN:  1.  Patient was counseled on the condition and diagnosis.    2.  Educated disease prevention and risks related to diabetes.    3.  Educated proper daily foot care and exam.  Instructed proper skin care / protection and footwear.  Instructed to identify any signs of infection and related foot problem.    4.  The recent blood work was reviewed / discussed and the last HbA1c was 7.  Discussed proper blood glucose control with diet and exercise.    5.  The patient has *2* risk diabetic foot and will return in 12 months for diabetic foot exam.      Imaging: I have personally reviewed pertinent films in PACS  Labs, pathology, and Other Studies: I have personally reviewed pertinent reports.        High risk  foot  precautions reviewed with patient including the need to wear protective shoegear at all times when walking including in the home, the need to check feet all surfaces daily with a mirror and report and skin breaks to podiatry, the need to apply an emollient to skin of feet daily.  Diabetic Foot Ulcer Risks    - Between 10-15% of diabetic foot ulcers do not heal.[v]  - Of diabetic foot ulcers that do not heal, 25% will require amputation.[v]    iv LUPE Deleon, EDA Salazar, Cassie DUVAL, and Sherron ISIDRO, Foot Ulcers in the Diabetic Patient, Prevention and Treatment. Vascular Health Risk Management. 2007 Feb; 3(1): 65-76  v ALEXANDRIA Jason., CHRISSIE Mejia, DWAINE Flower, KERA Higginbotham, Minzeynep, T.T., Risk factors for lower extremity amputation in patients with diabetic foot ulcers: a hospital-based case-control study. Diabetic Foot & Ankle, 2015. 6:[id number]      Procedure: All mycotic toenails were reduced and debrided in length, width, and girth using a nail nipper and dremel.  All hyperkeratotic skin lesion(s) were sharply pared with a scalpel with no bleeding or evidence of ulceration.  Patient tolerated procedure(s) well without complications.  [id number], 96442, q9        Subjective:          HPI  The patient presents for diabetic foot evaluation.  The patient has diabetes for multipl years.  The blood glucose is under control and the last HbA1c was 7.  The patient denied any history of diabetic foot ulcer or related foot infection.  significant numbness or paresthesia.  Denied weakness or significant functional deficit.  The patient denied any acute pedal disorder or injury.  Denied symptoms of arterial claudication in legs.  The patient presents with elongated painful nails that he cannot bend down and cut himself. Has significant difficulty reaching his toenails.      The following portions of the patient's history were reviewed and updated as appropriate: allergies, current medications, past family history, past medical history,  past social history, past surgical history and problem list.  All pertinent labs and images were reviewed.    Past Medical History  Past Medical History:   Diagnosis Date    Arthritis     Back pain     Diabetes mellitus (HCC)     Hypertension        Past Surgical History  Past Surgical History:   Procedure Laterality Date    HERNIA REPAIR Left 1998    with mesh to groin    [place] PARATHYROIDECTOMY/EXPLORATION PARATHYROIDS Right 11/1/2018    Procedure: MINIMALLY INVASIVE RIGHT PARATHYROIDECTOMY WITH PTH MONITORING;  Surgeon: Price Ybarra MD;  Location: [facility];  Service: Surgical Oncology    TONSILLECTOMY      US GUIDED THYROID BIOPSY  9/7/2018        Allergies:  Patient has no known allergies.    Medications:  Current Outpatient Medications   Medication Sig Dispense Refill    Alpha-Lipoic Acid 600 MG CAPS Take 600 mg by mouth daily      aspirin (ECOTRIN LOW STRENGTH) 81 mg EC tablet Take 81 mg by mouth daily      atorvastatin (LIPITOR) 40 mg tablet Take 1 tablet (40 mg total) by mouth daily 90 tablet 3    dutasteride (AVODART) 0.5 mg capsule Take 1 capsule (0.5 mg total) by mouth daily 90 capsule 3    Empagliflozin (Jardiance) 25 MG TABS Take 1 tablet (25 mg total) by mouth every morning 90 tablet 3    insulin degludec (Tresiba FlexTouch) 100 units/mL injection pen Inject 60 Units under the skin every 12 (twelve) hours 40 mL 3    Insulin Pen Needle (B-D ULTRAFINE III SHORT PEN) 31G X 8 MM MISC Use 2 (two) times a day 300 each 3    lisinopril (ZESTRIL) 20 mg tablet Take 1 tablet (20 mg total) by mouth daily 90 tablet 3    metFORMIN (GLUCOPHAGE) 1000 MG tablet Take 1 tablet (1,000 mg total) by mouth 2 (two) times a day with meals 180 tablet 3    OneTouch Ultra test strip Use 1 each 3 (three) times a day Use as instructed 400 strip 3    sitaGLIPtin (Januvia) 100 mg tablet Take 1 tablet (100 mg total) by mouth daily 90 tablet 3    SUPER B COMPLEX/C PO Take by mouth      tamsulosin (FLOMAX) 0.4 mg Take 1 capsule (0.4  mg total) by mouth daily 90 capsule 3     No current facility-administered medications for this visit.       Social History:  Social History     Socioeconomic History    Marital status: /Civil Union     Spouse name: None    Number of children: None    Years of education: None    Highest education level: None   Occupational History    None   Tobacco Use    Smoking status: Never    Smokeless tobacco: Never   Vaping Use    Vaping status: Never Used   Substance and Sexual Activity    Alcohol use: Yes     Comment: rarely    Drug use: No    Sexual activity: Yes   Other Topics Concern    None   Social History Narrative    None     Social Determinants of Health     Financial Resource Strain: Low Risk  (12/4/2023)    Overall Financial Resource Strain (CARDIA)     Difficulty of Paying Living Expenses: Not hard at all   Food Insecurity: No Food Insecurity (8/20/2023)    Received from [facility]    Food Insecurity     Within the past 12 months, you worried that your food would run out before you got the money to buy more.: 1     Within the past 12 months, the food you bought just didn't last and you didn't have money to get more.: 1   Transportation Needs: No Transportation Needs (12/4/2023)    PRAPARE - Transportation     Lack of Transportation (Medical): No     Lack of Transportation (Non-Medical): No   Physical Activity: Insufficiently Active (8/20/2023)    Received from [facility]    Physical Activity     On average, how many days per week do you engage in moderate to strenuous exercise (like a brisk walk)?: 3 days     On average, how many minutes do you engage in exercise at this level?: 30 min   Stress: No Stress Concern Present (8/20/2023)    Received from [facility]    [employer] [place] [employer] - Occupational Stress Questionnaire     Feeling of Stress : Only a little   Social Connections: Socially Integrated (8/20/2023)    Received from [facility]    Social Connection and Isolation Panel  [NHANES]     Frequency of Communication with Friends and Family: More than three times a week     Frequency of Social Gatherings with Friends and Family: Three times a week     Attends Protestant Services: More than 4 times per year     Active Member of Clubs or Organizations: Yes     Attends Club or Organization Meetings: 1 to 4 times per year     Marital Status:    Intimate Partner Violence: Not At Risk (8/20/2023)    Received from [facility]     Threatened: Not on file     Insulted: Not on file     Physically Hurt : Not on file     Scream: Not on file   Housing Stability: At Risk (8/20/2023)    Received from [facility]     Living Situation: Not on file     Housing Problems: Not on file        Review of Systems   Constitutional:  Negative for chills and fever.   HENT:  Negative for ear pain and sore throat.    Eyes:  Negative for pain and visual disturbance.   Respiratory:  Negative for cough and shortness of breath.    Cardiovascular:  Negative for chest pain and palpitations.   Gastrointestinal:  Negative for abdominal pain and vomiting.   Genitourinary:  Negative for dysuria and hematuria.   Musculoskeletal:  Negative for arthralgias and back pain.   Skin:  Negative for color change and rash.   Neurological:  Negative for seizures and syncope.   All other systems reviewed and are negative.        Objective:      /86 (BP Location: Left arm, Patient Position: Sitting, Cuff Size: Standard)   Pulse 94   Wt 97.1 kg (214 lb)   BMI 31.60 kg/m²          Physical Exam  Vitals reviewed.   Constitutional:       Appearance: Normal appearance. He is obese.   HENT:      Head: Normocephalic and atraumatic.      Nose: Nose normal.      Mouth/Throat:      Mouth: Mucous membranes are moist.   Eyes:      Pupils: Pupils are equal, round, and reactive to light.   Cardiovascular:      Pulses: Pulses are weak.           Dorsalis pedis pulses are 1+ on the right side and 1+ on the left  side.        Posterior tibial pulses are 1+ on the right side and 1+ on the left side.   Musculoskeletal:      Right lower leg: Edema present.      Left lower leg: Edema present.        Feet:       Comments: + 1/4 DP and PT pulses b/l feet. Skin is warm to cool proximal to distal    + 1/4 pitting edema b/l LE    + Parathesias b/l feet.     Nails 1 through 5 bilaterally thickened elongated with subungual debris's, skin is shiny and atrophic, DP PT pulses plus 1 out of 4.    Callosities b/l head of 5th met head     Feet:      Right foot:      Skin integrity: No ulcer, skin breakdown, erythema, warmth, callus or dry skin.      Left foot:      Skin integrity: No ulcer, skin breakdown, erythema, warmth, callus or dry skin.   Skin:     Capillary Refill: Capillary refill takes more than 3 seconds.   Neurological:      General: No focal deficit present.      Mental Status: He is alert and oriented to person, place, and time.

## 2024-01-29 ENCOUNTER — TELEPHONE (OUTPATIENT)
Age: 74
End: 2024-01-29

## 2024-01-29 DIAGNOSIS — E11.8 DIABETIC FOOT (HCC): Primary | ICD-10-CM

## 2024-01-29 RX ORDER — GABAPENTIN 600 MG/1
600 TABLET ORAL 3 TIMES DAILY
Qty: 270 TABLET | Refills: 3 | Status: SHIPPED | OUTPATIENT
Start: 2024-01-29

## 2024-01-29 NOTE — TELEPHONE ENCOUNTER
Reason for call:     Patient called rx refill line, states Dr Klein incorrectly canceled/discontinued his script for gabapentin as patient not taking. Patient states he still takes this medication and is asking for a script to be sent to Mail order.    Please enter a new script.      [x] Refill   [] Prior Auth  [] Other:     Office:   [x] PCP/Provider - Dr Klein  [] Specialty/Provider -     Medication:   Gabapentin 600 mg , 1 tid, qty 270    Pharmacy: [facility]

## 2024-02-12 ENCOUNTER — LAB (OUTPATIENT)
Dept: LAB | Facility: CLINIC | Age: 74
End: 2024-02-12
Payer: MEDICARE

## 2024-02-12 DIAGNOSIS — I10 PRIMARY HYPERTENSION: ICD-10-CM

## 2024-02-12 DIAGNOSIS — Z23 ENCOUNTER FOR IMMUNIZATION: ICD-10-CM

## 2024-02-12 DIAGNOSIS — Z79.4 TYPE 2 DIABETES MELLITUS WITH COMPLICATION, WITH LONG-TERM CURRENT USE OF INSULIN (HCC): ICD-10-CM

## 2024-02-12 DIAGNOSIS — E11.8 TYPE 2 DIABETES MELLITUS WITH COMPLICATION, WITH LONG-TERM CURRENT USE OF INSULIN (HCC): ICD-10-CM

## 2024-02-12 DIAGNOSIS — E78.49 OTHER HYPERLIPIDEMIA: ICD-10-CM

## 2024-02-12 LAB
ALBUMIN SERPL BCP-MCNC: 4.2 G/DL (ref 3.5–5)
ALP SERPL-CCNC: 78 U/L (ref 34–104)
ALT SERPL W P-5'-P-CCNC: 29 U/L (ref 7–52)
ANION GAP SERPL CALCULATED.3IONS-SCNC: 7 MMOL/L
AST SERPL W P-5'-P-CCNC: 21 U/L (ref 13–39)
BILIRUB SERPL-MCNC: 1.64 MG/DL (ref 0.2–1)
BUN SERPL-MCNC: 16 MG/DL (ref 5–25)
CALCIUM SERPL-MCNC: 8.6 MG/DL (ref 8.4–10.2)
CHLORIDE SERPL-SCNC: 107 MMOL/L (ref 96–108)
CHOLEST SERPL-MCNC: 137 MG/DL
CO2 SERPL-SCNC: 26 MMOL/L (ref 21–32)
CREAT SERPL-MCNC: 0.63 MG/DL (ref 0.6–1.3)
GFR SERPL CREATININE-BSD FRML MDRD: 99 ML/MIN/1.73SQ M
GLUCOSE P FAST SERPL-MCNC: 121 MG/DL (ref 65–99)
HDLC SERPL-MCNC: 39 MG/DL
LDLC SERPL CALC-MCNC: 78 MG/DL (ref 0–100)
NONHDLC SERPL-MCNC: 98 MG/DL
POTASSIUM SERPL-SCNC: 4.6 MMOL/L (ref 3.5–5.3)
PROT SERPL-MCNC: 7.2 G/DL (ref 6.4–8.4)
SODIUM SERPL-SCNC: 140 MMOL/L (ref 135–147)
TRIGL SERPL-MCNC: 100 MG/DL

## 2024-02-12 PROCEDURE — 80053 COMPREHEN METABOLIC PANEL: CPT

## 2024-02-12 PROCEDURE — 82043 UR ALBUMIN QUANTITATIVE: CPT

## 2024-02-12 PROCEDURE — 80061 LIPID PANEL: CPT

## 2024-02-12 PROCEDURE — 83036 HEMOGLOBIN GLYCOSYLATED A1C: CPT

## 2024-02-12 PROCEDURE — 82570 ASSAY OF URINE CREATININE: CPT

## 2024-02-12 PROCEDURE — 36415 COLL VENOUS BLD VENIPUNCTURE: CPT

## 2024-02-13 LAB
CREAT UR-MCNC: 63.6 MG/DL
EST. AVERAGE GLUCOSE BLD GHB EST-MCNC: 180 MG/DL
HBA1C MFR BLD: 7.9 %
MICROALBUMIN UR-MCNC: 47.1 MG/L
MICROALBUMIN/CREAT 24H UR: 74 MG/G CREATININE (ref 0–30)

## 2024-02-21 PROBLEM — Z00.00 MEDICARE ANNUAL WELLNESS VISIT, SUBSEQUENT: Status: RESOLVED | Noted: 2019-02-11 | Resolved: 2024-02-21

## 2024-03-04 ENCOUNTER — OFFICE VISIT (OUTPATIENT)
Dept: FAMILY MEDICINE CLINIC | Facility: CLINIC | Age: 74
End: 2024-03-04
Payer: COMMERCIAL

## 2024-03-04 VITALS
HEIGHT: 69 IN | WEIGHT: 198.2 LBS | OXYGEN SATURATION: 92 % | HEART RATE: 95 BPM | SYSTOLIC BLOOD PRESSURE: 120 MMHG | BODY MASS INDEX: 29.36 KG/M2 | DIASTOLIC BLOOD PRESSURE: 70 MMHG | TEMPERATURE: 98.5 F

## 2024-03-04 DIAGNOSIS — R06.02 SOB (SHORTNESS OF BREATH) ON EXERTION: ICD-10-CM

## 2024-03-04 DIAGNOSIS — I10 PRIMARY HYPERTENSION: ICD-10-CM

## 2024-03-04 DIAGNOSIS — E11.40 TYPE 2 DIABETES MELLITUS WITH DIABETIC NEUROPATHY, WITH LONG-TERM CURRENT USE OF INSULIN (HCC): ICD-10-CM

## 2024-03-04 DIAGNOSIS — E11.8 DIABETIC FOOT (HCC): Primary | ICD-10-CM

## 2024-03-04 DIAGNOSIS — G47.33 OSA (OBSTRUCTIVE SLEEP APNEA): ICD-10-CM

## 2024-03-04 DIAGNOSIS — Z79.4 TYPE 2 DIABETES MELLITUS WITH DIABETIC NEUROPATHY, WITH LONG-TERM CURRENT USE OF INSULIN (HCC): ICD-10-CM

## 2024-03-04 DIAGNOSIS — E11.8 TYPE 2 DIABETES MELLITUS WITH COMPLICATION, WITH LONG-TERM CURRENT USE OF INSULIN (HCC): ICD-10-CM

## 2024-03-04 DIAGNOSIS — Z79.4 TYPE 2 DIABETES MELLITUS WITH COMPLICATION, WITH LONG-TERM CURRENT USE OF INSULIN (HCC): ICD-10-CM

## 2024-03-04 DIAGNOSIS — E89.2 HISTORY OF PARATHYROIDECTOMY (HCC): ICD-10-CM

## 2024-03-04 DIAGNOSIS — I25.10 CORONARY ARTERY DISEASE INVOLVING NATIVE HEART, UNSPECIFIED VESSEL OR LESION TYPE, UNSPECIFIED WHETHER ANGINA PRESENT: ICD-10-CM

## 2024-03-04 PROCEDURE — 99214 OFFICE O/P EST MOD 30 MIN: CPT | Performed by: FAMILY MEDICINE

## 2024-03-04 RX ORDER — INSULIN DEGLUDEC INJECTION 100 U/ML
60 INJECTION, SOLUTION SUBCUTANEOUS EVERY 12 HOURS SCHEDULED
Qty: 40 ML | Refills: 3 | Status: SHIPPED | OUTPATIENT
Start: 2024-03-04

## 2024-03-04 NOTE — PROGRESS NOTES
Assessment/Plan:    No problem-specific Assessment & Plan notes found for this encounter.       Diagnoses and all orders for this visit:    Diabetic foot (HCC)    Type 2 diabetes mellitus with complication, with long-term current use of insulin ([facility])  -     Empagliflozin (Jardiance) 25 MG TABS; Take 1 tablet (25 mg total) by mouth every morning  -     insulin degludec (Tresiba FlexTouch) 100 units/mL injection pen; Inject 60 Units under the skin every 12 (twelve) hours    History of parathyroidectomy ([facility])    Type 2 diabetes mellitus with diabetic neuropathy, with long-term current use of insulin ([facility])    LILIA (obstructive sleep apnea)  -     Complete PFT with post bronchodilator; Future    Primary hypertension    Coronary artery disease involving native heart, unspecified vessel or lesion type, unspecified whether angina present  -     Ambulatory Referral to Cardiology; Future    SOB (shortness of breath) on exertion  -     Complete PFT with post bronchodilator; Future          Subjective:      Patient ID: Sg Munoz is a 73 y.o. male.    His A1c is at goal.  He has no side effects or hypoglycemia.    His BP is at goal on his current regimen.  He has no CP or SOB.  He has no HA or vision changes.     His lipids are at goal on his current regimen.  He has no myalgia or muscle weakness.    He is getting SOB with activity, but only with cold weather.  He had a NSTEMI in the winter at [place].  He had a normal stress test at that time.    Shortness of Breath        The following portions of the patient's history were reviewed and updated as appropriate: He  has a past medical history of Arthritis, Back pain, Diabetes mellitus ([facility]), and Hypertension.  He   Patient Active Problem List    Diagnosis Date Noted    Diabetic foot ([facility]) 01/22/2024    Non-STEMI (non-ST elevated myocardial infarction) ([facility]) 08/20/2023    Preop examination 02/03/2022    BMI 33.0-33.9,adult 04/09/2021    History of parathyroidectomy ([facility])  05/09/2019    Type 2 diabetes mellitus with complication, with long-term current use of insulin (HCC) 02/11/2019    LILIA (obstructive sleep apnea) 11/12/2018    Multiple thyroid nodules 09/05/2018    Other hyperlipidemia 05/08/2018    Hypertension 05/08/2018    BPH with obstruction/lower urinary tract symptoms 10/10/2017    Hypercalciuria 07/24/2017    Knee osteoarthritis 06/10/2014    Prepatellar bursitis 06/10/2014     He  has a past surgical history that includes US guided thyroid biopsy (9/7/2018); Tonsillectomy; Hernia repair (Left, 1998); and pr parathyroidectomy/exploration parathyroids (Right, 11/1/2018).  His family history includes Arthritis in his mother; Coronary artery disease in his father and mother; Diabetes in his maternal uncle and paternal uncle; Hypertension in his father and mother.  He  reports that he has never smoked. He has been exposed to tobacco smoke. He has never used smokeless tobacco. He reports current alcohol use. He reports that he does not use drugs.  Current Outpatient Medications   Medication Sig Dispense Refill    aspirin (ECOTRIN LOW STRENGTH) 81 mg EC tablet Take 81 mg by mouth daily      atorvastatin (LIPITOR) 40 mg tablet Take 1 tablet (40 mg total) by mouth daily 90 tablet 3    dutasteride (AVODART) 0.5 mg capsule Take 1 capsule (0.5 mg total) by mouth daily 90 capsule 3    Empagliflozin (Jardiance) 25 MG TABS Take 1 tablet (25 mg total) by mouth every morning 90 tablet 3    gabapentin (Neurontin) 600 MG tablet Take 1 tablet (600 mg total) by mouth 3 (three) times a day 270 tablet 3    insulin degludec (Tresiba FlexTouch) 100 units/mL injection pen Inject 60 Units under the skin every 12 (twelve) hours 40 mL 3    Insulin Pen Needle (B-D ULTRAFINE III SHORT PEN) 31G X 8 MM MISC Use 2 (two) times a day 300 each 3    lisinopril (ZESTRIL) 20 mg tablet Take 1 tablet (20 mg total) by mouth daily 90 tablet 3    metFORMIN (GLUCOPHAGE) 1000 MG tablet Take 1 tablet (1,000 mg total) by  mouth 2 (two) times a day with meals 180 tablet 3    OneTouch Ultra test strip Use 1 each 3 (three) times a day Use as instructed 400 strip 3    sitaGLIPtin (Januvia) 100 mg tablet Take 1 tablet (100 mg total) by mouth daily 90 tablet 3    SUPER B COMPLEX/C PO Take by mouth      tamsulosin (FLOMAX) 0.4 mg Take 1 capsule (0.4 mg total) by mouth daily 90 capsule 3     No current facility-administered medications for this visit.     Current Outpatient Medications on File Prior to Visit   Medication Sig    aspirin (ECOTRIN LOW STRENGTH) 81 mg EC tablet Take 81 mg by mouth daily    atorvastatin (LIPITOR) 40 mg tablet Take 1 tablet (40 mg total) by mouth daily    dutasteride (AVODART) 0.5 mg capsule Take 1 capsule (0.5 mg total) by mouth daily    gabapentin (Neurontin) 600 MG tablet Take 1 tablet (600 mg total) by mouth 3 (three) times a day    Insulin Pen Needle (B-D ULTRAFINE III SHORT PEN) 31G X 8 MM MISC Use 2 (two) times a day    lisinopril (ZESTRIL) 20 mg tablet Take 1 tablet (20 mg total) by mouth daily    metFORMIN (GLUCOPHAGE) 1000 MG tablet Take 1 tablet (1,000 mg total) by mouth 2 (two) times a day with meals    OneTouch Ultra test strip Use 1 each 3 (three) times a day Use as instructed    sitaGLIPtin (Januvia) 100 mg tablet Take 1 tablet (100 mg total) by mouth daily    SUPER B COMPLEX/C PO Take by mouth    tamsulosin (FLOMAX) 0.4 mg Take 1 capsule (0.4 mg total) by mouth daily    [DISCONTINUED] Empagliflozin (Jardiance) 25 MG TABS Take 1 tablet (25 mg total) by mouth every morning    [DISCONTINUED] insulin degludec (Tresiba FlexTouch) 100 units/mL injection pen Inject 60 Units under the skin every 12 (twelve) hours    [DISCONTINUED] Alpha-Lipoic Acid 600 MG CAPS Take 600 mg by mouth daily (Patient not taking: Reported on 3/4/2024)     No current facility-administered medications on file prior to visit.     He has No Known Allergies..    Review of Systems   Respiratory:  Positive for shortness of breath.   "  All other systems reviewed and are negative.        Objective:      /70 (BP Location: Left arm, Patient Position: Sitting)   Pulse 95   Temp 98.5 °F (36.9 °C) (Tympanic)   Ht 5' 9\" (1.753 m)   Wt 89.9 kg (198 lb 3.2 oz)   SpO2 92%   BMI 29.27 kg/m²          Physical Exam  Vitals and nursing note reviewed.   Constitutional:       Appearance: He is well-developed. He is obese.   Cardiovascular:      Rate and Rhythm: Normal rate and regular rhythm.   Pulmonary:      Effort: Pulmonary effort is normal.      Breath sounds: Normal breath sounds.   Abdominal:      General: Bowel sounds are normal.      Palpations: Abdomen is soft.   Musculoskeletal:         General: Normal range of motion.   Skin:     General: Skin is warm and dry.      Capillary Refill: Capillary refill takes less than 2 seconds.   Neurological:      General: No focal deficit present.      Mental Status: He is alert and oriented to person, place, and time.   Psychiatric:         Mood and Affect: Mood normal.         Behavior: Behavior normal.           "

## 2024-03-27 ENCOUNTER — DOCTOR'S OFFICE (OUTPATIENT)
Dept: URBAN - NONMETROPOLITAN AREA CLINIC 1 | Facility: CLINIC | Age: 74
Setting detail: OPHTHALMOLOGY
End: 2024-03-27
Payer: COMMERCIAL

## 2024-03-27 DIAGNOSIS — H40.023: ICD-10-CM

## 2024-03-27 DIAGNOSIS — H26.493: ICD-10-CM

## 2024-03-27 DIAGNOSIS — H44.23: ICD-10-CM

## 2024-03-27 DIAGNOSIS — H35.3111: ICD-10-CM

## 2024-03-27 DIAGNOSIS — H35.363: ICD-10-CM

## 2024-03-27 DIAGNOSIS — H35.3122: ICD-10-CM

## 2024-03-27 DIAGNOSIS — E11.3293: ICD-10-CM

## 2024-03-27 LAB
LEFT EYE DIABETIC RETINOPATHY: POSITIVE
RIGHT EYE DIABETIC RETINOPATHY: POSITIVE

## 2024-03-27 PROCEDURE — 99213 OFFICE O/P EST LOW 20 MIN: CPT | Performed by: OPHTHALMOLOGY

## 2024-03-27 PROCEDURE — 92133 CPTRZD OPH DX IMG PST SGM ON: CPT | Performed by: OPHTHALMOLOGY

## 2024-03-27 ASSESSMENT — LID POSITION - PTOSIS
OS_PTOSIS: LUL
OD_PTOSIS: RUL

## 2024-03-27 ASSESSMENT — LID POSITION - DERMATOCHALASIS
OS_DERMATOCHALASIS: LUL
OD_DERMATOCHALASIS: RUL

## 2024-04-01 ENCOUNTER — OFFICE VISIT (OUTPATIENT)
Dept: PODIATRY | Facility: CLINIC | Age: 74
End: 2024-04-01
Payer: MEDICARE

## 2024-04-01 VITALS
SYSTOLIC BLOOD PRESSURE: 154 MMHG | WEIGHT: 198 LBS | BODY MASS INDEX: 29.24 KG/M2 | HEART RATE: 96 BPM | DIASTOLIC BLOOD PRESSURE: 81 MMHG

## 2024-04-01 DIAGNOSIS — E11.8 TYPE 2 DIABETES MELLITUS WITH COMPLICATION, WITH LONG-TERM CURRENT USE OF INSULIN (HCC): ICD-10-CM

## 2024-04-01 DIAGNOSIS — Z79.4 TYPE 2 DIABETES MELLITUS WITH COMPLICATION, WITH LONG-TERM CURRENT USE OF INSULIN (HCC): ICD-10-CM

## 2024-04-01 DIAGNOSIS — B35.1 ONYCHOMYCOSIS: Primary | ICD-10-CM

## 2024-04-01 PROCEDURE — 11056 PARNG/CUTG B9 HYPRKR LES 2-4: CPT | Performed by: PODIATRIST

## 2024-04-01 PROCEDURE — 11721 DEBRIDE NAIL 6 OR MORE: CPT | Performed by: PODIATRIST

## 2024-04-01 NOTE — PROGRESS NOTES
Sg Munoz  1950  AT RISK FOOT CARE    1. Onychomycosis        2. Type 2 diabetes mellitus with complication, with long-term current use of insulin (HCC)            Patient presents for at-risk foot care.  Patient has no acute concerns today.  Patient has significant lower extremity risk due to neuropathy, parasthesia, edema, and trophic skin changes to the lower extremity.    On exam patient has thickened, hypertrophic, discolored, brittle toenails with subungual debris and tenderness x10   Callus: 2  Patient has lower extremity edema  PAtients skin is atrophic, thickened nails, and decreased pedal hair  Patient has decreased pinprick and vibratory sensation to his feet and parasthesia    Today's treatment includes:  Debridement of toenails. Using nail nipper, beni, and curette, nails were sharply debrided, reduced in thickness and length. Devitalized nail tissue and fungal debris excised and removed. Patient tolerated well.        Discussed proper shoe gear, daily inspections of feet, and general foot health with patient. Patient has Q9  findings and is recommended for at risk foot care every 9-10 weeks.        Procedure: All mycotic toenails were reduced and debrided in length, width, and girth using a nail nipper and dremel.  All hyperkeratotic skin lesion(s) were sharply pared with a scalpel with no bleeding or evidence of ulceration.  Patient tolerated procedure(s) well without complications.

## 2024-05-10 DIAGNOSIS — Z79.4 TYPE 2 DIABETES MELLITUS WITH COMPLICATION, WITH LONG-TERM CURRENT USE OF INSULIN (HCC): ICD-10-CM

## 2024-05-10 DIAGNOSIS — E11.8 TYPE 2 DIABETES MELLITUS WITH COMPLICATION, WITH LONG-TERM CURRENT USE OF INSULIN (HCC): ICD-10-CM

## 2024-05-10 RX ORDER — PEN NEEDLE, DIABETIC 31 GX5/16"
NEEDLE, DISPOSABLE MISCELLANEOUS 2 TIMES DAILY
Qty: 180 EACH | Refills: 1 | Status: SHIPPED | OUTPATIENT
Start: 2024-05-10

## 2024-05-10 NOTE — TELEPHONE ENCOUNTER
Reason for call:   [x] Refill   [] Prior Auth  [] Other:     Office:   [x] PCP/Provider -   [] Specialty/Provider -     Medication: Insulin Pen Needle (B-D Ultrafine III Short Pen) 31 G X 8 MM,    Use 2 times a day      Pharmacy: [facility]     Does the patient have enough for 3 days?   [x] Yes   [] No - Send as HP to POD

## 2024-05-30 ENCOUNTER — OFFICE VISIT (OUTPATIENT)
Dept: CARDIOLOGY CLINIC | Facility: HOSPITAL | Age: 74
End: 2024-05-30
Attending: FAMILY MEDICINE
Payer: COMMERCIAL

## 2024-05-30 VITALS
HEIGHT: 69 IN | DIASTOLIC BLOOD PRESSURE: 84 MMHG | HEART RATE: 102 BPM | BODY MASS INDEX: 31.73 KG/M2 | WEIGHT: 214.2 LBS | SYSTOLIC BLOOD PRESSURE: 164 MMHG

## 2024-05-30 DIAGNOSIS — I25.10 CORONARY ARTERY DISEASE INVOLVING NATIVE HEART, UNSPECIFIED VESSEL OR LESION TYPE, UNSPECIFIED WHETHER ANGINA PRESENT: ICD-10-CM

## 2024-05-30 DIAGNOSIS — G47.33 OSA (OBSTRUCTIVE SLEEP APNEA): ICD-10-CM

## 2024-05-30 DIAGNOSIS — E11.8 TYPE 2 DIABETES MELLITUS WITH COMPLICATION, WITH LONG-TERM CURRENT USE OF INSULIN (HCC): ICD-10-CM

## 2024-05-30 DIAGNOSIS — R07.89 OTHER CHEST PAIN: Primary | ICD-10-CM

## 2024-05-30 DIAGNOSIS — Z79.4 TYPE 2 DIABETES MELLITUS WITH COMPLICATION, WITH LONG-TERM CURRENT USE OF INSULIN (HCC): ICD-10-CM

## 2024-05-30 DIAGNOSIS — E78.49 OTHER HYPERLIPIDEMIA: ICD-10-CM

## 2024-05-30 DIAGNOSIS — E78.2 MIXED HYPERLIPIDEMIA: ICD-10-CM

## 2024-05-30 DIAGNOSIS — I10 PRIMARY HYPERTENSION: ICD-10-CM

## 2024-05-30 PROCEDURE — 99244 OFF/OP CNSLTJ NEW/EST MOD 40: CPT | Performed by: INTERNAL MEDICINE

## 2024-05-30 PROCEDURE — 93000 ELECTROCARDIOGRAM COMPLETE: CPT | Performed by: INTERNAL MEDICINE

## 2024-05-30 NOTE — PROGRESS NOTES
Sg Penaak  1950  [id number]  [facility] [address] [id number] [phone]    1. Other chest pain  POCT ECG    NM myocardial perfusion spect (stress and/or rest)    VAS screening    Holter monitor      2. Coronary artery disease involving native heart, unspecified vessel or lesion type, unspecified whether angina present  Ambulatory Referral to Cardiology    NM myocardial perfusion spect (stress and/or rest)    VAS screening      3. Other hyperlipidemia  NM myocardial perfusion spect (stress and/or rest)    VAS screening      4. Type 2 diabetes mellitus with complication, with long-term current use of insulin (HCC)        5. Primary hypertension        6. LILIA (obstructive sleep apnea)        7. Mixed hyperlipidemia  NM myocardial perfusion spect (stress and/or rest)    VAS screening          Discussion/Summary: He has chest pain with typical and atypical features.  He is a diabetic with documented vascular disease.  Abnormal resting EKG.  Start with an exercise nuclear treadmill.  Recent echocardiogram from August 2023 was reviewed no significant structural heart disease.  He has documented carotid stenosis on a prior CTA of the neck check a vascular screening.  Blood pressure manual recheck 138/70 continue current treatment plan with lisinopril.  Continue statin for hyperlipidemia LDL goal less than 70.  Continue aspirin.  I will follow-up with him in 4 months.        Interval History: Very pleasant 74-year-old gentleman with a history of type 2 diabetes, hypertension, hyperlipidemia presents as a new patient consult on behalf of Dr. Klein for chest pain.  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  In the warm weather he can do plenty of physical activity without any difficulty.  He had a few episodes where he had  some shortness of breath associated with this.  Random palpitations can also occur.  He has bifascicular block on EKG no history of syncope.  Denies any lower extremity edema, PND, orthopnea.  No active claudication.  He is a non-smoker.    Medical Problems       Problem List       Other hyperlipidemia    Hypertension    BPH with obstruction/lower urinary tract symptoms    Hypercalciuria    Knee osteoarthritis    Prepatellar bursitis    Multiple thyroid nodules    LILIA (obstructive sleep apnea)    Type 2 diabetes mellitus with complication, with long-term current use of insulin ([facility])        Lab Results   Component Value Date    HGBA1C 7.9 (H) 02/12/2024         History of parathyroidectomy    Overview Signed 5/9/2019  3:14 PM by Donna Baugh RN     11/1/2018 right inferior parathyroidectomy  Cellular parathyroid tissue 0.74 g         BMI 33.0-33.9,adult    Preop examination    Non-STEMI (non-ST elevated myocardial infarction) ([facility])    Diabetic foot ([facility])        Lab Results   Component Value Date    HGBA1C 7.9 (H) 02/12/2024         Other chest pain    Coronary artery disease involving native heart    Mixed hyperlipidemia        Past Medical History:   Diagnosis Date    Arthritis     Back pain     Diabetes mellitus ([facility])     Hypertension      Social History     Socioeconomic History    Marital status: /Civil Union     Spouse name: Not on file    Number of children: Not on file    Years of education: Not on file    Highest education level: Not on file   Occupational History    Not on file   Tobacco Use    Smoking status: Never     Passive exposure: Past    Smokeless tobacco: Never   Vaping Use    Vaping status: Never Used   Substance and Sexual Activity    Alcohol use: Yes     Comment: rarely    Drug use: No    Sexual activity: Yes   Other Topics Concern    Not on file   Social History Narrative    Not on file     Social Determinants of Health     Financial Resource Strain: Low Risk  (12/4/2023)    Overall  Financial Resource Strain (CARDIA)     Difficulty of Paying Living Expenses: Not hard at all   Food Insecurity: No Food Insecurity (8/20/2023)    Received from [facility]    Food Insecurity     Within the past 12 months, you worried that your food would run out before you got the money to buy more.: 1     Within the past 12 months, the food you bought just didn't last and you didn't have money to get more.: 1   Transportation Needs: No Transportation Needs (12/4/2023)    PRAPARE - Transportation     Lack of Transportation (Medical): No     Lack of Transportation (Non-Medical): No   Physical Activity: Insufficiently Active (8/20/2023)    Received from [facility]    Physical Activity     On average, how many days per week do you engage in moderate to strenuous exercise (like a brisk walk)?: 3 days     On average, how many minutes do you engage in exercise at this level?: 30 min   Stress: No Stress Concern Present (8/20/2023)    Received from [facility]    [facility] [place] of Occupational Health - Occupational Stress Questionnaire     Feeling of Stress : Only a little   Social Connections: Socially Integrated (8/20/2023)    Received from [facility]    Social Connection and Isolation Panel [NHANES]     Frequency of Communication with Friends and Family: More than three times a week     Frequency of Social Gatherings with Friends and Family: Three times a week     Attends Oriental orthodox Services: More than 4 times per year     Active Member of Clubs or Organizations: Yes     Attends Club or Organization Meetings: 1 to 4 times per year     Marital Status:    Intimate Partner Violence: Not At Risk (8/20/2023)    Received from [facility]    [facility] Safety     Threatened: Not on file     Insulted: Not on file     Physically Hurt : Not on file     Scream: Not on file   Housing Stability: At Risk (8/20/2023)    Received from [employer],  [facility]     Living Situation: Not on file     Housing Problems: Not on file      Family History   Problem Relation Age of Onset    Hypertension Father     Coronary artery disease Father     Hypertension Mother     Arthritis Mother     Coronary artery disease Mother     Diabetes Maternal Uncle     Diabetes Paternal Uncle      Past Surgical History:   Procedure Laterality Date    HERNIA REPAIR Left 1998    with mesh to groin    [place] PARATHYROIDECTOMY/EXPLORATION PARATHYROIDS Right 11/1/2018    Procedure: MINIMALLY INVASIVE RIGHT PARATHYROIDECTOMY WITH PTH MONITORING;  Surgeon: Price Ybarra MD;  Location: [facility];  Service: Surgical Oncology    TONSILLECTOMY      US GUIDED THYROID BIOPSY  9/7/2018       Current Outpatient Medications:     aspirin (ECOTRIN LOW STRENGTH) 81 mg EC tablet, Take 81 mg by mouth daily, Disp: , Rfl:     atorvastatin (LIPITOR) 40 mg tablet, Take 1 tablet (40 mg total) by mouth daily, Disp: 90 tablet, Rfl: 3    dutasteride (AVODART) 0.5 mg capsule, Take 1 capsule (0.5 mg total) by mouth daily, Disp: 90 capsule, Rfl: 3    Empagliflozin (Jardiance) 25 MG TABS, Take 1 tablet (25 mg total) by mouth every morning, Disp: 90 tablet, Rfl: 3    gabapentin (Neurontin) 600 MG tablet, Take 1 tablet (600 mg total) by mouth 3 (three) times a day, Disp: 270 tablet, Rfl: 3    insulin degludec (Tresiba FlexTouch) 100 units/mL injection pen, Inject 60 Units under the skin every 12 (twelve) hours, Disp: 40 mL, Rfl: 3    Insulin Pen Needle (B-D ULTRAFINE III SHORT PEN) 31G X 8 MM MISC, Use 2 (two) times a day, Disp: 180 each, Rfl: 1    lisinopril (ZESTRIL) 20 mg tablet, Take 1 tablet (20 mg total) by mouth daily, Disp: 90 tablet, Rfl: 3    metFORMIN (GLUCOPHAGE) 1000 MG tablet, Take 1 tablet (1,000 mg total) by mouth 2 (two) times a day with meals, Disp: 180 tablet, Rfl: 3    OneTouch Ultra test strip, Use 1 each 3 (three) times a day Use as instructed, Disp: 400 strip, Rfl: 3     "sitaGLIPtin (Januvia) 100 mg tablet, Take 1 tablet (100 mg total) by mouth daily, Disp: 90 tablet, Rfl: 3    SUPER B COMPLEX/C PO, Take by mouth, Disp: , Rfl:     tamsulosin (FLOMAX) 0.4 mg, Take 1 capsule (0.4 mg total) by mouth daily, Disp: 90 capsule, Rfl: 3  No Known Allergies    Labs:     Chemistry        Component Value Date/Time    K 4.6 02/12/2024 0907     02/12/2024 0907    CO2 26 02/12/2024 0907    BUN 16 02/12/2024 0907    CREATININE 0.63 02/12/2024 0907        Component Value Date/Time    CALCIUM 8.6 02/12/2024 0907    ALKPHOS 78 02/12/2024 0907    AST 21 02/12/2024 0907    ALT 29 02/12/2024 0907            No results found for: \"CHOL\"  Lab Results   Component Value Date    HDL 39 (L) 02/12/2024    HDL 39 (L) 02/07/2023    HDL 39 (L) 01/04/2022     Lab Results   Component Value Date    LDLCALC 78 02/12/2024    LDLCALC 71 02/07/2023    LDLCALC 91 01/04/2022     Lab Results   Component Value Date    TRIG 100 02/12/2024    TRIG 185 (H) 02/07/2023    TRIG 113 01/04/2022     No results found for: \"CHOLHDL\"    Imaging: No results found.    ECG: Sinus rhythm bifascicular block      Review of Systems   Constitutional: Negative.   HENT: Negative.     Eyes: Negative.    Cardiovascular: Negative.    Respiratory: Negative.     Endocrine: Negative.    Hematologic/Lymphatic: Negative.    Skin: Negative.    Musculoskeletal: Negative.    Gastrointestinal: Negative.    Genitourinary: Negative.    Neurological: Negative.    Psychiatric/Behavioral: Negative.     All other systems reviewed and are negative.      Vitals:    05/30/24 1319   BP: 164/84   Pulse: 102     Vitals:    05/30/24 1319   Weight: 97.2 kg (214 lb 3.2 oz)     Height: 5' 9\" (175.3 cm)   Body mass index is 31.63 kg/m².    Physical Exam:  Vital signs reviewed.  General appearance:  Appears stated age, alert, well appearing and in no distress  HEENT:  PERRLA, EOMI, no scleral icterus, no conjunctival pallor  NECK:  Supple, No elevated JVP, no " thyromegaly, no carotid bruits, no JVD  HEART:  Regular rate and rhythm, normal S1/S2, no S3/S4, no murmur or rub, PMI nondisplaced  LUNGS:  Clear to auscultation bilaterally, no wheezes rales or rhonchi  ABDOMEN:  Soft, non-tender, positive bowel sounds, no rebound or guarding, no organomegaly   EXTREMITIES:  Normal range of motion.  No clubbing or cyanosis. No edema  VASCULAR:  Normal pedal pulses   SKIN: No lesions or rashes on exposed skin  NEURO:  CN II-XII intact, no focal deficits

## 2024-05-30 NOTE — H&P (VIEW-ONLY)
Sg Penaak  1950  [id number]  [facility] [address] [id number] [phone]    1. Other chest pain  POCT ECG    NM myocardial perfusion spect (stress and/or rest)    VAS screening    Holter monitor      2. Coronary artery disease involving native heart, unspecified vessel or lesion type, unspecified whether angina present  Ambulatory Referral to Cardiology    NM myocardial perfusion spect (stress and/or rest)    VAS screening      3. Other hyperlipidemia  NM myocardial perfusion spect (stress and/or rest)    VAS screening      4. Type 2 diabetes mellitus with complication, with long-term current use of insulin (HCC)        5. Primary hypertension        6. LILIA (obstructive sleep apnea)        7. Mixed hyperlipidemia  NM myocardial perfusion spect (stress and/or rest)    VAS screening          Discussion/Summary: He has chest pain with typical and atypical features.  He is a diabetic with documented vascular disease.  Abnormal resting EKG.  Start with an exercise nuclear treadmill.  Recent echocardiogram from August 2023 was reviewed no significant structural heart disease.  He has documented carotid stenosis on a prior CTA of the neck check a vascular screening.  Blood pressure manual recheck 138/70 continue current treatment plan with lisinopril.  Continue statin for hyperlipidemia LDL goal less than 70.  Continue aspirin.  I will follow-up with him in 4 months.        Interval History: Very pleasant 74-year-old gentleman with a history of type 2 diabetes, hypertension, hyperlipidemia presents as a new patient consult on behalf of Dr. Klein for chest pain.  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  In the warm weather he can do plenty of physical activity without any difficulty.  He had a few episodes where he had  some shortness of breath associated with this.  Random palpitations can also occur.  He has bifascicular block on EKG no history of syncope.  Denies any lower extremity edema, PND, orthopnea.  No active claudication.  He is a non-smoker.    Medical Problems       Problem List       Other hyperlipidemia    Hypertension    BPH with obstruction/lower urinary tract symptoms    Hypercalciuria    Knee osteoarthritis    Prepatellar bursitis    Multiple thyroid nodules    LILIA (obstructive sleep apnea)    Type 2 diabetes mellitus with complication, with long-term current use of insulin ([facility])        Lab Results   Component Value Date    HGBA1C 7.9 (H) 02/12/2024         History of parathyroidectomy    Overview Signed 5/9/2019  3:14 PM by Donna Baugh RN     11/1/2018 right inferior parathyroidectomy  Cellular parathyroid tissue 0.74 g         BMI 33.0-33.9,adult    Preop examination    Non-STEMI (non-ST elevated myocardial infarction) ([facility])    Diabetic foot ([facility])        Lab Results   Component Value Date    HGBA1C 7.9 (H) 02/12/2024         Other chest pain    Coronary artery disease involving native heart    Mixed hyperlipidemia        Past Medical History:   Diagnosis Date    Arthritis     Back pain     Diabetes mellitus ([facility])     Hypertension      Social History     Socioeconomic History    Marital status: /Civil Union     Spouse name: Not on file    Number of children: Not on file    Years of education: Not on file    Highest education level: Not on file   Occupational History    Not on file   Tobacco Use    Smoking status: Never     Passive exposure: Past    Smokeless tobacco: Never   Vaping Use    Vaping status: Never Used   Substance and Sexual Activity    Alcohol use: Yes     Comment: rarely    Drug use: No    Sexual activity: Yes   Other Topics Concern    Not on file   Social History Narrative    Not on file     Social Determinants of Health     Financial Resource Strain: Low Risk  (12/4/2023)    Overall  Financial Resource Strain (CARDIA)     Difficulty of Paying Living Expenses: Not hard at all   Food Insecurity: No Food Insecurity (8/20/2023)    Received from [facility]    Food Insecurity     Within the past 12 months, you worried that your food would run out before you got the money to buy more.: 1     Within the past 12 months, the food you bought just didn't last and you didn't have money to get more.: 1   Transportation Needs: No Transportation Needs (12/4/2023)    PRAPARE - Transportation     Lack of Transportation (Medical): No     Lack of Transportation (Non-Medical): No   Physical Activity: Insufficiently Active (8/20/2023)    Received from [facility]    Physical Activity     On average, how many days per week do you engage in moderate to strenuous exercise (like a brisk walk)?: 3 days     On average, how many minutes do you engage in exercise at this level?: 30 min   Stress: No Stress Concern Present (8/20/2023)    Received from [facility]    [facility] [place] of Occupational Health - Occupational Stress Questionnaire     Feeling of Stress : Only a little   Social Connections: Socially Integrated (8/20/2023)    Received from [facility]    Social Connection and Isolation Panel [NHANES]     Frequency of Communication with Friends and Family: More than three times a week     Frequency of Social Gatherings with Friends and Family: Three times a week     Attends Sabianism Services: More than 4 times per year     Active Member of Clubs or Organizations: Yes     Attends Club or Organization Meetings: 1 to 4 times per year     Marital Status:    Intimate Partner Violence: Not At Risk (8/20/2023)    Received from [facility]    [facility] Safety     Threatened: Not on file     Insulted: Not on file     Physically Hurt : Not on file     Scream: Not on file   Housing Stability: At Risk (8/20/2023)    Received from [employer],  [facility] Housing     Living Situation: Not on file     Housing Problems: Not on file      Family History   Problem Relation Age of Onset    Hypertension Father     Coronary artery disease Father     Hypertension Mother     Arthritis Mother     Coronary artery disease Mother     Diabetes Maternal Uncle     Diabetes Paternal Uncle      Past Surgical History:   Procedure Laterality Date    HERNIA REPAIR Left 1998    with mesh to groin    [place] PARATHYROIDECTOMY/EXPLORATION PARATHYROIDS Right 11/1/2018    Procedure: MINIMALLY INVASIVE RIGHT PARATHYROIDECTOMY WITH PTH MONITORING;  Surgeon: Price Ybarra MD;  Location: [facility];  Service: Surgical Oncology    TONSILLECTOMY      US GUIDED THYROID BIOPSY  9/7/2018       Current Outpatient Medications:     aspirin (ECOTRIN LOW STRENGTH) 81 mg EC tablet, Take 81 mg by mouth daily, Disp: , Rfl:     atorvastatin (LIPITOR) 40 mg tablet, Take 1 tablet (40 mg total) by mouth daily, Disp: 90 tablet, Rfl: 3    dutasteride (AVODART) 0.5 mg capsule, Take 1 capsule (0.5 mg total) by mouth daily, Disp: 90 capsule, Rfl: 3    Empagliflozin (Jardiance) 25 MG TABS, Take 1 tablet (25 mg total) by mouth every morning, Disp: 90 tablet, Rfl: 3    gabapentin (Neurontin) 600 MG tablet, Take 1 tablet (600 mg total) by mouth 3 (three) times a day, Disp: 270 tablet, Rfl: 3    insulin degludec (Tresiba FlexTouch) 100 units/mL injection pen, Inject 60 Units under the skin every 12 (twelve) hours, Disp: 40 mL, Rfl: 3    Insulin Pen Needle (B-D ULTRAFINE III SHORT PEN) 31G X 8 MM MISC, Use 2 (two) times a day, Disp: 180 each, Rfl: 1    lisinopril (ZESTRIL) 20 mg tablet, Take 1 tablet (20 mg total) by mouth daily, Disp: 90 tablet, Rfl: 3    metFORMIN (GLUCOPHAGE) 1000 MG tablet, Take 1 tablet (1,000 mg total) by mouth 2 (two) times a day with meals, Disp: 180 tablet, Rfl: 3    OneTouch Ultra test strip, Use 1 each 3 (three) times a day Use as instructed, Disp: 400 strip, Rfl: 3     "sitaGLIPtin (Januvia) 100 mg tablet, Take 1 tablet (100 mg total) by mouth daily, Disp: 90 tablet, Rfl: 3    SUPER B COMPLEX/C PO, Take by mouth, Disp: , Rfl:     tamsulosin (FLOMAX) 0.4 mg, Take 1 capsule (0.4 mg total) by mouth daily, Disp: 90 capsule, Rfl: 3  No Known Allergies    Labs:     Chemistry        Component Value Date/Time    K 4.6 02/12/2024 0907     02/12/2024 0907    CO2 26 02/12/2024 0907    BUN 16 02/12/2024 0907    CREATININE 0.63 02/12/2024 0907        Component Value Date/Time    CALCIUM 8.6 02/12/2024 0907    ALKPHOS 78 02/12/2024 0907    AST 21 02/12/2024 0907    ALT 29 02/12/2024 0907            No results found for: \"CHOL\"  Lab Results   Component Value Date    HDL 39 (L) 02/12/2024    HDL 39 (L) 02/07/2023    HDL 39 (L) 01/04/2022     Lab Results   Component Value Date    LDLCALC 78 02/12/2024    LDLCALC 71 02/07/2023    LDLCALC 91 01/04/2022     Lab Results   Component Value Date    TRIG 100 02/12/2024    TRIG 185 (H) 02/07/2023    TRIG 113 01/04/2022     No results found for: \"CHOLHDL\"    Imaging: No results found.    ECG: Sinus rhythm bifascicular block      Review of Systems   Constitutional: Negative.   HENT: Negative.     Eyes: Negative.    Cardiovascular: Negative.    Respiratory: Negative.     Endocrine: Negative.    Hematologic/Lymphatic: Negative.    Skin: Negative.    Musculoskeletal: Negative.    Gastrointestinal: Negative.    Genitourinary: Negative.    Neurological: Negative.    Psychiatric/Behavioral: Negative.     All other systems reviewed and are negative.      Vitals:    05/30/24 1319   BP: 164/84   Pulse: 102     Vitals:    05/30/24 1319   Weight: 97.2 kg (214 lb 3.2 oz)     Height: 5' 9\" (175.3 cm)   Body mass index is 31.63 kg/m².    Physical Exam:  Vital signs reviewed.  General appearance:  Appears stated age, alert, well appearing and in no distress  HEENT:  PERRLA, EOMI, no scleral icterus, no conjunctival pallor  NECK:  Supple, No elevated JVP, no " thyromegaly, no carotid bruits, no JVD  HEART:  Regular rate and rhythm, normal S1/S2, no S3/S4, no murmur or rub, PMI nondisplaced  LUNGS:  Clear to auscultation bilaterally, no wheezes rales or rhonchi  ABDOMEN:  Soft, non-tender, positive bowel sounds, no rebound or guarding, no organomegaly   EXTREMITIES:  Normal range of motion.  No clubbing or cyanosis. No edema  VASCULAR:  Normal pedal pulses   SKIN: No lesions or rashes on exposed skin  NEURO:  CN II-XII intact, no focal deficits

## 2024-06-03 ENCOUNTER — OFFICE VISIT (OUTPATIENT)
Dept: PODIATRY | Facility: CLINIC | Age: 74
End: 2024-06-03
Payer: COMMERCIAL

## 2024-06-03 ENCOUNTER — OFFICE VISIT (OUTPATIENT)
Dept: FAMILY MEDICINE CLINIC | Facility: CLINIC | Age: 74
End: 2024-06-03
Payer: COMMERCIAL

## 2024-06-03 VITALS
BODY MASS INDEX: 31.73 KG/M2 | WEIGHT: 214.2 LBS | HEIGHT: 69 IN | TEMPERATURE: 98.1 F | SYSTOLIC BLOOD PRESSURE: 110 MMHG | OXYGEN SATURATION: 94 % | DIASTOLIC BLOOD PRESSURE: 60 MMHG | HEART RATE: 93 BPM

## 2024-06-03 VITALS
DIASTOLIC BLOOD PRESSURE: 77 MMHG | HEIGHT: 69 IN | SYSTOLIC BLOOD PRESSURE: 145 MMHG | BODY MASS INDEX: 31.4 KG/M2 | WEIGHT: 212 LBS | HEART RATE: 87 BPM

## 2024-06-03 DIAGNOSIS — R07.89 CHEST TIGHTNESS: ICD-10-CM

## 2024-06-03 DIAGNOSIS — L84 CALLUS: ICD-10-CM

## 2024-06-03 DIAGNOSIS — E11.40 TYPE 2 DIABETES MELLITUS WITH DIABETIC NEUROPATHY, WITH LONG-TERM CURRENT USE OF INSULIN (HCC): ICD-10-CM

## 2024-06-03 DIAGNOSIS — E78.49 OTHER HYPERLIPIDEMIA: ICD-10-CM

## 2024-06-03 DIAGNOSIS — Z79.4 TYPE 2 DIABETES MELLITUS WITH COMPLICATION, WITH LONG-TERM CURRENT USE OF INSULIN (HCC): ICD-10-CM

## 2024-06-03 DIAGNOSIS — E11.8 TYPE 2 DIABETES MELLITUS WITH COMPLICATION, WITH LONG-TERM CURRENT USE OF INSULIN (HCC): ICD-10-CM

## 2024-06-03 DIAGNOSIS — B35.1 ONYCHOMYCOSIS: Primary | ICD-10-CM

## 2024-06-03 DIAGNOSIS — I21.4 NON-STEMI (NON-ST ELEVATED MYOCARDIAL INFARCTION) (HCC): ICD-10-CM

## 2024-06-03 DIAGNOSIS — Z29.11 NEED FOR PROPHYLACTIC VACCINATION AND INOCULATION AGAINST RESPIRATORY SYNCYTIAL VIRUS (RSV): ICD-10-CM

## 2024-06-03 DIAGNOSIS — E11.8 DIABETIC FOOT (HCC): ICD-10-CM

## 2024-06-03 DIAGNOSIS — I10 PRIMARY HYPERTENSION: Primary | ICD-10-CM

## 2024-06-03 DIAGNOSIS — Z79.4 TYPE 2 DIABETES MELLITUS WITH DIABETIC NEUROPATHY, WITH LONG-TERM CURRENT USE OF INSULIN (HCC): ICD-10-CM

## 2024-06-03 LAB — SL AMB POCT HEMOGLOBIN AIC: 7.4 (ref ?–6.5)

## 2024-06-03 PROCEDURE — 11056 PARNG/CUTG B9 HYPRKR LES 2-4: CPT | Performed by: PODIATRIST

## 2024-06-03 PROCEDURE — 83036 HEMOGLOBIN GLYCOSYLATED A1C: CPT | Performed by: FAMILY MEDICINE

## 2024-06-03 PROCEDURE — 11721 DEBRIDE NAIL 6 OR MORE: CPT | Performed by: PODIATRIST

## 2024-06-03 PROCEDURE — 99214 OFFICE O/P EST MOD 30 MIN: CPT | Performed by: FAMILY MEDICINE

## 2024-06-03 NOTE — PROGRESS NOTES
"Ambulatory Visit  Name: Sg Munoz      : 1950      MRN: [id number]  Encounter Provider: Yung Klein MD  Encounter Date: 6/3/2024   Encounter department: [facility]    Assessment & Plan   1. Primary hypertension  Assessment & Plan:  BP at goal.  Continue current.  2. Non-STEMI (non-ST elevated myocardial infarction) (HCC)  Assessment & Plan:  Stable. Continue current.  3. Type 2 diabetes mellitus with complication, with long-term current use of insulin (HCC)  Assessment & Plan:    Lab Results   Component Value Date    HGBA1C 7.9 (H) 2024   A1c improving.  Continue current.  4. Diabetic foot (HCC)  5. Other hyperlipidemia       History of Present Illness     He recently saw cardiology and has a stress test scheduled.  He continues to get intermittent chest tightness more when it is cold.  He is concerned that Tresiba may be causing chest tightness.  He would like to try an alternative.  His A1c is acceptable at 7.4%.  He ha no hypoglycemia.    His lipids are at goal on his current regimen.  He has normal LFTs and no myalgia or muscle weakness.    His BP is at goal on his current regimen.  He has no HA or vision changes.        Review of Systems   All other systems reviewed and are negative.      Objective     /60 (BP Location: Left arm, Patient Position: Sitting)   Pulse 93   Temp 98.1 °F (36.7 °C) (Tympanic)   Ht 5' 9\" (1.753 m)   Wt 97.2 kg (214 lb 3.2 oz)   SpO2 94%   BMI 31.63 kg/m²     Physical Exam  Vitals and nursing note reviewed.   Constitutional:       Appearance: Normal appearance. He is obese.   Cardiovascular:      Rate and Rhythm: Normal rate and regular rhythm.      Pulses: Normal pulses.      Heart sounds: Normal heart sounds.   Pulmonary:      Effort: Pulmonary effort is normal.      Breath sounds: Normal breath sounds.   Abdominal:      General: Abdomen is flat. Bowel sounds are normal.      Palpations: Abdomen is soft.   Musculoskeletal:    "      General: Normal range of motion.      Cervical back: Normal range of motion and neck supple.   Skin:     General: Skin is warm and dry.      Capillary Refill: Capillary refill takes less than 2 seconds.   Neurological:      General: No focal deficit present.      Mental Status: He is alert and oriented to person, place, and time. Mental status is at baseline.   Psychiatric:         Mood and Affect: Mood normal.         Behavior: Behavior normal.         Thought Content: Thought content normal.         Judgment: Judgment normal.       Administrative Statements

## 2024-06-03 NOTE — PROGRESS NOTES
Sg CHAVARRIA Alexander  1950  AT RISK FOOT CARE    No diagnosis found.    Patient presents for at-risk foot care.  Patient has no acute concerns today.  Patient has significant lower extremity risk due to neuropathy, parasthesia, edema, and trophic skin changes to the lower extremity.    On exam patient has thickened, hypertrophic, discolored, brittle toenails with subungual debris and tenderness x10   Callus: 2  Patient has lower extremity edema  PAtients skin is atrophic, thickened nails, and decreased pedal hair  Patient has decreased pinprick and vibratory sensation to his feet and parasthesia    Today's treatment includes:  Debridement of toenails. Using nail nipper, beni, and curette, nails were sharply debrided, reduced in thickness and length. Devitalized nail tissue and fungal debris excised and removed. Patient tolerated well.        Discussed proper shoe gear, daily inspections of feet, and general foot health with patient. Patient has Q9  findings and is recommended for at risk foot care every 9-10 weeks.      Procedure: All mycotic toenails were reduced and debrided in length, width, and girth using a nail nipper and dremel.  All hyperkeratotic skin lesion(s) were sharply pared with a scalpel with no bleeding or evidence of ulceration.  Patient tolerated procedure(s) well without complications.

## 2024-06-03 NOTE — ASSESSMENT & PLAN NOTE
Lab Results   Component Value Date    HGBA1C 7.9 (H) 02/12/2024   A1c improving.  Continue current.

## 2024-06-06 ENCOUNTER — TELEPHONE (OUTPATIENT)
Age: 74
End: 2024-06-06

## 2024-06-06 NOTE — TELEPHONE ENCOUNTER
Spoke w/ pt and advised that he should call his insurances to find out about coverage of the RSV. Pt understood and said he would have his wife call

## 2024-06-07 ENCOUNTER — PREP FOR PROCEDURE (OUTPATIENT)
Dept: CARDIOLOGY CLINIC | Facility: CLINIC | Age: 74
End: 2024-06-07

## 2024-06-07 ENCOUNTER — HOSPITAL ENCOUNTER (OUTPATIENT)
Dept: NUCLEAR MEDICINE | Facility: HOSPITAL | Age: 74
Discharge: HOME | End: 2024-06-07
Attending: INTERNAL MEDICINE
Payer: COMMERCIAL

## 2024-06-07 ENCOUNTER — HOSPITAL ENCOUNTER (OUTPATIENT)
Dept: NON INVASIVE DIAGNOSTICS | Facility: HOSPITAL | Age: 74
Discharge: HOME | End: 2024-06-07
Attending: INTERNAL MEDICINE
Payer: COMMERCIAL

## 2024-06-07 DIAGNOSIS — R07.89 OTHER CHEST PAIN: ICD-10-CM

## 2024-06-07 DIAGNOSIS — I25.10 CORONARY ARTERY DISEASE INVOLVING NATIVE HEART, UNSPECIFIED VESSEL OR LESION TYPE, UNSPECIFIED WHETHER ANGINA PRESENT: ICD-10-CM

## 2024-06-07 DIAGNOSIS — E78.2 MIXED HYPERLIPIDEMIA: ICD-10-CM

## 2024-06-07 DIAGNOSIS — I20.9 ANGINA, CLASS III (HCC): Primary | ICD-10-CM

## 2024-06-07 DIAGNOSIS — E78.49 OTHER HYPERLIPIDEMIA: ICD-10-CM

## 2024-06-07 LAB
MAX HR PERCENT: 87 %
MAX HR: 129 BPM
NUC STRESS EJECTION FRACTION: 46 %
RATE PRESSURE PRODUCT: NORMAL
SL CV REST NUCLEAR ISOTOPE DOSE: 10.4 MCI
SL CV STRESS NUCLEAR ISOTOPE DOSE: 33 MCI
SL CV STRESS RECOVERY BP: NORMAL MMHG
SL CV STRESS RECOVERY HR: 82 BPM
SL CV STRESS RECOVERY O2 SAT: 96 %
SL CV STRESS STAGE REACHED: 1
STRESS ANGINA INDEX: 1
STRESS BASELINE BP: NORMAL MMHG
STRESS BASELINE HR: 86 BPM
STRESS DUKE TREADMILL SCORE: -6
STRESS O2 SAT REST: 97 %
STRESS PEAK HR: 129 BPM
STRESS POST ESTIMATED WORKLOAD: 4.6 METS
STRESS POST EXERCISE DUR MIN: 2 MIN
STRESS POST EXERCISE DUR SEC: 52 SEC
STRESS POST O2 SAT PEAK: 96 %
STRESS POST PEAK BP: 162 MMHG
STRESS ST DEPRESSION: 1 MM
STRESS/REST PERFUSION RATIO: 1.23

## 2024-06-07 PROCEDURE — 93017 CV STRESS TEST TRACING ONLY: CPT

## 2024-06-07 PROCEDURE — 78452 HT MUSCLE IMAGE SPECT MULT: CPT

## 2024-06-07 PROCEDURE — 93225 XTRNL ECG REC<48 HRS REC: CPT

## 2024-06-07 PROCEDURE — 78452 HT MUSCLE IMAGE SPECT MULT: CPT | Performed by: INTERNAL MEDICINE

## 2024-06-07 PROCEDURE — 93016 CV STRESS TEST SUPVJ ONLY: CPT | Performed by: INTERNAL MEDICINE

## 2024-06-07 PROCEDURE — 93018 CV STRESS TEST I&R ONLY: CPT | Performed by: INTERNAL MEDICINE

## 2024-06-07 PROCEDURE — A9502 TC99M TETROFOSMIN: HCPCS

## 2024-06-07 PROCEDURE — 93226 XTRNL ECG REC<48 HR SCAN A/R: CPT

## 2024-06-08 LAB
CHEST PAIN STATEMENT: NORMAL
MAX DIASTOLIC BP: 78 MMHG
MAX PREDICTED HEART RATE: 148 BPM
PROTOCOL NAME: NORMAL
REASON FOR TERMINATION: NORMAL
STRESS POST EXERCISE DUR MIN: 2 MIN
STRESS POST EXERCISE DUR SEC: 52 SEC
STRESS POST PEAK HR: 129 BPM
STRESS POST PEAK SYSTOLIC BP: 162 MMHG
TARGET HR FORMULA: NORMAL
TEST INDICATION: NORMAL

## 2024-06-09 NOTE — PROGRESS NOTES
[facility]  Lotus Stein, Pharmacist    Sg Munoz is a 74 y.o. male who was referred to the pharmacist for T2DM education and management, referred by Yung Klein MD .      Telemedicine consent  The patient was identified by name and date of birth. Sg Munoz was informed that this is a telemedicine visit and that the visit is being conducted through Telephone.  My office door was closed. The patient was notified the following individuals were present in the room Haily Miller MD.  He acknowledged consent and understanding of privacy and security of the video platform. The patient has agreed to participate and understands they can discontinue the visit at any time.    Assessment/ Plan       Type 2 Diabetes:  Goal A1c 7.5% individualized based on age, duration, co-morbidities, and microvascular complications. Most recent A1c   Lab Results   Component Value Date    HGBA1C 7.4 (A) 06/03/2024      Reported Home SMBG  Range from 77- 135 mg/dL  No longer having symptoms of hypoglycemia or readings <70 mg/dL  DM Complications:  CAD  Current Diabetes Regimen:  Metformin 1000 mg BID  Januvia 100 mg daily  Tresiba 48 units BID    Changes to Medication Regimen:   If PCP is in agreement with plan  Interested in CGM. Initiate Dexcom G7 sensors. He will use his smart phone as the reader.  We did discuss switching Januvia to Ozempic due to benefits in CAD. Patient is interested but would like to wait until after cardiac cath scheduled for 6/24/24.     2. On Additional Therapies:  Statin: yes  ACEI/ARB: yes  ASA: yes    3. Hyperlipidemia with clinical ASCVD event:   2018 ACC/AHA lipid guidelines recommend high statin.   Patient currently on high intensity and tolerating well without side effects.   LDL goal < 55 mg/dL (2024 ADA standards of care)     4. HTN:   BP goal less than 130/80 mmHg.   In office BP below goal, HR acceptable.     Follow-up: 5 weeks    Subjective      Medication Adherence/ Tolerability:  Metformin 1000 mg BID  Jardiance 25 mg daily- no side effects. Has had UTI but last one was 8/2023  Januvia 100 mg daily   Tresiba-48 units BID decreased from 60 units due to numbness in fingers /hypoglycemia. Readings were 77 mg/dL overnight and would decrease lower      Medications Tried in Past:      2. Lifestyle:   Physical Activity: Still pretty active. 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 Home monitoring devices  Glucometer: Yes, Brand:   CGM: No, Brand: ordered Dexcom g7 today    Objective     Lab Results   Component Value Date    HGBA1C 7.4 (A) 06/03/2024    HGBA1C 7.9 (H) 02/12/2024    HGBA1C 7.0 (A) 09/07/2023       Blood Glucose Readings  The patient is currently checking blood glucose 2 times per day. Patient does not report with SMBG logs.    Self report fasting  mg/dL  Evening 130-135 mg/dL  No readings < 70 mg/dL      Pharmacist Tracking Tool     Pharmacist Tracking Tool  Reason For Outreach: Embedded Pharmacist  Demographics:  Intervention Method: Phone  Type of Intervention: New  Topics Addressed: Diabetes  Pharmacologic Interventions: Medication Initiation and Med Rec  Non-Pharmacologic Interventions: Chart update, Disease state education, Home Monitoring, Medication/Device education, and Personal CGM  Time:  Direct Patient Care:  45  mins  Care Coordination:  15  mins  Recommendation Recipient: Patient/Caregiver and Provider  Outcome: Accepted

## 2024-06-10 ENCOUNTER — TELEPHONE (OUTPATIENT)
Dept: CARDIOLOGY CLINIC | Facility: CLINIC | Age: 74
End: 2024-06-10

## 2024-06-10 ENCOUNTER — CLINICAL SUPPORT (OUTPATIENT)
Dept: FAMILY MEDICINE CLINIC | Facility: CLINIC | Age: 74
End: 2024-06-10

## 2024-06-10 DIAGNOSIS — E11.9 CONTROLLED TYPE 2 DIABETES MELLITUS WITHOUT COMPLICATION, WITHOUT LONG-TERM CURRENT USE OF INSULIN (HCC): ICD-10-CM

## 2024-06-10 DIAGNOSIS — Z79.4 TYPE 2 DIABETES MELLITUS WITH COMPLICATION, WITH LONG-TERM CURRENT USE OF INSULIN (HCC): ICD-10-CM

## 2024-06-10 DIAGNOSIS — E11.40 TYPE 2 DIABETES MELLITUS WITH DIABETIC NEUROPATHY, WITH LONG-TERM CURRENT USE OF INSULIN (HCC): Primary | ICD-10-CM

## 2024-06-10 DIAGNOSIS — E11.8 TYPE 2 DIABETES MELLITUS WITH COMPLICATION, WITH LONG-TERM CURRENT USE OF INSULIN (HCC): ICD-10-CM

## 2024-06-10 DIAGNOSIS — Z79.4 TYPE 2 DIABETES MELLITUS WITH DIABETIC NEUROPATHY, WITH LONG-TERM CURRENT USE OF INSULIN (HCC): Primary | ICD-10-CM

## 2024-06-10 DIAGNOSIS — I20.9 ANGINA, CLASS III (HCC): Primary | ICD-10-CM

## 2024-06-10 PROCEDURE — 93227 XTRNL ECG REC<48 HR R&I: CPT | Performed by: INTERNAL MEDICINE

## 2024-06-10 RX ORDER — SACCHAROMYCES BOULARDII 250 MG
250 CAPSULE ORAL EVERY OTHER DAY
COMMUNITY

## 2024-06-10 RX ORDER — ACYCLOVIR 400 MG/1
1 TABLET ORAL
Qty: 3 EACH | Refills: 5 | Status: SHIPPED | OUTPATIENT
Start: 2024-06-10

## 2024-06-10 RX ORDER — METFORMIN HYDROCHLORIDE 500 MG/1
2000 TABLET, EXTENDED RELEASE ORAL
Qty: 360 TABLET | Refills: 1 | Status: SHIPPED | OUTPATIENT
Start: 2024-06-10

## 2024-06-10 RX ORDER — INSULIN DEGLUDEC 100 U/ML
48 INJECTION, SOLUTION SUBCUTANEOUS EVERY 12 HOURS SCHEDULED
Qty: 90 ML | Refills: 1 | Status: SHIPPED | OUTPATIENT
Start: 2024-06-10

## 2024-06-10 NOTE — TELEPHONE ENCOUNTER
Patient scheduled for LEFT Heart Cath on 6/24/24  at [facility] with Dr. WILD.      6/10/24  Mailed patient instructions.     Patient aware of all general instructions.    Medication holds:   Metformin - Take your regular dose day/evening before procedure and do not take morning of procedure.      Tresiba - Take your regular dose as you normally would either the evening before or the morning of your procedure.     Jardiance - Do not take for 4 days before and morning of procedure.     Januvia - Take your regular dose day/evening before your procedure and do not take morning of procedure.      Labs to be done NO LATER THAN 6/17/24  CMP / CBC (fasting 8 hours)

## 2024-06-10 NOTE — LETTER
6/10/2024       Sg Munoz              : 1950        MRN: [id number]  [address] [facility] [id number]       Procedure Name: LEFT HEART CATHETERIZATION    Procedure date: 2024    [facility]     The hospital will contact you the day prior to your procedure, usually between 4PM - 6PM to instruct you on the time and place to report. If you do not hear from a [facility]  by 6PM the evening prior to your procedure, please contact the campus that you are scheduled at.      [place]: [address] [place] [address] - [facility] [place] [phone]    You may have NOTHING to eat or drink from midnight the night prior to your procedure. You may have a minimal amount of water with your morning medications.     DO NOT stop taking Plavix or Aspirin unless advised otherwise.    Arrange for a responsible person to drive you to and from the hospital.    Please shower/bathe the night before your procedure and do not use powders or lotions.    Please notify us if you have been admitted to the hospital within the past 30 days.    Bring a list of daily medications, vitamins, minerals, herbals and nutritional supplements you take. Include dosage and time you take them each day.    If packing an overnight bag, pack minimal clothing, you will be given hospital sleepwear. Do not bring money, valuables or jewelry. Wedding band is OK.    If you use CPAP machine, bring it to the hospital.      Have your Photo ID and Insurance cards with you.    DO NOT take any diabetic medication, including insulin, the morning of the procedure. Oral diabetic medications may include: Glucophage, Prandin, Glyburide, Micronase, Avandia, Glocovance, Precose, Glynase y Starlix.        You should continue to take your morning dose of heart and/or blood pressure medications with a sip of water UNLESS ADVISED OTHERWISE.    Special Instructions:    Medication hold:   Metformin - Take your  regular dose day/evening before procedure and do not take morning of procedure.      Tresiba - Take your regular dose as you normally would either the evening before or the morning of your procedure.     Jardiance - Do not take for 4 days before and morning of procedure.     Januvia - Take your regular dose day/evening before your procedure and do not take morning of procedure.          Blood work to be done NO LATER THAN 6/17/2024  (FASTING)      Thank you,  Ingrid Aiken  Surgery Coordinator  [facility]   [address] [place] [id number]  Teams: [phone]

## 2024-06-12 ENCOUNTER — TELEPHONE (OUTPATIENT)
Dept: FAMILY MEDICINE CLINIC | Facility: CLINIC | Age: 74
End: 2024-06-12

## 2024-06-12 ENCOUNTER — APPOINTMENT (OUTPATIENT)
Dept: LAB | Facility: CLINIC | Age: 74
End: 2024-06-12
Payer: COMMERCIAL

## 2024-06-12 DIAGNOSIS — I20.9 ANGINA, CLASS III (HCC): ICD-10-CM

## 2024-06-12 LAB
ALBUMIN SERPL BCP-MCNC: 3.9 G/DL (ref 3.5–5)
ALP SERPL-CCNC: 68 U/L (ref 34–104)
ALT SERPL W P-5'-P-CCNC: 31 U/L (ref 7–52)
ANION GAP SERPL CALCULATED.3IONS-SCNC: 9 MMOL/L (ref 4–13)
AST SERPL W P-5'-P-CCNC: 20 U/L (ref 13–39)
BASOPHILS # BLD AUTO: 0.04 THOUSANDS/ÂΜL (ref 0–0.1)
BASOPHILS NFR BLD AUTO: 1 % (ref 0–1)
BILIRUB SERPL-MCNC: 1.39 MG/DL (ref 0.2–1)
BUN SERPL-MCNC: 14 MG/DL (ref 5–25)
CALCIUM SERPL-MCNC: 8.1 MG/DL (ref 8.4–10.2)
CHLORIDE SERPL-SCNC: 106 MMOL/L (ref 96–108)
CO2 SERPL-SCNC: 25 MMOL/L (ref 21–32)
CREAT SERPL-MCNC: 0.61 MG/DL (ref 0.6–1.3)
EOSINOPHIL # BLD AUTO: 0.3 THOUSAND/ÂΜL (ref 0–0.61)
EOSINOPHIL NFR BLD AUTO: 4 % (ref 0–6)
ERYTHROCYTE [DISTWIDTH] IN BLOOD BY AUTOMATED COUNT: 13.8 % (ref 11.6–15.1)
GFR SERPL CREATININE-BSD FRML MDRD: 99 ML/MIN/1.73SQ M
GLUCOSE P FAST SERPL-MCNC: 97 MG/DL (ref 65–99)
HCT VFR BLD AUTO: 50.8 % (ref 36.5–49.3)
HGB BLD-MCNC: 16.2 G/DL (ref 12–17)
IMM GRANULOCYTES # BLD AUTO: 0.06 THOUSAND/UL (ref 0–0.2)
IMM GRANULOCYTES NFR BLD AUTO: 1 % (ref 0–2)
LYMPHOCYTES # BLD AUTO: 1.6 THOUSANDS/ÂΜL (ref 0.6–4.47)
LYMPHOCYTES NFR BLD AUTO: 21 % (ref 14–44)
MCH RBC QN AUTO: 28.4 PG (ref 26.8–34.3)
MCHC RBC AUTO-ENTMCNC: 31.9 G/DL (ref 31.4–37.4)
MCV RBC AUTO: 89 FL (ref 82–98)
MONOCYTES # BLD AUTO: 0.53 THOUSAND/ÂΜL (ref 0.17–1.22)
MONOCYTES NFR BLD AUTO: 7 % (ref 4–12)
NEUTROPHILS # BLD AUTO: 5.29 THOUSANDS/ÂΜL (ref 1.85–7.62)
NEUTS SEG NFR BLD AUTO: 66 % (ref 43–75)
NRBC BLD AUTO-RTO: 0 /100 WBCS
PLATELET # BLD AUTO: 170 THOUSANDS/UL (ref 149–390)
PMV BLD AUTO: 10.4 FL (ref 8.9–12.7)
POTASSIUM SERPL-SCNC: 4.2 MMOL/L (ref 3.5–5.3)
PROT SERPL-MCNC: 6.4 G/DL (ref 6.4–8.4)
RBC # BLD AUTO: 5.71 MILLION/UL (ref 3.88–5.62)
SODIUM SERPL-SCNC: 140 MMOL/L (ref 135–147)
WBC # BLD AUTO: 7.82 THOUSAND/UL (ref 4.31–10.16)

## 2024-06-12 PROCEDURE — 85025 COMPLETE CBC W/AUTO DIFF WBC: CPT

## 2024-06-12 PROCEDURE — 36415 COLL VENOUS BLD VENIPUNCTURE: CPT

## 2024-06-12 PROCEDURE — 80053 COMPREHEN METABOLIC PANEL: CPT

## 2024-06-21 DIAGNOSIS — E11.9 TYPE 2 DIABETES MELLITUS WITHOUT COMPLICATION, WITHOUT LONG-TERM CURRENT USE OF INSULIN (HCC): ICD-10-CM

## 2024-06-21 RX ORDER — ATORVASTATIN CALCIUM 40 MG/1
40 TABLET, FILM COATED ORAL DAILY
Qty: 90 TABLET | Refills: 1 | Status: SHIPPED | OUTPATIENT
Start: 2024-06-21

## 2024-06-21 NOTE — TELEPHONE ENCOUNTER
Reason for call:   [x] Refill   [] Prior Auth  [] Other:     Office:   [x] PCP/Provider - Ernie/Abran JACKSON  [] Specialty/Provider -         Pharmacy: [facility] - [address] [phone]     Does the patient have enough for 3 days?   [x] Yes   [] No - Send as HP to POD

## 2024-06-24 ENCOUNTER — HOSPITAL ENCOUNTER (OUTPATIENT)
Facility: HOSPITAL | Age: 74
Setting detail: OUTPATIENT SURGERY
Discharge: HOME | End: 2024-06-24
Attending: INTERNAL MEDICINE | Admitting: INTERNAL MEDICINE
Payer: COMMERCIAL

## 2024-06-24 VITALS
TEMPERATURE: 97.8 F | BODY MASS INDEX: 30.64 KG/M2 | DIASTOLIC BLOOD PRESSURE: 83 MMHG | SYSTOLIC BLOOD PRESSURE: 171 MMHG | RESPIRATION RATE: 16 BRPM | HEART RATE: 83 BPM | OXYGEN SATURATION: 95 % | HEIGHT: 70 IN | WEIGHT: 214 LBS

## 2024-06-24 DIAGNOSIS — Z95.5 S/P DRUG ELUTING CORONARY STENT PLACEMENT: Primary | ICD-10-CM

## 2024-06-24 DIAGNOSIS — I20.9 ANGINA, CLASS III (HCC): ICD-10-CM

## 2024-06-24 PROBLEM — I25.118 CORONARY ARTERY DISEASE OF NATIVE ARTERY WITH STABLE ANGINA PECTORIS (HCC): Status: ACTIVE | Noted: 2024-05-30

## 2024-06-24 LAB
ATRIAL RATE: 80 BPM
ATRIAL RATE: 80 BPM
GLUCOSE SERPL-MCNC: 211 MG/DL (ref 65–140)
KCT BLD-ACNC: 361 SEC (ref 89–137)
P AXIS: -4 DEGREES
P AXIS: 10 DEGREES
PR INTERVAL: 200 MS
PR INTERVAL: 206 MS
QRS AXIS: -57 DEGREES
QRS AXIS: -58 DEGREES
QRSD INTERVAL: 154 MS
QRSD INTERVAL: 154 MS
QT INTERVAL: 420 MS
QT INTERVAL: 434 MS
QTC INTERVAL: 484 MS
QTC INTERVAL: 500 MS
SPECIMEN SOURCE: ABNORMAL
T WAVE AXIS: 11 DEGREES
T WAVE AXIS: 21 DEGREES
VENTRICULAR RATE: 80 BPM
VENTRICULAR RATE: 80 BPM

## 2024-06-24 PROCEDURE — C1725 CATH, TRANSLUMIN NON-LASER: HCPCS | Performed by: INTERNAL MEDICINE

## 2024-06-24 PROCEDURE — 85347 COAGULATION TIME ACTIVATED: CPT

## 2024-06-24 PROCEDURE — 93458 L HRT ARTERY/VENTRICLE ANGIO: CPT | Performed by: INTERNAL MEDICINE

## 2024-06-24 PROCEDURE — C1874 STENT, COATED/COV W/DEL SYS: HCPCS | Performed by: INTERNAL MEDICINE

## 2024-06-24 PROCEDURE — C1769 GUIDE WIRE: HCPCS | Performed by: INTERNAL MEDICINE

## 2024-06-24 PROCEDURE — C1887 CATHETER, GUIDING: HCPCS | Performed by: INTERNAL MEDICINE

## 2024-06-24 PROCEDURE — 92928 PRQ TCAT PLMT NTRAC ST 1 LES: CPT | Performed by: INTERNAL MEDICINE

## 2024-06-24 PROCEDURE — 93010 ELECTROCARDIOGRAM REPORT: CPT | Performed by: INTERNAL MEDICINE

## 2024-06-24 PROCEDURE — 82948 REAGENT STRIP/BLOOD GLUCOSE: CPT

## 2024-06-24 PROCEDURE — 99152 MOD SED SAME PHYS/QHP 5/>YRS: CPT | Performed by: INTERNAL MEDICINE

## 2024-06-24 PROCEDURE — C9600 PERC DRUG-EL COR STENT SING: HCPCS | Performed by: INTERNAL MEDICINE

## 2024-06-24 PROCEDURE — 93005 ELECTROCARDIOGRAM TRACING: CPT

## 2024-06-24 PROCEDURE — C1894 INTRO/SHEATH, NON-LASER: HCPCS | Performed by: INTERNAL MEDICINE

## 2024-06-24 PROCEDURE — NC001 PR NO CHARGE: Performed by: INTERNAL MEDICINE

## 2024-06-24 PROCEDURE — 99153 MOD SED SAME PHYS/QHP EA: CPT | Performed by: INTERNAL MEDICINE

## 2024-06-24 DEVICE — STENT ONYXNG35030UX ONYX 3.50X30RX
Type: IMPLANTABLE DEVICE | Status: FUNCTIONAL
Brand: ONYX FRONTIER™

## 2024-06-24 RX ORDER — FENTANYL CITRATE 50 UG/ML
INJECTION, SOLUTION INTRAMUSCULAR; INTRAVENOUS CODE/TRAUMA/SEDATION MEDICATION
Status: DISCONTINUED | OUTPATIENT
Start: 2024-06-24 | End: 2024-06-24 | Stop reason: HOSPADM

## 2024-06-24 RX ORDER — SODIUM CHLORIDE 9 MG/ML
125 INJECTION, SOLUTION INTRAVENOUS CONTINUOUS
Status: DISPENSED | OUTPATIENT
Start: 2024-06-24 | End: 2024-06-24

## 2024-06-24 RX ORDER — CLOPIDOGREL BISULFATE 75 MG/1
600 TABLET ORAL ONCE
Status: COMPLETED | OUTPATIENT
Start: 2024-06-24 | End: 2024-06-24

## 2024-06-24 RX ORDER — VERAPAMIL HYDROCHLORIDE 2.5 MG/ML
INJECTION, SOLUTION INTRAVENOUS CODE/TRAUMA/SEDATION MEDICATION
Status: DISCONTINUED | OUTPATIENT
Start: 2024-06-24 | End: 2024-06-24 | Stop reason: HOSPADM

## 2024-06-24 RX ORDER — MIDAZOLAM HYDROCHLORIDE 2 MG/2ML
INJECTION, SOLUTION INTRAMUSCULAR; INTRAVENOUS CODE/TRAUMA/SEDATION MEDICATION
Status: DISCONTINUED | OUTPATIENT
Start: 2024-06-24 | End: 2024-06-24 | Stop reason: HOSPADM

## 2024-06-24 RX ORDER — CLOPIDOGREL BISULFATE 75 MG/1
75 TABLET ORAL DAILY
Qty: 30 TABLET | Refills: 11 | Status: SHIPPED | OUTPATIENT
Start: 2024-06-24

## 2024-06-24 RX ORDER — SODIUM CHLORIDE 9 MG/ML
125 INJECTION, SOLUTION INTRAVENOUS CONTINUOUS
Status: DISCONTINUED | OUTPATIENT
Start: 2024-06-24 | End: 2024-06-24 | Stop reason: HOSPADM

## 2024-06-24 RX ORDER — NITROGLYCERIN 20 MG/100ML
INJECTION INTRAVENOUS CODE/TRAUMA/SEDATION MEDICATION
Status: DISCONTINUED | OUTPATIENT
Start: 2024-06-24 | End: 2024-06-24 | Stop reason: HOSPADM

## 2024-06-24 RX ORDER — LIDOCAINE HYDROCHLORIDE 10 MG/ML
INJECTION, SOLUTION EPIDURAL; INFILTRATION; INTRACAUDAL; PERINEURAL CODE/TRAUMA/SEDATION MEDICATION
Status: DISCONTINUED | OUTPATIENT
Start: 2024-06-24 | End: 2024-06-24 | Stop reason: HOSPADM

## 2024-06-24 RX ORDER — HEPARIN SODIUM 1000 [USP'U]/ML
INJECTION, SOLUTION INTRAVENOUS; SUBCUTANEOUS CODE/TRAUMA/SEDATION MEDICATION
Status: DISCONTINUED | OUTPATIENT
Start: 2024-06-24 | End: 2024-06-24 | Stop reason: HOSPADM

## 2024-06-24 RX ORDER — ASPIRIN 81 MG/1
324 TABLET, CHEWABLE ORAL ONCE
Status: COMPLETED | OUTPATIENT
Start: 2024-06-24 | End: 2024-06-24

## 2024-06-24 RX ADMIN — SODIUM CHLORIDE 125 ML/HR: 0.9 INJECTION, SOLUTION INTRAVENOUS at 08:40

## 2024-06-24 RX ADMIN — CLOPIDOGREL BISULFATE 600 MG: 75 TABLET ORAL at 15:20

## 2024-06-24 RX ADMIN — ASPIRIN 81 MG CHEWABLE TABLET 324 MG: 81 TABLET CHEWABLE at 08:41

## 2024-06-24 NOTE — INTERVAL H&P NOTE
"  H&P reviewed. After examining the patient, I find no changed to the H&P since it had been written.    /80 (BP Location: Right arm)   Pulse 79   Temp 97.5 °F (36.4 °C) (Temporal)   Resp 16   Ht 5' 10\" (1.778 m)   Wt 97.1 kg (214 lb)   SpO2 96%   BMI 30.71 kg/m²   Patient with typical chest pain and abnormal stress test.  Admitted for cardiac cath to identify coronary anatomy and ischemic burden.   /80 (BP Location: Right arm)   Pulse 79   Temp 97.5 °F (36.4 °C) (Temporal)   Resp 16   Ht 5' 10\" (1.778 m)   Wt 97.1 kg (214 lb)   SpO2 96%   BMI 30.71 kg/m²     Patient re-evaluated. Accept as history and physical.    MONICA Bruce/June 24, 2024/9:27 AM  "

## 2024-06-24 NOTE — DISCHARGE SUMMARY
Same Day PCI Discharge Summary Note    Date of Admission:6/24/24      Date of Discharge:6/24/24    Reason for Admission:  Angina class 3     Access site:Right radial artery    Procedure performed:  Left heart cath  PCI and BIA (SHIRLENE FRONTIER 3.5MM X 30 MM) to prox Circumflex    Cath results:  Left Main   The vessel exhibits minimal luminal irregularities.      Left Anterior Descending   Prox LAD lesion is 30% stenosed.   Mid LAD lesion is 30% stenosed. The lesion is diffuse. The lesion is calcified.   Dist LAD lesion is 50% stenosed.      Left Circumflex   Prox Cx lesion is 90% stenosed. Culprit for abnormal stress.      Right Coronary Artery   The vessel exhibits minimal luminal irregularities.           Intervention performed:  PCI and BIA (SHIRLENE FRONTIER 3.5MM X 30MM) to prox Circumflex    Criteria for same day discharge:    Patient underwent, uncomplicated PCI /stent on proximal  Left circumflex  without hemodynamic complications or instability per institutional protocol.    Patient will be accessed and discharged later today specifically noting the following criteria:    Uncomplicated procedure as above before above.    Patient is less than 80 years old with preserved LV function and no significant valvulopathy  Creatinine is less than 1.5  Preserved LV function  Patient has been observed for more than 4 hours and they live within 30 miles of 1 of our network hospital.  Patient will have a family member or friend stay with patient overnight.    Patient and family are agreeable to this discharge.      [facility] for 6/25/24 and cardiac rehab referral as been made.     Office follow-up appointment arranged:  Patient received Plavix load of 600 mg x 1 prior to discharge.  Patient was examined and RRA pulse +2 and neurovascular intact to R hand  Drugs ordered upon discharge:  Aspirin 81 mg daily starting tomorrow  Plavix 75 mg daily  Atorvastatin 40 mg daily    Patient is not an MI.  Patient with stable ischemic coronary  artery disease who underwent an elective PCI.  Does not benefit from beta-blocker based on ACC guideline for stable ischemic heart disease (SIHD).    Follow-up phone call will be made by a Cath Lab nurse on 6/25/2024.    She will follow-up in the [facility] office on 7/15/2024 at 2 PM with Acacia Redman PA-C

## 2024-06-24 NOTE — Clinical Note
Peer to peer needed for Right knee MRI.   Routed to Dr. Moses for review  MRI scheduled on 10/10/23    Knee xray done 11/22/21        Katja Raymond P Pmr Nurse Msg Griffiths  Cc: Taina Ogden,     The  authorization request for Mary Segovia, MRI KNEE RIGHT WO CONTRAST  is pending denial .   The provider (MD, PA, or NP) will need to complete a peer to peer.       Per [facility] Representative Latonia ZHOU, service is pending denial because:   --6 weeks of provider treatment was not performed within the last 3 months   --No xray taken after stmptoms started or changed that show a need for further imaging.     To complete the peer to peer call the number below to schedule an appointment based the providers availability.       Insurance:  [employer] ([facility] MEDICARE)   Phone number: [phone]   Member ID: [id number]   Date of scan: 10/10/2023   CASE #  [id number]   MRI KNEE RIGHT WO CONTRAST , CPT  04119     The MRI Right Hip was approved.     Please reply to [place] [employer] AUTHORIZATIONS POOL [[id number]] by 9/28/23 with peer to peer results.          Prepped: right groin and right radial. Prepped with: ChloraPrep. The site was clipped. The patient was draped.

## 2024-06-25 ENCOUNTER — APPOINTMENT (OUTPATIENT)
Dept: LAB | Facility: CLINIC | Age: 74
End: 2024-06-25
Payer: MEDICARE

## 2024-06-25 DIAGNOSIS — Z95.5 S/P DRUG ELUTING CORONARY STENT PLACEMENT: ICD-10-CM

## 2024-06-25 LAB
ANION GAP SERPL CALCULATED.3IONS-SCNC: 10 MMOL/L (ref 4–13)
BUN SERPL-MCNC: 12 MG/DL (ref 5–25)
CALCIUM SERPL-MCNC: 9.1 MG/DL (ref 8.4–10.2)
CHLORIDE SERPL-SCNC: 104 MMOL/L (ref 96–108)
CO2 SERPL-SCNC: 25 MMOL/L (ref 21–32)
CREAT SERPL-MCNC: 0.5 MG/DL (ref 0.6–1.3)
GFR SERPL CREATININE-BSD FRML MDRD: 108 ML/MIN/1.73SQ M
GLUCOSE P FAST SERPL-MCNC: 144 MG/DL (ref 65–99)
POTASSIUM SERPL-SCNC: 3.7 MMOL/L (ref 3.5–5.3)
SODIUM SERPL-SCNC: 139 MMOL/L (ref 135–147)

## 2024-06-25 PROCEDURE — 36415 COLL VENOUS BLD VENIPUNCTURE: CPT

## 2024-06-25 PROCEDURE — 80048 BASIC METABOLIC PNL TOTAL CA: CPT

## 2024-07-10 ENCOUNTER — HOSPITAL ENCOUNTER (OUTPATIENT)
Dept: NON INVASIVE DIAGNOSTICS | Facility: HOSPITAL | Age: 74
Discharge: HOME | End: 2024-07-10
Attending: INTERNAL MEDICINE
Payer: COMMERCIAL

## 2024-07-10 DIAGNOSIS — E78.2 MIXED HYPERLIPIDEMIA: ICD-10-CM

## 2024-07-10 DIAGNOSIS — E78.49 OTHER HYPERLIPIDEMIA: ICD-10-CM

## 2024-07-10 DIAGNOSIS — R07.89 OTHER CHEST PAIN: ICD-10-CM

## 2024-07-10 DIAGNOSIS — I25.10 CORONARY ARTERY DISEASE INVOLVING NATIVE HEART, UNSPECIFIED VESSEL OR LESION TYPE, UNSPECIFIED WHETHER ANGINA PRESENT: ICD-10-CM

## 2024-07-10 PROCEDURE — 93922 UPR/L XTREMITY ART 2 LEVELS: CPT

## 2024-07-11 PROCEDURE — 93978 VASCULAR STUDY: CPT | Performed by: SURGERY

## 2024-07-11 PROCEDURE — 93880 EXTRACRANIAL BILAT STUDY: CPT | Performed by: SURGERY

## 2024-07-11 PROCEDURE — 93922 UPR/L XTREMITY ART 2 LEVELS: CPT | Performed by: SURGERY

## 2024-07-15 ENCOUNTER — PATIENT MESSAGE (OUTPATIENT)
Dept: FAMILY MEDICINE CLINIC | Facility: CLINIC | Age: 74
End: 2024-07-15

## 2024-07-15 ENCOUNTER — OFFICE VISIT (OUTPATIENT)
Dept: CARDIOLOGY CLINIC | Facility: HOSPITAL | Age: 74
End: 2024-07-15
Payer: MEDICARE

## 2024-07-15 ENCOUNTER — CLINICAL SUPPORT (OUTPATIENT)
Dept: FAMILY MEDICINE CLINIC | Facility: CLINIC | Age: 74
End: 2024-07-15

## 2024-07-15 VITALS
BODY MASS INDEX: 31.09 KG/M2 | HEART RATE: 94 BPM | DIASTOLIC BLOOD PRESSURE: 72 MMHG | OXYGEN SATURATION: 96 % | WEIGHT: 217.2 LBS | HEIGHT: 70 IN | SYSTOLIC BLOOD PRESSURE: 118 MMHG

## 2024-07-15 DIAGNOSIS — G47.33 OSA (OBSTRUCTIVE SLEEP APNEA): ICD-10-CM

## 2024-07-15 DIAGNOSIS — Z95.5 S/P DRUG ELUTING CORONARY STENT PLACEMENT: ICD-10-CM

## 2024-07-15 DIAGNOSIS — E11.9 CONTROLLED TYPE 2 DIABETES MELLITUS WITHOUT COMPLICATION, WITHOUT LONG-TERM CURRENT USE OF INSULIN (HCC): ICD-10-CM

## 2024-07-15 DIAGNOSIS — I10 PRIMARY HYPERTENSION: ICD-10-CM

## 2024-07-15 DIAGNOSIS — I25.118 CORONARY ARTERY DISEASE OF NATIVE ARTERY OF NATIVE HEART WITH STABLE ANGINA PECTORIS (HCC): Primary | ICD-10-CM

## 2024-07-15 DIAGNOSIS — E78.5 DYSLIPIDEMIA: ICD-10-CM

## 2024-07-15 PROCEDURE — 99214 OFFICE O/P EST MOD 30 MIN: CPT | Performed by: PHYSICIAN ASSISTANT

## 2024-07-15 RX ORDER — CLOPIDOGREL BISULFATE 75 MG/1
75 TABLET ORAL DAILY
Qty: 90 TABLET | Refills: 3 | Status: SHIPPED | OUTPATIENT
Start: 2024-07-15

## 2024-07-15 RX ORDER — ACYCLOVIR 400 MG/1
1 TABLET ORAL
Qty: 9 EACH | Refills: 3 | Status: SHIPPED | OUTPATIENT
Start: 2024-07-15

## 2024-07-15 NOTE — PROGRESS NOTES
[facility]  Lotus Stein, Pharmacist    Sg Munoz is a 74 y.o. male who was referred to the pharmacist for T2DM education and management, referred by Dr. Klein      Telemedicine consent  The patient was identified by name and date of birth. Sg Munoz was informed that this is a telemedicine visit and that the visit is being conducted through Telephone.  My office door was closed. The patient was notified the following individuals were present in the room Haily Miller MD.  He acknowledged consent and understanding of privacy and security of the video platform. The patient has agreed to participate and understands they can discontinue the visit at any time.    Assessment/ Plan       Type 2 Diabetes:  Goal A1c 7.5% individualized based on age, duration, co-morbidities, and microvascular complications. Most recent A1c   Lab Results   Component Value Date    HGBA1C 7.4 (A) 06/03/2024      DM Complications:  CAD  Current Diabetes Regimen:  Metformin 1000 mg BID  Januvia 100 mg daily  Tresiba 48 units BID    Changes to Medication Regimen:   If PCP is in agreement with plan  Sent instructions to connect through Dexcom clarity for data sharing  We did discuss switching Januvia to Ozempic due to benefits in CAD. Patient declines at this time    2. On Additional Therapies:  Statin: yes  ACEI/ARB: yes  ASA: yes    3. Hyperlipidemia with clinical ASCVD event:   2018 ACC/AHA lipid guidelines recommend high statin.   Patient currently on high intensity and tolerating well without side effects.   LDL goal < 55 mg/dL (2024 ADA standards of care)     4. HTN:   BP goal less than 130/80 mmHg.   In office BP below goal, HR acceptable.     Follow-up: 6 weeks    Subjective     Medication Adherence/ Tolerability:  Metformin 1000 mg BID  Jardiance 25 mg daily- no side effects. Has had UTI but last one was 8/2023  Januvia 100 mg daily   Tresiba-48 units BID       Medications Tried in  Past:      2. Lifestyle:   Physical Activity: Still pretty active. 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 Home monitoring devices  Glucometer: Yes, Brand:   CGM: No, Brand: ordered Dexcom g7 today    Objective     Lab Results   Component Value Date    HGBA1C 7.4 (A) 06/03/2024    HGBA1C 7.9 (H) 02/12/2024    HGBA1C 7.0 (A) 09/07/2023       Pharmacist Tracking Tool     Pharmacist Tracking Tool  Reason For Outreach: Embedded Pharmacist  Demographics:  Intervention Method: Phone  Type of Intervention: Follow-Up  Topics Addressed: Diabetes  Pharmacologic Interventions: N/A  Non-Pharmacologic Interventions: Chart update, Disease state education, Home Monitoring, Medication/Device education, and Personal CGM  Time:  Direct Patient Care:  20  mins  Care Coordination:  5  mins  Recommendation Recipient: Patient/Caregiver and Provider  Outcome: Accepted

## 2024-07-15 NOTE — PROGRESS NOTES
Cardiology Follow Up    Sg Munoz  1950  [id number]  [facility] [address] [id number] [phone]    1. Coronary artery disease of native artery of native heart with stable angina pectoris (HCC)        2. S/P drug eluting coronary stent placement  clopidogrel (Plavix) 75 mg tablet      3. Primary hypertension        4. Dyslipidemia  Lipid Panel with Direct LDL reflex    Comprehensive metabolic panel      5. LILIA (obstructive sleep apnea)            Discussion/Summary:  Coronary artery disease  Nuclear stress test 6/7/24 showing lateral ischemia with subsequent LHC showing 90% proximal circumflex lesion for which he underwent BIA placement.   Residual 30-50% LAD disease noted  He feels much better and has more energy since his stent placement.  Continue dual antiplatelet therapy with aspirin and plavix uninterrupted for at least 1 year.  Continue statin  Cardiac rehab scheduled for 7/31.    Hypertension  Well controlled     Dyslipidemia   Most recent LDL of 78 - February 2024. Goal LDL will be <70. Will follow up on repeat lipid panel  Last A1C 7.4    He will RTO in 3 months with Dr. Preston or sooner if necessary. He will call the office with any concerns in the interim.     Interval History:   Sg Munoz is a 74 y.o. male with coronary artery disease, hypertension, dyslipidemia, Type 2 diabetes who presents to the office today for follow up.     Last month was his first office visit with Dr. Preston.  He was referred for chest pain.  He underwent a nuclear stress test which showed lateral wall ischemia.  Subsequently he underwent a left heart catheterization which showed a 90% proximal left circumflex lesion for which a drug-eluting stent was placed.  He was also noted to have mild to moderate LAD disease with 30% proximal, 30% mid, and 50% distal stenoses.    He also had a 24-hour  Holter monitor which was ordered by his PCP secondary to a bifascicular block.  This showed predominantly normal sinus rhythm with average heart rate 85 bpm.  2.5% PVC burden.  In addition, he had a vascular screen which showed abnormal plaque without evidence of stenosis bilaterally.  No abdominal aortic aneurysm and no direct evidence of PAD.    Since his stent has been placed he reports feeling well.  He states he feels like he has more energy.  He has not had any exertional chest pain/pressure/discomfort.  Occasionally he will get a slight twinge of pain in his chest at rest that goes away within a few seconds.  He denies any dyspnea on exertion.  Lower extremity edema has been stable and chronic per patient.  Denies any significant lightheadedness or dizziness outside of episodes of hypoglycemia.  He denies palpitations.  He has been taking all medications as prescribed.    Medical Problems       Problem List       Other hyperlipidemia    Hypertension    BPH with obstruction/lower urinary tract symptoms    Hypercalciuria    Knee osteoarthritis    Prepatellar bursitis    Multiple thyroid nodules    LILIA (obstructive sleep apnea)    Type 2 diabetes mellitus with complication, with long-term current use of insulin ([facility])      Lab Results   Component Value Date    HGBA1C 7.4 (A) 06/03/2024         History of parathyroidectomy    Overview Signed 5/9/2019  3:14 PM by Donna Baugh RN     11/1/2018 right inferior parathyroidectomy  Cellular parathyroid tissue 0.74 g         BMI 33.0-33.9,adult    Preop examination    Non-STEMI (non-ST elevated myocardial infarction) ([facility])    Diabetic foot ([facility])      Lab Results   Component Value Date    HGBA1C 7.4 (A) 06/03/2024         Other chest pain    Coronary artery disease of native artery with stable angina pectoris ([facility])    Mixed hyperlipidemia        Past Medical History:   Diagnosis Date    Arthritis     Back pain     Diabetes mellitus ([facility])     Hypertension      Social  History     Socioeconomic History    Marital status: /Civil Union     Spouse name: Not on file    Number of children: Not on file    Years of education: Not on file    Highest education level: Not on file   Occupational History    Not on file   Tobacco Use    Smoking status: Never     Passive exposure: Past    Smokeless tobacco: Never   Vaping Use    Vaping status: Never Used   Substance and Sexual Activity    Alcohol use: Yes     Comment: rarely    Drug use: No    Sexual activity: Yes   Other Topics Concern    Not on file   Social History Narrative    Not on file     Social Determinants of Health     Financial Resource Strain: Low Risk  (12/4/2023)    Overall Financial Resource Strain (CARDIA)     Difficulty of Paying Living Expenses: Not hard at all   Food Insecurity: No Food Insecurity (8/20/2023)    Received from [email or web address]    Food Insecurity     Within the past 12 months, you worried that your food would run out before you got the money to buy more.: 1     Within the past 12 months, the food you bought just didn't last and you didn't have money to get more.: 1   Transportation Needs: No Transportation Needs (12/4/2023)    PRAPARE - Transportation     Lack of Transportation (Medical): No     Lack of Transportation (Non-Medical): No   Physical Activity: Insufficiently Active (8/20/2023)    Received from [email or web address]    Physical Activity     On average, how many days per week do you engage in moderate to strenuous exercise (like a brisk walk)?: 3 days     On average, how many minutes do you engage in exercise at this level?: 30 min   Stress: No Stress Concern Present (8/20/2023)    Received from [facility]    [employer] [place] [employer] - Occupational Stress Questionnaire     Feeling of Stress : Only a little   Social Connections: Socially Integrated (8/20/2023)    Received from [email or web address]    Social Connection and Isolation Panel  [NHANES]     Frequency of Communication with Friends and Family: More than three times a week     Frequency of Social Gatherings with Friends and Family: Three times a week     Attends Christianity Services: More than 4 times per year     Active Member of Clubs or Organizations: Yes     Attends Club or Organization Meetings: 1 to 4 times per year     Marital Status:    Intimate Partner Violence: Not At Risk (8/20/2023)    [facility]     Threatened: Not on file     Insulted: Not on file     Physically Hurt : Not on file     Scream: Not on file   Housing Stability: At Risk (8/20/2023)    Received from [facility]     Living Situation: Not on file     Housing Problems: Not on file      Family History   Problem Relation Age of Onset    Hypertension Father     Coronary artery disease Father     Hypertension Mother     Arthritis Mother     Coronary artery disease Mother     Diabetes Maternal Uncle     Diabetes Paternal Uncle      Past Surgical History:   Procedure Laterality Date    CARDIAC CATHETERIZATION Left 6/24/2024    Procedure: Cardiac Left Heart Cath;  Surgeon: Ismael Wall DO;  Location: [facility];  Service: Cardiology    CARDIAC CATHETERIZATION N/A 6/24/2024    Procedure: Cardiac pci;  Surgeon: Ismael Wall DO;  Location: [facility];  Service: Cardiology    HERNIA REPAIR Left 1998    with mesh to groin    NC PARATHYROIDECTOMY/EXPLORATION PARATHYROIDS Right 11/1/2018    Procedure: MINIMALLY INVASIVE RIGHT PARATHYROIDECTOMY WITH PTH MONITORING;  Surgeon: Price Ybarra MD;  Location: [facility];  Service: Surgical Oncology    TONSILLECTOMY      US GUIDED THYROID BIOPSY  9/7/2018       Current Outpatient Medications:     aspirin (ECOTRIN LOW STRENGTH) 81 mg EC tablet, Take 81 mg by mouth daily, Disp: , Rfl:     atorvastatin (LIPITOR) 40 mg tablet, Take 1 tablet (40 mg total) by mouth daily, Disp: 90  tablet, Rfl: 1    clopidogrel (Plavix) 75 mg tablet, Take 1 tablet (75 mg total) by mouth daily, Disp: 90 tablet, Rfl: 3    Continuous Glucose Sensor (Dexcom G7 Sensor), Use 1 Device every 10 days, Disp: 9 each, Rfl: 3    dutasteride (AVODART) 0.5 mg capsule, Take 1 capsule (0.5 mg total) by mouth daily, Disp: 90 capsule, Rfl: 3    Empagliflozin (Jardiance) 25 MG TABS, Take 1 tablet (25 mg total) by mouth every morning, Disp: 90 tablet, Rfl: 3    gabapentin (Neurontin) 600 MG tablet, Take 1 tablet (600 mg total) by mouth 3 (three) times a day, Disp: 270 tablet, Rfl: 3    insulin degludec (Tresiba FlexTouch) 100 units/mL injection pen, Inject 48 Units under the skin every 12 (twelve) hours, Disp: 90 mL, Rfl: 1    Insulin Pen Needle (B-D ULTRAFINE III SHORT PEN) 31G X 8 MM MISC, Use 2 (two) times a day, Disp: 180 each, Rfl: 1    lisinopril (ZESTRIL) 20 mg tablet, Take 1 tablet (20 mg total) by mouth daily, Disp: 90 tablet, Rfl: 3    metFORMIN (GLUCOPHAGE-XR) 500 mg 24 hr tablet, Take 4 tablets (2,000 mg total) by mouth daily with breakfast, Disp: 360 tablet, Rfl: 1    OneTouch Ultra test strip, Use 1 each 3 (three) times a day Use as instructed, Disp: 400 strip, Rfl: 3    saccharomyces boulardii (FLORASTOR) 250 mg capsule, Take 250 mg by mouth every other day, Disp: , Rfl:     sitaGLIPtin (Januvia) 100 mg tablet, Take 1 tablet (100 mg total) by mouth daily, Disp: 90 tablet, Rfl: 1    SUPER B COMPLEX/C PO, Take by mouth, Disp: , Rfl:     tamsulosin (FLOMAX) 0.4 mg, Take 1 capsule (0.4 mg total) by mouth daily, Disp: 90 capsule, Rfl: 3  No Known Allergies    Labs:     Chemistry        Component Value Date/Time    K 3.7 06/25/2024 0739     06/25/2024 0739    CO2 25 06/25/2024 0739    BUN 12 06/25/2024 0739    CREATININE 0.50 (L) 06/25/2024 0739        Component Value Date/Time    CALCIUM 9.1 06/25/2024 0739    ALKPHOS 68 06/12/2024 0728    AST 20 06/12/2024 0728    ALT 31 06/12/2024 0728            No results found  "for: \"CHOL\"  Lab Results   Component Value Date    HDL 39 (L) 02/12/2024    HDL 39 (L) 02/07/2023    HDL 39 (L) 01/04/2022     Lab Results   Component Value Date    LDLCALC 78 02/12/2024    LDLCALC 71 02/07/2023    LDLCALC 91 01/04/2022     Lab Results   Component Value Date    TRIG 100 02/12/2024    TRIG 185 (H) 02/07/2023    TRIG 113 01/04/2022     No results found for: \"CHOLHDL\"    Imaging: VAS screening    Result Date: 7/11/2024  Narrative:  [facility] REPORT CLINICAL: Indications: Vascular screening for Carotid artery stenosis, AAA and PAD.  Patient reports a history of HTN, hyperlipidemia and diabetes. Operative History: 2024-06-24 Cardiac Catheterization Risk Factors The patient has history of HTN, Diabetes (NIDDM (oral meds)), Hyperlipidemia and CAD. Clinical Right Pressure:  145/ mm Hg, Left Pressure:  140/ mm Hg.  FINDINGS:  Aorta      AP Diam (cm): 1.8      PSV: 88      TRV Diam (cm): 1.8 DorsPedis, Right      Pressure: 229      Waveform: Triphasic Mid CCA, Right      PSV: 79      EDV: 16 Post Tibial, Right      Pressure: 255      Waveform: Triphasic Prox. ICA, Right      Impression: 2.  Abnormal plaque noted but no significant stenosis      PSV: 40      EDV: 12      ICA/CCA: 0.51      PPS: 78.99 Dist. ICA, Right      PPS: 78.99 DorsPedis, Left      Pressure: 171      Waveform: Triphasic Mid CCA, Left      PSV: 95      EDV: 18 Post Tibial, Left      Pressure: 187      Waveform: Triphasic Prox. ICA, Left      Impression: 2.  Abnormal plaque noted but no significant stenosis      PSV: 56      EDV: 19      ICA/CCA: 0.59      PPS: 95.25 Dist. ICA, Left      PPS: 95.25    CONCLUSION: Impression CAROTID SCREENING: Evaluation reveals abnormal plaque without evidence of stenosis in the internal carotid artery on the right. Evaluation reveals abnormal plaque without evidence of stenosis in the internal carotid artery on the left. AORTA SCREENING: The visualized abdominal aorta is patent and normal in " "caliber with the greatest diameter measurement of 1.8 cm. PAD SCREENING: The ankle/brachial index on the right is 1.58 , which is unreliable due to poorly compressible tibial vessels The ankle/brachial index on the left is 1.29 , which is within normal limits.  Technically difficult/limited study. Some segments may be poorly visualized on today's exam.  SIGNATURE: Electronically Signed by: SHAWNEE JOHNSON MD on 2024-07-11 12:25:07 PM    Cardiac catheterization    Result Date: 6/24/2024  Narrative:   PCI circumflex with BIA   Prox Cx lesion is 90% stenosed.       Review of Systems   All other systems reviewed and are negative.      Vitals:    07/15/24 1356   BP: 118/72   Pulse: 94   SpO2: 96%     Vitals:    07/15/24 1356   Weight: 98.5 kg (217 lb 3.2 oz)     Height: 5' 10\" (177.8 cm)   Body mass index is 31.16 kg/m².    Physical Exam:  Physical Exam  Vitals reviewed.   Constitutional:       General: He is not in acute distress.     Appearance: He is well-developed. He is not diaphoretic.   HENT:      Head: Normocephalic and atraumatic.   Eyes:      Pupils: Pupils are equal, round, and reactive to light.   Neck:      Vascular: No carotid bruit.   Cardiovascular:      Rate and Rhythm: Normal rate and regular rhythm.      Pulses:           Radial pulses are 2+ on the right side and 2+ on the left side.      Heart sounds: S1 normal and S2 normal. No murmur heard.  Pulmonary:      Effort: Pulmonary effort is normal. No respiratory distress.      Breath sounds: Normal breath sounds. No wheezing or rales.   Abdominal:      General: There is no distension.      Palpations: Abdomen is soft.      Tenderness: There is no abdominal tenderness.   Musculoskeletal:      Cervical back: Normal range of motion.   Skin:     General: Skin is warm and dry.      Capillary Refill: Capillary refill takes less than 2 seconds.      Findings: Bruising (posterior right thigh) present. No erythema.          Neurological:      General: No focal " deficit present.      Mental Status: He is alert and oriented to person, place, and time.      Gait: Gait normal.   Psychiatric:         Mood and Affect: Mood normal.         Behavior: Behavior normal.

## 2024-07-17 ENCOUNTER — CLINICAL SUPPORT (OUTPATIENT)
Dept: CARDIAC REHAB | Facility: HOSPITAL | Age: 74
End: 2024-07-17
Payer: COMMERCIAL

## 2024-07-17 DIAGNOSIS — Z95.5 S/P DRUG ELUTING CORONARY STENT PLACEMENT: Primary | ICD-10-CM

## 2024-07-17 PROCEDURE — 93797 PHYS/QHP OP CAR RHAB WO ECG: CPT

## 2024-07-17 NOTE — PROGRESS NOTES
CARDIAC REHABILITATION   ASSESSMENT AND INDIVIDUALIZED TREATMENT PLAN  INITIAL           Today's date: 2024   # of Exercise Sessions Completed: 1  Patient name: Sg Munoz      : 1950  Age: 74 y.o.       MRN: [id number]  Referring Physician: Ismael Wall  Cardiologist: Jose Preston DO  Provider: Nicolette  Clinician: Miladis Smiley RN        Treatment is tailored to this patient's individual needs.  The ITP was reviewed with the patient and all questions were answered to their satisfaction.  Additional ITP documentation can be found electronically including daily and monthly exercise summaries, daily session notes with ECG summaries, education notes, daily medication reconciliation, and daily physician supervision.      Comments: Sg had been having chest pain intermittently from . He had a nuclear stress test done on 24 which was positive for ischemia and a reversible ventricular perfusion defect. He had a cardiac catheterization  and DE stent placed in his prox Circumflex with PTCA.on 24. There is also  prox LAD  30% stenosis/ mid LAD 30% stenosi / Distal LAD 50% stenosis. He is agreeable to starting CR 3x week .         Dx:   Encounter Diagnosis   Name Primary?    S/P drug eluting coronary stent placement        Description of Diagnosis: s/p Drug eluting coronary stent prox Circumflex with PTCA/HTN Dyslipidemia LILIA  T2D  Date of onset: 24  Other Cardiac History: none /HTN/Dyslipemia/T2D        ASSESSMENT    Medical History:   Past Medical History:   Diagnosis Date    Arthritis     Back pain     Diabetes mellitus (HCC)     Hypertension        Family History:  Family History   Problem Relation Age of Onset    Hypertension Father     Coronary artery disease Father     Hypertension Mother     Arthritis Mother     Coronary artery disease Mother     Diabetes Maternal Uncle     Diabetes Paternal Uncle        Allergies:   Patient has no known allergies.    Current  Medications:   Current Outpatient Medications   Medication Sig Dispense Refill    aspirin (ECOTRIN LOW STRENGTH) 81 mg EC tablet Take 81 mg by mouth daily      atorvastatin (LIPITOR) 40 mg tablet Take 1 tablet (40 mg total) by mouth daily 90 tablet 1    clopidogrel (Plavix) 75 mg tablet Take 1 tablet (75 mg total) by mouth daily 90 tablet 3    Continuous Glucose Sensor (Dexcom G7 Sensor) Use 1 Device every 10 days 9 each 3    dutasteride (AVODART) 0.5 mg capsule Take 1 capsule (0.5 mg total) by mouth daily 90 capsule 3    Empagliflozin (Jardiance) 25 MG TABS Take 1 tablet (25 mg total) by mouth every morning 90 tablet 3    gabapentin (Neurontin) 600 MG tablet Take 1 tablet (600 mg total) by mouth 3 (three) times a day 270 tablet 3    insulin degludec (Tresiba FlexTouch) 100 units/mL injection pen Inject 48 Units under the skin every 12 (twelve) hours 90 mL 1    Insulin Pen Needle (B-D ULTRAFINE III SHORT PEN) 31G X 8 MM MISC Use 2 (two) times a day 180 each 1    lisinopril (ZESTRIL) 20 mg tablet Take 1 tablet (20 mg total) by mouth daily 90 tablet 3    metFORMIN (GLUCOPHAGE-XR) 500 mg 24 hr tablet Take 4 tablets (2,000 mg total) by mouth daily with breakfast 360 tablet 1    OneTouch Ultra test strip Use 1 each 3 (three) times a day Use as instructed 400 strip 3    saccharomyces boulardii (FLORASTOR) 250 mg capsule Take 250 mg by mouth every other day      sitaGLIPtin (Januvia) 100 mg tablet Take 1 tablet (100 mg total) by mouth daily 90 tablet 1    SUPER B COMPLEX/C PO Take by mouth      tamsulosin (FLOMAX) 0.4 mg Take 1 capsule (0.4 mg total) by mouth daily 90 capsule 3     No current facility-administered medications for this visit.       Medication compliance: Yes   Comments: Pt reports to be compliant with medications    Physical Limitations: none     Fall Risk: Low   Comments: Ambulates with a steady gait with no assist device and Reports a fall in the past 6 months    Cultural needs: none      CAD Risk  Factors:  Cholesterol: Yes   on atorvastatin 40mg  HTN: Yes              on Lisinopril 20mg  DM: Type 2   insulin          Januvia Metformin/  Tresiba/Jardiance  using CGM  system   Obesity: Yes     BMI  31.16  Inactivity: Yes      EXERCISE ASSESSMENT:     Initial Fitness Assessment: Submaximal TM ETT:  Resting:  BP: 160/80  HR: 91  SpO2: 97, Exercise:  BP: 180/80  HR: 121, METs:  4.3, ECG Summary: NSR with BBB and pvc`s, Symptoms: none, Test terminated at:  RPE 6, and Comments: good effort      ECG INTERPRETATION:  NSR with BBB and PVC`s    Current Functional Status:  Occupation: retired  part time job musician  Recreation/Physical Activity: working in a band  ADL’s:able to perform self-care resumed driving  [place]: No limitations  Home exercise:  stretching  Other Comments: carried  musical equipment       SMART Exercise Goals:   10% improvement in functional capacity based on max METs achieved in initial fitness assessment  improved DASI score by 10%  maintain > 150 minutes per week of moderate intensity exercise    Patient Specific EXERCISE GOALS:       Establish regular walking program   Increase exercise tolerance   Build strength    Functional Capacity Screening Tool:  [facility] Activity Status Index:  7.25 METs      PSYCHOSOCIAL ASSESSMENT:    Date of last Assessment:  0  Depression screening:  PHQ-9 = 0    Interpretation:  0 =No Depression  Anxiety screening:  ADRIENNE-7 = 0    Interpretation: 0-4  = Not anxious    Pt self-report of depression and anxiety   Patient reports they are coping well with good social support and denies depression or anxiety He stated his wife is busy and doesn`t cook much.He doesn`t cook.    Self-reported stress level:  3   Stressors:  denies any chronic stress  Stress Management Tools:  plays musical instruments    SMART Psychosocial Goals:     Physical Fitness in [facility] Score < 3 and Pain in [facility] Score < 3    Patient Specific PSYCHOCOSOCIAL GOALS:    Enjoy life and stay  "active  Stay active with orchestras  Keep making music    Quality of Life Screen:  (Higher score indicates disease impact on QOL)  [facility] COOP score: 15/45     Social Support:   spouse, children, friends, and musicians  Community/Social Activities: playing at events     Psychosocial Assessment as it relates to rehabilitation:   Patient denies issues with his/her family or home life that may affect their rehabilitation efforts.       NUTRITION ASSESSMENT:    Initial Weight:  216  Current Weight: 216    Height:   Ht Readings from Last 1 Encounters:   07/15/24 5' 10\" (1.778 m)       Rate Your Plate Score: 56/81    Diabetes: T2D  A1c: 7.4    last measured: 6/3/24    Lipid management: Last lipid profile 2/12/24  Chol      HDL 39  LDL 78    Current Dietary Habits:  He buys prepared diners at [employer] / Eats lunchmeat sandwiches. Drinks diet soda    SMART Nutrition Goals:   improved A1c  < 7.0%, 2.5-5%  wt loss, eat 5 or more servings of fruits and vegetables a day, and rarely eat processed meats or eat low fat processed meats decrease diet soda intake    Patient Specific NUTRITION GOALS:     1. Lose weight   2. Drink less diet soda- drink water   3. Eat less prosessed food     Drug/AlcoholUse:   No,       OTHER CORE COMPONENT ASSESSMENT:    Tobacco Use:     N/A:  Patient is a non-smoker     Anginal Symptoms:  chest pressure   NTG use: No prescription and no chest pain since  event    SMART Goals:   consistent, controlled resting BP < 130/80 and medication compliance    Patient Specific CORE COMPONENT GOALS:    Reduce plaque in arteries  Become more active- start exercise program  Eat healthier. Encouraged to learn to cook      INDIVIDUALIZED TREATMENT PLAN      EXERCISE GOALS and PLAN      Progress toward Exercise goals:   Coming to rehab 3x week      Exercise Intervention:    home exercise 30+ mins 2 days opposite CR    The patient was counseled on exercise guidelines to achieve a minimum of 150 mins/wk of " moderate intensity (RPE 4-6)   exercise and encouraged to add 1-2 days of exercise on opposite days of cardiac rehab as tolerated.     Current Aerobic Exercise Prescription:      Frequency: 3 days/week   Supplement with home exercise 2+ days/wk as tolerated       Minutes: 20 - 40 Submax ETT        METS: 4.29           HR: Intensity based on RPE 4-6    RPE: 4-6         Modalities: Treadmill     Exercise workloads will be progressed gradually as tolerated, within limits of patient's ability, and according to the patient's   response to the exercise program.      Aerobic Exercise Prescription Plan for Progression   Frequency: 3 days/week of cardiac rehab       Supplement with home exercise 2+ days/wk as tolerated    Minutes: 40       >150 mins/wk of moderate intensity exercise   METS: 3-4.5   HR:  20-30brm above resting      RPE: 4-6   Modalities: Treadmill, UBE, Lifecycle, NuStep, and Recumbent bike    Strength trainin-3 days / week  12-15 repetitions  1-2 sets per modality   Will be added following 2-3 weeks of monitored exercise sessions   Modalities: Leg Press, Chest Press, Pull Downs, Lateral Raise, Arm Extension, and Arm Curl    Home Exercise: none    Exercise Education: benefit of exercise for CAD risk factors, home exercise guidelines, AHA guidelines to achieve >150 mins/wk of moderate exercise, RPE scale, and physical activity/exercise in extreme weather conditions     Readiness to change: Contemplation:  (Acknowledging that there is a problem but not yet ready or sure of wanting to make a change)      NUTRITION GOALS AND PLAN      Nutritional   Reviewed patient's Rate your Plate. Discussed key elements of heart healthy eating. Reviewed patient goals for dietary modifications and their clinical implications.  Reviewed most recent lipid profile.     Patient's progress toward Nutrition goals:    Reviewed Pt goals and determined plan of care      Nutrition Intervention:   group Class: Heart Healthy Eating,  increase intake of whole grains, increase daily intake of fruits and vegetables, increase daily intake of low fat dairy, choose healthy meals while dining out, eat chicken and fish that is baked, broiled or roasted, and cook without fats or oils    Measurable goals were based Rate Your Plate Dietary Self-Assessment. These are the areas in which the patient could score higher on the assessment.  Goals include recommendations for a heart healthy diet based on American Heart Association.    Nutrition Education:   heart healthy eating principles  weight loss and management strategies  low sodium diet  exercise and diabetes management   portion control  group class: Heart Healthy Eating  group class:  Label Reading    Readiness to change: Contemplation:  (Acknowledging that there is a problem but not yet ready or sure of wanting to make a change)      PSYCHOSOCIAL GOALS AND PLAN    Psychosocial Assessment as it relates to rehabilitation:   Patient denies issues with his/her family or home life that may affect their rehabilitation efforts.     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    Readiness to change: Contemplation:  (Acknowledging that there is a problem but not yet ready or sure of wanting to make a change)      OTHER CORE COMPONENTS GOALS and PLAN      Blood Pressure will be monitored throughout the program and cardiologist will be notified of elevated trends.    Pt will be encouraged to monitor home BP if advised by cardiologist.    Tobacco  Intervention:   N/A:  Pt is a non-smoker    Progress toward Core Component goals:   Will continue to educate and progress as tolerated.    Other Core Components Intervention:   group class: Understanding Heart Disease, group class: Common Heart Medications, avoid places with second hand smoke, avoid processed foods, and engage in regular exercise    Group and Individual Education:  understanding high blood pressure and it's relationship to CAD, group class: Understanding Heart Disease, and group class:  Common Heart Medications    Readiness to change: Contemplation:  (Acknowledging that there is a problem but not yet ready or sure of wanting to make a change)

## 2024-07-18 ENCOUNTER — CLINICAL SUPPORT (OUTPATIENT)
Dept: CARDIAC REHAB | Facility: HOSPITAL | Age: 74
End: 2024-07-18
Payer: COMMERCIAL

## 2024-07-18 DIAGNOSIS — Z95.5 S/P DRUG ELUTING CORONARY STENT PLACEMENT: Primary | ICD-10-CM

## 2024-07-18 PROCEDURE — 93798 PHYS/QHP OP CAR RHAB W/ECG: CPT

## 2024-07-22 ENCOUNTER — CLINICAL SUPPORT (OUTPATIENT)
Dept: CARDIAC REHAB | Facility: HOSPITAL | Age: 74
End: 2024-07-22
Payer: COMMERCIAL

## 2024-07-22 DIAGNOSIS — Z95.5 S/P DRUG ELUTING CORONARY STENT PLACEMENT: Primary | ICD-10-CM

## 2024-07-22 PROCEDURE — 93798 PHYS/QHP OP CAR RHAB W/ECG: CPT

## 2024-07-23 ENCOUNTER — CLINICAL SUPPORT (OUTPATIENT)
Dept: CARDIAC REHAB | Facility: HOSPITAL | Age: 74
End: 2024-07-23
Payer: COMMERCIAL

## 2024-07-23 DIAGNOSIS — Z95.5 S/P DRUG ELUTING CORONARY STENT PLACEMENT: Primary | ICD-10-CM

## 2024-07-23 PROCEDURE — 93798 PHYS/QHP OP CAR RHAB W/ECG: CPT

## 2024-07-25 ENCOUNTER — APPOINTMENT (OUTPATIENT)
Dept: CARDIAC REHAB | Facility: HOSPITAL | Age: 74
End: 2024-07-25
Payer: COMMERCIAL

## 2024-07-29 ENCOUNTER — CLINICAL SUPPORT (OUTPATIENT)
Dept: CARDIAC REHAB | Facility: HOSPITAL | Age: 74
End: 2024-07-29
Payer: COMMERCIAL

## 2024-07-29 DIAGNOSIS — Z95.5 S/P DRUG ELUTING CORONARY STENT PLACEMENT: Primary | ICD-10-CM

## 2024-07-29 PROCEDURE — 93797 PHYS/QHP OP CAR RHAB WO ECG: CPT

## 2024-07-30 ENCOUNTER — CLINICAL SUPPORT (OUTPATIENT)
Dept: CARDIAC REHAB | Facility: HOSPITAL | Age: 74
End: 2024-07-30
Payer: COMMERCIAL

## 2024-07-30 DIAGNOSIS — Z95.5 S/P DRUG ELUTING CORONARY STENT PLACEMENT: Primary | ICD-10-CM

## 2024-07-30 PROCEDURE — 93798 PHYS/QHP OP CAR RHAB W/ECG: CPT

## 2024-08-01 ENCOUNTER — CLINICAL SUPPORT (OUTPATIENT)
Dept: CARDIAC REHAB | Facility: HOSPITAL | Age: 74
End: 2024-08-01
Payer: MEDICARE

## 2024-08-01 DIAGNOSIS — Z95.5 S/P DRUG ELUTING CORONARY STENT PLACEMENT: Primary | ICD-10-CM

## 2024-08-01 PROCEDURE — 93798 PHYS/QHP OP CAR RHAB W/ECG: CPT

## 2024-08-05 ENCOUNTER — CLINICAL SUPPORT (OUTPATIENT)
Dept: CARDIAC REHAB | Facility: HOSPITAL | Age: 74
End: 2024-08-05
Payer: COMMERCIAL

## 2024-08-05 DIAGNOSIS — Z95.5 S/P DRUG ELUTING CORONARY STENT PLACEMENT: Primary | ICD-10-CM

## 2024-08-05 PROCEDURE — 93798 PHYS/QHP OP CAR RHAB W/ECG: CPT

## 2024-08-06 ENCOUNTER — CLINICAL SUPPORT (OUTPATIENT)
Dept: CARDIAC REHAB | Facility: HOSPITAL | Age: 74
End: 2024-08-06
Payer: COMMERCIAL

## 2024-08-06 DIAGNOSIS — Z95.5 S/P DRUG ELUTING CORONARY STENT PLACEMENT: Primary | ICD-10-CM

## 2024-08-06 PROCEDURE — 93798 PHYS/QHP OP CAR RHAB W/ECG: CPT

## 2024-08-08 ENCOUNTER — CLINICAL SUPPORT (OUTPATIENT)
Dept: CARDIAC REHAB | Facility: HOSPITAL | Age: 74
End: 2024-08-08
Payer: COMMERCIAL

## 2024-08-08 DIAGNOSIS — Z95.5 S/P DRUG ELUTING CORONARY STENT PLACEMENT: Primary | ICD-10-CM

## 2024-08-08 PROCEDURE — 93798 PHYS/QHP OP CAR RHAB W/ECG: CPT

## 2024-08-12 ENCOUNTER — CLINICAL SUPPORT (OUTPATIENT)
Dept: CARDIAC REHAB | Facility: HOSPITAL | Age: 74
End: 2024-08-12
Payer: COMMERCIAL

## 2024-08-12 DIAGNOSIS — Z95.5 S/P DRUG ELUTING CORONARY STENT PLACEMENT: Primary | ICD-10-CM

## 2024-08-12 PROCEDURE — 93798 PHYS/QHP OP CAR RHAB W/ECG: CPT

## 2024-08-12 NOTE — PROGRESS NOTES
CARDIAC REHABILITATION   ASSESSMENT AND INDIVIDUALIZED TREATMENT PLAN  30 DAY          Today's date: 2024   # of Exercise Sessions Completed: 11  Patient name: Sg Munoz      : 1950  Age: 74 y.o.       MRN: [id number]  Referring Physician: Ismael Wall  Cardiologist: Jose Preston DO  Provider: Nicolette  Clinician: Miladis Smiley RN        Treatment is tailored to this patient's individual needs.  The ITP was reviewed with the patient and all questions were answered to their satisfaction.  Additional ITP documentation can be found electronically including daily and monthly exercise summaries, daily session notes with ECG summaries, education notes, daily medication reconciliation, and daily physician supervision.      Comments: Sg had been having chest pain intermittently from . He had a nuclear stress test done on 24 which was positive for ischemia and a reversible ventricular perfusion defect. He had a cardiac catheterization  and DE stent placed in his prox Circumflex with PTCA.on 24. There is also  prox LAD  30% stenosis/ mid LAD 30% stenosi / Distal LAD 50% stenosis. He is coming to rehab 3x week and active with the [facility] .  .         Dx:   Encounter Diagnosis   Name Primary?    S/P drug eluting coronary stent placement Yes       Description of Diagnosis: s/p Drug eluting coronary stent prox Circumflex with PTCA/HTN Dyslipidemia LILIA  T2D  Date of onset: 24  Other Cardiac History: none /HTN/Dyslipemia/T2D        ASSESSMENT    Medical History:   Past Medical History:   Diagnosis Date    Arthritis     Back pain     Diabetes mellitus (HCC)     Hypertension        Family History:  Family History   Problem Relation Age of Onset    Hypertension Father     Coronary artery disease Father     Hypertension Mother     Arthritis Mother     Coronary artery disease Mother     Diabetes Maternal Uncle     Diabetes Paternal Uncle        Allergies:   Patient  has no known allergies.    Current Medications:   Current Outpatient Medications   Medication Sig Dispense Refill    aspirin (ECOTRIN LOW STRENGTH) 81 mg EC tablet Take 81 mg by mouth daily      atorvastatin (LIPITOR) 40 mg tablet Take 1 tablet (40 mg total) by mouth daily 90 tablet 1    clopidogrel (Plavix) 75 mg tablet Take 1 tablet (75 mg total) by mouth daily 90 tablet 3    Continuous Glucose Sensor (Dexcom G7 Sensor) Use 1 Device every 10 days 9 each 3    dutasteride (AVODART) 0.5 mg capsule Take 1 capsule (0.5 mg total) by mouth daily 90 capsule 3    Empagliflozin (Jardiance) 25 MG TABS Take 1 tablet (25 mg total) by mouth every morning 90 tablet 3    gabapentin (Neurontin) 600 MG tablet Take 1 tablet (600 mg total) by mouth 3 (three) times a day 270 tablet 3    insulin degludec (Tresiba FlexTouch) 100 units/mL injection pen Inject 48 Units under the skin every 12 (twelve) hours 90 mL 1    Insulin Pen Needle (B-D ULTRAFINE III SHORT PEN) 31G X 8 MM MISC Use 2 (two) times a day 180 each 1    lisinopril (ZESTRIL) 20 mg tablet Take 1 tablet (20 mg total) by mouth daily 90 tablet 3    metFORMIN (GLUCOPHAGE-XR) 500 mg 24 hr tablet Take 4 tablets (2,000 mg total) by mouth daily with breakfast 360 tablet 1    OneTouch Ultra test strip Use 1 each 3 (three) times a day Use as instructed 400 strip 3    saccharomyces boulardii (FLORASTOR) 250 mg capsule Take 250 mg by mouth every other day      sitaGLIPtin (Januvia) 100 mg tablet Take 1 tablet (100 mg total) by mouth daily 90 tablet 1    SUPER B COMPLEX/C PO Take by mouth      tamsulosin (FLOMAX) 0.4 mg Take 1 capsule (0.4 mg total) by mouth daily 90 capsule 3     No current facility-administered medications for this visit.       Medication compliance: Yes   Comments: Pt reports to be compliant with medications    Physical Limitations: none     Fall Risk: Low   Comments: Ambulates with a steady gait with no assist device and Reports a fall in the past 6  months    Cultural needs: none      CAD Risk Factors:  Cholesterol: Yes   on atorvastatin 40mg  HTN: Yes              on Lisinopril 20mg  DM: Type 2   insulin          Januvia Metformin/  Tresiba/Jardiance  using CGM  system   Obesity: Yes     BMI  31.16  Inactivity: Yes      EXERCISE ASSESSMENT:     Initial Fitness Assessment: Submaximal TM ETT:  Resting:  BP: 160/80  HR: 91  SpO2: 97, Exercise:  BP: 180/80  HR: 121, METs:  4.3, ECG Summary: NSR with BBB and pvc`s, Symptoms: none, Test terminated at:  RPE 6, and Comments: good effort      ECG INTERPRETATION:  NSR with BBB and PVC`s    Current Functional Status:  Occupation: retired  part time job musician  Recreation/Physical Activity: working in a band  ADL’s:able to perform self-care resumed driving  [place]: No limitations  Home exercise:  stretching  Other Comments: carried  musical equipment       SMART Exercise Goals:   10% improvement in functional capacity based on max METs achieved in initial fitness assessment  improved DASI score by 10%  maintain > 150 minutes per week of moderate intensity exercise    Patient Specific EXERCISE GOALS:       Establish regular walking program   Increase exercise tolerance   Build strength    Functional Capacity Screening Tool:  [facility] Activity Status Index:  7.25 METs      PSYCHOSOCIAL ASSESSMENT:    Date of last Assessment:  7/17/24  Depression screening:  PHQ-9 = 0    Interpretation:  0 =No Depression  Anxiety screening:  ADRIENNE-7 = 0    Interpretation: 0-4  = Not anxious    Pt self-report of depression and anxiety   Patient reports they are coping well with good social support and denies depression or anxiety He stated his wife is busy and doesn`t cook much.He doesn`t cook.    Self-reported stress level:  3   Stressors:  denies any chronic stress  Stress Management Tools:  plays musical instruments    SMART Psychosocial Goals:     Physical Fitness in [facility] Score < 3 and Pain in [facility] Score < 3    Patient  "Specific PSYCHOCOSOCIAL GOALS:    Enjoy life and stay active  Stay active with orchestras  Keep making music    Quality of Life Screen:  (Higher score indicates disease impact on QOL)  [facility] COOP score: 15/45     Social Support:   spouse, children, friends, and musicians  Community/Social Activities: playing at events     Psychosocial Assessment as it relates to rehabilitation:   Patient denies issues with his/her family or home life that may affect their rehabilitation efforts.       NUTRITION ASSESSMENT:    Initial Weight:  216  Current Weight: 216    Height:   Ht Readings from Last 1 Encounters:   07/15/24 5' 10\" (1.778 m)       Rate Your Plate Score: 56/81    Diabetes: T2D  A1c: 7.4    last measured: 6/3/24    Lipid management: Last lipid profile 2/12/24  Chol      HDL 39  LDL 78    Current Dietary Habits:  He buys prepared diners at [employer] / Eats lunchmeat sandwiches. Drinks diet soda. He is trying to eat healthierand drink lesssoda    SMART Nutrition Goals:   improved A1c  < 7.0%, 2.5-5%  wt loss, eat 5 or more servings of fruits and vegetables a day, and rarely eat processed meats or eat low fat processed meats decrease diet soda intake    Patient Specific NUTRITION GOALS:     1. Lose weight   2. Drink less diet soda- drink water   3. Eat less prosessed food     Drug/AlcoholUse:   No,       OTHER CORE COMPONENT ASSESSMENT:    Tobacco Use:     N/A:  Patient is a non-smoker     Anginal Symptoms:  chest pressure   NTG use: No prescription and no chest pain since  event    SMART Goals:   consistent, controlled resting BP < 130/80 and medication compliance    Patient Specific CORE COMPONENT GOALS:    Reduce plaque in arteries  Become more active- start exercise program  Eat healthier. Encouraged to learn to cook      INDIVIDUALIZED TREATMENT PLAN      EXERCISE GOALS and PLAN      Progress toward Exercise goals:   Coming to rehab 3x week      Exercise Intervention:    home exercise 30+ mins 2 days " opposite CR    The patient was counseled on exercise guidelines to achieve a minimum of 150 mins/wk of moderate intensity (RPE 4-6)   exercise and encouraged to add 1-2 days of exercise on opposite days of cardiac rehab as tolerated.     Current Aerobic Exercise Prescription:      Frequency: 3 days/week   Supplement with home exercise 2+ days/wk as tolerated       Minutes: 40        METS: 2.19-2.23           HR: Intensity based on RPE 4-6  20-30bpm above resting   RPE: 4-6         Modalities: Treadmill, UBE, NuStep, and Recumbent bike     Exercise workloads will be progressed gradually as tolerated, within limits of patient's ability, and according to the patient's   response to the exercise program.      Aerobic Exercise Prescription Plan for Progression   Frequency: 3 days/week of cardiac rehab       Supplement with home exercise 2+ days/wk as tolerated    Minutes: 40       >150 mins/wk of moderate intensity exercise   METS: 3-4.5   HR:  20-30brm above resting      RPE: 4-6   Modalities: Treadmill, UBE, Lifecycle, NuStep, and Recumbent bike    Strength trainin-3 days / week  12-15 repetitions  1-2 sets per modality  Will be started next session    Modalities: Leg Press, Chest Press, Pull Downs, Lateral Raise, Arm Extension, and Arm Curl    Home Exercise: none    Exercise Education: benefit of exercise for CAD risk factors, home exercise guidelines, AHA guidelines to achieve >150 mins/wk of moderate exercise, RPE scale, and physical activity/exercise in extreme weather conditions     Readiness to change: Contemplation:  (Acknowledging that there is a problem but not yet ready or sure of wanting to make a change)      NUTRITION GOALS AND PLAN      Nutritional   Reviewed patient's Rate your Plate. Discussed key elements of heart healthy eating. Reviewed patient goals for dietary modifications and their clinical implications.  Reviewed most recent lipid profile.     Patient's progress toward Nutrition goals:     Reviewed Pt goals and determined plan of care      Nutrition Intervention:   group Class: Heart Healthy Eating, increase intake of whole grains, increase daily intake of fruits and vegetables, increase daily intake of low fat dairy, choose healthy meals while dining out, eat chicken and fish that is baked, broiled or roasted, and cook without fats or oils    Measurable goals were based Rate Your Plate Dietary Self-Assessment. These are the areas in which the patient could score higher on the assessment.  Goals include recommendations for a heart healthy diet based on American Heart Association.    Nutrition Education:   heart healthy eating principles  weight loss and management strategies  low sodium diet  exercise and diabetes management   portion control  group class: Heart Healthy Eating  group class:  Label Reading    Readiness to change: Contemplation:  (Acknowledging that there is a problem but not yet ready or sure of wanting to make a change)      PSYCHOSOCIAL GOALS AND PLAN    Psychosocial Assessment as it relates to rehabilitation:   Patient denies issues with his/her family or home life that may affect their rehabilitation efforts.     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    Readiness to change: Contemplation:  (Acknowledging that there is a problem but not yet ready or sure of wanting to make a change)      OTHER CORE COMPONENTS GOALS and  PLAN      Blood Pressure will be monitored throughout the program and cardiologist will be notified of elevated trends.    Pt will be encouraged to monitor home BP if advised by cardiologist.    Tobacco Intervention:   N/A:  Pt is a non-smoker    Progress toward Core Component goals:   Will continue to educate and progress as tolerated.  Blood pressure is below 130/80 at rest    Other Core Components Intervention:   group class: Understanding Heart Disease, group class: Common Heart Medications, avoid places with second hand smoke, avoid processed foods, and engage in regular exercise    Group and Individual Education:  understanding high blood pressure and it's relationship to CAD, group class: Understanding Heart Disease, and group class:  Common Heart Medications    Readiness to change: Contemplation:  (Acknowledging that there is a problem but not yet ready or sure of wanting to make a change)

## 2024-08-13 ENCOUNTER — CLINICAL SUPPORT (OUTPATIENT)
Dept: CARDIAC REHAB | Facility: HOSPITAL | Age: 74
End: 2024-08-13
Payer: COMMERCIAL

## 2024-08-13 DIAGNOSIS — Z95.5 S/P DRUG ELUTING CORONARY STENT PLACEMENT: Primary | ICD-10-CM

## 2024-08-13 PROCEDURE — 93798 PHYS/QHP OP CAR RHAB W/ECG: CPT

## 2024-08-15 ENCOUNTER — CLINICAL SUPPORT (OUTPATIENT)
Dept: CARDIAC REHAB | Facility: HOSPITAL | Age: 74
End: 2024-08-15
Payer: COMMERCIAL

## 2024-08-15 DIAGNOSIS — Z95.5 S/P DRUG ELUTING CORONARY STENT PLACEMENT: Primary | ICD-10-CM

## 2024-08-15 PROCEDURE — 93798 PHYS/QHP OP CAR RHAB W/ECG: CPT

## 2024-08-19 ENCOUNTER — CLINICAL SUPPORT (OUTPATIENT)
Dept: CARDIAC REHAB | Facility: HOSPITAL | Age: 74
End: 2024-08-19
Payer: COMMERCIAL

## 2024-08-19 DIAGNOSIS — Z95.5 S/P DRUG ELUTING CORONARY STENT PLACEMENT: Primary | ICD-10-CM

## 2024-08-19 PROCEDURE — 93798 PHYS/QHP OP CAR RHAB W/ECG: CPT

## 2024-08-20 ENCOUNTER — CLINICAL SUPPORT (OUTPATIENT)
Dept: CARDIAC REHAB | Facility: HOSPITAL | Age: 74
End: 2024-08-20
Payer: COMMERCIAL

## 2024-08-20 DIAGNOSIS — Z95.5 S/P DRUG ELUTING CORONARY STENT PLACEMENT: Primary | ICD-10-CM

## 2024-08-20 PROCEDURE — 93798 PHYS/QHP OP CAR RHAB W/ECG: CPT

## 2024-08-22 ENCOUNTER — CLINICAL SUPPORT (OUTPATIENT)
Dept: CARDIAC REHAB | Facility: HOSPITAL | Age: 74
End: 2024-08-22
Payer: COMMERCIAL

## 2024-08-22 DIAGNOSIS — Z95.5 S/P DRUG ELUTING CORONARY STENT PLACEMENT: Primary | ICD-10-CM

## 2024-08-22 PROCEDURE — 93798 PHYS/QHP OP CAR RHAB W/ECG: CPT

## 2024-08-26 ENCOUNTER — CLINICAL SUPPORT (OUTPATIENT)
Dept: CARDIAC REHAB | Facility: HOSPITAL | Age: 74
End: 2024-08-26
Payer: COMMERCIAL

## 2024-08-26 ENCOUNTER — CLINICAL SUPPORT (OUTPATIENT)
Dept: FAMILY MEDICINE CLINIC | Facility: CLINIC | Age: 74
End: 2024-08-26

## 2024-08-26 DIAGNOSIS — Z95.5 S/P DRUG ELUTING CORONARY STENT PLACEMENT: Primary | ICD-10-CM

## 2024-08-26 DIAGNOSIS — E11.8 TYPE 2 DIABETES MELLITUS WITH COMPLICATION, WITH LONG-TERM CURRENT USE OF INSULIN (HCC): ICD-10-CM

## 2024-08-26 DIAGNOSIS — Z79.4 TYPE 2 DIABETES MELLITUS WITH COMPLICATION, WITH LONG-TERM CURRENT USE OF INSULIN (HCC): ICD-10-CM

## 2024-08-26 PROCEDURE — 93798 PHYS/QHP OP CAR RHAB W/ECG: CPT

## 2024-08-26 RX ORDER — INSULIN DEGLUDEC 100 U/ML
50 INJECTION, SOLUTION SUBCUTANEOUS EVERY 12 HOURS SCHEDULED
Qty: 90 ML | Refills: 1 | Status: SHIPPED | OUTPATIENT
Start: 2024-08-26

## 2024-08-26 NOTE — PROGRESS NOTES
[facility]  Lotus Stein, Pharmacist    Sg Munoz is a 74 y.o. male who was referred to the pharmacist for T2DM education and management, referred by Dr. Klein    Telemedicine consent  The patient was identified by name and date of birth. Sg Munoz was informed that this is a telemedicine visit and that the visit is being conducted through Telephone.  My office door was closed.  No one else was in the room.  He acknowledged consent and understanding of privacy and security of the video platform. The patient has agreed to participate and understands they can discontinue the visit at any time.    Assessment/ Plan       Type 2 Diabetes:  Goal A1c 7.5% individualized based on age, duration, co-morbidities, and microvascular complications. Most recent A1c   Lab Results   Component Value Date    HGBA1C 7.4 (A) 06/03/2024      SMBG  Fasting readings controlled at 100-120 mg/dL in AM  Within goal range of  mg/dL 75% of time  Before adjusting his insulin dose he has having readings in the 60 mg/dL range.   DM Complications:  CAD  Current Diabetes Regimen:  Metformin 1000 mg BID  Januvia 100 mg daily  Tresiba 50 units once daily    Changes to Medication Regimen:   If PCP is in agreement with plan  Sent instructions to connect through Dexcom clarity for data sharing again  Patient lowered insulin dose from Tresiba 48 units BID (96 units total) to Tresiba 50 units daily. He has lower insulin requirement due to cardiac rehab and increase in exercise. Continue lower dose of Tresiba 50 units daily     2. On Additional Therapies:  Statin: yes  ACEI/ARB: yes  ASA: yes    3. Hyperlipidemia with clinical ASCVD event:   2018 ACC/AHA lipid guidelines recommend high statin.   Patient currently on high intensity and tolerating well without side effects.   LDL goal < 55 mg/dL (2024 ADA standards of care)     4. HTN:   BP goal less than 130/80 mmHg.   In office BP below goal, HR  acceptable.     Follow-up: 4 weeks    Subjective     Medication Adherence/ Tolerability:  Metformin 1000 mg BID  Jardiance 25 mg daily- no side effects. Has had UTI but last one was 8/2023  Januvia 100 mg daily   Tresiba 50 units once daily- 2 weeks ago. decreased due to hypoglycemia events        Medications Tried in Past:      2. Lifestyle:   Physical Activity: Still pretty active. 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 Home monitoring devices  Glucometer: Yes, Brand:   CGM: No, Brand: ordered Dexcom g7 today    Objective     Lab Results   Component Value Date    HGBA1C 7.4 (A) 06/03/2024    HGBA1C 7.9 (H) 02/12/2024    HGBA1C 7.0 (A) 09/07/2023       Blood glucose:  Has not connected to Dexcom Clarity however read of the following data to me  This  mg/dL   Readings in AM have been 100-120  mg/dL in the morning   Within goal range of  mg/dL 75% of time     Pharmacist Tracking Tool     Pharmacist Tracking Tool  Reason For Outreach: Embedded Pharmacist  Demographics:  Intervention Method: Phone  Type of Intervention: Follow-Up  Topics Addressed: Diabetes  Pharmacologic Interventions: Dose or Frequency Adjusted and N/A  Non-Pharmacologic Interventions: Chart update, Disease state education, Home Monitoring, Medication/Device education, and Personal CGM  Time:  Direct Patient Care:  20  mins  Care Coordination:  5  mins  Recommendation Recipient: Patient/Caregiver and Provider  Outcome: Accepted

## 2024-08-27 ENCOUNTER — CLINICAL SUPPORT (OUTPATIENT)
Dept: CARDIAC REHAB | Facility: HOSPITAL | Age: 74
End: 2024-08-27
Payer: COMMERCIAL

## 2024-08-27 DIAGNOSIS — Z95.5 S/P DRUG ELUTING CORONARY STENT PLACEMENT: Primary | ICD-10-CM

## 2024-08-27 PROCEDURE — 93798 PHYS/QHP OP CAR RHAB W/ECG: CPT

## 2024-08-29 ENCOUNTER — CLINICAL SUPPORT (OUTPATIENT)
Dept: CARDIAC REHAB | Facility: HOSPITAL | Age: 74
End: 2024-08-29
Payer: COMMERCIAL

## 2024-08-29 ENCOUNTER — OFFICE VISIT (OUTPATIENT)
Dept: PODIATRY | Facility: CLINIC | Age: 74
End: 2024-08-29
Payer: MEDICARE

## 2024-08-29 VITALS
OXYGEN SATURATION: 96 % | HEIGHT: 70 IN | RESPIRATION RATE: 16 BRPM | BODY MASS INDEX: 30.12 KG/M2 | WEIGHT: 210.4 LBS | HEART RATE: 93 BPM | TEMPERATURE: 97.6 F | DIASTOLIC BLOOD PRESSURE: 72 MMHG | SYSTOLIC BLOOD PRESSURE: 156 MMHG

## 2024-08-29 DIAGNOSIS — E11.49 CONTROLLED TYPE 2 DIABETES MELLITUS WITH OTHER NEUROLOGIC COMPLICATION, WITH LONG-TERM CURRENT USE OF INSULIN (HCC): Primary | ICD-10-CM

## 2024-08-29 DIAGNOSIS — Z79.4 CONTROLLED TYPE 2 DIABETES MELLITUS WITH OTHER NEUROLOGIC COMPLICATION, WITH LONG-TERM CURRENT USE OF INSULIN (HCC): Primary | ICD-10-CM

## 2024-08-29 DIAGNOSIS — Z95.5 S/P DRUG ELUTING CORONARY STENT PLACEMENT: Primary | ICD-10-CM

## 2024-08-29 DIAGNOSIS — I73.9 PERIPHERAL VASCULAR DISEASE (HCC): ICD-10-CM

## 2024-08-29 DIAGNOSIS — B35.1 ONYCHOMYCOSIS: ICD-10-CM

## 2024-08-29 DIAGNOSIS — L85.3 XEROSIS OF SKIN: ICD-10-CM

## 2024-08-29 PROCEDURE — 93798 PHYS/QHP OP CAR RHAB W/ECG: CPT

## 2024-08-29 PROCEDURE — 99212 OFFICE O/P EST SF 10 MIN: CPT | Performed by: PODIATRIST

## 2024-08-29 NOTE — PROGRESS NOTES
Ambulatory Visit  Name: Sg Munoz      : 1950      MRN: [id number]  Encounter Provider: Yung Salvador DPM  Encounter Date: 2024   Encounter department: [facility] [place]    Assessment & Plan   1. Controlled type 2 diabetes mellitus with other neurologic complication, with long-term current use of insulin (HCC)  2. Peripheral vascular disease (HCC)  3. Onychomycosis  4. Xerosis of skin    Prior mycotic nails and thin the nail plates with the use of a nail nipper manually and the edges and nailbeds were thinned with the use of an electric Dremel beni.  Patient tolerated the procedure well.    Discussed treatment for the dry skin with the patient in the form of a lotion patient stated that he will do other remedy that he has been doing from another podiatrist and is pleased with the results thus far    Return in about 10 weeks (around 2024).     History of Present Illness     Sg Munoz is a 74 y.o. male who presents with chief complaint of painful thick nails on both feet.  He is a diabetic who sugar is elevated.    Review of Systems  Medical History Reviewed by provider this encounter:       Current Outpatient Medications on File Prior to Visit   Medication Sig Dispense Refill    aspirin (ECOTRIN LOW STRENGTH) 81 mg EC tablet Take 81 mg by mouth daily      atorvastatin (LIPITOR) 40 mg tablet Take 1 tablet (40 mg total) by mouth daily 90 tablet 1    clopidogrel (Plavix) 75 mg tablet Take 1 tablet (75 mg total) by mouth daily 90 tablet 3    Continuous Glucose Sensor (Dexcom G7 Sensor) Use 1 Device every 10 days 9 each 3    dutasteride (AVODART) 0.5 mg capsule Take 1 capsule (0.5 mg total) by mouth daily 90 capsule 3    Empagliflozin (Jardiance) 25 MG TABS Take 1 tablet (25 mg total) by mouth every morning 90 tablet 3    gabapentin (Neurontin) 600 MG tablet Take 1 tablet (600 mg total) by mouth 3 (three) times a day 270 tablet 3    insulin degludec (Tresiba FlexTouch) 100  "units/mL injection pen Inject 50 Units under the skin every 12 (twelve) hours 90 mL 1    Insulin Pen Needle (B-D ULTRAFINE III SHORT PEN) 31G X 8 MM MISC Use 2 (two) times a day 180 each 1    lisinopril (ZESTRIL) 20 mg tablet Take 1 tablet (20 mg total) by mouth daily 90 tablet 3    metFORMIN (GLUCOPHAGE-XR) 500 mg 24 hr tablet Take 4 tablets (2,000 mg total) by mouth daily with breakfast 360 tablet 1    OneTouch Ultra test strip Use 1 each 3 (three) times a day Use as instructed 400 strip 3    saccharomyces boulardii (FLORASTOR) 250 mg capsule Take 250 mg by mouth every other day      sitaGLIPtin (Januvia) 100 mg tablet Take 1 tablet (100 mg total) by mouth daily 90 tablet 1    SUPER B COMPLEX/C PO Take by mouth      tamsulosin (FLOMAX) 0.4 mg Take 1 capsule (0.4 mg total) by mouth daily 90 capsule 3     No current facility-administered medications on file prior to visit.      Objective     /72   Pulse 93   Temp 97.6 °F (36.4 °C) (Temporal)   Resp 16   Ht 5' 10\" (1.778 m)   Wt 95.4 kg (210 lb 6.4 oz)   SpO2 96%   BMI 30.19 kg/m²     Physical Exam  Cardiovascular:      Pulses:           Dorsalis pedis pulses are 0 on the right side and 0 on the left side.        Posterior tibial pulses are 0 on the right side and 0 on the left side.   Feet:      Right foot:      Protective Sensation: 4 sites tested.  3 sites sensed.      Skin integrity: Dry skin present.      Toenail Condition: Right toenails are abnormally thick. Fungal disease present.     Left foot:      Protective Sensation: 4 sites tested.  0 sites sensed.      Skin integrity: Dry skin present.      Toenail Condition: Left toenails are abnormally thick. Fungal disease present.    Vascular status is 0/4 DP PT negative digital hair normal distal cooling delayed capillary refill bilaterally. The capillary refill is approximately 3 seconds or greater    Derm nails are brittle elongated hypertrophic yellow-brown discoloration with subungual debris.  " There is an increased thickness in the nails of approximately 2 to 4 mm and excessive length to the nails of approximately 5 mm.  There is white flaky tissue present on the medial lateral and plantar aspects of both feet no cracks in the skin are noted nor is there any dried blood present within the tissue.    Ortho mild hammertoe deformities are present on the fifth digits bilaterally which are in a slight adductovarus position.    Neuro vibration and 2 point is intact.  The 0.5 monofilament test was performed on both extremities in the areas of the plantar aspect of the hallux, plantar aspect of the third and fourth digits, submet 3, and the arch area patient was able to feel all sites on the right and had diminished or no sensation on the left especially in the forefoot region  Administrative Statements   I have spent a total time of 15 minutes in caring for this patient on the day of the visit/encounter including Patient and family education, Counseling / Coordination of care, Documenting in the medical record, Reviewing / ordering tests, medicine, procedures  , and Obtaining or reviewing history  .

## 2024-09-03 ENCOUNTER — CLINICAL SUPPORT (OUTPATIENT)
Dept: CARDIAC REHAB | Facility: HOSPITAL | Age: 74
End: 2024-09-03
Payer: COMMERCIAL

## 2024-09-03 DIAGNOSIS — Z95.5 S/P DRUG ELUTING CORONARY STENT PLACEMENT: Primary | ICD-10-CM

## 2024-09-03 PROCEDURE — 93798 PHYS/QHP OP CAR RHAB W/ECG: CPT

## 2024-09-04 ENCOUNTER — APPOINTMENT (OUTPATIENT)
Dept: LAB | Facility: CLINIC | Age: 74
End: 2024-09-04
Payer: MEDICARE

## 2024-09-04 DIAGNOSIS — E78.5 DYSLIPIDEMIA: ICD-10-CM

## 2024-09-04 LAB
ALBUMIN SERPL BCG-MCNC: 4 G/DL (ref 3.5–5)
ALP SERPL-CCNC: 79 U/L (ref 34–104)
ALT SERPL W P-5'-P-CCNC: 23 U/L (ref 7–52)
ANION GAP SERPL CALCULATED.3IONS-SCNC: 11 MMOL/L (ref 4–13)
AST SERPL W P-5'-P-CCNC: 17 U/L (ref 13–39)
BILIRUB SERPL-MCNC: 1.55 MG/DL (ref 0.2–1)
BUN SERPL-MCNC: 17 MG/DL (ref 5–25)
CALCIUM SERPL-MCNC: 8.4 MG/DL (ref 8.4–10.2)
CHLORIDE SERPL-SCNC: 106 MMOL/L (ref 96–108)
CHOLEST SERPL-MCNC: 129 MG/DL
CO2 SERPL-SCNC: 23 MMOL/L (ref 21–32)
CREAT SERPL-MCNC: 0.58 MG/DL (ref 0.6–1.3)
GFR SERPL CREATININE-BSD FRML MDRD: 101 ML/MIN/1.73SQ M
GLUCOSE P FAST SERPL-MCNC: 119 MG/DL (ref 65–99)
HDLC SERPL-MCNC: 38 MG/DL
LDLC SERPL CALC-MCNC: 71 MG/DL (ref 0–100)
POTASSIUM SERPL-SCNC: 4 MMOL/L (ref 3.5–5.3)
PROT SERPL-MCNC: 6.2 G/DL (ref 6.4–8.4)
SODIUM SERPL-SCNC: 140 MMOL/L (ref 135–147)
TRIGL SERPL-MCNC: 102 MG/DL

## 2024-09-04 PROCEDURE — 80053 COMPREHEN METABOLIC PANEL: CPT

## 2024-09-04 PROCEDURE — 36415 COLL VENOUS BLD VENIPUNCTURE: CPT

## 2024-09-04 PROCEDURE — 80061 LIPID PANEL: CPT

## 2024-09-05 ENCOUNTER — CLINICAL SUPPORT (OUTPATIENT)
Dept: CARDIAC REHAB | Facility: HOSPITAL | Age: 74
End: 2024-09-05
Payer: COMMERCIAL

## 2024-09-05 DIAGNOSIS — Z95.5 S/P DRUG ELUTING CORONARY STENT PLACEMENT: Primary | ICD-10-CM

## 2024-09-05 PROCEDURE — 93798 PHYS/QHP OP CAR RHAB W/ECG: CPT

## 2024-09-06 ENCOUNTER — TELEPHONE (OUTPATIENT)
Dept: CARDIOLOGY CLINIC | Facility: HOSPITAL | Age: 74
End: 2024-09-06

## 2024-09-06 NOTE — TELEPHONE ENCOUNTER
09/06/24 lvm for pt with results verbatim    In Basket message:    Acacia Brown PA-C sent to Saumya Tenorio  Please call patient and let him know that his cholesterol levels are good.  His kidney function, liver function, and potassium levels are within normal range.    Thank you!   Associated Results     Contains abnormal data Lipid Panel with Direct LDL reflex  Order: [id number]   Status: Final result       Visible to patient: Yes (not seen)       Dx: Dyslipidemia    1 Result Note            Component  Ref Range & Units 2 d ago 6 mo ago 1 yr ago 2 yr ago 3 yr ago 4 yr ago 5 yr ago   Cholesterol  See Comment mg/dL 129 137    R,  R,  R, CM   Comment: Cholesterol:        Pediatric <18 Years        Desirable          <170 mg/dL      Borderline High    170-199 mg/dL      High               >=200 mg/dL        Adult >=18 Years           Desirable         <200 mg/dL      Borderline High   200-239 mg/dL      High             >239 mg/dL   Triglycerides  See Comment mg/dL 102 100  High    High  R,  High  R,  R, CM   Comment: Triglyceride:     0-9Y            <75mg/dL     10Y-17Y         <90 mg/dL       >=18Y     Normal          <150 mg/dL     Borderline High 150-199 mg/dL     High            200-499 mg/dL       Very High       >499 mg/dL    Specimen collection should occur prior to Metamizole administration due to the potential for falsely depressed results.   HDL, Direct  >=40 mg/dL 38 Low  39 Low  39 Low  39 Low  CM 36 Low  CM 35 Low  CM 35 Low  R, CM   LDL Calculated  0 - 100 mg/dL 71 78 CM 71 CM 91 CM 75 CM 88 CM 81 CM   Comment: LDL Cholesterol:    Optimal           <100 mg/dl    Near Optimal      100-129 mg/dl    Above Optimal      Borderline High 130-159 mg/dl      High            160-189 mg/dl      Very High       >189 mg/dl         This screening LDL is a calculated result.  It does not have the accuracy of the Direct Measured LDL in the monitoring of  patients with hyperlipidemia and/or statin therapy.  Direct Measure LDL ([id number]) must be ordered separately in these patients.

## 2024-09-09 ENCOUNTER — CLINICAL SUPPORT (OUTPATIENT)
Dept: CARDIAC REHAB | Facility: HOSPITAL | Age: 74
End: 2024-09-09
Payer: COMMERCIAL

## 2024-09-09 DIAGNOSIS — Z95.5 S/P DRUG ELUTING CORONARY STENT PLACEMENT: Primary | ICD-10-CM

## 2024-09-09 PROCEDURE — 93798 PHYS/QHP OP CAR RHAB W/ECG: CPT

## 2024-09-09 NOTE — PROGRESS NOTES
CARDIAC REHABILITATION   ASSESSMENT AND INDIVIDUALIZED TREATMENT PLAN  60 DAY          Today's date: 2024   # of Exercise Sessions Completed: 22  Patient name: Sg Munoz      : 1950  Age: 74 y.o.       MRN: [id number]  Referring Physician: Ismael Wall  Cardiologist: Jose Preston DO  Provider: Nicolette  Clinician: Miladis Smiley RN        Treatment is tailored to this patient's individual needs.  The ITP was reviewed with the patient and all questions were answered to their satisfaction.  Additional ITP documentation can be found electronically including daily and monthly exercise summaries, daily session notes with ECG summaries, education notes, daily medication reconciliation, and daily physician supervision.      Comments: Sg had been having chest pain intermittently from . He had a nuclear stress test done on 24 which was positive for ischemia and a reversible ventricular perfusion defect. He had a cardiac catheterization  and DE stent placed in his prox Circumflex with PTCA.on 24. There is also  prox LAD  30% stenosis/ mid LAD 30% stenosi / Distal LAD 50% stenosis. He is coming to rehab 3x week and active with the [facility] .  .   Sg is in NSR with BBB. He also has occassional PVC`s. He states he feel much better since he is exercising. He lost 10 lb since enrolling. He also monitors his blood sugar regularly .      Dx: Cardiac stent  prax circumflex        Description of Diagnosis: s/p Drug eluting coronary stent prox Circumflex with PTCA/HTN Dyslipidemia LILIA  T2D  Date of onset: 24  Other Cardiac History: none /HTN/Dyslipemia/T2D        ASSESSMENT    Medical History:   Past Medical History:   Diagnosis Date    Arthritis     Back pain     Diabetes mellitus (HCC)     Hypertension        Family History:  Family History   Problem Relation Age of Onset    Hypertension Father     Coronary artery disease Father     Hypertension Mother      Arthritis Mother     Coronary artery disease Mother     Diabetes Maternal Uncle     Diabetes Paternal Uncle        Allergies:   Patient has no known allergies.    Current Medications:   Current Outpatient Medications   Medication Sig Dispense Refill    aspirin (ECOTRIN LOW STRENGTH) 81 mg EC tablet Take 81 mg by mouth daily      atorvastatin (LIPITOR) 40 mg tablet Take 1 tablet (40 mg total) by mouth daily 90 tablet 1    clopidogrel (Plavix) 75 mg tablet Take 1 tablet (75 mg total) by mouth daily 90 tablet 3    Continuous Glucose Sensor (Dexcom G7 Sensor) Use 1 Device every 10 days 9 each 3    dutasteride (AVODART) 0.5 mg capsule Take 1 capsule (0.5 mg total) by mouth daily 90 capsule 3    Empagliflozin (Jardiance) 25 MG TABS Take 1 tablet (25 mg total) by mouth every morning 90 tablet 3    gabapentin (Neurontin) 600 MG tablet Take 1 tablet (600 mg total) by mouth 3 (three) times a day 270 tablet 3    insulin degludec (Tresiba FlexTouch) 100 units/mL injection pen Inject 50 Units under the skin every 12 (twelve) hours 90 mL 1    Insulin Pen Needle (B-D ULTRAFINE III SHORT PEN) 31G X 8 MM MISC Use 2 (two) times a day 180 each 1    lisinopril (ZESTRIL) 20 mg tablet Take 1 tablet (20 mg total) by mouth daily 90 tablet 3    metFORMIN (GLUCOPHAGE-XR) 500 mg 24 hr tablet Take 4 tablets (2,000 mg total) by mouth daily with breakfast 360 tablet 1    OneTouch Ultra test strip Use 1 each 3 (three) times a day Use as instructed 400 strip 3    saccharomyces boulardii (FLORASTOR) 250 mg capsule Take 250 mg by mouth every other day      sitaGLIPtin (Januvia) 100 mg tablet Take 1 tablet (100 mg total) by mouth daily 90 tablet 1    SUPER B COMPLEX/C PO Take by mouth      tamsulosin (FLOMAX) 0.4 mg Take 1 capsule (0.4 mg total) by mouth daily 90 capsule 3     No current facility-administered medications for this visit.       Medication compliance: Yes   Comments: Pt reports to be compliant with medications    Physical Limitations:  none     Fall Risk: Low   Comments: Ambulates with a steady gait with no assist device and Reports a fall in the past 6 months    Cultural needs: none      CAD Risk Factors:  Cholesterol: Yes   on atorvastatin 40mg  HTN: Yes              on Lisinopril 20mg  DM: Type 2   insulin          Januvia Metformin/  Tresiba/Jardiance  using CGM  system   Obesity: Yes     BMI  31.16  Inactivity: Yes      EXERCISE ASSESSMENT:     Initial Fitness Assessment: Submaximal TM ETT:  Resting:  BP: 160/80  HR: 91  SpO2: 97, Exercise:  BP: 180/80  HR: 121, METs:  4.3, ECG Summary: NSR with BBB and pvc`s, Symptoms: none, Test terminated at:  RPE 6, and Comments: good effort      ECG INTERPRETATION:  NSR with BBB and PVC`s    Current Functional Status:  Occupation: retired  part time job musician  Recreation/Physical Activity: working in a band  ADL’s:able to perform self-care resumed driving  [place]: No limitations  Home exercise:  stretching  Other Comments: carried  musical equipment       SMART Exercise Goals:   10% improvement in functional capacity based on max METs achieved in initial fitness assessment  improved DASI score by 10%  maintain > 150 minutes per week of moderate intensity exercise    Patient Specific EXERCISE GOALS:       Establish regular walking program   Increase exercise tolerance   Build strength    Functional Capacity Screening Tool:  [facility] Activity Status Index:  7.25 METs      PSYCHOSOCIAL ASSESSMENT:    Date of last Assessment:  7/17/24  Depression screening:  PHQ-9 = 0    Interpretation:  0 =No Depression  Anxiety screening:  ADRIENNE-7 = 0    Interpretation: 0-4  = Not anxious    Pt self-report of depression and anxiety   Patient reports they are coping well with good social support and denies depression or anxiety He stated his wife is busy and doesn`t cook much.He doesn`t cook.    Self-reported stress level:  3   Stressors:  denies any chronic stress  Stress Management Tools:  plays musical  "instruments    SMART Psychosocial Goals:     Physical Fitness in [facility] Score < 3 and Pain in [facility] Score < 3    Patient Specific PSYCHOCOSOCIAL GOALS:    Enjoy life and stay active  Stay active with orchestras  Keep making music    Quality of Life Screen:  (Higher score indicates disease impact on QOL)  [facility] COOP score: 15/45     Social Support:   spouse, children, friends, and musicians  Community/Social Activities: playing at events     Psychosocial Assessment as it relates to rehabilitation:   Patient denies issues with his/her family or home life that may affect their rehabilitation efforts.       NUTRITION ASSESSMENT:    Initial Weight:  216  Current Weight: 206    Height:   Ht Readings from Last 1 Encounters:   08/29/24 5' 10\" (1.778 m)       Rate Your Plate Score: 56/81    Diabetes: T2D  A1c: 7.4    last measured: 6/3/24    Lipid management: Last lipid profile 09/04/24  Chol 129    HDL 38  LDL 71  Current Dietary Habits:  He is still drinking 2 16OZ diet  cokes a day . This is improved from 6 a day . He is also drinking water  He is eating more fruits and vegetables  SMART Nutrition Goals:   improved A1c  < 7.0%, 2.5-5%  wt loss, eat 5 or more servings of fruits and vegetables a day, and rarely eat processed meats or eat low fat processed meats decrease diet soda intake to 1 a day     Patient Specific NUTRITION GOALS:     1. Lose weight   2. Drink less diet soda- drink water   3. Eat less prosessed food     Drug/AlcoholUse:   No,       OTHER CORE COMPONENT ASSESSMENT:    Tobacco Use:     N/A:  Patient is a non-smoker     Anginal Symptoms:  chest pressure   NTG use: No prescription and no chest pain since  event    SMART Goals:   consistent, controlled resting BP < 130/80 and medication compliance    Patient Specific CORE COMPONENT GOALS:    Reduce plaque in arteries  Become more active- start exercise program  Eat healthier. Encouraged to learn to cook      INDIVIDUALIZED TREATMENT " PLAN      EXERCISE GOALS and PLAN      Progress toward Exercise goals:   Coming to rehab 3x week  working with band and moving equipment He has not started a walking program at home yet    Exercise Intervention:    home exercise 30+ mins 2 days opposite CR    The patient was counseled on exercise guidelines to achieve a minimum of 150 mins/wk of moderate intensity (RPE 4-6)   exercise and encouraged to add 1-2 days of exercise on opposite days of cardiac rehab as tolerated.     Current Aerobic Exercise Prescription:      Frequency: 3 days/week   Supplement with home exercise 2+ days/wk as tolerated       Minutes: 40        METS: 2.3-3.2           HR: 115--135   RPE: 4-6         Modalities: Treadmill, UBE, NuStep, and Recumbent bike     Exercise workloads will be progressed gradually as tolerated, within limits of patient's ability, and according to the patient's   response to the exercise program.      Aerobic Exercise Prescription Plan for Progression   Frequency: 3 days/week of cardiac rehab       Supplement with home exercise 2+ days/wk as tolerated    Minutes: 40       >150 mins/wk of moderate intensity exercise   METS: 3-4.5   HR:  20-30brm above resting      RPE: 4-6   Modalities: Treadmill, UBE, Lifecycle, NuStep, and Recumbent bike    Strength trainin-3 days / week  12-15 repetitions  1-2 sets per modality  He doesn`t want to do strength training at rehab . He states he lifts at home.     Modalities: Leg Press, Chest Press, Pull Downs, Lateral Raise, Arm Extension, and Arm Curl    Home Exercise: He a does stretching exercises      Exercise Education: benefit of exercise for CAD risk factors, home exercise guidelines, AHA guidelines to achieve >150 mins/wk of moderate exercise, RPE scale, and physical activity/exercise in extreme weather conditions     Readiness to change: Contemplation:  (Acknowledging that there is a problem but not yet ready or sure of wanting to make a change)      NUTRITION GOALS  AND PLAN      Nutritional   Reviewed patient's Rate your Plate. Discussed key elements of heart healthy eating. Reviewed patient goals for dietary modifications and their clinical implications.  Reviewed most recent lipid profile.     Patient's progress toward Nutrition goals:    Reviewed Pt goals and determined plan of care      Nutrition Intervention:   group Class: Heart Healthy Eating, increase intake of whole grains, increase daily intake of fruits and vegetables, increase daily intake of low fat dairy, choose healthy meals while dining out, eat chicken and fish that is baked, broiled or roasted, and cook without fats or oils    Measurable goals were based Rate Your Plate Dietary Self-Assessment. These are the areas in which the patient could score higher on the assessment.  Goals include recommendations for a heart healthy diet based on American Heart Association.    Nutrition Education:   heart healthy eating principles  weight loss and management strategies  low sodium diet  exercise and diabetes management   portion control  group class: Heart Healthy Eating  group class:  Label Reading    Readiness to change: Contemplation:  (Acknowledging that there is a problem but not yet ready or sure of wanting to make a change)      PSYCHOSOCIAL GOALS AND PLAN    Psychosocial Assessment as it relates to rehabilitation:   Patient denies issues with his/her family or home life that may affect their rehabilitation efforts.     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    Readiness to change: Contemplation:  (Acknowledging that there is a problem but not yet ready or sure of wanting to make a change)      OTHER CORE COMPONENTS GOALS and PLAN      Blood Pressure will be monitored throughout the program and cardiologist will be notified of elevated trends.    Pt will be encouraged to monitor home BP if advised by cardiologist.    Tobacco Intervention:   N/A:  Pt is a non-smoker    Progress toward Core Component goals:   Will continue to educate and progress as tolerated.  Blood pressure is below 130/80 at rest    Other Core Components Intervention:   group class: Understanding Heart Disease, group class: Common Heart Medications, avoid places with second hand smoke, avoid processed foods, and engage in regular exercise    Group and Individual Education:  understanding high blood pressure and it's relationship to CAD, group class: Understanding Heart Disease, and group class:  Common Heart Medications    Readiness to change: Contemplation:  (Acknowledging that there is a problem but not yet ready or sure of wanting to make a change)

## 2024-09-10 ENCOUNTER — CLINICAL SUPPORT (OUTPATIENT)
Dept: CARDIAC REHAB | Facility: HOSPITAL | Age: 74
End: 2024-09-10
Payer: COMMERCIAL

## 2024-09-10 DIAGNOSIS — Z95.5 S/P DRUG ELUTING CORONARY STENT PLACEMENT: Primary | ICD-10-CM

## 2024-09-10 PROCEDURE — 93798 PHYS/QHP OP CAR RHAB W/ECG: CPT

## 2024-09-12 ENCOUNTER — CLINICAL SUPPORT (OUTPATIENT)
Dept: CARDIAC REHAB | Facility: HOSPITAL | Age: 74
End: 2024-09-12
Payer: COMMERCIAL

## 2024-09-12 DIAGNOSIS — Z95.5 S/P DRUG ELUTING CORONARY STENT PLACEMENT: Primary | ICD-10-CM

## 2024-09-12 PROCEDURE — 93798 PHYS/QHP OP CAR RHAB W/ECG: CPT

## 2024-09-16 ENCOUNTER — CLINICAL SUPPORT (OUTPATIENT)
Dept: CARDIAC REHAB | Facility: HOSPITAL | Age: 74
End: 2024-09-16
Payer: COMMERCIAL

## 2024-09-16 DIAGNOSIS — Z95.5 S/P DRUG ELUTING CORONARY STENT PLACEMENT: Primary | ICD-10-CM

## 2024-09-16 PROCEDURE — 93798 PHYS/QHP OP CAR RHAB W/ECG: CPT

## 2024-09-17 ENCOUNTER — TELEPHONE (OUTPATIENT)
Age: 74
End: 2024-09-17

## 2024-09-17 ENCOUNTER — CLINICAL SUPPORT (OUTPATIENT)
Dept: CARDIAC REHAB | Facility: HOSPITAL | Age: 74
End: 2024-09-17
Payer: COMMERCIAL

## 2024-09-17 DIAGNOSIS — Z95.5 S/P DRUG ELUTING CORONARY STENT PLACEMENT: Primary | ICD-10-CM

## 2024-09-17 PROCEDURE — 93798 PHYS/QHP OP CAR RHAB W/ECG: CPT

## 2024-09-19 ENCOUNTER — CLINICAL SUPPORT (OUTPATIENT)
Dept: CARDIAC REHAB | Facility: HOSPITAL | Age: 74
End: 2024-09-19
Payer: COMMERCIAL

## 2024-09-19 DIAGNOSIS — Z95.5 S/P DRUG ELUTING CORONARY STENT PLACEMENT: Primary | ICD-10-CM

## 2024-09-19 PROCEDURE — 93798 PHYS/QHP OP CAR RHAB W/ECG: CPT

## 2024-09-23 ENCOUNTER — CLINICAL SUPPORT (OUTPATIENT)
Dept: FAMILY MEDICINE CLINIC | Facility: CLINIC | Age: 74
End: 2024-09-23

## 2024-09-23 ENCOUNTER — CLINICAL SUPPORT (OUTPATIENT)
Dept: CARDIAC REHAB | Facility: HOSPITAL | Age: 74
End: 2024-09-23
Payer: COMMERCIAL

## 2024-09-23 DIAGNOSIS — E11.8 TYPE 2 DIABETES MELLITUS WITH COMPLICATION, WITH LONG-TERM CURRENT USE OF INSULIN (HCC): Primary | ICD-10-CM

## 2024-09-23 DIAGNOSIS — Z95.5 S/P DRUG ELUTING CORONARY STENT PLACEMENT: Primary | ICD-10-CM

## 2024-09-23 DIAGNOSIS — Z79.4 TYPE 2 DIABETES MELLITUS WITH COMPLICATION, WITH LONG-TERM CURRENT USE OF INSULIN (HCC): Primary | ICD-10-CM

## 2024-09-23 PROCEDURE — 93798 PHYS/QHP OP CAR RHAB W/ECG: CPT

## 2024-09-23 NOTE — PROGRESS NOTES
[facility] Clinical Pharmacy Services  Lotus Stein, Pharmacist    Assessment/ Plan     Assessment & Plan  Type 2 diabetes mellitus with complication, with long-term current use of insulin ([facility], [facility])  Goal A1c <7.5% .   Lab Results   Component Value Date    HGBA1C 7.4 (A) 06/03/2024      Complications:  Microvascular complications:None identified at this time  Macrovascular complications: CAD  Current Diabetes Regimen:  Metformin 1000 mg BID  Januvia 100 mg daily  Tresiba 50 units once daily  Jardiance 25 mg daily  Historical DM Meds (reason for discontinuation):  N/A  On Additional Therapies:  Statin: yes  ACEI/ARB: yes  Glucose control on CGM report  Within goal range of 70 - 180 mg/dL 72% of time (goal >70% of time)  Average glucose 159 mg/dL (goal <154 mg/dL)      Assessment: Glucose remains well controlled per Dexcom report.  He has not connected through Dexcom clarity however he is able to pull up his AGP on his phone vivian and read them off to me.  He continues cardio rehab through beginning of October.  He does plan to continue exercise after this however we will touch base again just to make sure that glucose readings do not go up with stopping cardio rehab.  No recent hypoglycemia events    Changes to medication regimen:  No changes today continue current regimen             Follow-up: 8 weeks    Subjective   HPI    Medication Adherence/ Tolerability/ Cost:  Patient denies side effects, no issues with cost, 0 missed doses in last two week  aspirin  atorvastatin  B-D ULTRAFINE III SHORT PEN Misc  clopidogrel  Dexcom G7 Sensor  dutasteride  Empagliflozin Tabs  gabapentin  lisinopril  metFORMIN  OneTouch Ultra Strp  saccharomyces boulardii  sitaGLIPtin  SUPER B COMPLEX/C PO  tamsulosin  Tresiba FlexTouch Sopn    Previous Medications  N/A    Review of Systems     2. Lifestyle:   Physical Activity: Currently goes to [facility] multiple days per week.  This is ending in early October.  He would like to continue to  maintain that level of physical activity.  His wife is looking into insurance coverage for a gym membership.  If the insurance does not cover a membership he is unsure if he will be able to maintain that level of physical activity without going to the gym.    3. Home monitoring devices  Glucometer: Yes, Brand:   Continuous Glucose Monitor: Yes, Brand: Dexcom g7      Objective       Blood Sugar Readings  Has not connected to Dexcom Clarity however he does read off averages and time in range over the phone to me.  See above        ASCVD Risk:  The ASCVD Risk score (Isabel MONTIEL, et al., 2019) failed to calculate for the following reasons:    The patient has a prior MI or stroke diagnosis     Vitals:  There were no vitals filed for this visit.    Eye Exam:    Lab Results   Component Value Date    LEFTDIABRET Positive 03/27/2024    RIGHTDIABRET Positive 03/27/2024       Labs:  Lab Results   Component Value Date    SODIUM 140 09/04/2024    K 4.0 09/04/2024    EGFR 101 09/04/2024    CREATININE 0.58 (L) 09/04/2024    GLUF 119 (H) 09/04/2024    MICROALBCRE 74 (H) 02/12/2024       Lab Results   Component Value Date    HGBA1C 7.4 (A) 06/03/2024    HGBA1C 7.9 (H) 02/12/2024    HGBA1C 7.0 (A) 09/07/2023       Telemedicine consent  The patient was identified by name and date of birth. Sg Munoz was informed that this is a telemedicine visit and that the visit is being conducted through Telephone.  My office door was closed. No one else was in the room.  He acknowledged consent and understanding of privacy and security of the video platform. The patient has agreed to participate and understands they can discontinue the visit at any time.    Pharmacist Tracking Tool     Pharmacist Tracking Tool  Reason For Outreach: Embedded Pharmacist  Demographics:  Intervention Method: Phone  Type of Intervention: Follow-Up  Topics Addressed: Diabetes  Pharmacologic Interventions: N/A  Non-Pharmacologic Interventions: Disease state education,  Home Monitoring, Medication/Device education, and Personal CGM  Time:  Direct Patient Care:  10  mins  Care Coordination:  10  mins  Recommendation Recipient: Patient/Caregiver and Provider  Outcome: Accepted

## 2024-09-23 NOTE — ASSESSMENT & PLAN NOTE
Goal A1c <7.5% .   Lab Results   Component Value Date    HGBA1C 7.4 (A) 06/03/2024      Complications:  Microvascular complications:None identified at this time  Macrovascular complications: CAD  Current Diabetes Regimen:  Metformin 1000 mg BID  Januvia 100 mg daily  Tresiba 50 units once daily  Jardiance 25 mg daily  Historical DM Meds (reason for discontinuation):  N/A  On Additional Therapies:  Statin: yes  ACEI/ARB: yes  Glucose control on CGM report  Within goal range of 70 - 180 mg/dL 72% of time (goal >70% of time)  Average glucose 159 mg/dL (goal <154 mg/dL)      Assessment: Glucose remains well controlled per Dexcom report.  He has not connected through Dexcom clarity however he is able to pull up his AGP on his phone vivian and read them off to me.  He continues cardio rehab through beginning of October.  He does plan to continue exercise after this however we will touch base again just to make sure that glucose readings do not go up with stopping cardio rehab.  No recent hypoglycemia events    Changes to medication regimen:  No changes today continue current regimen

## 2024-09-24 ENCOUNTER — CLINICAL SUPPORT (OUTPATIENT)
Dept: CARDIAC REHAB | Facility: HOSPITAL | Age: 74
End: 2024-09-24
Payer: COMMERCIAL

## 2024-09-24 DIAGNOSIS — Z95.5 S/P DRUG ELUTING CORONARY STENT PLACEMENT: Primary | ICD-10-CM

## 2024-09-24 PROCEDURE — 93798 PHYS/QHP OP CAR RHAB W/ECG: CPT

## 2024-09-26 ENCOUNTER — CLINICAL SUPPORT (OUTPATIENT)
Dept: CARDIAC REHAB | Facility: HOSPITAL | Age: 74
End: 2024-09-26
Payer: COMMERCIAL

## 2024-09-26 DIAGNOSIS — Z95.5 S/P DRUG ELUTING CORONARY STENT PLACEMENT: Primary | ICD-10-CM

## 2024-09-26 PROCEDURE — 93798 PHYS/QHP OP CAR RHAB W/ECG: CPT

## 2024-09-30 ENCOUNTER — CLINICAL SUPPORT (OUTPATIENT)
Dept: CARDIAC REHAB | Facility: HOSPITAL | Age: 74
End: 2024-09-30
Payer: MEDICARE

## 2024-09-30 DIAGNOSIS — Z95.5 S/P DRUG ELUTING CORONARY STENT PLACEMENT: Primary | ICD-10-CM

## 2024-09-30 PROCEDURE — 93798 PHYS/QHP OP CAR RHAB W/ECG: CPT

## 2024-10-01 ENCOUNTER — CLINICAL SUPPORT (OUTPATIENT)
Dept: CARDIAC REHAB | Facility: HOSPITAL | Age: 74
End: 2024-10-01
Payer: MEDICARE

## 2024-10-01 DIAGNOSIS — Z95.5 S/P DRUG ELUTING CORONARY STENT PLACEMENT: Primary | ICD-10-CM

## 2024-10-01 PROCEDURE — 93798 PHYS/QHP OP CAR RHAB W/ECG: CPT

## 2024-10-02 ENCOUNTER — DOCTOR'S OFFICE (OUTPATIENT)
Dept: URBAN - NONMETROPOLITAN AREA CLINIC 1 | Facility: CLINIC | Age: 74
Setting detail: OPHTHALMOLOGY
End: 2024-10-02
Payer: COMMERCIAL

## 2024-10-02 DIAGNOSIS — H26.493: ICD-10-CM

## 2024-10-02 DIAGNOSIS — E11.3293: ICD-10-CM

## 2024-10-02 DIAGNOSIS — H44.23: ICD-10-CM

## 2024-10-02 DIAGNOSIS — H35.3122: ICD-10-CM

## 2024-10-02 DIAGNOSIS — H35.363: ICD-10-CM

## 2024-10-02 DIAGNOSIS — H40.023: ICD-10-CM

## 2024-10-02 DIAGNOSIS — H35.3111: ICD-10-CM

## 2024-10-02 DIAGNOSIS — H35.40: ICD-10-CM

## 2024-10-02 LAB
LEFT EYE DIABETIC RETINOPATHY: NORMAL
RIGHT EYE DIABETIC RETINOPATHY: NORMAL

## 2024-10-02 PROCEDURE — 92012 INTRM OPH EXAM EST PATIENT: CPT | Performed by: OPHTHALMOLOGY

## 2024-10-02 PROCEDURE — 92134 CPTRZ OPH DX IMG PST SGM RTA: CPT | Performed by: OPHTHALMOLOGY

## 2024-10-02 PROCEDURE — 76514 ECHO EXAM OF EYE THICKNESS: CPT | Performed by: OPHTHALMOLOGY

## 2024-10-02 PROCEDURE — 92083 EXTENDED VISUAL FIELD XM: CPT | Performed by: OPHTHALMOLOGY

## 2024-10-02 ASSESSMENT — REFRACTION_MANIFEST
OD_CYLINDER: -0.75
OS_ADD: +2.50
OS_CYLINDER: -0.75
OD_VA1: 20/25-2
OD_AXIS: 010
OS_AXIS: 020
OS_SPHERE: PLANO
OD_CYLINDER: -0.75
OS_VA2: 20/25-2
OD_AXIS: 010
OD_VA2: 20/25-2
OD_VA2: 20/25-2
OS_VA2: 20/25-2
OD_ADD: +1.25
OD_VA1: 20/20-2
OS_CYLINDER: -0.75
OS_SPHERE: +1.25
OD_ADD: +2.50
OS_ADD: +1.25
OS_VA1: 20/20-2
OD_SPHERE: +1.25
OD_SPHERE: PLANO
OS_VA1: 20/25-2
OS_AXIS: 020

## 2024-10-02 ASSESSMENT — PACHYMETRY
OS_CT_UM: 587
OS_CT_CORRECTION: -3
OD_CT_CORRECTION: -3
OD_CT_UM: 588

## 2024-10-02 ASSESSMENT — REFRACTION_CURRENTRX
OD_OVR_VA: 20/
OD_SPHERE: -5.75
OS_VPRISM_DIRECTION: PROGS
OD_VPRISM_DIRECTION: PROGS
OD_OVR_VA: 20/
OD_ADD: +2.50
OD_AXIS: 180
OD_ADD: +2.00
OS_SPHERE: -7.75
OS_ADD: +2.50
OD_SPHERE: PLANO
OS_VPRISM_DIRECTION: PROGS
OS_AXIS: 018
OS_AXIS: 010
OS_SPHERE: PLANO
OS_OVR_VA: 20/
OD_AXIS: 016
OD_CYLINDER: -1.50
OS_ADD: +2.00
OS_CYLINDER: -0.75
OS_OVR_VA: 20/
OD_CYLINDER: -0.75
OD_VPRISM_DIRECTION: PROGS
OS_CYLINDER: -1.00

## 2024-10-02 ASSESSMENT — LID POSITION - DERMATOCHALASIS
OS_DERMATOCHALASIS: LUL
OD_DERMATOCHALASIS: RUL

## 2024-10-02 ASSESSMENT — REFRACTION_AUTOREFRACTION
OS_SPHERE: +0.25
OD_SPHERE: +1.25
OS_AXIS: 072
OD_AXIS: 106
OS_CYLINDER: -0.50
OD_CYLINDER: -0.75

## 2024-10-02 ASSESSMENT — KERATOMETRY
OD_AXISANGLE_DEGREES: 091
OS_K1POWER_DIOPTERS: 44.50
OS_AXISANGLE_DEGREES: 097
OS_K2POWER_DIOPTERS: 45.25
OD_K2POWER_DIOPTERS: 46.25
OD_K1POWER_DIOPTERS: 45.00

## 2024-10-02 ASSESSMENT — VISUAL ACUITY
OS_BCVA: 20/20-1
OD_BCVA: 20/20-1

## 2024-10-02 ASSESSMENT — CONFRONTATIONAL VISUAL FIELD TEST (CVF)
OD_FINDINGS: FULL
OS_FINDINGS: FULL

## 2024-10-02 ASSESSMENT — LID POSITION - PTOSIS
OS_PTOSIS: LUL
OD_PTOSIS: RUL

## 2024-10-02 ASSESSMENT — TONOMETRY
OD_IOP_MMHG: 17
OS_IOP_MMHG: 17

## 2024-10-03 ENCOUNTER — CLINICAL SUPPORT (OUTPATIENT)
Dept: CARDIAC REHAB | Facility: HOSPITAL | Age: 74
End: 2024-10-03
Payer: MEDICARE

## 2024-10-03 DIAGNOSIS — Z95.5 S/P DRUG ELUTING CORONARY STENT PLACEMENT: Primary | ICD-10-CM

## 2024-10-03 PROCEDURE — 93798 PHYS/QHP OP CAR RHAB W/ECG: CPT

## 2024-10-07 ENCOUNTER — CLINICAL SUPPORT (OUTPATIENT)
Dept: CARDIAC REHAB | Facility: HOSPITAL | Age: 74
End: 2024-10-07
Payer: MEDICARE

## 2024-10-07 DIAGNOSIS — Z95.5 S/P DRUG ELUTING CORONARY STENT PLACEMENT: Primary | ICD-10-CM

## 2024-10-07 PROCEDURE — 93798 PHYS/QHP OP CAR RHAB W/ECG: CPT

## 2024-10-07 NOTE — PROGRESS NOTES
CARDIAC REHABILITATION   ASSESSMENT AND INDIVIDUALIZED TREATMENT PLAN  90 DAY          Today's date: 10/7/2024   # of Exercise Sessions Completed: 34  Patient name: Sg Munoz      : 1950  Age: 74 y.o.       MRN: [id number]  Referring Physician: Ismael Wall  Cardiologist: Jose Preston DO  Provider: Nicolette  Clinician: Miladis Smiley RN        Treatment is tailored to this patient's individual needs.  The ITP was reviewed with the patient and all questions were answered to their satisfaction.  Additional ITP documentation can be found electronically including daily and monthly exercise summaries, daily session notes with ECG summaries, education notes, daily medication reconciliation, and daily physician supervision.      Comments: Sg had been having chest pain intermittently from . He had a nuclear stress test done on 24 which was positive for ischemia and a reversible ventricular perfusion defect. He had a cardiac catheterization  and DE stent placed in his prox Circumflex with PTCA.on 24. There is also  prox LAD  30% stenosis/ mid LAD 30% stenosi / Distal LAD 50% stenosis. He is coming to rehab 3x week and active with the [facility] .  .   Sg is in NSR-sinus tachycardia with BBB. He also has occassional PVC`s.He does have an occasional ventricular couplet  He states he feel much better since he is exercising. He lost 10 lb since enrolling. He also monitors his blood sugar regularly with a CGM . His resting BP was 160/80 Exercise /80  Recovery /70. His resting - exercise 114-133  Recovery   Sg has changed his diet. He used to drink 6 diet sodas a day- now he drinks 2. He buys 8 sunitha donuts blueberry muffins and eats one a day. He plans on reducing his muffins and soda.     Dx: Cardiac stent  prax circumflex        Description of Diagnosis: s/p Drug eluting coronary stent prox Circumflex with PTCA/HTN Dyslipidemia LILIA   T2D  Date of onset: 6/24/24  Other Cardiac History: none /HTN/Dyslipemia/T2D        ASSESSMENT    Medical History:   Past Medical History:   Diagnosis Date    Arthritis     Back pain     Diabetes mellitus (HCC)     Hypertension        Family History:  Family History   Problem Relation Age of Onset    Hypertension Father     Coronary artery disease Father     Hypertension Mother     Arthritis Mother     Coronary artery disease Mother     Diabetes Maternal Uncle     Diabetes Paternal Uncle        Allergies:   Patient has no known allergies.    Current Medications:   Current Outpatient Medications   Medication Sig Dispense Refill    aspirin (ECOTRIN LOW STRENGTH) 81 mg EC tablet Take 81 mg by mouth daily      atorvastatin (LIPITOR) 40 mg tablet Take 1 tablet (40 mg total) by mouth daily 90 tablet 1    clopidogrel (Plavix) 75 mg tablet Take 1 tablet (75 mg total) by mouth daily 90 tablet 3    Continuous Glucose Sensor (Dexcom G7 Sensor) Use 1 Device every 10 days 9 each 3    dutasteride (AVODART) 0.5 mg capsule Take 1 capsule (0.5 mg total) by mouth daily 90 capsule 3    Empagliflozin (Jardiance) 25 MG TABS Take 1 tablet (25 mg total) by mouth every morning 90 tablet 3    gabapentin (Neurontin) 600 MG tablet Take 1 tablet (600 mg total) by mouth 3 (three) times a day 270 tablet 3    insulin degludec (Tresiba FlexTouch) 100 units/mL injection pen Inject 50 Units under the skin every 12 (twelve) hours 90 mL 1    Insulin Pen Needle (B-D ULTRAFINE III SHORT PEN) 31G X 8 MM MISC Use 2 (two) times a day 180 each 1    lisinopril (ZESTRIL) 20 mg tablet Take 1 tablet (20 mg total) by mouth daily 90 tablet 3    metFORMIN (GLUCOPHAGE-XR) 500 mg 24 hr tablet Take 4 tablets (2,000 mg total) by mouth daily with breakfast 360 tablet 1    OneTouch Ultra test strip Use 1 each 3 (three) times a day Use as instructed 400 strip 3    saccharomyces boulardii (FLORASTOR) 250 mg capsule Take 250 mg by mouth every other day      sitaGLIPtin  (Januvia) 100 mg tablet Take 1 tablet (100 mg total) by mouth daily 90 tablet 1    SUPER B COMPLEX/C PO Take by mouth      tamsulosin (FLOMAX) 0.4 mg Take 1 capsule (0.4 mg total) by mouth daily 90 capsule 3     No current facility-administered medications for this visit.       Medication compliance: Yes   Comments: Pt reports to be compliant with medications    Physical Limitations: none     Fall Risk: Low   Comments: Ambulates with a steady gait with no assist device and Reports a fall in the past 6 months    Cultural needs: none      CAD Risk Factors:  Cholesterol: Yes   on atorvastatin 40mg  HTN: Yes              on Lisinopril 20mg  DM: Type 2   insulin          Januvia Metformin/  Tresiba/Jardiance  using CGM  system   Obesity: Yes     BMI  31.16  Inactivity: Yes      EXERCISE ASSESSMENT:     Initial Fitness Assessment: Submaximal TM ETT:  Resting:  BP: 160/80  HR: 91  SpO2: 97, Exercise:  BP: 180/80  HR: 121, METs:  4.3, ECG Summary: NSR with BBB and pvc`s, Symptoms: none, Test terminated at:  RPE 6, and Comments: good effort      ECG INTERPRETATION:  NSR with BBB and PVC`s    Current Functional Status:  Occupation: retired  part time job musician  Recreation/Physical Activity: working in a band  ADL’s:able to perform self-care resumed driving  [place]: No limitations  Home exercise:  stretching  Other Comments: carried  [employer]       SMART Exercise Goals:   10% improvement in functional capacity based on max METs achieved in initial fitness assessment  improved DASI score by 10%  maintain > 150 minutes per week of moderate intensity exercise    Patient Specific EXERCISE GOALS:       Establish regular walking program   Increase exercise tolerance   Build strength    Functional Capacity Screening Tool:  [facility] Activity Status Index:  7.25 METs      PSYCHOSOCIAL ASSESSMENT:    Date of last Assessment:  7/17/24  Depression screening:  PHQ-9 = 0    Interpretation:  0 =No Depression  Anxiety screening:  " ADRIENNE-7 = 0    Interpretation: 0-4  = Not anxious    Pt self-report of depression and anxiety   Patient reports they are coping well with good social support and denies depression or anxiety He stated his wife is busy and doesn`t cook much.He doesn`t cook.    Self-reported stress level:  3   Stressors:  denies any chronic stress  Stress Management Tools:  plays musical instruments    SMART Psychosocial Goals:     Physical Fitness in [facility] Score < 3 and Pain in [facility] Score < 3    Patient Specific PSYCHOCOSOCIAL GOALS:    Enjoy life and stay active- he is active with his [employer] business  Stay active with [employer]  Keep making music    Quality of Life Screen:  (Higher score indicates disease impact on QOL)  [facility] COOP score: 15/45     Social Support:   spouse, children, friends, and musicians  Community/Social Activities: playing at events     Psychosocial Assessment as it relates to rehabilitation:   Patient denies issues with his/her family or home life that may affect their rehabilitation efforts.       NUTRITION ASSESSMENT:    Initial Weight:  216  Current Weight: 206    Height:   Ht Readings from Last 1 Encounters:   08/29/24 5' 10\" (1.778 m)       Rate Your Plate Score: 56/81    Diabetes: T2D  A1c: 7.4    last measured: 6/3/24    Lipid management: Last lipid profile 09/04/24  Chol 129    HDL 38  LDL 71  Current Dietary Habits:  He is still drinking 2 16OZ diet  cokes a day . This is improved from 6 a day . He is also drinking water  He is eating more fruits and vegetables  SMART Nutrition Goals:   improved A1c  < 7.0%, 2.5-5%  wt loss, eat 5 or more servings of fruits and vegetables a day, and rarely eat processed meats or eat low fat processed meats decrease diet soda intake to 1 a day     Patient Specific NUTRITION GOALS:     1. Lose weight   2. Drink less diet soda- drink water   3. Eat less prosessed food     Drug/AlcoholUse:   No,       OTHER CORE COMPONENT " ASSESSMENT:    Tobacco Use:     N/A:  Patient is a non-smoker     Anginal Symptoms:  chest pressure   NTG use: No prescription and no chest pain since  event    SMART Goals:   consistent, controlled resting BP < 130/80 and medication compliance    Patient Specific CORE COMPONENT GOALS:    Reduce plaque in arteries  Become more active- start exercise program  Eat healthier. Encouraged to learn to cook      INDIVIDUALIZED TREATMENT PLAN      EXERCISE GOALS and PLAN      Progress toward Exercise goals:   Coming to rehab 3x week  working with band and moving equipment He has not started a walking program at home yet    Exercise Intervention:    home exercise 30+ mins 2 days opposite CR    The patient was counseled on exercise guidelines to achieve a minimum of 150 mins/wk of moderate intensity (RPE 4-6)   exercise and encouraged to add 1-2 days of exercise on opposite days of cardiac rehab as tolerated.     Current Aerobic Exercise Prescription:      Frequency: 3 days/week   Supplement with home exercise 2+ days/wk as tolerated       Minutes: 40        METS: 3.2-3.5           HR: 110--133   RPE: 4-6         Modalities: Treadmill, UBE, NuStep, and Recumbent bike     Exercise workloads will be progressed gradually as tolerated, within limits of patient's ability, and according to the patient's   response to the exercise program.      Aerobic Exercise Prescription Plan for Progression   Frequency: 3 days/week of cardiac rehab       Supplement with home exercise 2+ days/wk as tolerated    Minutes: 40       >150 mins/wk of moderate intensity exercise   METS: 3-4.5   HR:  20-30brm above resting      RPE: 4-6   Modalities: Treadmill, UBE, Lifecycle, NuStep, and Recumbent bike    Strength trainin-3 days / week  12-15 repetitions  1-2 sets per modality    Modalities: Leg Press, Chest Press, Pull Downs, Lateral Raise, Arm Extension, and Arm Curl    Home Exercise: He a does stretching exercises   He is planning in continuing  his exercise at a gym . 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 Discussed key elements of heart healthy eating. Reviewed patient goals for dietary modifications and their clinical implications.  Reviewed most recent lipid profile.     Patient's progress toward Nutrition goals:    Reviewed Pt goals and determined plan of care      Nutrition Intervention:   group Class: Heart Healthy Eating, increase intake of whole grains, increase daily intake of fruits and vegetables, increase daily intake of low fat dairy, choose healthy meals while dining out, eat chicken and fish that is baked, broiled or roasted, and cook without fats or oils    Measurable goals were based Rate Your Plate Dietary Self-Assessment. These are the areas in which the patient could score higher on the assessment.  Goals include recommendations for a heart healthy diet based on American Heart Association.    Nutrition Education:   heart healthy eating principles  weight loss and management strategies  low sodium diet  exercise and diabetes management   portion control  group class: Heart Healthy Eating  group class:  Label Reading    Readiness to change: Action:  (Changing behavior)      PSYCHOSOCIAL GOALS AND PLAN    Psychosocial Assessment as it relates to rehabilitation:   Patient denies issues with his/her family or home life that may affect their rehabilitation efforts.     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    Readiness to change: Contemplation:  (Acknowledging that there is a problem but not yet ready or sure of wanting to make a change)      OTHER CORE COMPONENTS GOALS and PLAN      Blood Pressure will be monitored throughout the program and cardiologist will be notified of elevated trends.    Pt will be encouraged to monitor home BP if advised by cardiologist.    Tobacco Intervention:   N/A:  Pt is a non-smoker    Progress toward Core Component goals:   Will continue to educate and progress as tolerated.  Blood pressure is below 130/80 at rest encouraged to decrease his sodium and monitor his BP at home  BP at rest 160/80. He does talk  a lot at rehab and bp taken when talking.     Other Core Components Intervention:   group class: Understanding Heart Disease, group class: Common Heart Medications, avoid places with second hand smoke, avoid processed foods, and engage in regular exercise    Group and Individual Education:  understanding high blood pressure and it's relationship to CAD, group class: Understanding Heart Disease, and group class:  Common Heart Medications    Readiness to change: Contemplation:  (Acknowledging that there is a problem but not yet ready or sure of wanting to make a change)

## 2024-10-08 ENCOUNTER — CLINICAL SUPPORT (OUTPATIENT)
Dept: CARDIAC REHAB | Facility: HOSPITAL | Age: 74
End: 2024-10-08
Payer: COMMERCIAL

## 2024-10-08 DIAGNOSIS — Z95.5 S/P DRUG ELUTING CORONARY STENT PLACEMENT: Primary | ICD-10-CM

## 2024-10-08 PROCEDURE — 93798 PHYS/QHP OP CAR RHAB W/ECG: CPT

## 2024-10-10 ENCOUNTER — CLINICAL SUPPORT (OUTPATIENT)
Dept: CARDIAC REHAB | Facility: HOSPITAL | Age: 74
End: 2024-10-10
Payer: COMMERCIAL

## 2024-10-10 DIAGNOSIS — N40.0 BENIGN PROSTATIC HYPERPLASIA, UNSPECIFIED WHETHER LOWER URINARY TRACT SYMPTOMS PRESENT: ICD-10-CM

## 2024-10-10 DIAGNOSIS — Z95.5 S/P DRUG ELUTING CORONARY STENT PLACEMENT: Primary | ICD-10-CM

## 2024-10-10 PROCEDURE — 93798 PHYS/QHP OP CAR RHAB W/ECG: CPT

## 2024-10-10 NOTE — TELEPHONE ENCOUNTER
Reason for call:   [x] Refill   [] Prior Auth  [] Other:     Office:   [x] PCP/Provider - Dr Yung Klein MD  [] Specialty/Provider -     Medication:       tamsulosin (FLOMAX) 0.4 mg- bid       Pharmacy: [facility] - [address]    Does the patient have enough for 3 days?   [x] Yes   [] No - Send as HP to POD

## 2024-10-10 NOTE — PROGRESS NOTES
CARDIAC REHABILITATION   ASSESSMENT AND INDIVIDUALIZED TREATMENT PLAN  DISCHARGE            Today's date: 10/10/2024   # of Exercise Sessions Completed: 36  Patient name: Sg Munoz      : 1950  Age: 74 y.o.       MRN: [id number]  Referring Physician: Ismael Wall  Cardiologist: Jose Preston DO  Provider: Nicolette  Clinician: Pat Barroso MS        Treatment is tailored to this patient's individual needs.  The ITP was reviewed with the patient and all questions were answered to their satisfaction.  Additional ITP documentation can be found electronically including daily and monthly exercise summaries, daily session notes with ECG summaries, education notes, daily medication reconciliation, and daily physician supervision.      Comments: Sg has completed 36 out of 36 Cardiac Rehab exercise sessions and is being discharged. He is currently working at a maximum MET level of 4.65 METs in cardiac rehab and stated that he has never felt better. 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 Throughout rehab, he improved his DASI MET score from an initial 7.25 METs to a final 9.89 METs. He also showed improvement in his Rate Your Plate score from an initial 56 to a final 63. His [place] score also improved from an initial 15 to a final 12. He did not have a repeat lipid panel, AIC, and echo since his initial evaluation so all values remained the same.     Sg completed a submax ETT for his discharge test and completed a total of 9 minutes (stage 3) compared to his initial ETT where he completed a total of 7 minutes. His initial BP was 150/70 with a resting HR of 89 bpm. He had an exercise HR range of 110-130 with a maximum exercise BP of 180/80. His hemodynamic responses returned to baseline with rest and water with a recovery BP of 146/70 and a HR of 100 bpm. He had no cardiac complaints for the duration  of the test and the test was terminated due to an RPE of 6 and an exercise HR 30 beats above resting.     Sg plans on continuing exercise at a local gym that has equipment similar to that in cardiac rehab. He was given education on continuing exercise after rehab as well as all workloads and heart rate zones that were performed at rehab. He was also educated on exercise safety and some things to monitor during exercise like his heart rate and any cardiac complaints he may have.     Dx: Cardiac stent  prax circumflex        Description of Diagnosis: s/p Drug eluting coronary stent prox Circumflex with PTCA/HTN Dyslipidemia LILIA  T2D  Date of onset: 6/24/24  Other Cardiac History: none /HTN/Dyslipemia/T2D        ASSESSMENT    Medical History:   Past Medical History:   Diagnosis Date    Arthritis     Back pain     Diabetes mellitus (HCC)     Hypertension        Family History:  Family History   Problem Relation Age of Onset    Hypertension Father     Coronary artery disease Father     Hypertension Mother     Arthritis Mother     Coronary artery disease Mother     Diabetes Maternal Uncle     Diabetes Paternal Uncle        Allergies:   Patient has no known allergies.    Current Medications:   Current Outpatient Medications   Medication Sig Dispense Refill    aspirin (ECOTRIN LOW STRENGTH) 81 mg EC tablet Take 81 mg by mouth daily      atorvastatin (LIPITOR) 40 mg tablet Take 1 tablet (40 mg total) by mouth daily 90 tablet 1    clopidogrel (Plavix) 75 mg tablet Take 1 tablet (75 mg total) by mouth daily 90 tablet 3    Continuous Glucose Sensor (Dexcom G7 Sensor) Use 1 Device every 10 days 9 each 3    dutasteride (AVODART) 0.5 mg capsule Take 1 capsule (0.5 mg total) by mouth daily 90 capsule 3    Empagliflozin (Jardiance) 25 MG TABS Take 1 tablet (25 mg total) by mouth every morning 90 tablet 3    gabapentin (Neurontin) 600 MG tablet Take 1 tablet (600 mg total) by mouth 3 (three) times a day 270 tablet 3    insulin  degludec (Tresiba FlexTouch) 100 units/mL injection pen Inject 50 Units under the skin every 12 (twelve) hours 90 mL 1    Insulin Pen Needle (B-D ULTRAFINE III SHORT PEN) 31G X 8 MM MISC Use 2 (two) times a day 180 each 1    lisinopril (ZESTRIL) 20 mg tablet Take 1 tablet (20 mg total) by mouth daily 90 tablet 3    metFORMIN (GLUCOPHAGE-XR) 500 mg 24 hr tablet Take 4 tablets (2,000 mg total) by mouth daily with breakfast 360 tablet 1    OneTouch Ultra test strip Use 1 each 3 (three) times a day Use as instructed 400 strip 3    saccharomyces boulardii (FLORASTOR) 250 mg capsule Take 250 mg by mouth every other day      sitaGLIPtin (Januvia) 100 mg tablet Take 1 tablet (100 mg total) by mouth daily 90 tablet 1    SUPER B COMPLEX/C PO Take by mouth      tamsulosin (FLOMAX) 0.4 mg Take 1 capsule (0.4 mg total) by mouth daily 90 capsule 3     No current facility-administered medications for this visit.       Medication compliance: Yes   Comments: Pt reports to be compliant with medications    Physical Limitations: none     Fall Risk: Low   Comments: Ambulates with a steady gait with no assist device and Reports a fall in the past 6 months    Cultural needs: none      CAD Risk Factors:  Cholesterol: Yes     HTN: Yes               DM: Type 2   insulin          Inactivity: No, attending a gym after rehab      EXERCISE ASSESSMENT:     Initial Fitness Assessment: Submaximal TM ETT:  Resting:  BP: 160/80  HR: 91  SpO2: 97, Exercise:  BP: 180/80  HR: 121, METs:  4.3, ECG Summary: NSR with BBB and pvc`s, Symptoms: none, Test terminated at:  RPE 6, and Comments: good effort    Discharge ETT  Rest: HR 89, /70, SpO2 NA%, Asx  Exercise: , /80, SpO2 NA%, 4.3 METs, Asx   Reason for termination: RPE 6, 30 Beats above rest   Recovery: , /70, SpO2 NA, Asx    ECG INTERPRETATION:  NSR with BBB and PVC`s    Current Functional Status:  Occupation: retired  part time job musician  Recreation/Physical Activity:  working in a band  ADL’s:able to perform self-care resumed driving  [place]: No limitations  Home exercise:  stretching  Other Comments: will be starting a workout plan at a local gym. Staff gave Sg all of his information on workloads in rehab so he can continue exercising at intensities he was in rehab.       SMART Exercise Goals:   10% improvement in functional capacity based on max METs achieved in initial fitness assessment  improved DASI score by 10%  maintain > 150 minutes per week of moderate intensity exercise    Patient Specific EXERCISE GOALS:       Establish regular walking program   Increase exercise tolerance   Build strength    Functional Capacity Screening Tool:  [facility] Activity Status Index:  9.89 METs      PSYCHOSOCIAL ASSESSMENT:    Date of last Assessment:  10/10/2024  Depression screening:  PHQ-9 = 0    Interpretation:  0 =No Depression  Anxiety screening:  ADRIENNE-7 = 0    Interpretation: 0-4  = Not anxious    Pt self-report of depression and anxiety   Patient reports they are coping well with good social support and denies depression or anxiety     Self-reported stress level:  3   Stressors:  denies any chronic stress  Stress Management Tools:  plays musical instruments    SMART Psychosocial Goals:     Physical Fitness in [facility] Score < 3 and Pain in [facility] Score < 3    Patient Specific PSYCHOCOSOCIAL GOALS:    Enjoy life and stay active- he is active with his [employer] business  Stay active with orchestras  Keep making music    Quality of Life Screen:  (Higher score indicates disease impact on QOL)  [facility] COOP score: 12/45     Social Support:   spouse, children, friends, and musicians  Community/Social Activities: playing at events     Psychosocial Assessment as it relates to rehabilitation:   Patient denies issues with his/her family or home life that may affect their rehabilitation efforts.       NUTRITION ASSESSMENT:    Initial Weight:  216  Current Weight:  "210    Height:   Ht Readings from Last 1 Encounters:   08/29/24 5' 10\" (1.778 m)       Rate Your Plate Score: 63/81    Diabetes: T2D  A1c: 7.9  last measured: 2/12/2024    Lipid management: Last lipid profile 02/12/2024  Chol 137    HDL 39  LDL 78  Current Dietary Habits:  He is still drinking 2 16OZ diet  cokes a day . This is improved from 6 a day . He is also drinking water  He is eating more fruits and vegetables  Monitoring his sodium intake     SMART Nutrition Goals:   improved A1c  < 7.0%, 2.5-5%  wt loss, eat 5 or more servings of fruits and vegetables a day, and rarely eat processed meats or eat low fat processed meats decrease diet soda intake to 1 a day     Patient Specific NUTRITION GOALS:     1. Lose weight   2. Drink less diet soda- drink water   3. Eat less prosessed food     Drug/AlcoholUse:   No      OTHER CORE COMPONENT ASSESSMENT:    Tobacco Use:     N/A:  Patient is a non-smoker     Anginal Symptoms:  chest pressure   NTG use: No prescription and no chest pain since  event    SMART Goals:   consistent, controlled resting BP < 130/80 and medication compliance    Patient Specific CORE COMPONENT GOALS:    Reduce plaque in arteries  Become more active- start exercise program  Eat healthier. Encouraged to learn to cook      INDIVIDUALIZED TREATMENT PLAN      EXERCISE GOALS and PLAN      Progress toward Exercise goals:   Coming to rehab 3x week  working with band and moving equipment He has not started a walking program at home yet    Exercise Intervention:    home exercise 30+ mins 2 days opposite CR    The patient was counseled on exercise guidelines to achieve a minimum of 150 mins/wk of moderate intensity (RPE 4-6)   exercise and encouraged to add 1-2 days of exercise on opposite days of cardiac rehab as tolerated.     Current Aerobic Exercise Prescription:      Frequency: 3 days/week   Supplement with home exercise 2+ days/wk as tolerated       Minutes: 55-60       METS: " 3.2-3.5           HR: 110--133   RPE: 4-6         Modalities: Treadmill, UBE, NuStep, and Recumbent bike     Exercise workloads will be progressed gradually as tolerated, within limits of patient's ability, and according to the patient's   response to the exercise program.      Aerobic Exercise Prescription Plan for Progression   Frequency: 3 days/week of cardiac rehab       Supplement with home exercise 2+ days/wk as tolerated    Minutes: 60     >150 mins/wk of moderate intensity exercise   METS: 3.5-4.5   HR:  20-30brm above resting      RPE: 4-6   Modalities: Treadmill, UBE, Lifecycle, NuStep, and Recumbent bike    Strength trainin-3 days / week  12-15 repetitions  1-2 sets per modality    Modalities: Leg Press, Chest Press, Pull Downs, Lateral Raise, Arm Extension, and Arm Curl    Home Exercise: He a does stretching exercises   He is planning in continuing his exercise at a gym . 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 Discussed key elements of heart healthy eating. Reviewed patient goals for dietary modifications and their clinical implications.  Reviewed most recent lipid profile.     Patient's progress toward Nutrition goals:    Reviewed Pt goals and determined plan of care      Nutrition Intervention:   group Class: Heart Healthy Eating, increase intake of whole grains, increase daily intake of fruits and vegetables, increase daily intake of low fat dairy, choose healthy meals while dining out, eat chicken and fish that is baked, broiled or roasted, and cook without fats or oils    Measurable goals were based Rate Your Plate Dietary  Self-Assessment. These are the areas in which the patient could score higher on the assessment.  Goals include recommendations for a heart healthy diet based on American Heart Association.    Nutrition Education:   heart healthy eating principles  weight loss and management strategies  low sodium diet  exercise and diabetes management   portion control  group class: Heart Healthy Eating  group class:  Label Reading    Readiness to change: Action:  (Changing behavior)      PSYCHOSOCIAL GOALS AND PLAN    Psychosocial Assessment as it relates to rehabilitation:   Patient denies issues with his/her family or home life that may affect their rehabilitation efforts.     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    Readiness to change: Contemplation:  (Acknowledging that there is a problem but not yet ready or sure of wanting to make a change)      OTHER CORE COMPONENTS GOALS and PLAN      Blood Pressure will be monitored throughout the program and cardiologist will be notified of elevated trends.    Pt will be encouraged to monitor home BP if advised by cardiologist.    Tobacco Intervention:   N/A:  Pt is a non-smoker    Progress toward Core Component goals:   Will continue to educate and progress as tolerated.  Blood pressure is below 130/80 at rest encouraged to decrease his sodium and monitor his BP at home  BP at rest 160/80. He does talk  a lot at rehab and bp taken when  talking.     Other Core Components Intervention:   group class: Understanding Heart Disease, group class: Common Heart Medications, avoid places with second hand smoke, avoid processed foods, and engage in regular exercise    Group and Individual Education:  understanding high blood pressure and it's relationship to CAD, group class: Understanding Heart Disease, and group class:  Common Heart Medications    Readiness to change: Preparation:  (Getting ready to change)

## 2024-10-11 RX ORDER — TAMSULOSIN HYDROCHLORIDE 0.4 MG/1
0.4 CAPSULE ORAL DAILY
Qty: 90 CAPSULE | Refills: 1 | Status: SHIPPED | OUTPATIENT
Start: 2024-10-11

## 2024-10-14 ENCOUNTER — OFFICE VISIT (OUTPATIENT)
Dept: CARDIOLOGY CLINIC | Facility: HOSPITAL | Age: 74
End: 2024-10-14
Payer: MEDICARE

## 2024-10-14 VITALS
HEART RATE: 88 BPM | HEIGHT: 70 IN | SYSTOLIC BLOOD PRESSURE: 132 MMHG | OXYGEN SATURATION: 94 % | DIASTOLIC BLOOD PRESSURE: 70 MMHG | WEIGHT: 206 LBS | BODY MASS INDEX: 29.49 KG/M2

## 2024-10-14 DIAGNOSIS — E78.2 MIXED HYPERLIPIDEMIA: ICD-10-CM

## 2024-10-14 DIAGNOSIS — E11.9 TYPE 2 DIABETES MELLITUS WITHOUT COMPLICATION, WITHOUT LONG-TERM CURRENT USE OF INSULIN (HCC): ICD-10-CM

## 2024-10-14 DIAGNOSIS — I10 PRIMARY HYPERTENSION: ICD-10-CM

## 2024-10-14 DIAGNOSIS — G47.33 OSA (OBSTRUCTIVE SLEEP APNEA): ICD-10-CM

## 2024-10-14 DIAGNOSIS — I25.118 CORONARY ARTERY DISEASE OF NATIVE ARTERY OF NATIVE HEART WITH STABLE ANGINA PECTORIS (HCC): Primary | ICD-10-CM

## 2024-10-14 PROCEDURE — 99214 OFFICE O/P EST MOD 30 MIN: CPT | Performed by: INTERNAL MEDICINE

## 2024-10-14 RX ORDER — ATORVASTATIN CALCIUM 80 MG/1
80 TABLET, FILM COATED ORAL DAILY
Qty: 90 TABLET | Refills: 3 | Status: SHIPPED | OUTPATIENT
Start: 2024-10-14

## 2024-10-14 NOTE — PROGRESS NOTES
Sg Munoz  1950  [id number]  [facility] [address] [id number] [phone]    1. Coronary artery disease of native artery of native heart with stable angina pectoris (HCC)        2. Type 2 diabetes mellitus without complication, without long-term current use of insulin (HCC)  atorvastatin (LIPITOR) 80 mg tablet      3. Primary hypertension        4. Mixed hyperlipidemia        5. LILIA (obstructive sleep apnea)            Discussion/Summary: Overall he has been doing great.  After our last visit stress test was abnormal which led to a left heart catheterization.  90% circumflex lesion that was stented.  He is feeling much better no further anginal symptoms.  I am going to increase his Lipitor to 80 a day.  Goal LDL should be at least 20 points lower.  Blood pressure well-controlled.  Vascular screening reviewed mild carotid plaque every 2-year interval.      Interval History: Very pleasant 74-year-old gentleman with a history of type 2 diabetes, hypertension, hyperlipidemia presents as a new patient consult on behalf of Dr. Klein for chest pain.  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  In the warm weather he can do plenty of physical activity without any difficulty.  He had a few episodes where he had some shortness of breath associated with this.  Random palpitations can also occur.      Overall he has been doing well following his last appointment underwent left heart catheterization which showed 90% lesion that was stented.  Ostial circumflex.  Denies any chest pain, shortness of breath, palpitations, lightheadedness, dizziness, or syncope.  Is been no lower extremity edema, PND, orthopnea.  He has been taking all medications as prescribed.      Medical Problems       Problem List       Other hyperlipidemia     Hypertension    BPH with obstruction/lower urinary tract symptoms    Hypercalciuria    Knee osteoarthritis    Prepatellar bursitis    Multiple thyroid nodules    LILIA (obstructive sleep apnea)    Type 2 diabetes mellitus with complication, with long-term current use of insulin ([facility])      Lab Results   Component Value Date    HGBA1C 7.4 (A) 06/03/2024         History of parathyroidectomy    Overview Signed 5/9/2019  3:14 PM by Donna Baugh RN     11/1/2018 right inferior parathyroidectomy  Cellular parathyroid tissue 0.74 g         BMI 33.0-33.9,adult    Preop examination    Non-STEMI (non-ST elevated myocardial infarction) ([facility])    Diabetic foot ([facility])      Lab Results   Component Value Date    HGBA1C 7.4 (A) 06/03/2024         Other chest pain    Coronary artery disease involving native heart    Mixed hyperlipidemia        Past Medical History:   Diagnosis Date    Arthritis     Back pain     Diabetes mellitus ([facility])     Hypertension      Social History     Socioeconomic History    Marital status: /Civil Union     Spouse name: Not on file    Number of children: Not on file    Years of education: Not on file    Highest education level: Not on file   Occupational History    Not on file   Tobacco Use    Smoking status: Never     Passive exposure: Past    Smokeless tobacco: Never   Vaping Use    Vaping status: Never Used   Substance and Sexual Activity    Alcohol use: Yes     Comment: rarely    Drug use: No    Sexual activity: Yes   Other Topics Concern    Not on file   Social History Narrative    Not on file     Social Determinants of Health     Financial Resource Strain: Low Risk  (12/4/2023)    Overall Financial Resource Strain (CARDIA)     Difficulty of Paying Living Expenses: Not hard at all   Food Insecurity: No Food Insecurity (8/20/2023)    Received from [facility]    Food Insecurity     Within the past 12 months, you worried that your food would run out before you got the money to buy more.:  1     Within the past 12 months, the food you bought just didn't last and you didn't have money to get more.: 1   Transportation Needs: No Transportation Needs (12/4/2023)    PRAPARE - Transportation     Lack of Transportation (Medical): No     Lack of Transportation (Non-Medical): No   Physical Activity: Insufficiently Active (8/20/2023)    [facility]    Physical Activity     On average, how many days per week do you engage in moderate to strenuous exercise (like a brisk walk)?: 3 days     On average, how many minutes do you engage in exercise at this level?: 30 min   Stress: No Stress Concern Present (8/20/2023)    Received from [facility] - Occupational Stress Questionnaire     Feeling of Stress : Only a little   Social Connections: Socially Integrated (8/20/2023)    [facility]    Social Connection and Isolation Panel [NHANES]     Frequency of Communication with Friends and Family: More than three times a week     Frequency of Social Gatherings with Friends and Family: Three times a week     Attends Taoism Services: More than 4 times per year     Active Member of Clubs or Organizations: Yes     Attends Club or Organization Meetings: 1 to 4 times per year     Marital Status:    Intimate Partner Violence: Not At Risk (8/20/2023)    Received from [facility]     Threatened: Not on file     Insulted: Not on file     Physically Hurt : Not on file     Scream: Not on file   Housing Stability: At Risk (8/20/2023)    Received from [facility]     Living Situation: Not on file     Housing Problems: Not on file      Family History   Problem Relation Age of Onset    Hypertension Father     Coronary artery disease Father     Hypertension Mother     Arthritis Mother     Coronary artery disease Mother     Diabetes Maternal Uncle     Diabetes  Paternal Uncle      Past Surgical History:   Procedure Laterality Date    CARDIAC CATHETERIZATION Left 6/24/2024    Procedure: Cardiac Left Heart Cath;  Surgeon: Ismael Wall DO;  Location: [facility];  Service: Cardiology    CARDIAC CATHETERIZATION N/A 6/24/2024    Procedure: Cardiac pci;  Surgeon: Ismael Wall DO;  Location: [facility];  Service: Cardiology    HERNIA REPAIR Left 1998    with mesh to groin    NE PARATHYROIDECTOMY/EXPLORATION PARATHYROIDS Right 11/1/2018    Procedure: MINIMALLY INVASIVE RIGHT PARATHYROIDECTOMY WITH PTH MONITORING;  Surgeon: Price Ybarra MD;  Location: [facility] MAIN OR;  Service: Surgical Oncology    TONSILLECTOMY      US GUIDED THYROID BIOPSY  9/7/2018       Current Outpatient Medications:     aspirin (ECOTRIN LOW STRENGTH) 81 mg EC tablet, Take 81 mg by mouth daily, Disp: , Rfl:     atorvastatin (LIPITOR) 80 mg tablet, Take 1 tablet (80 mg total) by mouth daily, Disp: 90 tablet, Rfl: 3    clopidogrel (Plavix) 75 mg tablet, Take 1 tablet (75 mg total) by mouth daily, Disp: 90 tablet, Rfl: 3    Continuous Glucose Sensor (Dexcom G7 Sensor), Use 1 Device every 10 days, Disp: 9 each, Rfl: 3    dutasteride (AVODART) 0.5 mg capsule, Take 1 capsule (0.5 mg total) by mouth daily, Disp: 90 capsule, Rfl: 3    Empagliflozin (Jardiance) 25 MG TABS, Take 1 tablet (25 mg total) by mouth every morning, Disp: 90 tablet, Rfl: 3    gabapentin (Neurontin) 600 MG tablet, Take 1 tablet (600 mg total) by mouth 3 (three) times a day, Disp: 270 tablet, Rfl: 3    insulin degludec (Tresiba FlexTouch) 100 units/mL injection pen, Inject 50 Units under the skin every 12 (twelve) hours, Disp: 90 mL, Rfl: 1    Insulin Pen Needle (B-D ULTRAFINE III SHORT PEN) 31G X 8 MM MISC, Use 2 (two) times a day, Disp: 180 each, Rfl: 1    lisinopril (ZESTRIL) 20 mg tablet, Take 1 tablet (20 mg total) by mouth daily, Disp: 90 tablet, Rfl: 3    metFORMIN (GLUCOPHAGE-XR) 500 mg 24 hr tablet, Take  "4 tablets (2,000 mg total) by mouth daily with breakfast, Disp: 360 tablet, Rfl: 1    OneTouch Ultra test strip, Use 1 each 3 (three) times a day Use as instructed, Disp: 400 strip, Rfl: 3    saccharomyces boulardii (FLORASTOR) 250 mg capsule, Take 250 mg by mouth every other day, Disp: , Rfl:     sitaGLIPtin (Januvia) 100 mg tablet, Take 1 tablet (100 mg total) by mouth daily, Disp: 90 tablet, Rfl: 1    SUPER B COMPLEX/C PO, Take by mouth, Disp: , Rfl:     tamsulosin (FLOMAX) 0.4 mg, Take 1 capsule (0.4 mg total) by mouth daily, Disp: 90 capsule, Rfl: 1  No Known Allergies    Labs:     Chemistry        Component Value Date/Time    K 4.0 09/04/2024 0745     09/04/2024 0745    CO2 23 09/04/2024 0745    BUN 17 09/04/2024 0745    CREATININE 0.58 (L) 09/04/2024 0745        Component Value Date/Time    CALCIUM 8.4 09/04/2024 0745    ALKPHOS 79 09/04/2024 0745    AST 17 09/04/2024 0745    ALT 23 09/04/2024 0745            No results found for: \"CHOL\"  Lab Results   Component Value Date    HDL 38 (L) 09/04/2024    HDL 39 (L) 02/12/2024    HDL 39 (L) 02/07/2023     Lab Results   Component Value Date    LDLCALC 71 09/04/2024    LDLCALC 78 02/12/2024    LDLCALC 71 02/07/2023     Lab Results   Component Value Date    TRIG 102 09/04/2024    TRIG 100 02/12/2024    TRIG 185 (H) 02/07/2023     No results found for: \"CHOLHDL\"    Imaging: No results found.    ECG: Sinus rhythm bifascicular block      Review of Systems   Constitutional: Negative.   HENT: Negative.     Eyes: Negative.    Cardiovascular: Negative.    Respiratory: Negative.     Endocrine: Negative.    Hematologic/Lymphatic: Negative.    Skin: Negative.    Musculoskeletal: Negative.    Gastrointestinal: Negative.    Genitourinary: Negative.    Neurological: Negative.    Psychiatric/Behavioral: Negative.     All other systems reviewed and are negative.      Vitals:    10/14/24 1308   BP: 132/70   Pulse: 88   SpO2: 94%     Vitals:    10/14/24 1308   Weight: 93.4 kg " "(206 lb)     Height: 5' 10\" (177.8 cm)   Body mass index is 29.56 kg/m².    Physical Exam:  Vital signs reviewed  General:  Alert and cooperative, appears stated age, no acute distress  HEENT:  PERRLA, EOMI, no scleral icterus, no conjunctival pallor  Neck:  No lymphadenopathy, no thyromegaly, no carotid bruits, no elevated JVP  Heart:  Regular rate and rhythm, normal S1/S2, no S3/S4, no murmur, rubs or gallops.  PMI nondisplaced  Lungs:  Clear to auscultation bilaterally, no wheezes rales or rhonchi  Abdomen:  Soft, non-tender, positive bowel sounds, no rebound or guarding,   no organomegaly   Extremities:  Normal range of motion.  No clubbing, cyanosis or edema   Vascular:  2+ pedal pulses  Skin:  No rashes or lesions on exposed skin  Neurologic:  Cranial nerves II-XII grossly intact without focal deficits  Psych:  Normal mood and affect      "

## 2024-10-25 ENCOUNTER — OFFICE VISIT (OUTPATIENT)
Dept: FAMILY MEDICINE CLINIC | Facility: CLINIC | Age: 74
End: 2024-10-25
Payer: COMMERCIAL

## 2024-10-25 VITALS
WEIGHT: 203.1 LBS | RESPIRATION RATE: 18 BRPM | BODY MASS INDEX: 29.07 KG/M2 | OXYGEN SATURATION: 95 % | DIASTOLIC BLOOD PRESSURE: 82 MMHG | HEART RATE: 85 BPM | HEIGHT: 70 IN | SYSTOLIC BLOOD PRESSURE: 154 MMHG

## 2024-10-25 DIAGNOSIS — E11.9 TYPE 2 DIABETES MELLITUS WITHOUT COMPLICATION, WITHOUT LONG-TERM CURRENT USE OF INSULIN (HCC): ICD-10-CM

## 2024-10-25 DIAGNOSIS — Z79.4 TYPE 2 DIABETES MELLITUS WITH COMPLICATION, WITH LONG-TERM CURRENT USE OF INSULIN (HCC): ICD-10-CM

## 2024-10-25 DIAGNOSIS — E11.8 TYPE 2 DIABETES MELLITUS WITH COMPLICATION, WITH LONG-TERM CURRENT USE OF INSULIN (HCC): ICD-10-CM

## 2024-10-25 DIAGNOSIS — N40.0 BENIGN PROSTATIC HYPERPLASIA, UNSPECIFIED WHETHER LOWER URINARY TRACT SYMPTOMS PRESENT: ICD-10-CM

## 2024-10-25 DIAGNOSIS — I25.118 CORONARY ARTERY DISEASE OF NATIVE ARTERY OF NATIVE HEART WITH STABLE ANGINA PECTORIS (HCC): ICD-10-CM

## 2024-10-25 DIAGNOSIS — E11.8 DIABETIC FOOT (HCC): ICD-10-CM

## 2024-10-25 DIAGNOSIS — E78.49 OTHER HYPERLIPIDEMIA: ICD-10-CM

## 2024-10-25 DIAGNOSIS — I21.4 NON-STEMI (NON-ST ELEVATED MYOCARDIAL INFARCTION) (HCC): ICD-10-CM

## 2024-10-25 DIAGNOSIS — Z23 NEEDS FLU SHOT: Primary | ICD-10-CM

## 2024-10-25 DIAGNOSIS — I10 PRIMARY HYPERTENSION: ICD-10-CM

## 2024-10-25 LAB — SL AMB POCT HEMOGLOBIN AIC: 7.3 (ref ?–6.5)

## 2024-10-25 PROCEDURE — 99214 OFFICE O/P EST MOD 30 MIN: CPT | Performed by: FAMILY MEDICINE

## 2024-10-25 PROCEDURE — 90471 IMMUNIZATION ADMIN: CPT | Performed by: FAMILY MEDICINE

## 2024-10-25 PROCEDURE — 83036 HEMOGLOBIN GLYCOSYLATED A1C: CPT | Performed by: FAMILY MEDICINE

## 2024-10-25 RX ORDER — DUTASTERIDE 0.5 MG/1
0.5 CAPSULE, LIQUID FILLED ORAL DAILY
Qty: 90 CAPSULE | Refills: 3 | Status: SHIPPED | OUTPATIENT
Start: 2024-10-25

## 2024-10-25 RX ORDER — LISINOPRIL 20 MG/1
20 TABLET ORAL DAILY
Qty: 90 TABLET | Refills: 3 | Status: SHIPPED | OUTPATIENT
Start: 2024-10-25

## 2024-10-25 NOTE — ASSESSMENT & PLAN NOTE
Lab Results   Component Value Date    HGBA1C 7.3 (A) 10/25/2024   Stable.  Continue current.

## 2024-10-25 NOTE — ASSESSMENT & PLAN NOTE
Lab Results   Component Value Date    HGBA1C 7.3 (A) 10/25/2024   A1c under better control.  Continue current.

## 2024-10-25 NOTE — ASSESSMENT & PLAN NOTE
Lipids at goal.  Continue current.    Orders:    Lipid panel; Future    Comprehensive metabolic panel; Future

## 2024-10-25 NOTE — PROGRESS NOTES
Ambulatory Visit  Name: Sg Munoz      : 1950      MRN: [id number]  Encounter Provider: Yung Klein MD  Encounter Date: 10/25/2024   Encounter department: [facility]    Assessment & Plan  Type 2 diabetes mellitus without complication, without long-term current use of insulin (HCC)    Lab Results   Component Value Date    HGBA1C 7.3 (A) 10/25/2024       Orders:    POCT hemoglobin A1c    sitaGLIPtin (Januvia) 100 mg tablet; Take 1 tablet (100 mg total) by mouth daily    Primary hypertension    Orders:    lisinopril (ZESTRIL) 20 mg tablet; Take 1 tablet (20 mg total) by mouth daily    Lipid panel; Future    Comprehensive metabolic panel; Future    Benign prostatic hyperplasia, unspecified whether lower urinary tract symptoms present    Orders:    dutasteride (AVODART) 0.5 mg capsule; Take 1 capsule (0.5 mg total) by mouth daily    Needs flu shot    Orders:    influenza vaccine, high-dose, PF 0.5 mL (Fluzone High Dose)    Non-STEMI (non-ST elevated myocardial infarction) (HCC)  Plan per cardiology.         Coronary artery disease of native artery of native heart with stable angina pectoris (HCC)  Plan per cardiology.    Orders:    Lipid panel; Future    Comprehensive metabolic panel; Future    Type 2 diabetes mellitus with complication, with long-term current use of insulin (HCC)    Lab Results   Component Value Date    HGBA1C 7.3 (A) 10/25/2024   A1c under better control.  Continue current.         Diabetic foot (HCC)    Lab Results   Component Value Date    HGBA1C 7.3 (A) 10/25/2024   Stable.  Continue current.         Other hyperlipidemia  Lipids at goal.  Continue current.    Orders:    Lipid panel; Future    Comprehensive metabolic panel; Future       History of Present Illness     He denies CP or SOB.  He has no GONZALES.    His LDL is at goal.  He has no myalgia or muscle weakness.    His A1c is at goal.  He has minimal hypoglycemia.          Review of Systems   All  "other systems reviewed and are negative.          Objective     /82 (BP Location: Left arm, Patient Position: Sitting, Cuff Size: Large)   Pulse 85   Resp 18   Ht 5' 10\" (1.778 m)   Wt 92.1 kg (203 lb 1.6 oz)   SpO2 95%   BMI 29.14 kg/m²     Physical Exam  Vitals and nursing note reviewed.   Constitutional:       Appearance: Normal appearance.   Cardiovascular:      Rate and Rhythm: Normal rate and regular rhythm.      Pulses: Normal pulses.      Heart sounds: Normal heart sounds.   Pulmonary:      Effort: Pulmonary effort is normal.      Breath sounds: Normal breath sounds.   Abdominal:      General: Abdomen is flat. Bowel sounds are normal.      Palpations: Abdomen is soft.   Musculoskeletal:         General: Normal range of motion.      Cervical back: Normal range of motion and neck supple.   Skin:     General: Skin is warm and dry.      Capillary Refill: Capillary refill takes less than 2 seconds.   Neurological:      General: No focal deficit present.      Mental Status: He is alert and oriented to person, place, and time. Mental status is at baseline.   Psychiatric:         Mood and Affect: Mood normal.         Behavior: Behavior normal.         Thought Content: Thought content normal.         Judgment: Judgment normal.         "

## 2024-10-25 NOTE — ASSESSMENT & PLAN NOTE
Orders:    lisinopril (ZESTRIL) 20 mg tablet; Take 1 tablet (20 mg total) by mouth daily    Lipid panel; Future    Comprehensive metabolic panel; Future

## 2024-11-07 ENCOUNTER — VBI (OUTPATIENT)
Dept: ADMINISTRATIVE | Facility: OTHER | Age: 74
End: 2024-11-07

## 2024-11-07 NOTE — TELEPHONE ENCOUNTER
11/07/24 6:27 PM     Chart reviewed for   Comprehensive Diabetes Care - Blood Pressure Controlled <140/90 mm Hg   Controlling High Blood Pressure    was/were submitted to the patient's insurance.     MELANY ESCALANTE MA   [facility]

## 2024-11-08 ENCOUNTER — OFFICE VISIT (OUTPATIENT)
Dept: PODIATRY | Facility: CLINIC | Age: 74
End: 2024-11-08
Payer: COMMERCIAL

## 2024-11-08 VITALS
OXYGEN SATURATION: 98 % | DIASTOLIC BLOOD PRESSURE: 67 MMHG | RESPIRATION RATE: 16 BRPM | TEMPERATURE: 98 F | WEIGHT: 203 LBS | BODY MASS INDEX: 29.06 KG/M2 | HEIGHT: 70 IN | HEART RATE: 94 BPM | SYSTOLIC BLOOD PRESSURE: 124 MMHG

## 2024-11-08 DIAGNOSIS — B35.1 ONYCHOMYCOSIS: ICD-10-CM

## 2024-11-08 DIAGNOSIS — I73.9 PERIPHERAL VASCULAR DISEASE (HCC): Primary | ICD-10-CM

## 2024-11-08 DIAGNOSIS — E11.8 TYPE 2 DIABETES MELLITUS WITH COMPLICATION, WITH LONG-TERM CURRENT USE OF INSULIN (HCC): ICD-10-CM

## 2024-11-08 DIAGNOSIS — Z79.4 TYPE 2 DIABETES MELLITUS WITH COMPLICATION, WITH LONG-TERM CURRENT USE OF INSULIN (HCC): ICD-10-CM

## 2024-11-08 PROCEDURE — 11721 DEBRIDE NAIL 6 OR MORE: CPT | Performed by: PODIATRIST

## 2024-11-08 PROCEDURE — RECHECK: Performed by: PODIATRIST

## 2024-11-08 RX ORDER — ACYCLOVIR 400 MG/1
TABLET ORAL
COMMUNITY
Start: 2024-09-30

## 2024-11-08 NOTE — PROGRESS NOTES
Ambulatory Visit  Name: Sg Munoz      : 1950      MRN: [id number]  Encounter Provider: Yung Salvador DPM  Encounter Date: 2024   Encounter department: [facility] [place]    Assessment & Plan  Peripheral vascular disease ([facility])         Onychomycosis       Debride mycotic nails and thin the nail plates x 10 with the use of a nail nipper manually and an electric Dremel bur was used to reduce the thickness of the nail beds and smoothed the distal aspect of the nails.   Type 2 diabetes mellitus with complication, with long-term current use of insulin ([facility])    Lab Results   Component Value Date    HGBA1C 7.3 (A) 10/25/2024            Discussed proper shoe gear, daily inspections of feet, and general foot health with patient. Patient has Q8  findings and is recommended for at risk foot care every 9-10 weeks.    Patients most recent complete clinical foot exam was on: 2024      Return in about 10 weeks (around 2025).     History of Present Illness     Sg Munoz is a 74 y.o. male who presents with chief complaint of painful thick nails on both feet.  He is a diabetic.  Patient presents for at-risk foot care.  Patient has no acute concerns today.  Patient has significant lower extremity risk due to neuropathy, parasthesia, edema, and trophic skin changes to the lower extremity.     History obtained from : patient  Review of Systems  Medical History Reviewed by provider this encounter:       Current Outpatient Medications on File Prior to Visit   Medication Sig Dispense Refill    aspirin (ECOTRIN LOW STRENGTH) 81 mg EC tablet Take 81 mg by mouth daily      atorvastatin (LIPITOR) 80 mg tablet Take 1 tablet (80 mg total) by mouth daily 90 tablet 3    clopidogrel (Plavix) 75 mg tablet Take 1 tablet (75 mg total) by mouth daily 90 tablet 3    Continuous Glucose Sensor (Dexcom G7 Sensor) Use 1 Device every 10 days 9 each 3    Continuous Glucose Sensor (Dexcom G7 Sensor)        "dutasteride (AVODART) 0.5 mg capsule Take 1 capsule (0.5 mg total) by mouth daily 90 capsule 3    Empagliflozin (Jardiance) 25 MG TABS Take 1 tablet (25 mg total) by mouth every morning 90 tablet 3    gabapentin (Neurontin) 600 MG tablet Take 1 tablet (600 mg total) by mouth 3 (three) times a day 270 tablet 3    insulin degludec (Tresiba FlexTouch) 100 units/mL injection pen Inject 50 Units under the skin every 12 (twelve) hours (Patient taking differently: Inject 50 Units under the skin every 12 (twelve) hours Patient taking once daily) 90 mL 1    Insulin Pen Needle (B-D ULTRAFINE III SHORT PEN) 31G X 8 MM MISC Use 2 (two) times a day 180 each 1    lisinopril (ZESTRIL) 20 mg tablet Take 1 tablet (20 mg total) by mouth daily 90 tablet 3    metFORMIN (GLUCOPHAGE-XR) 500 mg 24 hr tablet Take 4 tablets (2,000 mg total) by mouth daily with breakfast 360 tablet 1    OneTouch Ultra test strip Use 1 each 3 (three) times a day Use as instructed 400 strip 3    saccharomyces boulardii (FLORASTOR) 250 mg capsule Take 250 mg by mouth every other day      sitaGLIPtin (Januvia) 100 mg tablet Take 1 tablet (100 mg total) by mouth daily 90 tablet 1    SUPER B COMPLEX/C PO Take by mouth      tamsulosin (FLOMAX) 0.4 mg Take 1 capsule (0.4 mg total) by mouth daily 90 capsule 1     No current facility-administered medications on file prior to visit.      Social History     Tobacco Use    Smoking status: Never     Passive exposure: Past    Smokeless tobacco: Never   Vaping Use    Vaping status: Never Used   Substance and Sexual Activity    Alcohol use: Yes     Comment: rarely    Drug use: No    Sexual activity: Yes     Partners: Female         Objective     /67   Pulse 94   Temp 98 °F (36.7 °C)   Resp 16   Ht 5' 10\" (1.778 m)   Wt 92.1 kg (203 lb)   SpO2 98%   BMI 29.13 kg/m²     Physical Exam  Vascular status is a faint 1/4 DP PT negative digital hair normal distal cooling immediate capillary refill bilaterally.  Capillary " refill is approximately 2 to 3 seconds.    Derm nails are brittle elongated hypertrophic yellow-white discoloration with subungual debris x 10.  There is an increased thickness in the nails of approximately 2 mm.  There is loss of turgor and elasticity to the skin along with thinning of the skin bilaterally.    Ortho and neuro exams are unchanged from last visit.  Administrative Statements   I have spent a total time of 14 minutes in caring for this patient on the day of the visit/encounter including Risks and benefits of tx options, Instructions for management, Patient and family education, Importance of tx compliance, Counseling / Coordination of care, and Documenting in the medical record.

## 2024-11-11 ENCOUNTER — CLINICAL SUPPORT (OUTPATIENT)
Dept: FAMILY MEDICINE CLINIC | Facility: CLINIC | Age: 74
End: 2024-11-11

## 2024-11-11 DIAGNOSIS — E11.8 TYPE 2 DIABETES MELLITUS WITH COMPLICATION, WITH LONG-TERM CURRENT USE OF INSULIN (HCC): ICD-10-CM

## 2024-11-11 DIAGNOSIS — Z79.4 TYPE 2 DIABETES MELLITUS WITH COMPLICATION, WITH LONG-TERM CURRENT USE OF INSULIN (HCC): ICD-10-CM

## 2024-11-11 PROCEDURE — PBNCHG PB NO CHARGE PLACEHOLDER: Performed by: PHARMACIST

## 2024-11-11 NOTE — PROGRESS NOTES
[facility]  Lotus Stein, Pharmacist    Assessment/ Plan     Assessment & Plan  Type 2 diabetes mellitus with complication, with long-term current use of insulin ([facility])  Lab Results   Component Value Date    HGBA1C 7.3 (A) 10/25/2024      Goal A1c <7.5% .   Complications:  Microvascular complications:None identified at this time  Macrovascular complications: CAD  Current Diabetes Regimen:  Metformin 1000 mg BID  Januvia 100 mg daily  Tresiba 55 units once daily  Jardiance 25 mg daily  Historical DM Meds (reason for discontinuation):  N/A  On Additional Therapies:  Statin: yes  ACEI/ARB: yes    Assessment: A1c at goal.  30-day average glucose increased to 187 mg/dL (individualized goal less than 170 mg/dL) however his Tresiba was increased from 50 to 55 units and his 14-day average has now improved to 177 mg/dL.     Changes to medication regimen:  No changes today continue current regimen  Patient has transferred his care to [place] office.  He is aware that I do not cover the office however he will continue to follow-up with his PCP Dr. Klein         Subjective   HPI    Medication Adherence/ Tolerability/ Cost:  Patient denies side effects, no issues with cost, 0 missed doses in last two week  aspirin  atorvastatin  B-D ULTRAFINE III SHORT PEN Misc  clopidogrel  Dexcom G7 Sensor  dutasteride  Empagliflozin Tabs  gabapentin  lisinopril  metFORMIN  OneTouch Ultra Strp  saccharomyces boulardii  sitaGLIPtin  SUPER B COMPLEX/C PO  tamsulosin  Tresiba FlexTouch Sopn- increased to 55 units.     Previous Medications  N/A    Review of Systems     2. Lifestyle:   Physical Activity: Cardio rehab ended and has been going wellness PT 3 x/week     3. Home monitoring devices  Glucometer: Yes, Brand:   Continuous Glucose Monitor: Yes, Brand: Dexcom g7      Objective       Blood Sugar Readings  Has not connected to Dexcom Clarity however he does read off averages and time in range over the phone to me.   See above  14 day average  Within goal range of 70 - 180 mg/dL 57% of time (goal 60-70% of time)  Average glucose 177 mg/dL (goal <154 mg/dL)  GMI 7.5%    30 day average  Within goal range of 70 - 180 mg/dL 49% of time (goal 60-70% of time)  Average glucose 187 mg/dL (goal <154 mg/dL)  GMI 7.8%    ASCVD Risk:  The ASCVD Risk score (Isabel MONTIEL, et al., 2019) failed to calculate for the following reasons:    The patient has a prior MI or stroke diagnosis     Vitals:  There were no vitals filed for this visit.    Eye Exam:    Lab Results   Component Value Date    LEFTDIABRET None 10/02/2024    RIGHTDIABRET None 10/02/2024       Labs:  Lab Results   Component Value Date    SODIUM 140 09/04/2024    K 4.0 09/04/2024    EGFR 101 09/04/2024    CREATININE 0.58 (L) 09/04/2024    GLUF 119 (H) 09/04/2024    MICROALBCRE 74 (H) 02/12/2024       Lab Results   Component Value Date    HGBA1C 7.3 (A) 10/25/2024    HGBA1C 7.4 (A) 06/03/2024    HGBA1C 7.9 (H) 02/12/2024       Telemedicine consent  The patient was identified by name and date of birth. Sg Munoz was informed that this is a telemedicine visit and that the visit is being conducted through Telephone.  My office door was closed. No one else was in the room.  He acknowledged consent and understanding of privacy and security of the video platform. The patient has agreed to participate and understands they can discontinue the visit at any time.    Pharmacist Tracking Tool     Pharmacist Tracking Tool  Reason For Outreach: Embedded Pharmacist  Demographics:  Intervention Method: Phone  Type of Intervention: Follow-Up  Topics Addressed: Diabetes  Pharmacologic Interventions: N/A  Non-Pharmacologic Interventions: Disease state education, Home Monitoring, Medication/Device education, and Personal CGM  Time:  Direct Patient Care:  10  mins  Care Coordination:  10  mins  Recommendation Recipient: Patient/Caregiver and Provider  Outcome: Accepted

## 2024-11-11 NOTE — ASSESSMENT & PLAN NOTE
Lab Results   Component Value Date    HGBA1C 7.3 (A) 10/25/2024      Goal A1c <7.5% .   Complications:  Microvascular complications:None identified at this time  Macrovascular complications: CAD  Current Diabetes Regimen:  Metformin 1000 mg BID  Januvia 100 mg daily  Tresiba 55 units once daily  Jardiance 25 mg daily  Historical DM Meds (reason for discontinuation):  N/A  On Additional Therapies:  Statin: yes  ACEI/ARB: yes    Assessment: A1c at goal.  30-day average glucose increased to 187 mg/dL (individualized goal less than 170 mg/dL) however his Tresiba was increased from 50 to 55 units and his 14-day average has now improved to 177 mg/dL.     Changes to medication regimen:  No changes today continue current regimen  Patient has transferred his care to [place] office.  He is aware that I do not cover the office however he will continue to follow-up with his PCP Dr. Klein

## 2025-01-09 ENCOUNTER — OFFICE VISIT (OUTPATIENT)
Dept: PODIATRY | Facility: CLINIC | Age: 75
End: 2025-01-09
Payer: COMMERCIAL

## 2025-01-09 VITALS
HEIGHT: 70 IN | WEIGHT: 206 LBS | BODY MASS INDEX: 29.49 KG/M2 | TEMPERATURE: 97.6 F | RESPIRATION RATE: 16 BRPM | HEART RATE: 102 BPM | SYSTOLIC BLOOD PRESSURE: 156 MMHG | OXYGEN SATURATION: 96 % | DIASTOLIC BLOOD PRESSURE: 82 MMHG

## 2025-01-09 DIAGNOSIS — Z79.4 CONTROLLED TYPE 2 DIABETES MELLITUS WITH OTHER NEUROLOGIC COMPLICATION, WITH LONG-TERM CURRENT USE OF INSULIN (HCC): ICD-10-CM

## 2025-01-09 DIAGNOSIS — L85.3 XEROSIS OF SKIN: ICD-10-CM

## 2025-01-09 DIAGNOSIS — I73.9 PERIPHERAL VASCULAR DISEASE (HCC): Primary | ICD-10-CM

## 2025-01-09 DIAGNOSIS — B35.1 ONYCHOMYCOSIS: ICD-10-CM

## 2025-01-09 DIAGNOSIS — M79.674 PAIN IN TOES OF BOTH FEET: ICD-10-CM

## 2025-01-09 DIAGNOSIS — E11.49 CONTROLLED TYPE 2 DIABETES MELLITUS WITH OTHER NEUROLOGIC COMPLICATION, WITH LONG-TERM CURRENT USE OF INSULIN (HCC): ICD-10-CM

## 2025-01-09 DIAGNOSIS — E11.8 DIABETIC FOOT (HCC): ICD-10-CM

## 2025-01-09 DIAGNOSIS — M79.675 PAIN IN TOES OF BOTH FEET: ICD-10-CM

## 2025-01-09 PROCEDURE — 11721 DEBRIDE NAIL 6 OR MORE: CPT | Performed by: PODIATRIST

## 2025-01-09 PROCEDURE — RECHECK: Performed by: PODIATRIST

## 2025-01-09 NOTE — PROGRESS NOTES
Name: Sg Munzo      : 1950      MRN: [id number]  Encounter Provider: Yung Salvador DPM  Encounter Date: 2025   Encounter department: [facility] PODIATRY [place]  :  Assessment & Plan  Peripheral vascular disease (HCC)         Onychomycosis       Debride mycotic nails and thin the nail plates x 10 with the use of a nail nipper manually and an electric Dremel bur was used to reduce the thickness of the nail beds and smoothed the distal aspect of the nails.   Xerosis of skin       Pt was instructed to use lotion once a day on both feet such as cerave, Cetaphil or similar thick style of lotion.   Controlled type 2 diabetes mellitus with other neurologic complication, with long-term current use of insulin ([facility])    Lab Results   Component Value Date    HGBA1C 7.3 (A) 10/25/2024            Pain in toes of both feet         Discussed proper shoe gear, daily inspections of feet, and general foot health with patient. Patient has Q8  findings and is recommended for at risk foot care every 9-10 weeks.    Patients most recent complete clinical foot exam was on: 2024      Return in about 10 weeks (around 3/20/2025).     History of Present Illness   HPI  Sg Munoz is a 74 y.o. male who presents with chief complaint of painful thick nails on both feet.  He is a diabetic.Patient presents for at-risk foot care.  Patient has no acute concerns today.  Patient has significant lower extremity risk due to neuropathy, parasthesia, edema, and trophic skin changes to the lower extremity.   History obtained from: patient    Review of Systems  Medical History Reviewed by provider this encounter:     .  Current Outpatient Medications on File Prior to Visit   Medication Sig Dispense Refill    aspirin (ECOTRIN LOW STRENGTH) 81 mg EC tablet Take 81 mg by mouth daily      atorvastatin (LIPITOR) 80 mg tablet Take 1 tablet (80 mg total) by mouth daily 90 tablet 3    clopidogrel (Plavix) 75 mg tablet Take 1 tablet  (75 mg total) by mouth daily 90 tablet 3    Continuous Glucose Sensor (Dexcom G7 Sensor) Use 1 Device every 10 days 9 each 3    Continuous Glucose Sensor (Dexcom G7 Sensor)       dutasteride (AVODART) 0.5 mg capsule Take 1 capsule (0.5 mg total) by mouth daily 90 capsule 3    Empagliflozin (Jardiance) 25 MG TABS Take 1 tablet (25 mg total) by mouth every morning 90 tablet 3    gabapentin (Neurontin) 600 MG tablet Take 1 tablet (600 mg total) by mouth 3 (three) times a day 270 tablet 3    insulin degludec (Tresiba FlexTouch) 100 units/mL injection pen Inject 50 Units under the skin every 12 (twelve) hours (Patient taking differently: Inject 55 Units under the skin daily Patient taking once daily) 90 mL 1    Insulin Pen Needle (B-D ULTRAFINE III SHORT PEN) 31G X 8 MM MISC Use 2 (two) times a day 180 each 1    lisinopril (ZESTRIL) 20 mg tablet Take 1 tablet (20 mg total) by mouth daily 90 tablet 3    metFORMIN (GLUCOPHAGE-XR) 500 mg 24 hr tablet Take 4 tablets (2,000 mg total) by mouth daily with breakfast 360 tablet 1    OneTouch Ultra test strip Use 1 each 3 (three) times a day Use as instructed 400 strip 3    saccharomyces boulardii (FLORASTOR) 250 mg capsule Take 250 mg by mouth every other day      sitaGLIPtin (Januvia) 100 mg tablet Take 1 tablet (100 mg total) by mouth daily 90 tablet 1    SUPER B COMPLEX/C PO Take by mouth      tamsulosin (FLOMAX) 0.4 mg Take 1 capsule (0.4 mg total) by mouth daily 90 capsule 1     No current facility-administered medications on file prior to visit.      Social History     Tobacco Use    Smoking status: Never     Passive exposure: Past    Smokeless tobacco: Never   Vaping Use    Vaping status: Never Used   Substance and Sexual Activity    Alcohol use: Yes     Comment: rarely    Drug use: No    Sexual activity: Yes     Partners: Female        Objective   /82 (BP Location: Right arm, Patient Position: Sitting, Cuff Size: Large)   Pulse 102   Temp 97.6 °F (36.4 °C)  "(Temporal)   Resp 16   Ht 5' 10\" (1.778 m)   Wt 93.4 kg (206 lb)   SpO2 96%   BMI 29.56 kg/m²      Physical Exam  Cardiovascular:      Pulses: Pulses are weak.           Dorsalis pedis pulses are 1+ on the right side and 1+ on the left side.        Posterior tibial pulses are 0 on the right side and 0 on the left side.   Feet:      Right foot:      Skin integrity: Dry skin present. No ulcer, skin breakdown, erythema, warmth or callus.      Left foot:      Skin integrity: Dry skin present. No ulcer, skin breakdown, erythema or warmth.       Vascular status is 1/4 DP a faint 1/4 PT negative digital hair normal distal cooling immediate capillary refill with dependent rubor present bilaterally.  The capillary refill is approximately 3 seconds.    Derm nails are brittle elongated hypertrophic yellow-white discoloration with subungual debris x 10.  There is an increased thickness and the nails are approximately 2 to 4 mm with the hallux nails being the worst.  There is white flaky tissue present on the dorsal and plantar aspects of both feet and the worst part is in the posterior aspect of the heel.  No signs of any fissures or dried blood present within the tissue.  There is blanching present on the toes with elevation.  There is loss of turgor noted bilaterally and thinning of the skin also noted bilaterally.    Ortho and neuro are unchanged from his visit on 11/8/2024.    Diabetic Foot Exam    Patient's shoes and socks removed.    Right Foot/Ankle   Right Foot Inspection  Skin Exam: dry skin and abnormal color. Skin not intact, no warmth, no callus, no erythema, no maceration, no pre-ulcer, no ulcer and no callus.     Toe Exam: ROM and strength within normal limits and right toe deformity. No swelling, no tenderness and erythema    Sensory   Vibration: intact  Proprioception: intact  Monofilament testing: intact    Vascular  Capillary refills: elevated  The right DP pulse is 1+. The right PT pulse is 0.     Left " Foot/Ankle  Left Foot Inspection  Skin Exam: dry skin and abnormal color. Skin not intact, no warmth, no erythema, no maceration, no pre-ulcer and no ulcer.     Toe Exam: ROM and strength within normal limits and left toe deformity. No swelling, no tenderness and no erythema.     Sensory   Vibration: diminished  Proprioception: diminished  Monofilament testing: diminished    Vascular  Capillary refills: elevated  The left DP pulse is 1+. The left PT pulse is 0.     Assign Risk Category  Deformity present  No loss of protective sensation  Weak pulses  Risk: 1    Administrative Statements   I have spent a total time of 15 minutes in caring for this patient on the day of the visit/encounter including Risks and benefits of tx options, Instructions for management, Patient and family education, Importance of tx compliance, Counseling / Coordination of care, and Documenting in the medical record.

## 2025-01-09 NOTE — TELEPHONE ENCOUNTER
Reason for call:   [x] Refill   [] Prior Auth  [] Other:     Office:   [x] PCP/Provider -  Yung Klein MD   [] Specialty/Provider -     Medication: Gabapentin 600 mg    Dose/Frequency: 1 tab TID    Quantity: 270 tabs    Pharmacy: [facility] - [place], [place] - [address] [place] [address]      Does the patient have enough for 3 days?   [x] Yes   [] No - Send as HP to POD

## 2025-01-10 RX ORDER — GABAPENTIN 600 MG/1
600 TABLET ORAL 3 TIMES DAILY
Qty: 270 TABLET | Refills: 0 | Status: SHIPPED | OUTPATIENT
Start: 2025-01-10

## 2025-01-24 ENCOUNTER — OFFICE VISIT (OUTPATIENT)
Dept: FAMILY MEDICINE CLINIC | Facility: CLINIC | Age: 75
End: 2025-01-24
Payer: MEDICARE

## 2025-01-24 VITALS
OXYGEN SATURATION: 96 % | SYSTOLIC BLOOD PRESSURE: 144 MMHG | HEART RATE: 78 BPM | HEIGHT: 70 IN | WEIGHT: 202 LBS | RESPIRATION RATE: 18 BRPM | BODY MASS INDEX: 28.92 KG/M2 | DIASTOLIC BLOOD PRESSURE: 70 MMHG

## 2025-01-24 DIAGNOSIS — Z79.4 TYPE 2 DIABETES MELLITUS WITH COMPLICATION, WITH LONG-TERM CURRENT USE OF INSULIN (HCC): ICD-10-CM

## 2025-01-24 DIAGNOSIS — E11.40 TYPE 2 DIABETES MELLITUS WITH DIABETIC NEUROPATHY, WITH LONG-TERM CURRENT USE OF INSULIN (HCC): ICD-10-CM

## 2025-01-24 DIAGNOSIS — E78.49 OTHER HYPERLIPIDEMIA: ICD-10-CM

## 2025-01-24 DIAGNOSIS — I10 PRIMARY HYPERTENSION: ICD-10-CM

## 2025-01-24 DIAGNOSIS — E11.8 DIABETIC FOOT (HCC): ICD-10-CM

## 2025-01-24 DIAGNOSIS — E89.2 POSTPROCEDURAL HYPOPARATHYROIDISM (HCC): Primary | ICD-10-CM

## 2025-01-24 DIAGNOSIS — E11.8 TYPE 2 DIABETES MELLITUS WITH COMPLICATION, WITH LONG-TERM CURRENT USE OF INSULIN (HCC): ICD-10-CM

## 2025-01-24 DIAGNOSIS — N40.0 BENIGN PROSTATIC HYPERPLASIA, UNSPECIFIED WHETHER LOWER URINARY TRACT SYMPTOMS PRESENT: ICD-10-CM

## 2025-01-24 DIAGNOSIS — Z79.4 TYPE 2 DIABETES MELLITUS WITH DIABETIC NEUROPATHY, WITH LONG-TERM CURRENT USE OF INSULIN (HCC): ICD-10-CM

## 2025-01-24 DIAGNOSIS — I25.118 CORONARY ARTERY DISEASE OF NATIVE ARTERY OF NATIVE HEART WITH STABLE ANGINA PECTORIS (HCC): ICD-10-CM

## 2025-01-24 LAB — SL AMB POCT HEMOGLOBIN AIC: 7.2 (ref ?–6.5)

## 2025-01-24 PROCEDURE — 83036 HEMOGLOBIN GLYCOSYLATED A1C: CPT | Performed by: FAMILY MEDICINE

## 2025-01-24 PROCEDURE — 99214 OFFICE O/P EST MOD 30 MIN: CPT | Performed by: FAMILY MEDICINE

## 2025-01-24 RX ORDER — GABAPENTIN 600 MG/1
600 TABLET ORAL 3 TIMES DAILY
Qty: 270 TABLET | Refills: 0 | Status: SHIPPED | OUTPATIENT
Start: 2025-01-24

## 2025-01-24 RX ORDER — TAMSULOSIN HYDROCHLORIDE 0.4 MG/1
0.4 CAPSULE ORAL DAILY
Qty: 90 CAPSULE | Refills: 1 | Status: SHIPPED | OUTPATIENT
Start: 2025-01-24

## 2025-01-24 RX ORDER — METFORMIN HYDROCHLORIDE 500 MG/1
2000 TABLET, EXTENDED RELEASE ORAL
Qty: 360 TABLET | Refills: 1 | Status: SHIPPED | OUTPATIENT
Start: 2025-01-24

## 2025-01-24 NOTE — PROGRESS NOTES
"Name: Sg Munoz      : 1950      MRN: [id number]  Encounter Provider: Yung Klein MD  Encounter Date: 2025   Encounter department: [facility]  :  Assessment & Plan  Type 2 diabetes mellitus with diabetic neuropathy, with long-term current use of insulin (HCC)    Lab Results   Component Value Date    HGBA1C 7.3 (A) 10/25/2024       Orders:    metFORMIN (GLUCOPHAGE-XR) 500 mg 24 hr tablet; Take 4 tablets (2,000 mg total) by mouth daily with breakfast    POCT hemoglobin A1c    Diabetic foot (HCC)    Lab Results   Component Value Date    HGBA1C 7.3 (A) 10/25/2024       Orders:    gabapentin (Neurontin) 600 MG tablet; Take 1 tablet (600 mg total) by mouth 3 (three) times a day    Benign prostatic hyperplasia, unspecified whether lower urinary tract symptoms present    Orders:    tamsulosin (FLOMAX) 0.4 mg; Take 1 capsule (0.4 mg total) by mouth daily    Postprocedural hypoparathyroidism (HCC)         Coronary artery disease of native artery of native heart with stable angina pectoris (HCC)  Stable.  Continue current.         Primary hypertension  BP at goal.  Continue current.         Type 2 diabetes mellitus with complication, with long-term current use of insulin (HCC)    Lab Results   Component Value Date    HGBA1C 7.3 (A) 10/25/2024   A1c improving.  Continue current.         Other hyperlipidemia  LDL at goal.  Continue current.                History of Present Illness   He is doing well overall.    He is physically active.  He has no CP or SOB.  He has no PND or orthopnea.    His lipids are at goal on his current regimen.  He has no myalgia or muscle weakness.  His LFTs are WNL.    His A1c is improving.  He has no side effects or hypoglycemia.      Review of Systems   All other systems reviewed and are negative.      Objective   /70 (BP Location: Right arm, Patient Position: Sitting, Cuff Size: Large)   Pulse 78   Resp 18   Ht 5' 10\" (1.778 m)   Wt " 91.6 kg (202 lb)   SpO2 96%   BMI 28.98 kg/m²      Physical Exam  Vitals and nursing note reviewed.   Constitutional:       Appearance: Normal appearance.   Cardiovascular:      Rate and Rhythm: Normal rate and regular rhythm.      Pulses: Normal pulses.      Heart sounds: Normal heart sounds.   Pulmonary:      Effort: Pulmonary effort is normal.      Breath sounds: Normal breath sounds.   Abdominal:      General: Abdomen is flat. Bowel sounds are normal.      Palpations: Abdomen is soft.   Musculoskeletal:         General: Normal range of motion.      Cervical back: Normal range of motion and neck supple.   Skin:     General: Skin is warm and dry.      Capillary Refill: Capillary refill takes less than 2 seconds.   Neurological:      General: No focal deficit present.      Mental Status: He is alert and oriented to person, place, and time. Mental status is at baseline.   Psychiatric:         Mood and Affect: Mood normal.         Behavior: Behavior normal.         Thought Content: Thought content normal.         Judgment: Judgment normal.

## 2025-01-24 NOTE — ASSESSMENT & PLAN NOTE
Lab Results   Component Value Date    HGBA1C 7.3 (A) 10/25/2024   A1c improving.  Continue current.

## 2025-01-24 NOTE — ASSESSMENT & PLAN NOTE
Lab Results   Component Value Date    HGBA1C 7.3 (A) 10/25/2024       Orders:    gabapentin (Neurontin) 600 MG tablet; Take 1 tablet (600 mg total) by mouth 3 (three) times a day

## 2025-03-04 DIAGNOSIS — E11.8 TYPE 2 DIABETES MELLITUS WITH COMPLICATION, WITH LONG-TERM CURRENT USE OF INSULIN (HCC): ICD-10-CM

## 2025-03-04 DIAGNOSIS — Z79.4 TYPE 2 DIABETES MELLITUS WITH COMPLICATION, WITH LONG-TERM CURRENT USE OF INSULIN (HCC): ICD-10-CM

## 2025-03-04 RX ORDER — INSULIN DEGLUDEC 100 U/ML
50 INJECTION, SOLUTION SUBCUTANEOUS EVERY 12 HOURS SCHEDULED
Qty: 90 ML | Refills: 1 | Status: SHIPPED | OUTPATIENT
Start: 2025-03-04

## 2025-03-04 NOTE — TELEPHONE ENCOUNTER
Reason for call:   [x] Refill   [] Prior Auth  [] Other:     Office:   [x] PCP/Provider - Yung Klein MD   [] Specialty/Provider -     Medication:     insulin degludec (Tresiba FlexTouch) 100 units/mL injection pen       Dose/Frequency: 50 Units, Subcutaneous, Every 12 hours scheduled     Quantity: 90 ml    Pharmacy: [facility] - [address]     Does the patient have enough for 3 days?   [x] Yes   [] No - Send as HP to POD

## 2025-03-17 ENCOUNTER — OFFICE VISIT (OUTPATIENT)
Dept: PODIATRY | Facility: CLINIC | Age: 75
End: 2025-03-17
Payer: COMMERCIAL

## 2025-03-17 VITALS
WEIGHT: 202 LBS | RESPIRATION RATE: 16 BRPM | BODY MASS INDEX: 28.92 KG/M2 | HEIGHT: 70 IN | TEMPERATURE: 98 F | OXYGEN SATURATION: 98 % | HEART RATE: 88 BPM

## 2025-03-17 DIAGNOSIS — E11.49 CONTROLLED TYPE 2 DIABETES MELLITUS WITH OTHER NEUROLOGIC COMPLICATION, WITH LONG-TERM CURRENT USE OF INSULIN (HCC): ICD-10-CM

## 2025-03-17 DIAGNOSIS — I73.9 PERIPHERAL VASCULAR DISEASE (HCC): ICD-10-CM

## 2025-03-17 DIAGNOSIS — M79.674 PAIN IN TOES OF BOTH FEET: ICD-10-CM

## 2025-03-17 DIAGNOSIS — M79.675 PAIN IN TOES OF BOTH FEET: ICD-10-CM

## 2025-03-17 DIAGNOSIS — Z79.01 LONG TERM CURRENT USE OF ANTICOAGULANT: ICD-10-CM

## 2025-03-17 DIAGNOSIS — B35.1 ONYCHOMYCOSIS: Primary | ICD-10-CM

## 2025-03-17 DIAGNOSIS — Z79.4 CONTROLLED TYPE 2 DIABETES MELLITUS WITH OTHER NEUROLOGIC COMPLICATION, WITH LONG-TERM CURRENT USE OF INSULIN (HCC): ICD-10-CM

## 2025-03-17 PROCEDURE — RECHECK: Performed by: PODIATRIST

## 2025-03-17 PROCEDURE — 11721 DEBRIDE NAIL 6 OR MORE: CPT | Performed by: PODIATRIST

## 2025-03-17 NOTE — PROGRESS NOTES
Name: Sg Munoz      : 1950      MRN: [id number]  Encounter Provider: Yung Salvador DPM  Encounter Date: 3/17/2025   Encounter department: [facility] PODIATRY [place]  :  Assessment & Plan  Onychomycosis       Debride mycotic nails and thin the nail plates x 10 with the use of a nail nipper manually and an electric Dremel bur was used to reduce the thickness of the nail beds and smoothed the distal aspect of the nails.   Peripheral vascular disease (HCC)         Controlled type 2 diabetes mellitus with other neurologic complication, with long-term current use of insulin (HCC)    Lab Results   Component Value Date    HGBA1C 7.2 (A) 2025            Pain in toes of both feet         Long term current use of anticoagulant         Pt was instructed to use lotion once a day on both feet such as cerave, Cetaphil or similar thick style of lotion.   Discussed proper shoe gear, daily inspections of feet, and general foot health with patient. Patient has Q8  findings and is recommended for at risk foot care every 9-10 weeks.    Patients most recent complete clinical foot exam was on: 2025      Return in about 10 weeks (around 2025).     History of Present Illness   HPI  Sg Munoz is a 74 y.o. male who presents chief complaint of painful thick nails on both feet.  He is a diabetic with peripheral neuropathy and is on long-term anticoagulation therapy.  His last A1c was 7.2.Patient presents for at-risk foot care.  Patient has no acute concerns today.  Patient has significant lower extremity risk due to neuropathy, parasthesia, edema, and trophic skin changes to the lower extremity.   History obtained from: patient    Review of Systems  Medical History Reviewed by provider this encounter:     .  Current Outpatient Medications on File Prior to Visit   Medication Sig Dispense Refill    aspirin (ECOTRIN LOW STRENGTH) 81 mg EC tablet Take 81 mg by mouth daily      atorvastatin (LIPITOR) 80 mg  "tablet Take 1 tablet (80 mg total) by mouth daily 90 tablet 3    clopidogrel (Plavix) 75 mg tablet Take 1 tablet (75 mg total) by mouth daily 90 tablet 3    Continuous Glucose Sensor (Dexcom G7 Sensor) Use 1 Device every 10 days 9 each 3    Continuous Glucose Sensor (Dexcom G7 Sensor)       dutasteride (AVODART) 0.5 mg capsule Take 1 capsule (0.5 mg total) by mouth daily 90 capsule 3    Empagliflozin (Jardiance) 25 MG TABS Take 1 tablet (25 mg total) by mouth every morning 90 tablet 3    gabapentin (Neurontin) 600 MG tablet Take 1 tablet (600 mg total) by mouth 3 (three) times a day 270 tablet 0    insulin degludec (Tresiba FlexTouch) 100 units/mL injection pen Inject 50 Units under the skin every 12 (twelve) hours 90 mL 1    Insulin Pen Needle (B-D ULTRAFINE III SHORT PEN) 31G X 8 MM MISC Use 2 (two) times a day 180 each 1    lisinopril (ZESTRIL) 20 mg tablet Take 1 tablet (20 mg total) by mouth daily 90 tablet 3    metFORMIN (GLUCOPHAGE-XR) 500 mg 24 hr tablet Take 4 tablets (2,000 mg total) by mouth daily with breakfast 360 tablet 1    OneTouch Ultra test strip Use 1 each 3 (three) times a day Use as instructed 400 strip 3    saccharomyces boulardii (FLORASTOR) 250 mg capsule Take 250 mg by mouth every other day      sitaGLIPtin (Januvia) 100 mg tablet Take 1 tablet (100 mg total) by mouth daily 90 tablet 1    SUPER B COMPLEX/C PO Take by mouth      tamsulosin (FLOMAX) 0.4 mg Take 1 capsule (0.4 mg total) by mouth daily 90 capsule 1     No current facility-administered medications on file prior to visit.      Social History     Tobacco Use    Smoking status: Never     Passive exposure: Past    Smokeless tobacco: Never   Vaping Use    Vaping status: Never Used   Substance and Sexual Activity    Alcohol use: Yes     Comment: rarely    Drug use: No    Sexual activity: Yes     Partners: Female        Objective   Pulse 88   Temp 98 °F (36.7 °C)   Resp 16   Ht 5' 10\" (1.778 m)   Wt 91.6 kg (202 lb)   SpO2 98%   " BMI 28.98 kg/m²      Physical Exam  Vascular status is 1/4 DP a faint 1/4 PT negative digital hair, normal distal cooling, immediate capillary refill with slight dependent rubor present bilaterally today.  Capillary refill is approximately 3 seconds.    Derm nails are brittle elongated hypertrophic white-yellow discoloration with subungual debris x 10.  There is an increased thickness in the nails approximately 3 mm with the hallux nails being the worst.  There is white flaky tissue present on the dorsal and plantar aspects of both feet and along the medial and lateral portions of the heels.  The flakes are very small but the coloration makes the skin looks slightly ashy.  No breaks in the skin and no signs of infection or dried blood present in any of the areas.  Slight discoloration is noted with elevation of both extremities with slight blanching seen.  There is loss of turgor and thinning of the skin seen bilaterally.      Ortho and neuro are unchanged from his visit on 11/8/2024.  Administrative Statements   I have spent a total time of 15 minutes in caring for this patient on the day of the visit/encounter including Risks and benefits of tx options, Instructions for management, Patient and family education, Importance of tx compliance, Risk factor reductions, Counseling / Coordination of care, Documenting in the medical record, and Obtaining or reviewing history  .

## 2025-04-09 ENCOUNTER — DOCTOR'S OFFICE (OUTPATIENT)
Dept: URBAN - NONMETROPOLITAN AREA CLINIC 1 | Facility: CLINIC | Age: 75
Setting detail: OPHTHALMOLOGY
End: 2025-04-09
Payer: COMMERCIAL

## 2025-04-09 DIAGNOSIS — H40.023: ICD-10-CM

## 2025-04-09 DIAGNOSIS — H35.40: ICD-10-CM

## 2025-04-09 DIAGNOSIS — H35.3111: ICD-10-CM

## 2025-04-09 DIAGNOSIS — H35.3122: ICD-10-CM

## 2025-04-09 DIAGNOSIS — H35.363: ICD-10-CM

## 2025-04-09 DIAGNOSIS — H26.493: ICD-10-CM

## 2025-04-09 DIAGNOSIS — H47.012: ICD-10-CM

## 2025-04-09 DIAGNOSIS — E11.3293: ICD-10-CM

## 2025-04-09 LAB
LEFT EYE DIABETIC RETINOPATHY: POSITIVE
RIGHT EYE DIABETIC RETINOPATHY: POSITIVE

## 2025-04-09 PROCEDURE — 92134 CPTRZ OPH DX IMG PST SGM RTA: CPT | Performed by: OPHTHALMOLOGY

## 2025-04-09 PROCEDURE — 92133 CPTRZD OPH DX IMG PST SGM ON: CPT | Performed by: OPHTHALMOLOGY

## 2025-04-09 PROCEDURE — 99213 OFFICE O/P EST LOW 20 MIN: CPT | Performed by: OPHTHALMOLOGY

## 2025-04-09 ASSESSMENT — PACHYMETRY
OD_CT_CORRECTION: -3
OS_CT_CORRECTION: -3
OS_CT_UM: 587
OD_CT_UM: 588

## 2025-04-09 ASSESSMENT — CONFRONTATIONAL VISUAL FIELD TEST (CVF)
OS_FINDINGS: FULL
OD_FINDINGS: FULL

## 2025-04-09 ASSESSMENT — LID POSITION - PTOSIS
OS_PTOSIS: LUL
OD_PTOSIS: RUL

## 2025-04-09 ASSESSMENT — TONOMETRY
OS_IOP_MMHG: 17
OD_IOP_MMHG: 17

## 2025-04-09 ASSESSMENT — LID POSITION - DERMATOCHALASIS
OS_DERMATOCHALASIS: LUL
OD_DERMATOCHALASIS: RUL

## 2025-04-11 ENCOUNTER — OFFICE VISIT (OUTPATIENT)
Dept: FAMILY MEDICINE CLINIC | Facility: CLINIC | Age: 75
End: 2025-04-11
Payer: MEDICARE

## 2025-04-11 VITALS
HEIGHT: 70 IN | RESPIRATION RATE: 18 BRPM | HEART RATE: 70 BPM | SYSTOLIC BLOOD PRESSURE: 130 MMHG | DIASTOLIC BLOOD PRESSURE: 68 MMHG | OXYGEN SATURATION: 96 % | TEMPERATURE: 97.3 F | WEIGHT: 198 LBS | BODY MASS INDEX: 28.35 KG/M2

## 2025-04-11 DIAGNOSIS — E11.8 TYPE 2 DIABETES MELLITUS WITH COMPLICATION, WITH LONG-TERM CURRENT USE OF INSULIN (HCC): Primary | ICD-10-CM

## 2025-04-11 DIAGNOSIS — E89.2 POSTPROCEDURAL HYPOPARATHYROIDISM (HCC): ICD-10-CM

## 2025-04-11 DIAGNOSIS — E11.9 TYPE 2 DIABETES MELLITUS WITHOUT COMPLICATION, WITHOUT LONG-TERM CURRENT USE OF INSULIN (HCC): ICD-10-CM

## 2025-04-11 DIAGNOSIS — E78.2 MIXED HYPERLIPIDEMIA: ICD-10-CM

## 2025-04-11 DIAGNOSIS — I25.118 CORONARY ARTERY DISEASE OF NATIVE ARTERY OF NATIVE HEART WITH STABLE ANGINA PECTORIS (HCC): ICD-10-CM

## 2025-04-11 DIAGNOSIS — I10 PRIMARY HYPERTENSION: ICD-10-CM

## 2025-04-11 DIAGNOSIS — G47.33 OSA (OBSTRUCTIVE SLEEP APNEA): ICD-10-CM

## 2025-04-11 DIAGNOSIS — Z79.4 TYPE 2 DIABETES MELLITUS WITH COMPLICATION, WITH LONG-TERM CURRENT USE OF INSULIN (HCC): Primary | ICD-10-CM

## 2025-04-11 DIAGNOSIS — E11.8 DIABETIC FOOT (HCC): ICD-10-CM

## 2025-04-11 DIAGNOSIS — I21.4 NON-STEMI (NON-ST ELEVATED MYOCARDIAL INFARCTION) (HCC): ICD-10-CM

## 2025-04-11 LAB — SL AMB POCT HEMOGLOBIN AIC: 7.7 (ref ?–6.5)

## 2025-04-11 PROCEDURE — 99214 OFFICE O/P EST MOD 30 MIN: CPT | Performed by: FAMILY MEDICINE

## 2025-04-11 PROCEDURE — 83036 HEMOGLOBIN GLYCOSYLATED A1C: CPT | Performed by: FAMILY MEDICINE

## 2025-04-11 RX ORDER — BRIMONIDINE TARTRATE 1 MG/ML
SOLUTION/ DROPS OPHTHALMIC
COMMUNITY
Start: 2025-04-09

## 2025-04-11 RX ORDER — BROMFENAC 1.03 MG/ML
SOLUTION/ DROPS OPHTHALMIC
COMMUNITY
Start: 2025-04-09

## 2025-04-11 RX ORDER — GABAPENTIN 600 MG/1
600 TABLET ORAL 3 TIMES DAILY
Qty: 270 TABLET | Refills: 0 | Status: SHIPPED | OUTPATIENT
Start: 2025-04-11

## 2025-04-11 ASSESSMENT — REFRACTION_CURRENTRX
OS_AXIS: 010
OS_OVR_VA: 20/
OD_ADD: +2.00
OS_ADD: +2.00
OS_OVR_VA: 20/
OD_SPHERE: PLANO
OS_AXIS: 018
OD_CYLINDER: -1.50
OD_CYLINDER: -0.75
OD_SPHERE: -5.75
OD_OVR_VA: 20/
OS_SPHERE: PLANO
OS_ADD: +2.50
OS_SPHERE: -7.75
OD_OVR_VA: 20/
OD_VPRISM_DIRECTION: PROGS
OD_AXIS: 180
OS_VPRISM_DIRECTION: PROGS
OD_VPRISM_DIRECTION: PROGS
OD_ADD: +2.50
OD_AXIS: 016
OS_VPRISM_DIRECTION: PROGS
OS_CYLINDER: -1.00
OS_CYLINDER: -0.75

## 2025-04-11 ASSESSMENT — REFRACTION_MANIFEST
OD_CYLINDER: -0.75
OD_CYLINDER: -0.75
OS_VA1: 20/20-2
OD_ADD: +2.50
OS_ADD: +1.25
OD_AXIS: 010
OD_VA1: 20/25-2
OS_VA1: 20/25-2
OS_AXIS: 020
OS_VA2: 20/25-2
OS_VA2: 20/25-2
OS_CYLINDER: -0.75
OS_SPHERE: +1.25
OS_CYLINDER: -0.75
OD_SPHERE: +1.25
OD_VA2: 20/25-2
OS_AXIS: 020
OS_SPHERE: PLANO
OD_AXIS: 010
OD_ADD: +1.25
OS_ADD: +2.50
OD_VA2: 20/25-2
OD_VA1: 20/20-2
OD_SPHERE: PLANO

## 2025-04-11 ASSESSMENT — KERATOMETRY
OS_K1POWER_DIOPTERS: 44.50
OS_AXISANGLE_DEGREES: 097
OD_AXISANGLE_DEGREES: 091
OD_K2POWER_DIOPTERS: 46.25
OD_K1POWER_DIOPTERS: 45.00
OS_K2POWER_DIOPTERS: 45.25

## 2025-04-11 ASSESSMENT — REFRACTION_AUTOREFRACTION
OS_SPHERE: +0.25
OD_SPHERE: +1.25
OS_CYLINDER: -0.50
OS_AXIS: 072
OD_CYLINDER: -0.75
OD_AXIS: 106

## 2025-04-11 ASSESSMENT — VISUAL ACUITY
OS_BCVA: 20/20
OD_BCVA: 20/20

## 2025-04-11 NOTE — ASSESSMENT & PLAN NOTE
Lab Results   Component Value Date    HGBA1C 7.7 (A) 04/11/2025       Orders:    gabapentin (Neurontin) 600 MG tablet; Take 1 tablet (600 mg total) by mouth 3 (three) times a day

## 2025-04-11 NOTE — ASSESSMENT & PLAN NOTE
LDL at goal.  Continue current.    Orders:    Lipid panel; Future    Comprehensive metabolic panel; Future    TSH, 3rd generation with Free T4 reflex; Future

## 2025-04-11 NOTE — ASSESSMENT & PLAN NOTE
Lab Results   Component Value Date    HGBA1C 7.7 (A) 04/11/2025   A1c at goal for age.  Continue current.    Orders:    POCT hemoglobin A1c    Empagliflozin (Jardiance) 25 MG TABS; Take 1 tablet (25 mg total) by mouth every morning    Albumin / creatinine urine ratio; Future    Lipid panel; Future    Comprehensive metabolic panel; Future    TSH, 3rd generation with Free T4 reflex; Future

## 2025-04-11 NOTE — ASSESSMENT & PLAN NOTE
BP at goal.  Continue current.    Orders:    Lipid panel; Future    Comprehensive metabolic panel; Future    TSH, 3rd generation with Free T4 reflex; Future

## 2025-04-17 ENCOUNTER — AMBUL SURGICAL CARE (OUTPATIENT)
Dept: URBAN - NONMETROPOLITAN AREA SURGERY 1 | Facility: SURGERY | Age: 75
Setting detail: OPHTHALMOLOGY
End: 2025-04-17
Payer: COMMERCIAL

## 2025-04-17 DIAGNOSIS — H26.491: ICD-10-CM

## 2025-04-17 PROBLEM — H47.012 ISCHEMIC OPTIC NEUROPATHY; LEFT EYE: Status: ACTIVE | Noted: 2025-04-09

## 2025-04-17 PROCEDURE — G8918 PT W/O PREOP ORDER IV AB PRO: HCPCS | Performed by: OPHTHALMOLOGY

## 2025-04-17 PROCEDURE — G8907 PT DOC NO EVENTS ON DISCHARG: HCPCS | Performed by: OPHTHALMOLOGY

## 2025-04-17 PROCEDURE — 66821 AFTER CATARACT LASER SURGERY: CPT | Mod: RT | Performed by: OPHTHALMOLOGY

## 2025-05-07 ENCOUNTER — APPOINTMENT (OUTPATIENT)
Dept: LAB | Facility: CLINIC | Age: 75
End: 2025-05-07
Payer: MEDICARE

## 2025-05-07 DIAGNOSIS — I25.118 CORONARY ARTERY DISEASE OF NATIVE ARTERY OF NATIVE HEART WITH STABLE ANGINA PECTORIS (HCC): ICD-10-CM

## 2025-05-07 DIAGNOSIS — E11.9 TYPE 2 DIABETES MELLITUS WITHOUT COMPLICATION, WITHOUT LONG-TERM CURRENT USE OF INSULIN (HCC): ICD-10-CM

## 2025-05-07 DIAGNOSIS — Z79.4 TYPE 2 DIABETES MELLITUS WITH COMPLICATION, WITH LONG-TERM CURRENT USE OF INSULIN (HCC): ICD-10-CM

## 2025-05-07 DIAGNOSIS — E78.49 OTHER HYPERLIPIDEMIA: ICD-10-CM

## 2025-05-07 DIAGNOSIS — E89.2 POSTPROCEDURAL HYPOPARATHYROIDISM (HCC): ICD-10-CM

## 2025-05-07 DIAGNOSIS — Z79.4 TYPE 2 DIABETES MELLITUS WITH DIABETIC NEUROPATHY, WITH LONG-TERM CURRENT USE OF INSULIN (HCC): ICD-10-CM

## 2025-05-07 DIAGNOSIS — E11.8 TYPE 2 DIABETES MELLITUS WITH COMPLICATION, WITH LONG-TERM CURRENT USE OF INSULIN (HCC): ICD-10-CM

## 2025-05-07 DIAGNOSIS — E78.2 MIXED HYPERLIPIDEMIA: ICD-10-CM

## 2025-05-07 DIAGNOSIS — I10 PRIMARY HYPERTENSION: ICD-10-CM

## 2025-05-07 DIAGNOSIS — E11.8 DIABETIC FOOT (HCC): ICD-10-CM

## 2025-05-07 DIAGNOSIS — E11.40 TYPE 2 DIABETES MELLITUS WITH DIABETIC NEUROPATHY, WITH LONG-TERM CURRENT USE OF INSULIN (HCC): ICD-10-CM

## 2025-05-07 DIAGNOSIS — N40.0 BENIGN PROSTATIC HYPERPLASIA, UNSPECIFIED WHETHER LOWER URINARY TRACT SYMPTOMS PRESENT: ICD-10-CM

## 2025-05-07 LAB
ALBUMIN SERPL BCG-MCNC: 3.9 G/DL (ref 3.5–5)
ALP SERPL-CCNC: 78 U/L (ref 34–104)
ALT SERPL W P-5'-P-CCNC: 24 U/L (ref 7–52)
ANION GAP SERPL CALCULATED.3IONS-SCNC: 7 MMOL/L (ref 4–13)
AST SERPL W P-5'-P-CCNC: 20 U/L (ref 13–39)
BASOPHILS # BLD AUTO: 0.04 THOUSANDS/ÂΜL (ref 0–0.1)
BASOPHILS NFR BLD AUTO: 1 % (ref 0–1)
BILIRUB SERPL-MCNC: 1.37 MG/DL (ref 0.2–1)
BUN SERPL-MCNC: 16 MG/DL (ref 5–25)
CALCIUM SERPL-MCNC: 8.3 MG/DL (ref 8.4–10.2)
CHLORIDE SERPL-SCNC: 107 MMOL/L (ref 96–108)
CHOLEST SERPL-MCNC: 123 MG/DL (ref ?–200)
CO2 SERPL-SCNC: 27 MMOL/L (ref 21–32)
CREAT SERPL-MCNC: 0.54 MG/DL (ref 0.6–1.3)
CREAT UR-MCNC: 64.4 MG/DL
EOSINOPHIL # BLD AUTO: 0.23 THOUSAND/ÂΜL (ref 0–0.61)
EOSINOPHIL NFR BLD AUTO: 3 % (ref 0–6)
ERYTHROCYTE [DISTWIDTH] IN BLOOD BY AUTOMATED COUNT: 14 % (ref 11.6–15.1)
GFR SERPL CREATININE-BSD FRML MDRD: 104 ML/MIN/1.73SQ M
GLUCOSE P FAST SERPL-MCNC: 114 MG/DL (ref 65–99)
HCT VFR BLD AUTO: 51.1 % (ref 36.5–49.3)
HDLC SERPL-MCNC: 40 MG/DL
HGB BLD-MCNC: 15.6 G/DL (ref 12–17)
IMM GRANULOCYTES # BLD AUTO: 0.05 THOUSAND/UL (ref 0–0.2)
IMM GRANULOCYTES NFR BLD AUTO: 1 % (ref 0–2)
LDLC SERPL CALC-MCNC: 69 MG/DL (ref 0–100)
LYMPHOCYTES # BLD AUTO: 1.56 THOUSANDS/ÂΜL (ref 0.6–4.47)
LYMPHOCYTES NFR BLD AUTO: 22 % (ref 14–44)
MCH RBC QN AUTO: 27.8 PG (ref 26.8–34.3)
MCHC RBC AUTO-ENTMCNC: 30.5 G/DL (ref 31.4–37.4)
MCV RBC AUTO: 91 FL (ref 82–98)
MICROALBUMIN UR-MCNC: 23.4 MG/L
MICROALBUMIN/CREAT 24H UR: 36 MG/G CREATININE (ref 0–30)
MONOCYTES # BLD AUTO: 0.46 THOUSAND/ÂΜL (ref 0.17–1.22)
MONOCYTES NFR BLD AUTO: 7 % (ref 4–12)
NEUTROPHILS # BLD AUTO: 4.76 THOUSANDS/ÂΜL (ref 1.85–7.62)
NEUTS SEG NFR BLD AUTO: 66 % (ref 43–75)
NONHDLC SERPL-MCNC: 83 MG/DL
NRBC BLD AUTO-RTO: 0 /100 WBCS
PLATELET # BLD AUTO: 170 THOUSANDS/UL (ref 149–390)
PMV BLD AUTO: 10.6 FL (ref 8.9–12.7)
POTASSIUM SERPL-SCNC: 3.6 MMOL/L (ref 3.5–5.3)
PROT SERPL-MCNC: 6.5 G/DL (ref 6.4–8.4)
RBC # BLD AUTO: 5.61 MILLION/UL (ref 3.88–5.62)
SODIUM SERPL-SCNC: 141 MMOL/L (ref 135–147)
TRIGL SERPL-MCNC: 72 MG/DL (ref ?–150)
TSH SERPL DL<=0.05 MIU/L-ACNC: 2.45 UIU/ML (ref 0.45–4.5)
WBC # BLD AUTO: 7.1 THOUSAND/UL (ref 4.31–10.16)

## 2025-05-07 PROCEDURE — 82043 UR ALBUMIN QUANTITATIVE: CPT

## 2025-05-07 PROCEDURE — 80053 COMPREHEN METABOLIC PANEL: CPT

## 2025-05-07 PROCEDURE — 36415 COLL VENOUS BLD VENIPUNCTURE: CPT

## 2025-05-07 PROCEDURE — 80061 LIPID PANEL: CPT

## 2025-05-07 PROCEDURE — 84443 ASSAY THYROID STIM HORMONE: CPT

## 2025-05-07 PROCEDURE — 85025 COMPLETE CBC W/AUTO DIFF WBC: CPT

## 2025-05-07 PROCEDURE — 82570 ASSAY OF URINE CREATININE: CPT

## 2025-05-09 ENCOUNTER — RESULTS FOLLOW-UP (OUTPATIENT)
Dept: FAMILY MEDICINE CLINIC | Facility: CLINIC | Age: 75
End: 2025-05-09

## 2025-05-15 ENCOUNTER — AMBUL SURGICAL CARE (OUTPATIENT)
Dept: URBAN - NONMETROPOLITAN AREA SURGERY 1 | Facility: SURGERY | Age: 75
Setting detail: OPHTHALMOLOGY
End: 2025-05-15
Payer: COMMERCIAL

## 2025-05-15 DIAGNOSIS — H26.492: ICD-10-CM

## 2025-05-15 PROCEDURE — 66821 AFTER CATARACT LASER SURGERY: CPT | Mod: 79,LT | Performed by: OPHTHALMOLOGY

## 2025-05-15 PROCEDURE — G8918 PT W/O PREOP ORDER IV AB PRO: HCPCS | Performed by: OPHTHALMOLOGY

## 2025-05-15 PROCEDURE — 66821 AFTER CATARACT LASER SURGERY: CPT | Mod: LT | Performed by: OPHTHALMOLOGY

## 2025-05-15 PROCEDURE — G8907 PT DOC NO EVENTS ON DISCHARG: HCPCS | Performed by: OPHTHALMOLOGY

## 2025-05-29 ENCOUNTER — PROCEDURE VISIT (OUTPATIENT)
Dept: PODIATRY | Facility: CLINIC | Age: 75
End: 2025-05-29
Payer: COMMERCIAL

## 2025-05-29 VITALS
WEIGHT: 202 LBS | RESPIRATION RATE: 16 BRPM | OXYGEN SATURATION: 95 % | TEMPERATURE: 97.2 F | HEART RATE: 90 BPM | HEIGHT: 70 IN | BODY MASS INDEX: 28.92 KG/M2

## 2025-05-29 DIAGNOSIS — M79.675 PAIN IN TOES OF BOTH FEET: ICD-10-CM

## 2025-05-29 DIAGNOSIS — B35.1 ONYCHOMYCOSIS: Primary | ICD-10-CM

## 2025-05-29 DIAGNOSIS — E11.49 CONTROLLED TYPE 2 DIABETES MELLITUS WITH OTHER NEUROLOGIC COMPLICATION, WITH LONG-TERM CURRENT USE OF INSULIN (HCC): ICD-10-CM

## 2025-05-29 DIAGNOSIS — Z79.01 LONG TERM CURRENT USE OF ANTICOAGULANT: ICD-10-CM

## 2025-05-29 DIAGNOSIS — M79.674 PAIN IN TOES OF BOTH FEET: ICD-10-CM

## 2025-05-29 DIAGNOSIS — Z79.4 CONTROLLED TYPE 2 DIABETES MELLITUS WITH OTHER NEUROLOGIC COMPLICATION, WITH LONG-TERM CURRENT USE OF INSULIN (HCC): ICD-10-CM

## 2025-05-29 DIAGNOSIS — I73.9 PERIPHERAL VASCULAR DISEASE (HCC): ICD-10-CM

## 2025-05-29 PROCEDURE — 11721 DEBRIDE NAIL 6 OR MORE: CPT | Performed by: PODIATRIST

## 2025-05-29 NOTE — PROGRESS NOTES
"Name: Sg Munoz      : 1950      MRN: [id number]  Encounter Provider: Yung Salvador DPM  Encounter Date: 2025   Encounter department: [facility] PODIATRY [place]  :  Assessment & Plan  Onychomycosis       Debride mycotic nails and thin the nail plates x 10 with the use of a nail nipper manually and an electric Dremel bur was used to reduce the thickness of the nail beds and smoothed the distal aspect of the nails.   Peripheral vascular disease (HCC)         Controlled type 2 diabetes mellitus with other neurologic complication, with long-term current use of insulin (HCC)    Lab Results   Component Value Date    HGBA1C 7.7 (A) 2025            Long term current use of anticoagulant         Pain in toes of both feet         Discussed proper shoe gear, daily inspections of feet, and general foot health with patient. Patient has Q8  findings and is recommended for at risk foot care every 9-10 weeks.    Patients most recent complete clinical foot exam was on: 2025    Return in about 10 weeks (around 2025).      History of Present Illness   HPI  Sg Munoz is a 75 y.o. male who presents with chief complaint of painful thick nails on both feet.  He is a diabetic whose last A1c was 7.7 drawn on 2025.  Patient presents for at-risk foot care.  Patient has no acute concerns today.  Patient has significant lower extremity risk due to neuropathy, parasthesia, edema, and trophic skin changes to the lower extremity.   History obtained from: patient    Review of Systems  Medical History Reviewed by provider this encounter:     .  Medications Ordered Prior to Encounter[1]   Social History[2]     Objective   Pulse 90   Temp (!) 97.2 °F (36.2 °C)   Resp 16   Ht 5' 10\" (1.778 m)   Wt 91.6 kg (202 lb)   SpO2 95%   BMI 28.98 kg/m²      Physical Exam  Vascular status is 1/4 DP a faint 1/4 PT negative digital hair, normal distal cooling, immediate capillary refill with slight dependent " rubor present bilaterally today.  Capillary refill is approximately 3 seconds.  There is slight edema present bilaterally of +1 pitting.     Derm nails are brittle elongated hypertrophic white-yellow discoloration with subungual debris x 10.  There is an increased thickness in the nails approximately 1-3 mm with the hallux nails being the worst.  There is white flaky tissue present on the dorsal and plantar aspects of both feet and along the medial and lateral portions of the heels.  No breaks in the skin and no signs of infection or dried blood present in any of the areas.  Slight discoloration is noted with elevation of both extremities with slight blanching seen.  There is loss of turgor and thinning of the skin seen bilaterally.      Ortho and neuro are unchanged from his visit on 1/9/2025.       [1]   Current Outpatient Medications on File Prior to Visit   Medication Sig Dispense Refill    aspirin (ECOTRIN LOW STRENGTH) 81 mg EC tablet Take 81 mg by mouth in the morning.      atorvastatin (LIPITOR) 80 mg tablet Take 1 tablet (80 mg total) by mouth daily 90 tablet 3    brimonidine (ALPHAGAN P) 0.1 %       Bromfenac Sodium, Once-Daily, 0.09 % SOLN       clopidogrel (Plavix) 75 mg tablet Take 1 tablet (75 mg total) by mouth daily 90 tablet 3    Continuous Glucose Sensor (Dexcom G7 Sensor) Use 1 Device every 10 days 9 each 3    Continuous Glucose Sensor (Dexcom G7 Sensor)       dutasteride (AVODART) 0.5 mg capsule Take 1 capsule (0.5 mg total) by mouth daily 90 capsule 3    Empagliflozin (Jardiance) 25 MG TABS Take 1 tablet (25 mg total) by mouth every morning 90 tablet 3    gabapentin (Neurontin) 600 MG tablet Take 1 tablet (600 mg total) by mouth 3 (three) times a day 270 tablet 0    insulin degludec (Tresiba FlexTouch) 100 units/mL injection pen Inject 50 Units under the skin every 12 (twelve) hours 90 mL 1    Insulin Pen Needle (B-D ULTRAFINE III SHORT PEN) 31G X 8 MM MISC Use 2 (two) times a day 180 each 1     lisinopril (ZESTRIL) 20 mg tablet Take 1 tablet (20 mg total) by mouth daily 90 tablet 3    metFORMIN (GLUCOPHAGE-XR) 500 mg 24 hr tablet Take 4 tablets (2,000 mg total) by mouth daily with breakfast 360 tablet 1    OneTouch Ultra test strip Use 1 each 3 (three) times a day Use as instructed 400 strip 3    saccharomyces boulardii (FLORASTOR) 250 mg capsule Take 250 mg by mouth every other day      sitaGLIPtin (Januvia) 100 mg tablet Take 1 tablet (100 mg total) by mouth daily 90 tablet 1    SUPER B COMPLEX/C PO Take by mouth      tamsulosin (FLOMAX) 0.4 mg Take 1 capsule (0.4 mg total) by mouth daily 90 capsule 1     No current facility-administered medications on file prior to visit.   [2]   Social History  Tobacco Use    Smoking status: Never     Passive exposure: Past    Smokeless tobacco: Never   Vaping Use    Vaping status: Never Used   Substance and Sexual Activity    Alcohol use: Yes     Comment: rarely    Drug use: No    Sexual activity: Yes     Partners: Female

## 2025-06-04 ENCOUNTER — DOCTOR'S OFFICE (OUTPATIENT)
Dept: URBAN - NONMETROPOLITAN AREA CLINIC 1 | Facility: CLINIC | Age: 75
Setting detail: OPHTHALMOLOGY
End: 2025-06-04
Payer: COMMERCIAL

## 2025-06-04 DIAGNOSIS — H35.3111: ICD-10-CM

## 2025-06-04 DIAGNOSIS — H47.012: ICD-10-CM

## 2025-06-04 DIAGNOSIS — H35.3122: ICD-10-CM

## 2025-06-04 DIAGNOSIS — Z96.1: ICD-10-CM

## 2025-06-04 DIAGNOSIS — E11.3293: ICD-10-CM

## 2025-06-04 DIAGNOSIS — H40.023: ICD-10-CM

## 2025-06-04 DIAGNOSIS — H35.363: ICD-10-CM

## 2025-06-04 PROBLEM — H26.493 POSTERIOR CAPSULE OPACIFIED; BOTH EYES: Status: RESOLVED | Noted: 2025-06-04 | Resolved: 2025-06-04

## 2025-06-04 LAB
LEFT EYE DIABETIC RETINOPATHY: POSITIVE
RIGHT EYE DIABETIC RETINOPATHY: POSITIVE

## 2025-06-04 PROCEDURE — 92133 CPTRZD OPH DX IMG PST SGM ON: CPT | Performed by: OPHTHALMOLOGY

## 2025-06-04 PROCEDURE — 99024 POSTOP FOLLOW-UP VISIT: CPT | Performed by: OPHTHALMOLOGY

## 2025-06-04 ASSESSMENT — REFRACTION_AUTOREFRACTION
OD_SPHERE: 0.00
OD_CYLINDER: -0.25
OD_AXIS: 154

## 2025-06-04 ASSESSMENT — REFRACTION_CURRENTRX
OD_OVR_VA: 20/
OS_AXIS: 010
OD_AXIS: 016
OS_SPHERE: -7.75
OS_OVR_VA: 20/
OS_CYLINDER: -1.00
OS_CYLINDER: -0.75
OD_OVR_VA: 20/
OS_ADD: +2.50
OD_SPHERE: PLANO
OS_VPRISM_DIRECTION: PROGS
OD_VPRISM_DIRECTION: PROGS
OD_ADD: +2.00
OS_SPHERE: PLANO
OS_AXIS: 018
OD_SPHERE: -5.75
OD_CYLINDER: -1.50
OS_VPRISM_DIRECTION: PROGS
OS_ADD: +2.00
OD_AXIS: 180
OD_CYLINDER: -0.75
OS_OVR_VA: 20/
OD_ADD: +2.50
OD_VPRISM_DIRECTION: PROGS

## 2025-06-04 ASSESSMENT — REFRACTION_MANIFEST
OS_VA2: 20/25-2
OS_ADD: +2.50
OS_CYLINDER: -0.75
OD_VA2: 20/25-2
OD_AXIS: 010
OS_SPHERE: PLANO
OS_ADD: +1.25
OD_ADD: +2.50
OD_AXIS: 010
OS_CYLINDER: -0.75
OD_VA2: 20/25-2
OD_SPHERE: +1.25
OS_AXIS: 020
OD_VA1: 20/25-2
OS_SPHERE: +1.25
OD_CYLINDER: -0.75
OS_VA1: 20/25-2
OD_ADD: +1.25
OD_SPHERE: PLANO
OS_AXIS: 020
OS_VA1: 20/20-2
OD_CYLINDER: -0.75
OD_VA1: 20/20-2
OS_VA2: 20/25-2

## 2025-06-04 ASSESSMENT — TONOMETRY
OD_IOP_MMHG: 11
OS_IOP_MMHG: 10

## 2025-06-04 ASSESSMENT — CONFRONTATIONAL VISUAL FIELD TEST (CVF)
OD_FINDINGS: FULL
OS_FINDINGS: FULL

## 2025-06-04 ASSESSMENT — KERATOMETRY
OD_K2POWER_DIOPTERS: 45.75
OS_AXISANGLE_DEGREES: 099
OD_AXISANGLE_DEGREES: 097
OS_K2POWER_DIOPTERS: 45.50
OD_K1POWER_DIOPTERS: 44.50
OS_K1POWER_DIOPTERS: 44.75

## 2025-06-04 ASSESSMENT — PACHYMETRY
OD_CT_UM: 588
OS_CT_UM: 587
OS_CT_CORRECTION: -3
OD_CT_CORRECTION: -3

## 2025-06-04 ASSESSMENT — LID POSITION - DERMATOCHALASIS
OS_DERMATOCHALASIS: LUL
OD_DERMATOCHALASIS: RUL

## 2025-06-04 ASSESSMENT — VISUAL ACUITY
OD_BCVA: 20/20-1
OS_BCVA: 20/20-1

## 2025-06-04 ASSESSMENT — LID POSITION - PTOSIS
OS_PTOSIS: LUL
OD_PTOSIS: RUL

## 2025-06-18 PROBLEM — Z95.5 PRESENCE OF DRUG-ELUTING STENT IN LEFT CIRCUMFLEX CORONARY ARTERY: Status: ACTIVE | Noted: 2025-06-18

## 2025-06-18 NOTE — PROGRESS NOTES
Cardiology Follow Up    Sg Munoz  1950  [id number]  [facility] [address] [id number] [phone]    1. Coronary artery disease involving native coronary artery of native heart without angina pectoris        2. Presence of drug-eluting stent in left circumflex coronary artery        3. Primary hypertension        4. Dyslipidemia            Discussion/Summary:  Coronary artery disease  Nuclear stress test 6/7/24 showing lateral ischemia with subsequent LHC showing 90% proximal circumflex lesion for which he underwent BIA placement.   Residual 30-50% LAD disease noted  He remains physically active including attending a local wellness center 3 days/week without any cardiopulmonary symptoms.  Can continue DAPT with plavix and aspirin, will continue single antiplatelet agent in July. Will discuss with primary cardiologist.     Hypertension  Controlled on present regimen      Dyslipidemia   Lipids are controlled on atorvastatin 80 mg daily     Carotid artery plaque  Mild bilaterally on vascular screen from July 2024  Follow up in July 2026     He will RTO in 6 months with Dr. Preston or sooner if necessary. He will call the office with any concerns in the interim.     Interval History:   Sg Munoz is a 75 y.o. male  with coronary artery disease s/p BIA to the proximal circumflex in June 2024,  hypertension, dyslipidemia, Type 2 diabetes who presents to the office today for follow up.     Since his last office visit he has been feeling well.  He goes to the gym 3 times a week.  He performs a combination of cardio and weights for 1 hour each time.  He is able to do so without any shortness of breath or chest pain/pressure/discomfort.  He denies lower extremity edema.  He denies any significant lightheadedness or dizziness.  He has been taking all medications as prescribed.    Medical  Problems       Problem List       Other hyperlipidemia    Hypertension    BPH with obstruction/lower urinary tract symptoms    Hypercalciuria    Knee osteoarthritis    Prepatellar bursitis    Multiple thyroid nodules    LILIA (obstructive sleep apnea)    Type 2 diabetes mellitus with complication, with long-term current use of insulin (HCC)      Lab Results   Component Value Date    HGBA1C 7.7 (A) 04/11/2025         History of parathyroidectomy    Overview Signed 5/9/2019  3:14 PM by Donna Baugh RN   11/1/2018 right inferior parathyroidectomy  Cellular parathyroid tissue 0.74 g         BMI 33.0-33.9,adult    Preop examination    Non-STEMI (non-ST elevated myocardial infarction) ([facility])    Diabetic foot ([facility])      Lab Results   Component Value Date    HGBA1C 7.7 (A) 04/11/2025         Other chest pain    Coronary artery disease of native artery with stable angina pectoris ([facility])    Mixed hyperlipidemia    Postprocedural hypoparathyroidism ([facility])        Past Medical History[1]  Social History     Socioeconomic History    Marital status: /Civil Union     Spouse name: Not on file    Number of children: Not on file    Years of education: Not on file    Highest education level: Not on file   Occupational History    Not on file   Tobacco Use    Smoking status: Never     Passive exposure: Past    Smokeless tobacco: Never   Vaping Use    Vaping status: Never Used   Substance and Sexual Activity    Alcohol use: Yes     Comment: rarely    Drug use: No    Sexual activity: Yes     Partners: Female   Other Topics Concern    Not on file   Social History Narrative    Not on file     Social Drivers of Health     Financial Resource Strain: Low Risk  (12/4/2023)    Overall Financial Resource Strain (CARDIA)     Difficulty of Paying Living Expenses: Not hard at all   Food Insecurity: No Food Insecurity (8/20/2023)    Received from [employer]    Food Insecurity     Within the past 12 months, you worried that your food would run out  before you got the money to buy more.: 1     Within the past 12 months, the food you bought just didn't last and you didn't have money to get more.: 1   Transportation Needs: No Transportation Needs (12/4/2023)    PRAPARE - Transportation     Lack of Transportation (Medical): No     Lack of Transportation (Non-Medical): No   Physical Activity: Insufficiently Active (8/20/2023)    Received from [facility]    Physical Activity     On average, how many days per week do you engage in moderate to strenuous exercise (like a brisk walk)?: 3 days     On average, how many minutes do you engage in exercise at this level?: 30 min   Stress: No Stress Concern Present (8/20/2023)    Received from [facility]    [employer] [place] [place] [employer] - Occupational Stress Questionnaire     Feeling of Stress : Only a little   Social Connections: Socially Integrated (8/20/2023)    Received from [facility]    Social Connection and Isolation Panel     In a typical week, how many times do you talk on the phone with family, friends, or neighbors?: More than three times a week     How often do you get together with friends or relatives?: Three times a week     How often do you attend Taoist or Caodaism services?: More than 4 times per year     Do you belong to any clubs or organizations such as Taoist groups, unions, fraternal or athletic groups, or school groups?: Yes     How often do you attend meetings of the clubs or organizations you belong to?: 1 to 4 times per year     Are you , , , , never , or living with a partner?:    Intimate Partner Violence: Not At Risk (8/20/2023)    Received from [facility]     Threatened: Not on file     Insulted: Not on file     Physically Hurt : Not on file     Scream: Not on file   Housing Stability: At Risk (8/20/2023)    Received from [facility]     Living Situation: Not on file     Housing Problems: Not on file     "  Family History[2]  Past Surgical History[3]  Current Medications[4]  No Known Allergies    Labs:     Chemistry        Component Value Date/Time    K 3.6 05/07/2025 0729     05/07/2025 0729    CO2 27 05/07/2025 0729    BUN 16 05/07/2025 0729    CREATININE 0.54 (L) 05/07/2025 0729        Component Value Date/Time    CALCIUM 8.3 (L) 05/07/2025 0729    ALKPHOS 78 05/07/2025 0729    AST 20 05/07/2025 0729    ALT 24 05/07/2025 0729            No results found for: \"CHOL\"  Lab Results   Component Value Date    HDL 40 05/07/2025    HDL 38 (L) 09/04/2024    HDL 39 (L) 02/12/2024     Lab Results   Component Value Date    LDLCALC 69 05/07/2025    LDLCALC 71 09/04/2024    LDLCALC 78 02/12/2024     Lab Results   Component Value Date    TRIG 72 05/07/2025    TRIG 102 09/04/2024    TRIG 100 02/12/2024     No results found for: \"CHOLHDL\"    Imaging: No results found.    ECG: sinus rhythm with PAC's  Left axis deviation  RBBB  Inferior infarct    Review of Systems   All other systems reviewed and are negative.      Vitals:    06/19/25 1324   BP: 136/66   Pulse: 81   SpO2: 95%     Vitals:    06/19/25 1324   Weight: 90.9 kg (200 lb 6.4 oz)     Height: 5' 10\" (177.8 cm)   Body mass index is 28.75 kg/m².    Physical Exam:  Physical Exam  Vitals reviewed.   Constitutional:       General: He is not in acute distress.     Appearance: He is well-developed. He is not diaphoretic.   HENT:      Head: Normocephalic and atraumatic.     Eyes:      Pupils: Pupils are equal, round, and reactive to light.     Neck:      Vascular: No carotid bruit.     Cardiovascular:      Rate and Rhythm: Normal rate and regular rhythm.      Pulses:           Radial pulses are 2+ on the right side and 2+ on the left side.      Heart sounds: S1 normal and S2 normal. No murmur heard.  Pulmonary:      Effort: Pulmonary effort is normal. No respiratory distress.      Breath sounds: Normal breath sounds. No wheezing or rales.   Abdominal:      General: There is " no distension.      Palpations: Abdomen is soft.      Tenderness: There is no abdominal tenderness.     Musculoskeletal:         General: Normal range of motion.      Cervical back: Normal range of motion.      Right lower leg: No edema.      Left lower leg: No edema.     Skin:     General: Skin is warm and dry.      Findings: No erythema.     Neurological:      General: No focal deficit present.      Mental Status: He is alert and oriented to person, place, and time.      Gait: Gait normal.     Psychiatric:         Mood and Affect: Mood normal.         Behavior: Behavior normal.              [1]   Past Medical History:  Diagnosis Date    Arthritis     Back pain     Diabetes mellitus (HCC)     Hypertension    [2]   Family History  Problem Relation Name Age of Onset    Hypertension Father      Coronary artery disease Father      Hypertension Mother      Arthritis Mother      Coronary artery disease Mother      Diabetes Maternal Uncle      Diabetes Paternal Uncle     [3]   Past Surgical History:  Procedure Laterality Date    CARDIAC CATHETERIZATION Left 6/24/2024    Procedure: Cardiac Left Heart Cath;  Surgeon: Ismael Wall DO;  Location: [facility];  Service: Cardiology    CARDIAC CATHETERIZATION N/A 6/24/2024    Procedure: Cardiac pci;  Surgeon: Ismael Wall DO;  Location: [facility];  Service: Cardiology    HERNIA REPAIR Left 1998    with mesh to groin    OH PARATHYROIDECTOMY/EXPLORATION PARATHYROIDS Right 11/1/2018    Procedure: MINIMALLY INVASIVE RIGHT PARATHYROIDECTOMY WITH PTH MONITORING;  Surgeon: Price Ybarra MD;  Location: [facility];  Service: Surgical Oncology    TONSILLECTOMY      US GUIDED THYROID BIOPSY  9/7/2018   [4]   Current Outpatient Medications:     aspirin (ECOTRIN LOW STRENGTH) 81 mg EC tablet, Take 81 mg by mouth in the morning., Disp: , Rfl:     atorvastatin (LIPITOR) 80 mg tablet, Take 1 tablet (80 mg total) by mouth daily, Disp: 90 tablet, Rfl: 3     clopidogrel (Plavix) 75 mg tablet, Take 1 tablet (75 mg total) by mouth daily, Disp: 90 tablet, Rfl: 3    Continuous Glucose Sensor (Dexcom G7 Sensor), Use 1 Device every 10 days, Disp: 9 each, Rfl: 3    Continuous Glucose Sensor (Dexcom G7 Sensor), , Disp: , Rfl:     dutasteride (AVODART) 0.5 mg capsule, Take 1 capsule (0.5 mg total) by mouth daily, Disp: 90 capsule, Rfl: 3    Empagliflozin (Jardiance) 25 MG TABS, Take 1 tablet (25 mg total) by mouth every morning, Disp: 90 tablet, Rfl: 3    gabapentin (Neurontin) 600 MG tablet, Take 1 tablet (600 mg total) by mouth 3 (three) times a day, Disp: 270 tablet, Rfl: 0    insulin degludec (Tresiba FlexTouch) 100 units/mL injection pen, Inject 50 Units under the skin every 12 (twelve) hours (Patient taking differently: Inject 55 Units under the skin every 12 (twelve) hours), Disp: 90 mL, Rfl: 1    Insulin Pen Needle (B-D ULTRAFINE III SHORT PEN) 31G X 8 MM MISC, Use 2 (two) times a day, Disp: 180 each, Rfl: 1    lisinopril (ZESTRIL) 20 mg tablet, Take 1 tablet (20 mg total) by mouth daily, Disp: 90 tablet, Rfl: 3    metFORMIN (GLUCOPHAGE-XR) 500 mg 24 hr tablet, Take 4 tablets (2,000 mg total) by mouth daily with breakfast, Disp: 360 tablet, Rfl: 1    OneTouch Ultra test strip, Use 1 each 3 (three) times a day Use as instructed, Disp: 400 strip, Rfl: 3    sitaGLIPtin (Januvia) 100 mg tablet, Take 1 tablet (100 mg total) by mouth daily, Disp: 90 tablet, Rfl: 1    SUPER B COMPLEX/C PO, Take by mouth, Disp: , Rfl:     tamsulosin (FLOMAX) 0.4 mg, Take 1 capsule (0.4 mg total) by mouth daily, Disp: 90 capsule, Rfl: 1    brimonidine (ALPHAGAN P) 0.1 %, , Disp: , Rfl:     Bromfenac Sodium, Once-Daily, 0.09 % SOLN, , Disp: , Rfl:     saccharomyces boulardii (FLORASTOR) 250 mg capsule, Take 250 mg by mouth every other day (Patient not taking: Reported on 6/19/2025), Disp: , Rfl:

## 2025-06-19 ENCOUNTER — OFFICE VISIT (OUTPATIENT)
Dept: CARDIOLOGY CLINIC | Facility: HOSPITAL | Age: 75
End: 2025-06-19
Payer: MEDICARE

## 2025-06-19 VITALS
WEIGHT: 200.4 LBS | DIASTOLIC BLOOD PRESSURE: 66 MMHG | OXYGEN SATURATION: 95 % | BODY MASS INDEX: 28.69 KG/M2 | SYSTOLIC BLOOD PRESSURE: 136 MMHG | HEART RATE: 81 BPM | HEIGHT: 70 IN

## 2025-06-19 DIAGNOSIS — I10 PRIMARY HYPERTENSION: ICD-10-CM

## 2025-06-19 DIAGNOSIS — I25.10 CORONARY ARTERY DISEASE INVOLVING NATIVE CORONARY ARTERY OF NATIVE HEART WITHOUT ANGINA PECTORIS: Primary | ICD-10-CM

## 2025-06-19 DIAGNOSIS — E78.5 DYSLIPIDEMIA: ICD-10-CM

## 2025-06-19 DIAGNOSIS — Z95.5 PRESENCE OF DRUG-ELUTING STENT IN LEFT CIRCUMFLEX CORONARY ARTERY: ICD-10-CM

## 2025-06-19 PROCEDURE — 99214 OFFICE O/P EST MOD 30 MIN: CPT | Performed by: PHYSICIAN ASSISTANT

## 2025-06-19 PROCEDURE — 93000 ELECTROCARDIOGRAM COMPLETE: CPT | Performed by: PHYSICIAN ASSISTANT

## 2025-06-20 DIAGNOSIS — E11.8 TYPE 2 DIABETES MELLITUS WITH COMPLICATION, WITH LONG-TERM CURRENT USE OF INSULIN (HCC): ICD-10-CM

## 2025-06-20 DIAGNOSIS — Z79.4 TYPE 2 DIABETES MELLITUS WITH COMPLICATION, WITH LONG-TERM CURRENT USE OF INSULIN (HCC): ICD-10-CM

## 2025-06-20 NOTE — TELEPHONE ENCOUNTER
Reason for call:   [x] Refill   [] Prior Auth  [] Other:     Office:   [x] PCP/Provider -   [] Specialty/Provider -     Medication: Jardiance    Dose/Frequency: 25 mg    Quantity: 90 tablet    Pharmacy: [facility] - [address] [phone]    Local Pharmacy   Does the patient have enough for 3 days?   [] Yes   [] No - Send as HP to POD    Mail Away Pharmacy   Does the patient have enough for 10 days?   [x] Yes   [] No - Send as HP to POD

## 2025-07-01 DIAGNOSIS — E11.9 CONTROLLED TYPE 2 DIABETES MELLITUS WITHOUT COMPLICATION, WITHOUT LONG-TERM CURRENT USE OF INSULIN (HCC): ICD-10-CM

## 2025-07-01 NOTE — TELEPHONE ENCOUNTER
Reason for call:   [x] Refill   [] Prior Auth  [] Other:     Office:   [x] PCP/Provider - Yung Klein   [] Specialty/Provider -     Medication: Continuous Glucose Sensor (Dexcom G7 Sensor)     Dose/Frequency: 1 devise every 10 days    Quantity: 9    Pharmacy: [facility]    Local Pharmacy   Does the patient have enough for 3 days?   [] Yes   [] No - Send as HP to POD    Mail Away Pharmacy   Does the patient have enough for 10 days?   [x] Yes   [] No - Send as HP to POD

## 2025-07-02 RX ORDER — ACYCLOVIR 400 MG/1
1 TABLET ORAL
Qty: 9 EACH | Refills: 0 | Status: SHIPPED | OUTPATIENT
Start: 2025-07-02

## 2025-07-18 ENCOUNTER — OFFICE VISIT (OUTPATIENT)
Dept: FAMILY MEDICINE CLINIC | Facility: CLINIC | Age: 75
End: 2025-07-18
Payer: COMMERCIAL

## 2025-07-18 VITALS
RESPIRATION RATE: 18 BRPM | TEMPERATURE: 97.7 F | DIASTOLIC BLOOD PRESSURE: 64 MMHG | HEIGHT: 70 IN | HEART RATE: 75 BPM | OXYGEN SATURATION: 96 % | BODY MASS INDEX: 28.63 KG/M2 | SYSTOLIC BLOOD PRESSURE: 130 MMHG | WEIGHT: 200 LBS

## 2025-07-18 DIAGNOSIS — Z79.4 TYPE 2 DIABETES MELLITUS WITH COMPLICATION, WITH LONG-TERM CURRENT USE OF INSULIN (HCC): ICD-10-CM

## 2025-07-18 DIAGNOSIS — E89.2 POSTPROCEDURAL HYPOPARATHYROIDISM (HCC): ICD-10-CM

## 2025-07-18 DIAGNOSIS — I10 PRIMARY HYPERTENSION: Primary | ICD-10-CM

## 2025-07-18 DIAGNOSIS — E11.8 TYPE 2 DIABETES MELLITUS WITH COMPLICATION, WITH LONG-TERM CURRENT USE OF INSULIN (HCC): ICD-10-CM

## 2025-07-18 DIAGNOSIS — E78.2 MIXED HYPERLIPIDEMIA: ICD-10-CM

## 2025-07-18 DIAGNOSIS — I25.10 CORONARY ARTERY DISEASE INVOLVING NATIVE CORONARY ARTERY OF NATIVE HEART WITHOUT ANGINA PECTORIS: ICD-10-CM

## 2025-07-18 DIAGNOSIS — K63.5 POLYP OF COLON, UNSPECIFIED PART OF COLON, UNSPECIFIED TYPE: ICD-10-CM

## 2025-07-18 LAB — SL AMB POCT HEMOGLOBIN AIC: 8.4 (ref ?–6.5)

## 2025-07-18 PROCEDURE — 99214 OFFICE O/P EST MOD 30 MIN: CPT | Performed by: FAMILY MEDICINE

## 2025-07-18 RX ORDER — INSULIN DEGLUDEC 100 U/ML
60 INJECTION, SOLUTION SUBCUTANEOUS EVERY 12 HOURS SCHEDULED
Qty: 90 ML | Refills: 1 | Status: SHIPPED | OUTPATIENT
Start: 2025-07-18

## 2025-07-18 NOTE — ASSESSMENT & PLAN NOTE
Lab Results   Component Value Date    HGBA1C 7.7 (A) 04/11/2025   A1c largely at goal.  Continue current.    Orders:    POCT hemoglobin A1c

## 2025-07-18 NOTE — PROGRESS NOTES
":  Assessment & Plan  Primary hypertension  BP at goal.  Continue current.         Coronary artery disease involving native coronary artery of native heart without angina pectoris  Stable. Continue current.         Type 2 diabetes mellitus with complication, with long-term current use of insulin (HCC)    Lab Results   Component Value Date    HGBA1C 7.7 (A) 04/11/2025   A1c largely at goal.  Continue current.    Orders:    POCT hemoglobin A1c    Postprocedural hypoparathyroidism (HCC)         Mixed hyperlipidemia  LDL at goal.  Continue current.         Polyp of colon, unspecified part of colon, unspecified type    Orders:    Ambulatory Referral to Gastroenterology; Future        History of Present Illness     Sg Munoz is a 75 y.o. male   He denies CP or SOB. He has no HA or vision changes.    His lipids are at goal.  He has normal LFTs and no myalgia or muscle weakness.    His A1c is largely at goal.  He has no side effects or hypoglycemia.      Review of Systems   All other systems reviewed and are negative.    Objective   /64 (BP Location: Right arm, Patient Position: Sitting, Cuff Size: Large)   Pulse 75   Temp 97.7 °F (36.5 °C) (Temporal)   Resp 18   Ht 5' 10\" (1.778 m)   Wt 90.7 kg (200 lb) Comment: Provided by pt  SpO2 96%   BMI 28.70 kg/m²      Physical Exam  Vitals and nursing note reviewed.   Constitutional:       Appearance: Normal appearance.     Cardiovascular:      Rate and Rhythm: Normal rate and regular rhythm.      Pulses: Normal pulses.      Heart sounds: Normal heart sounds.   Pulmonary:      Effort: Pulmonary effort is normal.      Breath sounds: Normal breath sounds.   Abdominal:      General: Abdomen is flat.      Palpations: Abdomen is soft.     Musculoskeletal:         General: Normal range of motion.      Cervical back: Normal range of motion and neck supple.     Skin:     General: Skin is warm and dry.      Capillary Refill: Capillary refill takes less than 2 seconds. "     Neurological:      General: No focal deficit present.      Mental Status: He is alert and oriented to person, place, and time. Mental status is at baseline.     Psychiatric:         Mood and Affect: Mood normal.         Behavior: Behavior normal.         Thought Content: Thought content normal.         Judgment: Judgment normal.

## 2025-07-24 ENCOUNTER — TELEPHONE (OUTPATIENT)
Age: 75
End: 2025-07-24

## 2025-07-24 ENCOUNTER — PREP FOR PROCEDURE (OUTPATIENT)
Age: 75
End: 2025-07-24

## 2025-07-24 DIAGNOSIS — Z86.0100 HISTORY OF COLON POLYPS: Primary | ICD-10-CM

## 2025-07-24 NOTE — TELEPHONE ENCOUNTER
Scheduled date of colonoscopy (as of today):9/9/25  Physician performing colonoscopy:Dr. Brewer   Location of colonoscopy:[facility]  Bowel prep reviewed with patient: reviewed izabel/dul prep instruction with pt, sent to pt email   [email or web address]     Instructions reviewed with patient by:[email or web address]  Clearances: na    Diabetic hold Jardiance 4 days. Pt requesting to go to [facility],

## 2025-07-24 NOTE — TELEPHONE ENCOUNTER
07/24/25  Screened by: Elaine Ferris    Referring Provider     Pre- Screening:     There is no height or weight on file to calculate BMI.28.70  Has patient been referred for a routine screening Colonoscopy? yes  Is the patient between 45-75 years old? yes      Previous Colonoscopy yes   If yes:    Date:     Facility:     Reason:           Does the patient want to see a Gastroenterologist prior to their procedure OR are they having any GI symptoms? no    Has the patient been hospitalized or had abdominal surgery in the past 6 months? no    Does the patient use supplemental oxygen? no    Does the patient take Coumadin, Lovenox, Plavix, Elliquis, Xarelto, or other blood thinning medication? no    Has the patient had a stroke, cardiac event, or stent placed in the past year? no        If patient is between 45yrs - 49yrs, please advise patient that we will have to confirm benefits & coverage with their insurance company for a routine screening colonoscopy.

## (undated) DEVICE — PLUMEPEN PRO 10FT

## (undated) DEVICE — GLOVE INDICATOR PI UNDERGLOVE SZ 7 BLUE

## (undated) DEVICE — CHLORAPREP HI-LITE 26ML ORANGE

## (undated) DEVICE — SUT VICRYL 3-0 SH 27 IN J416H

## (undated) DEVICE — DRAPE SURGIKIT SADDLE BAG

## (undated) DEVICE — SURGICEL 4 X 8

## (undated) DEVICE — INTENDED FOR TISSUE SEPARATION, AND OTHER PROCEDURES THAT REQUIRE A SHARP SURGICAL BLADE TO PUNCTURE OR CUT.: Brand: BARD-PARKER SAFETY BLADES SIZE 15, STERILE

## (undated) DEVICE — 3M™ STERI-STRIP™ REINFORCED ADHESIVE SKIN CLOSURES, R1547, 1/2 IN X 4 IN (12 MM X 100 MM), 6 STRIPS/ENVELOPE: Brand: 3M™ STERI-STRIP™

## (undated) DEVICE — LIGACLIP MCA MULTIPLE CLIP APPLIERS, 20 SMALL CLIPS: Brand: LIGACLIP

## (undated) DEVICE — RUNTHROUGH NS EXTRA FLOPPY PTCA GUIDEWIRE: Brand: RUNTHROUGH

## (undated) DEVICE — 3000CC GUARDIAN II: Brand: GUARDIAN

## (undated) DEVICE — TELFA NON-ADHERENT ABSORBENT DRESSING: Brand: TELFA

## (undated) DEVICE — GLIDESHEATH BASIC HYDROPHILIC COATED INTRODUCER SHEATH: Brand: GLIDESHEATH

## (undated) DEVICE — 3M™ TEGADERM™ TRANSPARENT FILM DRESSING FRAME STYLE, 1626W, 4 IN X 4-3/4 IN (10 CM X 12 CM), 50/CT 4CT/CASE: Brand: 3M™ TEGADERM™

## (undated) DEVICE — INTENDED FOR TISSUE SEPARATION, AND OTHER PROCEDURES THAT REQUIRE A SHARP SURGICAL BLADE TO PUNCTURE OR CUT.: Brand: BARD-PARKER ® CARBON RIB-BACK BLADES

## (undated) DEVICE — BALLOON NC EUPHORA 3 X 20MM

## (undated) DEVICE — Device: Brand: MINAMO

## (undated) DEVICE — TR BAND RADIAL ARTERY COMPRESSION DEVICE: Brand: TR BAND

## (undated) DEVICE — BALLOON EUPHORA RX 2.5 X 15MM

## (undated) DEVICE — GLOVE SRG BIOGEL ECLIPSE 7

## (undated) DEVICE — ELECTRODE BLADE MOD E-Z CLEAN 2.5IN 6.4CM -0012M

## (undated) DEVICE — RADIFOCUS OPTITORQUE ANGIOGRAPHIC CATHETER: Brand: OPTITORQUE

## (undated) DEVICE — SCD SEQUENTIAL COMPRESSION COMFORT SLEEVE MEDIUM KNEE LENGTH: Brand: KENDALL SCD

## (undated) DEVICE — GUIDEWIRE WHOLEY HI TORQUE INTERM MOD J .035 145CM

## (undated) DEVICE — SUT MONOCRYL 5-0 P-3 18 IN Y493G

## (undated) DEVICE — SUT VICRYL 4-0 PS-2 27 IN J426H

## (undated) DEVICE — PACK UNIVERSAL NECK

## (undated) DEVICE — 3M™ STERI-STRIP™ COMPOUND BENZOIN TINCTURE 40 BAGS/CARTON 4 CARTONS/CASE C1544: Brand: 3M™ STERI-STRIP™

## (undated) DEVICE — GAUZE SPONGES,USP TYPE VII GAUZE, 12 PLY: Brand: CURITY

## (undated) DEVICE — NEEDLE 25G X 1 1/2

## (undated) DEVICE — Device

## (undated) DEVICE — CATH GUIDE LAUNCHER 6FR EBU 3.5

## (undated) DEVICE — MINOR PROCEDURE DRAPE: Brand: CONVERTORS

## (undated) DEVICE — BALLOON NC EUPHORA 3.75 X 15MM